# Patient Record
Sex: MALE | Race: WHITE | Employment: OTHER | URBAN - METROPOLITAN AREA
[De-identification: names, ages, dates, MRNs, and addresses within clinical notes are randomized per-mention and may not be internally consistent; named-entity substitution may affect disease eponyms.]

---

## 2017-02-22 RX ORDER — DILTIAZEM HYDROCHLORIDE 360 MG/1
360 CAPSULE, EXTENDED RELEASE ORAL DAILY
Qty: 90 CAPSULE | Refills: 1 | Status: SHIPPED | OUTPATIENT
Start: 2017-02-22 | End: 2017-05-25 | Stop reason: SDUPTHER

## 2017-05-25 ENCOUNTER — OFFICE VISIT (OUTPATIENT)
Dept: FAMILY MEDICINE CLINIC | Age: 62
End: 2017-05-25
Payer: COMMERCIAL

## 2017-05-25 VITALS
RESPIRATION RATE: 20 BRPM | SYSTOLIC BLOOD PRESSURE: 140 MMHG | BODY MASS INDEX: 38.06 KG/M2 | HEIGHT: 72 IN | WEIGHT: 281 LBS | HEART RATE: 75 BPM | DIASTOLIC BLOOD PRESSURE: 80 MMHG

## 2017-05-25 DIAGNOSIS — E11.9 TYPE 2 DIABETES MELLITUS WITHOUT COMPLICATION, WITHOUT LONG-TERM CURRENT USE OF INSULIN (HCC): Primary | ICD-10-CM

## 2017-05-25 DIAGNOSIS — I10 ESSENTIAL HYPERTENSION, BENIGN: ICD-10-CM

## 2017-05-25 DIAGNOSIS — M79.672 FOOT PAIN, BILATERAL: ICD-10-CM

## 2017-05-25 DIAGNOSIS — R39.14 FEELING OF INCOMPLETE BLADDER EMPTYING: ICD-10-CM

## 2017-05-25 DIAGNOSIS — M79.671 FOOT PAIN, BILATERAL: ICD-10-CM

## 2017-05-25 PROCEDURE — 99214 OFFICE O/P EST MOD 30 MIN: CPT | Performed by: FAMILY MEDICINE

## 2017-05-25 RX ORDER — DILTIAZEM HYDROCHLORIDE 360 MG/1
360 CAPSULE, EXTENDED RELEASE ORAL DAILY
Qty: 90 CAPSULE | Refills: 3 | Status: SHIPPED | OUTPATIENT
Start: 2017-05-25 | End: 2018-04-11 | Stop reason: SDUPTHER

## 2017-05-25 RX ORDER — TAMSULOSIN HYDROCHLORIDE 0.4 MG/1
0.8 CAPSULE ORAL DAILY
Qty: 180 CAPSULE | Refills: 3 | Status: SHIPPED | OUTPATIENT
Start: 2017-05-25 | End: 2018-04-11 | Stop reason: SDUPTHER

## 2017-05-30 ENCOUNTER — TELEPHONE (OUTPATIENT)
Dept: FAMILY MEDICINE CLINIC | Age: 62
End: 2017-05-30

## 2017-05-30 ENCOUNTER — HOSPITAL ENCOUNTER (OUTPATIENT)
Age: 62
Discharge: HOME | End: 2017-05-30
Payer: COMMERCIAL

## 2017-05-30 ENCOUNTER — NURSE ONLY (OUTPATIENT)
Dept: FAMILY MEDICINE CLINIC | Age: 62
End: 2017-05-30
Payer: COMMERCIAL

## 2017-05-30 VITALS — SYSTOLIC BLOOD PRESSURE: 136 MMHG | DIASTOLIC BLOOD PRESSURE: 88 MMHG

## 2017-05-30 DIAGNOSIS — E11.9 TYPE 2 DIABETES MELLITUS WITHOUT COMPLICATION, WITHOUT LONG-TERM CURRENT USE OF INSULIN (HCC): Primary | ICD-10-CM

## 2017-05-30 DIAGNOSIS — R39.14 FEELING OF INCOMPLETE BLADDER EMPTYING: ICD-10-CM

## 2017-05-30 DIAGNOSIS — E11.9 TYPE 2 DIABETES MELLITUS WITHOUT COMPLICATION, WITHOUT LONG-TERM CURRENT USE OF INSULIN (HCC): ICD-10-CM

## 2017-05-30 DIAGNOSIS — I10 ESSENTIAL HYPERTENSION, BENIGN: ICD-10-CM

## 2017-05-30 LAB
ALBUMIN SERPL-MCNC: 4.3 G/DL (ref 3.5–5.2)
ALBUMIN/GLOBULIN RATIO: ABNORMAL (ref 1–2.5)
ALP BLD-CCNC: 69 U/L (ref 40–129)
ALT SERPL-CCNC: 94 U/L (ref 5–41)
ANION GAP SERPL CALCULATED.3IONS-SCNC: 17 MMOL/L (ref 9–17)
AST SERPL-CCNC: 76 U/L
BILIRUB SERPL-MCNC: 0.62 MG/DL (ref 0.3–1.2)
BUN BLDV-MCNC: 16 MG/DL (ref 8–23)
BUN/CREAT BLD: 18 (ref 9–20)
CALCIUM SERPL-MCNC: 9.8 MG/DL (ref 8.6–10.4)
CHLORIDE BLD-SCNC: 101 MMOL/L (ref 98–107)
CHOLESTEROL/HDL RATIO: 3.1
CHOLESTEROL: 160 MG/DL
CO2: 22 MMOL/L (ref 20–31)
CREAT SERPL-MCNC: 0.88 MG/DL (ref 0.7–1.2)
CREATININE URINE POCT: NORMAL
ESTIMATED AVERAGE GLUCOSE: 154 MG/DL
GFR AFRICAN AMERICAN: >60 ML/MIN
GFR NON-AFRICAN AMERICAN: >60 ML/MIN
GFR SERPL CREATININE-BSD FRML MDRD: ABNORMAL ML/MIN/{1.73_M2}
GFR SERPL CREATININE-BSD FRML MDRD: ABNORMAL ML/MIN/{1.73_M2}
GLUCOSE BLD-MCNC: 129 MG/DL (ref 70–99)
HBA1C MFR BLD: 7 % (ref 4.8–5.9)
HDLC SERPL-MCNC: 52 MG/DL
LDL CHOLESTEROL: 89 MG/DL (ref 0–130)
MICROALBUMIN/CREAT 24H UR: NORMAL MG/G{CREAT}
MICROALBUMIN/CREAT UR-RTO: NORMAL
PATIENT FASTING?: YES
POTASSIUM SERPL-SCNC: 4.5 MMOL/L (ref 3.7–5.3)
PROSTATE SPECIFIC ANTIGEN: 0.39 UG/L
SODIUM BLD-SCNC: 140 MMOL/L (ref 135–144)
TOTAL PROTEIN: 7.6 G/DL (ref 6.4–8.3)
TRIGL SERPL-MCNC: 96 MG/DL
VLDLC SERPL CALC-MCNC: NORMAL MG/DL (ref 1–30)

## 2017-05-30 PROCEDURE — 83036 HEMOGLOBIN GLYCOSYLATED A1C: CPT

## 2017-05-30 PROCEDURE — 84153 ASSAY OF PSA TOTAL: CPT

## 2017-05-30 PROCEDURE — 36415 COLL VENOUS BLD VENIPUNCTURE: CPT

## 2017-05-30 PROCEDURE — 80053 COMPREHEN METABOLIC PANEL: CPT

## 2017-05-30 PROCEDURE — 82044 UR ALBUMIN SEMIQUANTITATIVE: CPT | Performed by: FAMILY MEDICINE

## 2017-05-30 PROCEDURE — 80061 LIPID PANEL: CPT

## 2017-06-11 ASSESSMENT — ENCOUNTER SYMPTOMS
COLOR CHANGE: 0
ORTHOPNEA: 0
CONSTIPATION: 0
VOMITING: 0
PHOTOPHOBIA: 0
BACK PAIN: 0
NAUSEA: 0
TROUBLE SWALLOWING: 0
FACIAL SWELLING: 0
SHORTNESS OF BREATH: 0
WHEEZING: 0
BLURRED VISION: 0
DIARRHEA: 0
ABDOMINAL PAIN: 0
ABDOMINAL DISTENTION: 0
COUGH: 0

## 2018-04-11 ENCOUNTER — OFFICE VISIT (OUTPATIENT)
Dept: FAMILY MEDICINE CLINIC | Age: 63
End: 2018-04-11
Payer: COMMERCIAL

## 2018-04-11 VITALS
HEART RATE: 93 BPM | OXYGEN SATURATION: 97 % | BODY MASS INDEX: 37.97 KG/M2 | DIASTOLIC BLOOD PRESSURE: 80 MMHG | SYSTOLIC BLOOD PRESSURE: 120 MMHG | WEIGHT: 280 LBS

## 2018-04-11 DIAGNOSIS — R19.4 CHANGE IN BOWEL HABITS: ICD-10-CM

## 2018-04-11 DIAGNOSIS — Z86.010 HISTORY OF COLON POLYPS: ICD-10-CM

## 2018-04-11 DIAGNOSIS — R39.9 LOWER URINARY TRACT SYMPTOMS (LUTS): ICD-10-CM

## 2018-04-11 DIAGNOSIS — I10 ESSENTIAL HYPERTENSION, BENIGN: ICD-10-CM

## 2018-04-11 DIAGNOSIS — E11.9 TYPE 2 DIABETES MELLITUS WITHOUT COMPLICATION, WITHOUT LONG-TERM CURRENT USE OF INSULIN (HCC): Primary | ICD-10-CM

## 2018-04-11 LAB — HBA1C MFR BLD: 9.1 %

## 2018-04-11 PROCEDURE — 1036F TOBACCO NON-USER: CPT | Performed by: FAMILY MEDICINE

## 2018-04-11 PROCEDURE — 3017F COLORECTAL CA SCREEN DOC REV: CPT | Performed by: FAMILY MEDICINE

## 2018-04-11 PROCEDURE — 99214 OFFICE O/P EST MOD 30 MIN: CPT | Performed by: FAMILY MEDICINE

## 2018-04-11 PROCEDURE — G8417 CALC BMI ABV UP PARAM F/U: HCPCS | Performed by: FAMILY MEDICINE

## 2018-04-11 PROCEDURE — 3046F HEMOGLOBIN A1C LEVEL >9.0%: CPT | Performed by: FAMILY MEDICINE

## 2018-04-11 PROCEDURE — G8427 DOCREV CUR MEDS BY ELIG CLIN: HCPCS | Performed by: FAMILY MEDICINE

## 2018-04-11 PROCEDURE — 2022F DILAT RTA XM EVC RTNOPTHY: CPT | Performed by: FAMILY MEDICINE

## 2018-04-11 PROCEDURE — 83036 HEMOGLOBIN GLYCOSYLATED A1C: CPT | Performed by: FAMILY MEDICINE

## 2018-04-11 RX ORDER — DILTIAZEM HYDROCHLORIDE 360 MG/1
360 CAPSULE, EXTENDED RELEASE ORAL DAILY
Qty: 90 CAPSULE | Refills: 3 | Status: SHIPPED | OUTPATIENT
Start: 2018-04-11 | End: 2018-07-27 | Stop reason: SDUPTHER

## 2018-04-11 RX ORDER — TAMSULOSIN HYDROCHLORIDE 0.4 MG/1
0.8 CAPSULE ORAL DAILY
Qty: 180 CAPSULE | Refills: 3 | Status: SHIPPED | OUTPATIENT
Start: 2018-04-11 | End: 2018-07-27 | Stop reason: SDUPTHER

## 2018-04-11 ASSESSMENT — PATIENT HEALTH QUESTIONNAIRE - PHQ9
SUM OF ALL RESPONSES TO PHQ QUESTIONS 1-9: 1
2. FEELING DOWN, DEPRESSED OR HOPELESS: 0
1. LITTLE INTEREST OR PLEASURE IN DOING THINGS: 1
SUM OF ALL RESPONSES TO PHQ9 QUESTIONS 1 & 2: 1

## 2018-04-18 ENCOUNTER — OFFICE VISIT (OUTPATIENT)
Dept: SURGERY | Age: 63
End: 2018-04-18
Payer: COMMERCIAL

## 2018-04-18 VITALS — HEIGHT: 72 IN | WEIGHT: 278 LBS | BODY MASS INDEX: 37.65 KG/M2

## 2018-04-18 DIAGNOSIS — R19.4 CHANGE IN BOWEL HABITS: Primary | ICD-10-CM

## 2018-04-18 DIAGNOSIS — Z86.010 HISTORY OF COLON POLYPS: ICD-10-CM

## 2018-04-18 PROCEDURE — G8417 CALC BMI ABV UP PARAM F/U: HCPCS | Performed by: SURGERY

## 2018-04-18 PROCEDURE — 3017F COLORECTAL CA SCREEN DOC REV: CPT | Performed by: SURGERY

## 2018-04-18 PROCEDURE — 1036F TOBACCO NON-USER: CPT | Performed by: SURGERY

## 2018-04-18 PROCEDURE — 99204 OFFICE O/P NEW MOD 45 MIN: CPT | Performed by: SURGERY

## 2018-04-18 PROCEDURE — G8427 DOCREV CUR MEDS BY ELIG CLIN: HCPCS | Performed by: SURGERY

## 2018-04-22 ASSESSMENT — ENCOUNTER SYMPTOMS
BLOOD IN STOOL: 0
ORTHOPNEA: 0
SHORTNESS OF BREATH: 0
TROUBLE SWALLOWING: 0
ABDOMINAL DISTENTION: 0
WHEEZING: 0
BACK PAIN: 0
FACIAL SWELLING: 0
NAUSEA: 0
COUGH: 0
SHORTNESS OF BREATH: 0
BLURRED VISION: 0
NAUSEA: 0
ABDOMINAL PAIN: 0
BACK PAIN: 0
COLOR CHANGE: 0
SORE THROAT: 0
TROUBLE SWALLOWING: 0
CHOKING: 0
COUGH: 0
VOMITING: 0
VOMITING: 0
ABDOMINAL PAIN: 0
CONSTIPATION: 1
PHOTOPHOBIA: 0

## 2018-04-23 ENCOUNTER — OFFICE VISIT (OUTPATIENT)
Dept: UROLOGY | Age: 63
End: 2018-04-23
Payer: COMMERCIAL

## 2018-04-23 ENCOUNTER — HOSPITAL ENCOUNTER (OUTPATIENT)
Age: 63
Setting detail: SPECIMEN
Discharge: HOME | End: 2018-04-23
Payer: COMMERCIAL

## 2018-04-23 VITALS
HEIGHT: 72 IN | SYSTOLIC BLOOD PRESSURE: 138 MMHG | WEIGHT: 278 LBS | BODY MASS INDEX: 37.65 KG/M2 | DIASTOLIC BLOOD PRESSURE: 84 MMHG

## 2018-04-23 DIAGNOSIS — R39.12 WEAK URINARY STREAM: ICD-10-CM

## 2018-04-23 DIAGNOSIS — N13.8 BPH WITH OBSTRUCTION/LOWER URINARY TRACT SYMPTOMS: ICD-10-CM

## 2018-04-23 DIAGNOSIS — N40.1 BPH WITH OBSTRUCTION/LOWER URINARY TRACT SYMPTOMS: ICD-10-CM

## 2018-04-23 DIAGNOSIS — R35.0 FREQUENCY OF URINATION: ICD-10-CM

## 2018-04-23 DIAGNOSIS — N13.8 BPH WITH OBSTRUCTION/LOWER URINARY TRACT SYMPTOMS: Primary | ICD-10-CM

## 2018-04-23 DIAGNOSIS — N40.1 BPH WITH OBSTRUCTION/LOWER URINARY TRACT SYMPTOMS: Primary | ICD-10-CM

## 2018-04-23 LAB
-: ABNORMAL
AMORPHOUS: ABNORMAL
BACTERIA: ABNORMAL
BILIRUBIN URINE: NEGATIVE
CASTS UA: ABNORMAL /LPF
COLOR: YELLOW
COMMENT UA: ABNORMAL
CRYSTALS, UA: ABNORMAL /HPF
EPITHELIAL CELLS UA: ABNORMAL /HPF (ref 0–5)
GLUCOSE URINE: NEGATIVE
KETONES, URINE: NEGATIVE
LEUKOCYTE ESTERASE, URINE: NEGATIVE
MUCUS: ABNORMAL
NITRITE, URINE: NEGATIVE
OTHER OBSERVATIONS UA: ABNORMAL
PH UA: 5 (ref 5–9)
PROTEIN UA: NEGATIVE
RBC UA: ABNORMAL /HPF (ref 0–2)
RENAL EPITHELIAL, UA: ABNORMAL /HPF
SPECIFIC GRAVITY UA: >1.03 (ref 1.01–1.02)
TRICHOMONAS: ABNORMAL
TURBIDITY: CLEAR
URINE HGB: ABNORMAL
UROBILINOGEN, URINE: NORMAL
WBC UA: ABNORMAL /HPF (ref 0–5)
YEAST: ABNORMAL

## 2018-04-23 PROCEDURE — 51798 US URINE CAPACITY MEASURE: CPT | Performed by: NURSE PRACTITIONER

## 2018-04-23 PROCEDURE — 81001 URINALYSIS AUTO W/SCOPE: CPT

## 2018-04-23 PROCEDURE — G8427 DOCREV CUR MEDS BY ELIG CLIN: HCPCS | Performed by: NURSE PRACTITIONER

## 2018-04-23 PROCEDURE — 99204 OFFICE O/P NEW MOD 45 MIN: CPT | Performed by: NURSE PRACTITIONER

## 2018-04-23 PROCEDURE — 1036F TOBACCO NON-USER: CPT | Performed by: NURSE PRACTITIONER

## 2018-04-23 PROCEDURE — 3017F COLORECTAL CA SCREEN DOC REV: CPT | Performed by: NURSE PRACTITIONER

## 2018-04-23 PROCEDURE — 87086 URINE CULTURE/COLONY COUNT: CPT

## 2018-04-23 PROCEDURE — G8417 CALC BMI ABV UP PARAM F/U: HCPCS | Performed by: NURSE PRACTITIONER

## 2018-04-23 ASSESSMENT — ENCOUNTER SYMPTOMS
BACK PAIN: 0
ABDOMINAL PAIN: 0
EYE PAIN: 0
CONSTIPATION: 0
WHEEZING: 0
NAUSEA: 0
COLOR CHANGE: 0
COUGH: 0
VOMITING: 0
SHORTNESS OF BREATH: 0
EYE REDNESS: 0

## 2018-04-24 LAB
CULTURE: NO GROWTH
CULTURE: NORMAL
Lab: NORMAL
Lab: NORMAL
SPECIMEN DESCRIPTION: NORMAL
SPECIMEN DESCRIPTION: NORMAL
STATUS: NORMAL

## 2018-05-14 ENCOUNTER — PROCEDURE VISIT (OUTPATIENT)
Dept: UROLOGY | Age: 63
End: 2018-05-14
Payer: COMMERCIAL

## 2018-05-14 VITALS — BODY MASS INDEX: 37.84 KG/M2 | SYSTOLIC BLOOD PRESSURE: 130 MMHG | DIASTOLIC BLOOD PRESSURE: 88 MMHG | WEIGHT: 279 LBS

## 2018-05-14 DIAGNOSIS — N40.0 BPH WITHOUT OBSTRUCTION/LOWER URINARY TRACT SYMPTOMS: Primary | ICD-10-CM

## 2018-05-14 PROCEDURE — G8417 CALC BMI ABV UP PARAM F/U: HCPCS | Performed by: UROLOGY

## 2018-05-14 PROCEDURE — 3017F COLORECTAL CA SCREEN DOC REV: CPT | Performed by: UROLOGY

## 2018-05-14 PROCEDURE — 52000 CYSTOURETHROSCOPY: CPT | Performed by: UROLOGY

## 2018-05-14 PROCEDURE — G8427 DOCREV CUR MEDS BY ELIG CLIN: HCPCS | Performed by: UROLOGY

## 2018-05-14 PROCEDURE — 1036F TOBACCO NON-USER: CPT | Performed by: UROLOGY

## 2018-05-14 PROCEDURE — 99214 OFFICE O/P EST MOD 30 MIN: CPT | Performed by: UROLOGY

## 2018-05-14 RX ORDER — FINASTERIDE 5 MG/1
5 TABLET, FILM COATED ORAL DAILY
Qty: 90 TABLET | Refills: 3 | Status: SHIPPED | OUTPATIENT
Start: 2018-05-14 | End: 2018-07-27 | Stop reason: SDUPTHER

## 2018-05-14 ASSESSMENT — ENCOUNTER SYMPTOMS
COLOR CHANGE: 0
WHEEZING: 0
EYE REDNESS: 0
SHORTNESS OF BREATH: 0
EYE PAIN: 0
VOMITING: 0
NAUSEA: 0
BACK PAIN: 0
COUGH: 0
ABDOMINAL PAIN: 0

## 2018-07-27 ENCOUNTER — TELEPHONE (OUTPATIENT)
Dept: UROLOGY | Age: 63
End: 2018-07-27

## 2018-07-27 DIAGNOSIS — N40.0 BPH WITHOUT OBSTRUCTION/LOWER URINARY TRACT SYMPTOMS: ICD-10-CM

## 2018-07-27 DIAGNOSIS — E11.9 TYPE 2 DIABETES MELLITUS WITHOUT COMPLICATION, WITHOUT LONG-TERM CURRENT USE OF INSULIN (HCC): ICD-10-CM

## 2018-07-27 RX ORDER — DILTIAZEM HYDROCHLORIDE 360 MG/1
360 CAPSULE, EXTENDED RELEASE ORAL DAILY
Qty: 90 CAPSULE | Refills: 1 | Status: SHIPPED | OUTPATIENT
Start: 2018-07-27 | End: 2019-01-23 | Stop reason: SDUPTHER

## 2018-07-27 RX ORDER — FINASTERIDE 5 MG/1
5 TABLET, FILM COATED ORAL DAILY
Qty: 90 TABLET | Refills: 1 | Status: SHIPPED | OUTPATIENT
Start: 2018-07-27 | End: 2019-01-23 | Stop reason: SDUPTHER

## 2018-07-27 RX ORDER — TAMSULOSIN HYDROCHLORIDE 0.4 MG/1
0.8 CAPSULE ORAL DAILY
Qty: 180 CAPSULE | Refills: 1 | Status: SHIPPED | OUTPATIENT
Start: 2018-07-27 | End: 2019-01-23 | Stop reason: SDUPTHER

## 2018-07-27 NOTE — TELEPHONE ENCOUNTER
Patient is requesting a refill to [facility] DR JEFFERS    Tamsulosin 0.4 mg  Metformin 1000 mg  Diltiazem 360 mg  Sitagliptin 100 mg    Patient is scheduled for 8/15/18 with Dr. Jaylin Negro Date Due    Diabetic foot exam  07/30/1963    DTaP/Tdap/Td vaccine (1 - Tdap) 07/30/1972    Pneumococcal med risk (1 of 1 - PPSV23) 07/30/1972    Shingles Vaccine (1 of 2 - 2 Dose Series) 07/30/2003    Diabetic retinal exam  06/06/2017    Diabetic microalbuminuria test  05/30/2018    Lipid screen  05/30/2018    Potassium monitoring  05/30/2018    Creatinine monitoring  05/30/2018    A1C test (Diabetic or Prediabetic)  07/11/2018    Flu vaccine (1) 09/01/2018    Colon cancer screen colonoscopy  02/12/2025    Hepatitis C screen  Addressed    HIV screen  Addressed             (applicable per patient's age: Cancer Screenings, Depression Screening, Fall Risk Screening, Immunizations)    Hemoglobin A1C (%)   Date Value   04/11/2018 9.1   05/30/2017 7.0 (H)   12/02/2016 7.5     Microalb/Crt.  Ratio (mcg/mg creat)   Date Value   05/26/2016 8     LDL Cholesterol (mg/dL)   Date Value   05/30/2017 89     AST (U/L)   Date Value   05/30/2017 76 (H)     ALT (U/L)   Date Value   05/30/2017 94 (H)     BUN (mg/dL)   Date Value   05/30/2017 16      (goal A1C is < 7)   (goal LDL is <100) need 30-50% reduction from baseline     BP Readings from Last 3 Encounters:   05/14/18 130/88   04/23/18 138/84   04/11/18 120/80    (goal /80)      All Future Testing planned in [facility]:  Lab Frequency Next Occurrence       Next Visit Date:  Future Appointments  Date Time Provider Lashay Rubio   8/13/2018 1:20 PM DO Ashanti Samuels [facility]   8/15/2018 9:40 AM DO Ashanti Samuels [facility]   11/14/2018 9:30 AM MD Mary Wright [facility]            Patient Active Problem List:     Essential hypertension, benign     Type 2 diabetes mellitus without complication, without long-term current use

## 2018-07-27 NOTE — TELEPHONE ENCOUNTER
Patient called and is requesting a new prescription for the Proscar, 5 mg., daily. He advised that he can no longer use the mail order and would like a new script sent to [facility] in [place].

## 2018-08-06 ENCOUNTER — HOSPITAL ENCOUNTER (OUTPATIENT)
Age: 63
Setting detail: SPECIMEN
Discharge: HOME | DRG: 694 | End: 2018-08-06
Payer: MEDICARE

## 2018-08-06 ENCOUNTER — OFFICE VISIT (OUTPATIENT)
Dept: UROLOGY | Age: 63
End: 2018-08-06
Payer: MEDICARE

## 2018-08-06 VITALS — WEIGHT: 272 LBS | DIASTOLIC BLOOD PRESSURE: 88 MMHG | BODY MASS INDEX: 36.89 KG/M2 | SYSTOLIC BLOOD PRESSURE: 148 MMHG

## 2018-08-06 DIAGNOSIS — R31.0 GROSS HEMATURIA: Primary | ICD-10-CM

## 2018-08-06 DIAGNOSIS — R31.0 GROSS HEMATURIA: ICD-10-CM

## 2018-08-06 LAB
-: ABNORMAL
AMORPHOUS: ABNORMAL
BACTERIA: ABNORMAL
BILIRUBIN URINE: ABNORMAL
CASTS UA: ABNORMAL /LPF
COLOR: ABNORMAL
COMMENT UA: ABNORMAL
CRYSTALS, UA: ABNORMAL /HPF
EPITHELIAL CELLS UA: ABNORMAL /HPF (ref 0–5)
GLUCOSE URINE: NEGATIVE
KETONES, URINE: ABNORMAL
LEUKOCYTE ESTERASE, URINE: NEGATIVE
MUCUS: ABNORMAL
NITRITE, URINE: ABNORMAL
OTHER OBSERVATIONS UA: ABNORMAL
PH UA: 6.5 (ref 5–9)
PROTEIN UA: ABNORMAL
RBC UA: ABNORMAL /HPF (ref 0–2)
RENAL EPITHELIAL, UA: ABNORMAL /HPF
SPECIFIC GRAVITY UA: >1.03 (ref 1.01–1.02)
TRICHOMONAS: ABNORMAL
TURBIDITY: ABNORMAL
URINE HGB: ABNORMAL
UROBILINOGEN, URINE: NORMAL
WBC UA: ABNORMAL /HPF (ref 0–5)
YEAST: ABNORMAL

## 2018-08-06 PROCEDURE — G8427 DOCREV CUR MEDS BY ELIG CLIN: HCPCS | Performed by: NURSE PRACTITIONER

## 2018-08-06 PROCEDURE — 99214 OFFICE O/P EST MOD 30 MIN: CPT | Performed by: NURSE PRACTITIONER

## 2018-08-06 PROCEDURE — 81001 URINALYSIS AUTO W/SCOPE: CPT

## 2018-08-06 PROCEDURE — 3017F COLORECTAL CA SCREEN DOC REV: CPT | Performed by: NURSE PRACTITIONER

## 2018-08-06 PROCEDURE — G8417 CALC BMI ABV UP PARAM F/U: HCPCS | Performed by: NURSE PRACTITIONER

## 2018-08-06 PROCEDURE — 1123F ACP DISCUSS/DSCN MKR DOCD: CPT | Performed by: NURSE PRACTITIONER

## 2018-08-06 PROCEDURE — 1101F PT FALLS ASSESS-DOCD LE1/YR: CPT | Performed by: NURSE PRACTITIONER

## 2018-08-06 PROCEDURE — 87086 URINE CULTURE/COLONY COUNT: CPT

## 2018-08-06 PROCEDURE — 4040F PNEUMOC VAC/ADMIN/RCVD: CPT | Performed by: NURSE PRACTITIONER

## 2018-08-06 PROCEDURE — 1036F TOBACCO NON-USER: CPT | Performed by: NURSE PRACTITIONER

## 2018-08-06 RX ORDER — CIPROFLOXACIN 500 MG/1
500 TABLET, FILM COATED ORAL 2 TIMES DAILY
Qty: 20 TABLET | Refills: 0 | Status: SHIPPED | OUTPATIENT
Start: 2018-08-06 | End: 2018-08-16

## 2018-08-06 ASSESSMENT — ENCOUNTER SYMPTOMS
SHORTNESS OF BREATH: 0
NAUSEA: 0
EYE REDNESS: 0
ABDOMINAL PAIN: 0
VOMITING: 0
COUGH: 0
EYE PAIN: 0
BACK PAIN: 0
CONSTIPATION: 0
COLOR CHANGE: 0
WHEEZING: 0

## 2018-08-06 NOTE — PROGRESS NOTES
Subjective:      Patient ID: Jamia Savage is a 70 y.o. male. HPI  Patient is a 70 y.o. male in no acute distress and alert and oriented to person, place and time. History  4/2018 Referral from Dr. Chip Maloney for LUTS. He complains of frequency, urgency, weak stream, intermittency. He has nocturia 1-2 times per night. He has been on Flomax for the last 5-6 years. He denies history of kidney stones. He denies history of frequent urinary tract infections. He was circumcised. He has never seen urology in the past.  He does have type 2 diabetes that is currently uncontrolled. Most recent hemoglobin A1c was 9.1. He does admit to drinking 3 cups of coffee per day. He denies constipation. 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 Started on proscar. Today  Here today with complaints of gross hematuria that started this morning. Patient denies flank or abdominal pain. He denies dysuria or worsening LUTS. He was a previous smoker, smoked 1 ppd for 20 years. He did quit 20 years ago. He is now retired, but worked on the [employer].    Past Medical History:   Diagnosis Date    Diabetes mellitus ([address]     Hypertension     Obstructive sleep apnea     on bipap at home     Past Surgical History:   Procedure Laterality Date    COLONOSCOPY  08/2012    [facility]    COLONOSCOPY  02/12/2015    Demar Lindsey MD; Colon polyps x2, sigmoid diverticulosis, internal and external hemorrhoids    HERNIA REPAIR       Family History   Problem Relation Age of Onset    Cancer Mother         Uterine    Heart Disease Father         CAD    Diabetes Brother      Current Outpatient Prescriptions on File Prior to Visit   Medication Sig Dispense Refill    finasteride (PROSCAR) 5 MG tablet Take 1 tablet by mouth daily 90 tablet 1    tamsulosin (FLOMAX) 0.4 MG capsule Take 2 capsules by mouth daily 180 capsule 1    metFORMIN (GLUCOPHAGE) 1000 MG tablet Take 1 tablet by mouth 2 times daily (with meals) 180 tablet 1    diltiazem (CARDIZEM CD) 360 MG extended release capsule Take 1 capsule by mouth daily 90 capsule 1    SITagliptin (JANUVIA) 100 MG tablet Take 1 tablet by mouth daily 90 tablet 1     No current facility-administered medications on file prior to visit. Outpatient Encounter Prescriptions as of 8/6/2018   Medication Sig Dispense Refill    finasteride (PROSCAR) 5 MG tablet Take 1 tablet by mouth daily 90 tablet 1    tamsulosin (FLOMAX) 0.4 MG capsule Take 2 capsules by mouth daily 180 capsule 1    metFORMIN (GLUCOPHAGE) 1000 MG tablet Take 1 tablet by mouth 2 times daily (with meals) 180 tablet 1    diltiazem (CARDIZEM CD) 360 MG extended release capsule Take 1 capsule by mouth daily 90 capsule 1    SITagliptin (JANUVIA) 100 MG tablet Take 1 tablet by mouth daily 90 tablet 1     No facility-administered encounter medications on file as of 8/6/2018. Patient has no known allergies. History   Smoking Status    Former Smoker    Years: 24.00    Quit date: 2/12/1996   Smokeless Tobacco    Never Used       History   Alcohol Use    4.8 oz/week    6 Cans of beer, 2 Shots of liquor per week     Comment: Beer weekly       Vitals:    08/06/18 1601   BP: (!) 148/88       Constitutional: Patient in no acute distress; Neuro: alert and oriented to person place and time. Psych: Mood and affect normal.  Skin: Normal  Lungs: Respiratory effort normal  Cardiovascular:  Normal peripheral pulses  Abdomen: Soft, non-tender, non-distended with no CVA, flank pain, hepatosplenomegaly or hernia. Kidneys normal.  Bladder non-tender and not distended.   Lymphatics: no palpable lymphadenopathy  Penis normal  Urethral meatus normal  Scrotal exam normal  Testicles normal bilaterally    Lab Results   Component Value Date    PSA 0.39 05/30/2017    PSA 0.53 05/26/2016    PSA 0.38 07/15/2014     Lab Results   Component Value Date    BUN 16 05/30/2017     Lab Results   Component Value Date    CREATININE 0.88

## 2018-08-07 ENCOUNTER — APPOINTMENT (OUTPATIENT)
Dept: CT IMAGING | Age: 63
End: 2018-08-07
Payer: MEDICARE

## 2018-08-07 ENCOUNTER — ANESTHESIA (OUTPATIENT)
Dept: OPERATING ROOM | Age: 63
DRG: 694 | End: 2018-08-07
Payer: MEDICARE

## 2018-08-07 ENCOUNTER — APPOINTMENT (OUTPATIENT)
Dept: GENERAL RADIOLOGY | Age: 63
DRG: 694 | End: 2018-08-07
Attending: FAMILY MEDICINE
Payer: MEDICARE

## 2018-08-07 ENCOUNTER — HOSPITAL ENCOUNTER (EMERGENCY)
Age: 63
Discharge: ANOTHER HOSPITAL | End: 2018-08-07
Attending: EMERGENCY MEDICINE
Payer: MEDICARE

## 2018-08-07 ENCOUNTER — HOSPITAL ENCOUNTER (INPATIENT)
Age: 63
LOS: 1 days | Discharge: HOME | DRG: 694 | End: 2018-08-08
Attending: FAMILY MEDICINE | Admitting: FAMILY MEDICINE
Payer: MEDICARE

## 2018-08-07 ENCOUNTER — ANESTHESIA EVENT (OUTPATIENT)
Dept: OPERATING ROOM | Age: 63
DRG: 694 | End: 2018-08-07
Payer: MEDICARE

## 2018-08-07 VITALS
OXYGEN SATURATION: 94 % | DIASTOLIC BLOOD PRESSURE: 67 MMHG | RESPIRATION RATE: 2 BRPM | SYSTOLIC BLOOD PRESSURE: 97 MMHG

## 2018-08-07 VITALS
DIASTOLIC BLOOD PRESSURE: 96 MMHG | TEMPERATURE: 97.8 F | HEART RATE: 86 BPM | SYSTOLIC BLOOD PRESSURE: 146 MMHG | WEIGHT: 269.9 LBS | BODY MASS INDEX: 36.56 KG/M2 | HEIGHT: 72 IN | RESPIRATION RATE: 18 BRPM | OXYGEN SATURATION: 96 %

## 2018-08-07 DIAGNOSIS — R10.9 LEFT FLANK PAIN: Primary | ICD-10-CM

## 2018-08-07 DIAGNOSIS — R31.29 OTHER MICROSCOPIC HEMATURIA: ICD-10-CM

## 2018-08-07 DIAGNOSIS — N20.1 URETEROLITHIASIS: ICD-10-CM

## 2018-08-07 PROBLEM — N13.2 HYDRONEPHROSIS WITH URINARY OBSTRUCTION DUE TO URETERAL CALCULUS: Status: ACTIVE | Noted: 2018-08-07

## 2018-08-07 PROBLEM — N20.0 KIDNEY STONE: Status: ACTIVE | Noted: 2018-08-07

## 2018-08-07 LAB
-: NORMAL
ABSOLUTE EOS #: 0.1 K/UL (ref 0–0.4)
ABSOLUTE IMMATURE GRANULOCYTE: ABNORMAL K/UL (ref 0–0.3)
ABSOLUTE LYMPH #: 1.3 K/UL (ref 1–4.8)
ABSOLUTE MONO #: 1 K/UL (ref 0–1)
AMORPHOUS: NORMAL
ANION GAP SERPL CALCULATED.3IONS-SCNC: 17 MMOL/L (ref 9–17)
BACTERIA: NORMAL
BASOPHILS # BLD: 0 % (ref 0–2)
BASOPHILS ABSOLUTE: 0.1 K/UL (ref 0–0.2)
BILIRUBIN URINE: NEGATIVE
BUN BLDV-MCNC: 19 MG/DL (ref 8–23)
BUN/CREAT BLD: 15 (ref 9–20)
CALCIUM SERPL-MCNC: 10.3 MG/DL (ref 8.6–10.4)
CASTS UA: NORMAL /LPF
CHLORIDE BLD-SCNC: 102 MMOL/L (ref 98–107)
CO2: 21 MMOL/L (ref 20–31)
COLOR: YELLOW
COMMENT UA: ABNORMAL
CREAT SERPL-MCNC: 1.31 MG/DL (ref 0.7–1.2)
CRYSTALS, UA: NORMAL /HPF
CULTURE: NO GROWTH
CULTURE: NO GROWTH
DIFFERENTIAL TYPE: YES
EKG ATRIAL RATE: 86 BPM
EKG P AXIS: 24 DEGREES
EKG P-R INTERVAL: 148 MS
EKG Q-T INTERVAL: 374 MS
EKG QRS DURATION: 98 MS
EKG QTC CALCULATION (BAZETT): 447 MS
EKG R AXIS: 19 DEGREES
EKG T AXIS: 20 DEGREES
EKG VENTRICULAR RATE: 86 BPM
EOSINOPHILS RELATIVE PERCENT: 1 % (ref 0–5)
EPITHELIAL CELLS UA: NORMAL /HPF
GFR AFRICAN AMERICAN: >60 ML/MIN
GFR NON-AFRICAN AMERICAN: 55 ML/MIN
GFR SERPL CREATININE-BSD FRML MDRD: ABNORMAL ML/MIN/{1.73_M2}
GFR SERPL CREATININE-BSD FRML MDRD: ABNORMAL ML/MIN/{1.73_M2}
GLUCOSE BLD-MCNC: 129 MG/DL (ref 74–100)
GLUCOSE BLD-MCNC: 165 MG/DL (ref 74–100)
GLUCOSE BLD-MCNC: 222 MG/DL (ref 74–100)
GLUCOSE BLD-MCNC: 318 MG/DL (ref 70–99)
GLUCOSE URINE: ABNORMAL
HCT VFR BLD CALC: 50.1 % (ref 41–53)
HEMOGLOBIN: 16.8 G/DL (ref 13.5–17.5)
IMMATURE GRANULOCYTES: ABNORMAL %
INR BLD: 1 (ref 0.9–1.2)
KETONES, URINE: ABNORMAL
LEUKOCYTE ESTERASE, URINE: NEGATIVE
LYMPHOCYTES # BLD: 10 % (ref 13–44)
Lab: NORMAL
Lab: NORMAL
MCH RBC QN AUTO: 31.8 PG (ref 26–34)
MCHC RBC AUTO-ENTMCNC: 33.5 G/DL (ref 31–37)
MCV RBC AUTO: 94.9 FL (ref 80–100)
MONOCYTES # BLD: 8 % (ref 5–9)
MUCUS: NORMAL
NITRITE, URINE: NEGATIVE
NRBC AUTOMATED: ABNORMAL PER 100 WBC
OTHER OBSERVATIONS UA: NORMAL
PDW BLD-RTO: 13.5 % (ref 12.1–15.2)
PH UA: 7 (ref 5–8)
PLATELET # BLD: 285 K/UL (ref 140–450)
PLATELET ESTIMATE: ABNORMAL
PMV BLD AUTO: ABNORMAL FL (ref 6–12)
POTASSIUM SERPL-SCNC: 4.9 MMOL/L (ref 3.7–5.3)
PROTEIN UA: ABNORMAL
PROTHROMBIN TIME: 10.7 SEC (ref 9.7–12.2)
RBC # BLD: 5.28 M/UL (ref 4.5–5.9)
RBC # BLD: ABNORMAL 10*6/UL
RBC UA: NORMAL /HPF (ref 0–2)
RENAL EPITHELIAL, UA: NORMAL /HPF
SEG NEUTROPHILS: 81 % (ref 39–75)
SEGMENTED NEUTROPHILS ABSOLUTE COUNT: 10.2 K/UL (ref 2.1–6.5)
SODIUM BLD-SCNC: 140 MMOL/L (ref 135–144)
SPECIFIC GRAVITY UA: 1.01 (ref 1–1.03)
SPECIMEN DESCRIPTION: NORMAL
SPECIMEN DESCRIPTION: NORMAL
STATUS: NORMAL
STATUS: NORMAL
TRICHOMONAS: NORMAL
TURBIDITY: CLEAR
URINE HGB: ABNORMAL
UROBILINOGEN, URINE: NORMAL
WBC # BLD: 12.7 K/UL (ref 3.5–11)
WBC # BLD: ABNORMAL 10*3/UL
WBC UA: NORMAL /HPF
YEAST: NORMAL

## 2018-08-07 PROCEDURE — 3600000014 HC SURGERY LEVEL 4 ADDTL 15MIN: Performed by: UROLOGY

## 2018-08-07 PROCEDURE — 81001 URINALYSIS AUTO W/SCOPE: CPT

## 2018-08-07 PROCEDURE — 3700000000 HC ANESTHESIA ATTENDED CARE: Performed by: UROLOGY

## 2018-08-07 PROCEDURE — 93005 ELECTROCARDIOGRAM TRACING: CPT

## 2018-08-07 PROCEDURE — 96376 TX/PRO/DX INJ SAME DRUG ADON: CPT

## 2018-08-07 PROCEDURE — 2580000003 HC RX 258: Performed by: EMERGENCY MEDICINE

## 2018-08-07 PROCEDURE — 82947 ASSAY GLUCOSE BLOOD QUANT: CPT

## 2018-08-07 PROCEDURE — 3600000004 HC SURGERY LEVEL 4 BASE: Performed by: UROLOGY

## 2018-08-07 PROCEDURE — 2580000003 HC RX 258: Performed by: UROLOGY

## 2018-08-07 PROCEDURE — 71045 X-RAY EXAM CHEST 1 VIEW: CPT

## 2018-08-07 PROCEDURE — 80048 BASIC METABOLIC PNL TOTAL CA: CPT

## 2018-08-07 PROCEDURE — 2500000003 HC RX 250 WO HCPCS: Performed by: NURSE ANESTHETIST, CERTIFIED REGISTERED

## 2018-08-07 PROCEDURE — 87086 URINE CULTURE/COLONY COUNT: CPT

## 2018-08-07 PROCEDURE — 85610 PROTHROMBIN TIME: CPT

## 2018-08-07 PROCEDURE — 3700000001 HC ADD 15 MINUTES (ANESTHESIA): Performed by: UROLOGY

## 2018-08-07 PROCEDURE — 6360000002 HC RX W HCPCS: Performed by: PHYSICIAN ASSISTANT

## 2018-08-07 PROCEDURE — 6360000002 HC RX W HCPCS: Performed by: EMERGENCY MEDICINE

## 2018-08-07 PROCEDURE — 2580000003 HC RX 258: Performed by: PHYSICIAN ASSISTANT

## 2018-08-07 PROCEDURE — 36415 COLL VENOUS BLD VENIPUNCTURE: CPT

## 2018-08-07 PROCEDURE — 74176 CT ABD & PELVIS W/O CONTRAST: CPT

## 2018-08-07 PROCEDURE — 1200000000 HC SEMI PRIVATE

## 2018-08-07 PROCEDURE — 96374 THER/PROPH/DIAG INJ IV PUSH: CPT

## 2018-08-07 PROCEDURE — 6360000002 HC RX W HCPCS: Performed by: FAMILY MEDICINE

## 2018-08-07 PROCEDURE — 85025 COMPLETE CBC W/AUTO DIFF WBC: CPT

## 2018-08-07 PROCEDURE — 7100000000 HC PACU RECOVERY - FIRST 15 MIN: Performed by: UROLOGY

## 2018-08-07 PROCEDURE — 6360000002 HC RX W HCPCS: Performed by: NURSE ANESTHETIST, CERTIFIED REGISTERED

## 2018-08-07 PROCEDURE — 2709999900 HC NON-CHARGEABLE SUPPLY: Performed by: UROLOGY

## 2018-08-07 PROCEDURE — 96375 TX/PRO/DX INJ NEW DRUG ADDON: CPT

## 2018-08-07 PROCEDURE — 6370000000 HC RX 637 (ALT 250 FOR IP): Performed by: UROLOGY

## 2018-08-07 PROCEDURE — C2617 STENT, NON-COR, TEM W/O DEL: HCPCS | Performed by: UROLOGY

## 2018-08-07 PROCEDURE — 0T778DZ DILATION OF LEFT URETER WITH INTRALUMINAL DEVICE, VIA NATURAL OR ARTIFICIAL OPENING ENDOSCOPIC: ICD-10-PCS | Performed by: UROLOGY

## 2018-08-07 PROCEDURE — 74018 RADEX ABDOMEN 1 VIEW: CPT

## 2018-08-07 PROCEDURE — 99285 EMERGENCY DEPT VISIT HI MDM: CPT

## 2018-08-07 DEVICE — URETERAL STENT SET
Type: IMPLANTABLE DEVICE | Site: URETER | Status: FUNCTIONAL
Brand: PERCUFLEX™ PLUS

## 2018-08-07 RX ORDER — SODIUM CHLORIDE 0.9 % (FLUSH) 0.9 %
10 SYRINGE (ML) INJECTION EVERY 12 HOURS SCHEDULED
Status: DISCONTINUED | OUTPATIENT
Start: 2018-08-07 | End: 2018-08-08 | Stop reason: HOSPADM

## 2018-08-07 RX ORDER — 0.9 % SODIUM CHLORIDE 0.9 %
500 INTRAVENOUS SOLUTION INTRAVENOUS ONCE
Status: COMPLETED | OUTPATIENT
Start: 2018-08-07 | End: 2018-08-07

## 2018-08-07 RX ORDER — PANTOPRAZOLE SODIUM 40 MG/10ML
40 INJECTION, POWDER, LYOPHILIZED, FOR SOLUTION INTRAVENOUS
Status: DISCONTINUED | OUTPATIENT
Start: 2018-08-07 | End: 2018-08-07

## 2018-08-07 RX ORDER — DEXTROSE MONOHYDRATE 25 G/50ML
12.5 INJECTION, SOLUTION INTRAVENOUS PRN
Status: DISCONTINUED | OUTPATIENT
Start: 2018-08-07 | End: 2018-08-08 | Stop reason: HOSPADM

## 2018-08-07 RX ORDER — ONDANSETRON 4 MG/1
4 TABLET, ORALLY DISINTEGRATING ORAL EVERY 8 HOURS PRN
Status: DISCONTINUED | OUTPATIENT
Start: 2018-08-07 | End: 2018-08-08 | Stop reason: HOSPADM

## 2018-08-07 RX ORDER — DEXTROSE MONOHYDRATE 50 MG/ML
100 INJECTION, SOLUTION INTRAVENOUS PRN
Status: DISCONTINUED | OUTPATIENT
Start: 2018-08-07 | End: 2018-08-08 | Stop reason: HOSPADM

## 2018-08-07 RX ORDER — NICOTINE POLACRILEX 4 MG
15 LOZENGE BUCCAL PRN
Status: DISCONTINUED | OUTPATIENT
Start: 2018-08-07 | End: 2018-08-07

## 2018-08-07 RX ORDER — ONDANSETRON 2 MG/ML
4 INJECTION INTRAMUSCULAR; INTRAVENOUS EVERY 30 MIN PRN
Status: DISCONTINUED | OUTPATIENT
Start: 2018-08-07 | End: 2018-08-07 | Stop reason: HOSPADM

## 2018-08-07 RX ORDER — PROPOFOL 10 MG/ML
INJECTION, EMULSION INTRAVENOUS PRN
Status: DISCONTINUED | OUTPATIENT
Start: 2018-08-07 | End: 2018-08-07 | Stop reason: SDUPTHER

## 2018-08-07 RX ORDER — SODIUM CHLORIDE 0.9 % (FLUSH) 0.9 %
10 SYRINGE (ML) INJECTION PRN
Status: DISCONTINUED | OUTPATIENT
Start: 2018-08-07 | End: 2018-08-07

## 2018-08-07 RX ORDER — LIDOCAINE HYDROCHLORIDE 10 MG/ML
INJECTION, SOLUTION INFILTRATION; PERINEURAL PRN
Status: DISCONTINUED | OUTPATIENT
Start: 2018-08-07 | End: 2018-08-07 | Stop reason: SDUPTHER

## 2018-08-07 RX ORDER — TAMSULOSIN HYDROCHLORIDE 0.4 MG/1
0.8 CAPSULE ORAL DAILY
Status: DISCONTINUED | OUTPATIENT
Start: 2018-08-07 | End: 2018-08-08 | Stop reason: HOSPADM

## 2018-08-07 RX ORDER — DILTIAZEM HYDROCHLORIDE 180 MG/1
360 CAPSULE, COATED, EXTENDED RELEASE ORAL DAILY
Status: DISCONTINUED | OUTPATIENT
Start: 2018-08-08 | End: 2018-08-08 | Stop reason: HOSPADM

## 2018-08-07 RX ORDER — ONDANSETRON 4 MG/1
4 TABLET, ORALLY DISINTEGRATING ORAL EVERY 8 HOURS PRN
Status: DISCONTINUED | OUTPATIENT
Start: 2018-08-07 | End: 2018-08-07

## 2018-08-07 RX ORDER — MORPHINE SULFATE 4 MG/ML
4 INJECTION, SOLUTION INTRAMUSCULAR; INTRAVENOUS ONCE
Status: COMPLETED | OUTPATIENT
Start: 2018-08-07 | End: 2018-08-07

## 2018-08-07 RX ORDER — DEXTROSE MONOHYDRATE 50 MG/ML
100 INJECTION, SOLUTION INTRAVENOUS PRN
Status: DISCONTINUED | OUTPATIENT
Start: 2018-08-07 | End: 2018-08-07

## 2018-08-07 RX ORDER — CIPROFLOXACIN 2 MG/ML
400 INJECTION, SOLUTION INTRAVENOUS EVERY 12 HOURS
Status: DISCONTINUED | OUTPATIENT
Start: 2018-08-07 | End: 2018-08-07

## 2018-08-07 RX ORDER — FINASTERIDE 5 MG/1
5 TABLET, FILM COATED ORAL DAILY
Status: DISCONTINUED | OUTPATIENT
Start: 2018-08-07 | End: 2018-08-08 | Stop reason: HOSPADM

## 2018-08-07 RX ORDER — SODIUM CHLORIDE 0.9 % (FLUSH) 0.9 %
10 SYRINGE (ML) INJECTION EVERY 12 HOURS SCHEDULED
Status: DISCONTINUED | OUTPATIENT
Start: 2018-08-07 | End: 2018-08-07

## 2018-08-07 RX ORDER — TAMSULOSIN HYDROCHLORIDE 0.4 MG/1
0.8 CAPSULE ORAL DAILY
Status: DISCONTINUED | OUTPATIENT
Start: 2018-08-07 | End: 2018-08-07

## 2018-08-07 RX ORDER — CIPROFLOXACIN 2 MG/ML
400 INJECTION, SOLUTION INTRAVENOUS EVERY 12 HOURS
Status: DISCONTINUED | OUTPATIENT
Start: 2018-08-08 | End: 2018-08-08 | Stop reason: HOSPADM

## 2018-08-07 RX ORDER — HYDROMORPHONE HCL 110MG/55ML
1 PATIENT CONTROLLED ANALGESIA SYRINGE INTRAVENOUS ONCE
Status: COMPLETED | OUTPATIENT
Start: 2018-08-07 | End: 2018-08-07

## 2018-08-07 RX ORDER — SODIUM CHLORIDE 9 MG/ML
INJECTION, SOLUTION INTRAVENOUS CONTINUOUS
Status: DISCONTINUED | OUTPATIENT
Start: 2018-08-07 | End: 2018-08-08 | Stop reason: HOSPADM

## 2018-08-07 RX ORDER — DEXAMETHASONE SODIUM PHOSPHATE 4 MG/ML
INJECTION, SOLUTION INTRA-ARTICULAR; INTRALESIONAL; INTRAMUSCULAR; INTRAVENOUS; SOFT TISSUE PRN
Status: DISCONTINUED | OUTPATIENT
Start: 2018-08-07 | End: 2018-08-07 | Stop reason: SDUPTHER

## 2018-08-07 RX ORDER — MORPHINE SULFATE 2 MG/ML
2 INJECTION, SOLUTION INTRAMUSCULAR; INTRAVENOUS
Status: DISCONTINUED | OUTPATIENT
Start: 2018-08-07 | End: 2018-08-08 | Stop reason: HOSPADM

## 2018-08-07 RX ORDER — PANTOPRAZOLE SODIUM 40 MG/10ML
40 INJECTION, POWDER, LYOPHILIZED, FOR SOLUTION INTRAVENOUS
Status: DISCONTINUED | OUTPATIENT
Start: 2018-08-08 | End: 2018-08-08 | Stop reason: HOSPADM

## 2018-08-07 RX ORDER — MORPHINE SULFATE 4 MG/ML
4 INJECTION, SOLUTION INTRAMUSCULAR; INTRAVENOUS
Status: DISCONTINUED | OUTPATIENT
Start: 2018-08-07 | End: 2018-08-07

## 2018-08-07 RX ORDER — SODIUM CHLORIDE 0.9 % (FLUSH) 0.9 %
10 SYRINGE (ML) INJECTION PRN
Status: DISCONTINUED | OUTPATIENT
Start: 2018-08-07 | End: 2018-08-08 | Stop reason: HOSPADM

## 2018-08-07 RX ORDER — NICOTINE POLACRILEX 4 MG
15 LOZENGE BUCCAL PRN
Status: DISCONTINUED | OUTPATIENT
Start: 2018-08-07 | End: 2018-08-08 | Stop reason: HOSPADM

## 2018-08-07 RX ORDER — DEXTROSE MONOHYDRATE 25 G/50ML
12.5 INJECTION, SOLUTION INTRAVENOUS PRN
Status: DISCONTINUED | OUTPATIENT
Start: 2018-08-07 | End: 2018-08-07

## 2018-08-07 RX ORDER — FINASTERIDE 5 MG/1
5 TABLET, FILM COATED ORAL DAILY
Status: DISCONTINUED | OUTPATIENT
Start: 2018-08-07 | End: 2018-08-07

## 2018-08-07 RX ORDER — DILTIAZEM HYDROCHLORIDE 180 MG/1
360 CAPSULE, COATED, EXTENDED RELEASE ORAL DAILY
Status: DISCONTINUED | OUTPATIENT
Start: 2018-08-07 | End: 2018-08-07

## 2018-08-07 RX ORDER — MORPHINE SULFATE 4 MG/ML
4 INJECTION, SOLUTION INTRAMUSCULAR; INTRAVENOUS ONCE
Status: DISCONTINUED | OUTPATIENT
Start: 2018-08-07 | End: 2018-08-07 | Stop reason: HOSPADM

## 2018-08-07 RX ORDER — MORPHINE SULFATE 2 MG/ML
1 INJECTION, SOLUTION INTRAMUSCULAR; INTRAVENOUS
Status: DISCONTINUED | OUTPATIENT
Start: 2018-08-07 | End: 2018-08-08 | Stop reason: HOSPADM

## 2018-08-07 RX ORDER — SODIUM CHLORIDE 9 MG/ML
INJECTION, SOLUTION INTRAVENOUS CONTINUOUS
Status: DISCONTINUED | OUTPATIENT
Start: 2018-08-07 | End: 2018-08-07

## 2018-08-07 RX ADMIN — INSULIN LISPRO 2 UNITS: 100 INJECTION, SOLUTION INTRAVENOUS; SUBCUTANEOUS at 20:28

## 2018-08-07 RX ADMIN — HYDROMORPHONE HYDROCHLORIDE 1 MG: 2 INJECTION, SOLUTION INTRAMUSCULAR; INTRAVENOUS; SUBCUTANEOUS at 03:48

## 2018-08-07 RX ADMIN — SODIUM CHLORIDE: 9 INJECTION, SOLUTION INTRAVENOUS at 16:33

## 2018-08-07 RX ADMIN — MORPHINE SULFATE 2 MG: 2 INJECTION, SOLUTION INTRAMUSCULAR; INTRAVENOUS at 12:26

## 2018-08-07 RX ADMIN — CIPROFLOXACIN 400 MG: 2 INJECTION, SOLUTION INTRAVENOUS at 10:46

## 2018-08-07 RX ADMIN — PROPOFOL 200 MG: 10 INJECTION, EMULSION INTRAVENOUS at 15:45

## 2018-08-07 RX ADMIN — CIPROFLOXACIN 400 MG: 2 INJECTION, SOLUTION INTRAVENOUS at 15:51

## 2018-08-07 RX ADMIN — SODIUM CHLORIDE: 9 INJECTION, SOLUTION INTRAVENOUS at 10:46

## 2018-08-07 RX ADMIN — FINASTERIDE 5 MG: 5 TABLET, FILM COATED ORAL at 17:47

## 2018-08-07 RX ADMIN — LIDOCAINE HYDROCHLORIDE 60 MG: 10 INJECTION, SOLUTION INFILTRATION; PERINEURAL at 15:45

## 2018-08-07 RX ADMIN — MORPHINE SULFATE 4 MG: 4 INJECTION INTRAVENOUS at 03:21

## 2018-08-07 RX ADMIN — TAMSULOSIN HYDROCHLORIDE 0.8 MG: 0.4 CAPSULE ORAL at 17:47

## 2018-08-07 RX ADMIN — DEXAMETHASONE SODIUM PHOSPHATE 4 MG: 4 INJECTION, SOLUTION INTRAMUSCULAR; INTRAVENOUS at 15:46

## 2018-08-07 RX ADMIN — SODIUM CHLORIDE: 9 INJECTION, SOLUTION INTRAVENOUS at 19:52

## 2018-08-07 RX ADMIN — SODIUM CHLORIDE 500 ML: 9 INJECTION, SOLUTION INTRAVENOUS at 03:20

## 2018-08-07 RX ADMIN — Medication 10 ML: at 10:56

## 2018-08-07 RX ADMIN — MORPHINE SULFATE 4 MG: 4 INJECTION INTRAVENOUS at 02:49

## 2018-08-07 RX ADMIN — ONDANSETRON 4 MG: 2 INJECTION INTRAMUSCULAR; INTRAVENOUS at 02:49

## 2018-08-07 ASSESSMENT — ENCOUNTER SYMPTOMS
SINUS PRESSURE: 0
ABDOMINAL PAIN: 0
TROUBLE SWALLOWING: 0
VOMITING: 1
SINUS PAIN: 0
SHORTNESS OF BREATH: 0
NAUSEA: 1
COUGH: 0
CHEST TIGHTNESS: 0
RHINORRHEA: 0
DIARRHEA: 0
VOICE CHANGE: 0
SORE THROAT: 0

## 2018-08-07 ASSESSMENT — PULMONARY FUNCTION TESTS
PIF_VALUE: 2
PIF_VALUE: 3
PIF_VALUE: 1
PIF_VALUE: 0
PIF_VALUE: 3
PIF_VALUE: 3
PIF_VALUE: 18
PIF_VALUE: 3
PIF_VALUE: 3
PIF_VALUE: 14
PIF_VALUE: 1
PIF_VALUE: 3
PIF_VALUE: 20
PIF_VALUE: 2
PIF_VALUE: 19
PIF_VALUE: 3
PIF_VALUE: 2

## 2018-08-07 ASSESSMENT — PAIN DESCRIPTION - PAIN TYPE
TYPE: ACUTE PAIN

## 2018-08-07 ASSESSMENT — PAIN SCALES - GENERAL
PAINLEVEL_OUTOF10: 0
PAINLEVEL_OUTOF10: 5
PAINLEVEL_OUTOF10: 7
PAINLEVEL_OUTOF10: 8
PAINLEVEL_OUTOF10: 4
PAINLEVEL_OUTOF10: 8
PAINLEVEL_OUTOF10: 8
PAINLEVEL_OUTOF10: 0
PAINLEVEL_OUTOF10: 4
PAINLEVEL_OUTOF10: 8
PAINLEVEL_OUTOF10: 5
PAINLEVEL_OUTOF10: 0

## 2018-08-07 ASSESSMENT — PAIN DESCRIPTION - LOCATION
LOCATION: FLANK
LOCATION: ABDOMEN
LOCATION: ABDOMEN

## 2018-08-07 ASSESSMENT — PAIN DESCRIPTION - PROGRESSION
CLINICAL_PROGRESSION: NOT CHANGED
CLINICAL_PROGRESSION: GRADUALLY IMPROVING

## 2018-08-07 ASSESSMENT — PAIN DESCRIPTION - ORIENTATION
ORIENTATION: LEFT

## 2018-08-07 ASSESSMENT — PAIN DESCRIPTION - FREQUENCY
FREQUENCY: INTERMITTENT
FREQUENCY: CONTINUOUS

## 2018-08-07 ASSESSMENT — PAIN DESCRIPTION - ONSET
ONSET: GRADUAL
ONSET: AWAKENED FROM SLEEP
ONSET: ON-GOING

## 2018-08-07 ASSESSMENT — PAIN DESCRIPTION - DESCRIPTORS
DESCRIPTORS: STABBING
DESCRIPTORS: STABBING
DESCRIPTORS: SHARP;SHOOTING

## 2018-08-07 NOTE — BRIEF OP NOTE
Brief Postoperative Note  ______________________________________________________________    Patient: Dawson Beltrán  YOB: 1955  MRN: [id number]  Date of Procedure: 8/7/2018    Pre-Op Diagnosis: KIDNEY STONES    Post-Op Diagnosis: Same       Procedure(s):  CYSTOSCOPY  STENT INSERTION-LEFT    Anesthesia: General    Surgeon(s):  Kenisha Mcgowan MD    Staff:  [employer]: Yaron Smalls     Estimated Blood Loss: * No values recorded between 8/7/2018  3:39 PM and 8/7/2018  3:55 PM * None    Complications: None    Specimens:   * No specimens in log *    Implants:    Implant Name Type Inv.  Item Serial No.  Lot No. LRB No. Used   STENT URET DBL PIGTL FLX [id number] Stent:Urological STENT URET DBL PIGTL FLX .[id number]   [employer]: INTERVENTIONAL CARDIO [id number] Left 1         Drains:      Findings: obstructed left ureter    Kenisha Mcgowan MD  Date: 8/7/2018  Time: 3:55 PM

## 2018-08-07 NOTE — ANESTHESIA POSTPROCEDURE EVALUATION
Department of Anesthesiology  Postprocedure Note    Patient: Giancarlo Caro  MRN: [id number]  [place]: 1955  Date of evaluation: 8/7/2018  Time:  4:05 PM     Procedure Summary     Date:  08/07/18 Room / Location:  Rhianna Mueller / Adan Baird    Anesthesia Start:  1539 Anesthesia Stop:  1603    Procedure:  CYSTOSCOPY  STENT INSERTION-LEFT (Left ) Diagnosis:  (KIDNEY STONES)    Surgeon:  Conrad Moore MD Responsible Provider:  MATTEO Meraz CRNA    Anesthesia Type:  general ASA Status:  3          Anesthesia Type: general    German Phase I: German Score: 9    German Phase II:      Last vitals: Reviewed and per EMR flowsheets.        Anesthesia Post Evaluation    Patient location during evaluation: PACU  Patient participation: complete - patient participated  Level of consciousness: awake and alert  Pain score: 0  Airway patency: patent  Nausea & Vomiting: no nausea and no vomiting  Complications: no  Cardiovascular status: hemodynamically stable  Respiratory status: acceptable and spontaneous ventilation  Hydration status: stable

## 2018-08-07 NOTE — H&P
is a 0.5 cm calculus in the distal left ureter with a more proximal approximately 0.4 cm calculus within the left ureter. There is mild upstream hydroureter and hydronephrosis with periureteral and perinephric fat stranding. The pancreas, adrenal glands, right kidney, and the appendix are unremarkable. There is colonic diverticulosis without evidence of acute inflammation. There is no mesenteric or retroperitoneal lymphadenopathy. Pelvis: The bladder and rectum are unremarkable. There are small bilateral fat-containing inguinal hernias. There is no iliac or inguinal lymphadenopathy. There is mild atherosclerotic disease. Bone windows show no aggressive osseous lesions. 1. 2 left ureteral calculi with the larger and more caudal calculus measuring up to 0.5 cm. There is mild upstream left hydroureter and hydronephrosis with periureteral and perinephric fat stranding. 2. Cholelithiasis without evidence of acute inflammation. 3. Hepatic steatosis. 4. Colonic diverticulosis without evidence of acute inflammation. Xr Chest Portable    Result Date: 8/7/2018  REPORT: Portable AP radiograph of the chest obtained at 1020  hours INDICATION: Preoperative evaluation, hypertension FINDINGS:  The lungs are well expanded and clear bilaterally. No focal consolidation, pleural effusion or pneumothorax seen. Normal cardiac and mediastinal silhouettes. No free intraperitoneal air. Final report electronically signed by Richard Wesley on 8/7/2018 10:39 AM    Normal chest        Assessment:    Principal Problem:    Hydronephrosis with urinary obstruction due to ureteral calculus  Active Problems:    Essential hypertension, benign    Type 2 diabetes mellitus without complication, without long-term current use of insulin (HCC)    BPH without obstruction/lower urinary tract symptoms    Left flank pain    Kidney stone  Resolved Problems:    * No resolved hospital problems.  *      Patient Active Problem List    Diagnosis Date Noted   

## 2018-08-07 NOTE — ANESTHESIA PRE PROCEDURE
Department of Anesthesiology  Preprocedure Note       Name:  Kindra Gonzalez   Age:  70 y.o.  :  1955                                          MRN:  [id number]         Date:  2018      Surgeon: Ijeoma Fuller):  Lavaun Barthel, MD    Procedure: Procedure(s):  CYSTOSCOPY  STENT INSERTION-POSSIBLE HLL    Medications prior to admission:   Prior to Admission medications    Medication Sig Start Date End Date Taking?  Authorizing Provider   ciprofloxacin (CIPRO) 500 MG tablet Take 1 tablet by mouth 2 times daily for 10 days 18 Yes MATTEO Pena CNP   finasteride (PROSCAR) 5 MG tablet Take 1 tablet by mouth daily 18  Yes MATTEO Pena CNP   tamsulosin [facility]) 0.4 MG capsule Take 2 capsules by mouth daily 18  Yes Radha Mason DO   metFORMIN (GLUCOPHAGE) 1000 MG tablet Take 1 tablet by mouth 2 times daily (with meals) 18  Yes Radha Mason DO   diltiazem (CARDIZEM CD) 360 MG extended release capsule Take 1 capsule by mouth daily 18  Yes Radha Mason DO   SITagliptin (JANUVIA) 100 MG tablet Take 1 tablet by mouth daily 18  Yes Radha Mason DO       Current medications:    Current Facility-Administered Medications   Medication Dose Route Frequency Provider Last Rate Last Dose    [MAR Hold] diltiazem (CARDIZEM CD) extended release capsule 360 mg  360 mg Oral Daily MD Bonnie Xiao Hold] finasteride (PROSCAR) tablet 5 mg  5 mg Oral Daily MD Bonnie Xiao Hold] linagliptin (TRADJENTA) tablet 5 mg  5 mg Oral Daily MD Bonnie Xiao Hold] tamsulosin (FLOMAX) capsule 0.8 mg  0.8 mg Oral Daily MD Bonnie Xiao Hold] ciprofloxacin (CIPRO) IVPB 400 mg  400 mg Intravenous Q12H Erin Bonner MD   Stopped at 18 1150    [MAR Hold] insulin lispro (HUMALOG) injection pen 0-12 Units  0-12 Units Subcutaneous TID WC MD Bonnie Xiao Hold] insulin lispro (HUMALOG) injection pen 0-6 Units  0-6 Units Subcutaneous Nightly Ronnie Lopes MD        [MAR Hold] glucose (GLUTOSE) 40 % oral gel 15 g  15 g Oral PRN Ronnie Lopes MD        [MAR Hold] dextrose 50 % solution 12.5 g  12.5 g Intravenous PRN Ronnie Lopes MD        [MAR Hold] glucagon (rDNA) injection 1 mg  1 mg Intramuscular PRN Ronnie Lopes MD        [MAR Hold] dextrose 5 % solution  100 mL/hr Intravenous PRN Ronnie Lopes MD        [facility] Hold] pneumococcal 13-valent conjugate (PREVNAR) injection 0.5 mL  0.5 mL Intramuscular Prior to discharge Ronnie Lpoes MD        [facility] Hold] morphine injection 1 mg  1 mg Intravenous Q3H PRN Rhea Sandhu PA-C        Or    [MAR Hold] morphine injection 2 mg  2 mg Intravenous Q3H PRN Rhea Sandhu PA-C   2 mg at 08/07/18 1226    [MAR Hold] 0.9 % sodium chloride infusion   Intravenous Continuous Rhea Sandhu PA-C 125 mL/hr at 08/07/18 1046      [MAR Hold] sodium chloride flush 0.9 % injection 10 mL  10 mL Intravenous 2 times per day Rhea Sandhu PA-C   10 mL at 08/07/18 1056    [MAR Hold] sodium chloride flush 0.9 % injection 10 mL  10 mL Intravenous PRN Rhea Sandhu PA-C        [MAR Hold] magnesium hydroxide (MILK OF MAGNESIA) 400 MG/5ML suspension 30 mL  30 mL Oral Daily PRN Rhea Sandhu PA-C        [facility] Hold] pantoprazole (PROTONIX) injection 40 mg  40 mg Intravenous QAM AC Tejinder Sandhu PA-C        [facility] Hold] ondansetron (ZOFRAN-ODT) disintegrating tablet 4 mg  4 mg Oral Q8H PRN Rhea Sandhu PA-C           Allergies:  No Known Allergies    Problem List:    Patient Active Problem List   Diagnosis Code    Essential hypertension, benign I10    Type 2 diabetes mellitus without complication, without long-term current use of insulin (HCC) E11.9    Varicose veins of legs I83.93    Tubular adenoma of colon D12.6    BPH without obstruction/lower urinary tract symptoms N40.0    Left flank pain R10.9

## 2018-08-07 NOTE — ED PROVIDER NOTES
[facility] ED  eMERGENCY dEPARTMENT eNCOUnter      Pt Name: Amy Child  MRN: [id number]  [place] 1955  Date of evaluation: 8/7/2018  Provider: Harvinder Glass, [facility]       Chief Complaint   Patient presents with    Flank Pain     left        HISTORY OF PRESENT ILLNESS    Amy Child is a 70 y.o. male who presents to the emergency department from home    Patient evaluated in the emergency department for left flank pain. He was seen by his urologist yesterday for gross hematuria. He was started on Cipro which he started taking. He has taken one dose so far. He was ordered an outpatient CT abdomen and pelvis to rule out kidney stone. He is on Flomax daily. No history of kidney stones. No fevers, chills. He does have nausea and vomiting that started when the pain started at 11 PM yesterday. No chest pain or shortness of breath. Triage notes and Nursing notes were reviewed by myself. Any discrepancies are addressed above.     PAST MEDICAL HISTORY     Past Medical History:   Diagnosis Date    Diabetes mellitus ([facility] [address]     Hypertension     Obstructive sleep apnea     on bipap at home       SURGICAL HISTORY       Past Surgical History:   Procedure Laterality Date    COLONOSCOPY  08/2012    Bethany Corrales MD    COLONOSCOPY  02/12/2015    Anibal Ferro MD; Colon polyps x2, sigmoid diverticulosis, internal and external hemorrhoids    HERNIA REPAIR         CURRENT MEDICATIONS       Previous Medications    CIPROFLOXACIN (CIPRO) 500 MG TABLET    Take 1 tablet by mouth 2 times daily for 10 days    DILTIAZEM (CARDIZEM CD) 360 MG EXTENDED RELEASE CAPSULE    Take 1 capsule by mouth daily    FINASTERIDE (PROSCAR) 5 MG TABLET    Take 1 tablet by mouth daily    METFORMIN (GLUCOPHAGE) 1000 MG TABLET    Take 1 tablet by mouth 2 times daily (with meals)    SITAGLIPTIN (JANUVIA) 100 MG TABLET    Take 1 tablet by mouth daily    TAMSULOSIN (FLOMAX) 0.4 MG CAPSULE    Take 2 capsules by mouth daily HENT:   Head: Normocephalic and atraumatic. Mouth/Throat: Oropharynx is clear and moist.   Neck: Normal range of motion. Neck supple. Cardiovascular: Normal rate and regular rhythm. Pulmonary/Chest: Effort normal and breath sounds normal.   Abdominal: Soft. Bowel sounds are normal. He exhibits no distension and no mass. There is no tenderness. There is CVA tenderness (left). There is no rebound and no guarding. Musculoskeletal: Normal range of motion. Neurological: He is alert and oriented to person, place, and time. Skin: Skin is warm and dry. No rash noted. He is not diaphoretic. No erythema. No pallor. Psychiatric: He has a normal mood and affect. Nursing note and vitals reviewed. DIAGNOSTIC RESULTS     EKG: (none if blank)  All EKG's are interpreted by the Emergency Department Physician who either signs or Co-signs this chart in the absence of a cardiologist.        RADIOLOGY: (none if blank)   Interpretation per the Radiologist below, if available at the time of this note:    CT ABDOMEN PELVIS WO CONTRAST Additional Contrast? None   Preliminary Result   Preliminary report only            1. 2 left ureteral calculi with the larger and more caudal calculus measuring    up to 0.5 cm. There is mild upstream left hydroureter and hydronephrosis with    periureteral and perinephric fat stranding. 2. Cholelithiasis without evidence of acute inflammation. 3. Hepatic steatosis. 4. Colonic diverticulosis without evidence of acute inflammation.           LABS:  Labs Reviewed   URINALYSIS - Abnormal; Notable for the following:        Result Value    Glucose, Ur 1000 mg/dL (*)     Ketones, Urine MODERATE (*)     Urine Hgb 3+ (*)     Protein, UA TRACE (*)     All other components within normal limits   BASIC METABOLIC PANEL - Abnormal; Notable for the following:     Glucose 318 (*)     CREATININE 1.31 (*)     GFR Non- 55 (*)     All other components within normal limits   CBC WITH AUTO DIFFERENTIAL - Abnormal; Notable for the following:     WBC 12.7 (*)     Seg Neutrophils 81 (*)     Lymphocytes 10 (*)     Segs Absolute 10.20 (*)     All other components within normal limits   URINE CULTURE   MICROSCOPIC URINALYSIS       All other labs were within normal range or not returned as of this dictation. EMERGENCY DEPARTMENT COURSE and Medical Decision Making:     MDM/   Evaluated for left flank pain. Lab work was obtained. Creatinine was slightly elevated 1.3. He had a slight leukocytosis as well. He is currently on Cipro for possible urinary tract infection. Cultures are pending. IV fluids and analgesia were provided. CT abdomen and pelvis shows 2 left ureteral calculi. The larger one measuring 5 mm is more caudal.  He does have left hydroureter and hydronephrosis with perinephric fat stranding. Spoke with his urologist.  He recommends keeping him on oral Cipro. He is to remain nothing by mouth. Physician is operating at [facility] tomorrow and request the patient be transferred there so he can taken to the OR if needed. Patient is comfortable this plan. Patient will be admitted for intractable flank pain secondary to obstructing ureterolithiasis. No further orders from urology. Pulse ox did drop after giving 1 mg Dilaudid. He was placed on 3 L of O2 by nasal cannula. O2 level back to normal.  We will Continue to monitor pulse ox and RR. He will be transferred via EMS squad to [facility] for further evaluation and treatment regarding the left flank pain and ureterolithiasis.   Strict return precautions and follow up instructions were discussed with the patient with which the patient agrees    ED Medications administered this visit:    Medications   0.9 % sodium chloride bolus (500 mLs Intravenous New Bag 8/7/18 0088)   ondansetron (ZOFRAN) injection 4 mg (4 mg Intravenous Given 8/7/18 1099)   morphine (PF) injection 4 mg (not administered)   morphine (PF) injection 4 mg (4 mg

## 2018-08-07 NOTE — PLAN OF CARE
Problem: Falls - Risk of:  Goal: Will remain free from falls  Will remain free from falls   Outcome: Ongoing  Remains free from falls. Fall precautions in place, slipper socks on. Pathway clear. Call light and belongings in reach. Instructed to use call light prior to getting up. Problem: Pain:  Goal: Pain level will decrease  Pain level will decrease   Outcome: Ongoing  Pain controlled at this time with PRN Morphine. Pt continues to take as needed with effectiveness. Will continue to assess pain every two hours. Problem: Fluid Volume:  Goal: Maintenance of adequate hydration will improve  Maintenance of adequate hydration will improve  Outcome: Ongoing  IV fluids infusing at 125ml/hr. Pt NPO at this time for surgery this afternoon.

## 2018-08-07 NOTE — CONSULTS
Meds:.[MAR Hold] glucose, [MAR Hold] dextrose, [MAR Hold] glucagon (rDNA), [MAR Hold] dextrose, [MAR Hold] morphine **OR** [MAR Hold] morphine, [MAR Hold] sodium chloride flush, [MAR Hold] magnesium hydroxide, [MAR Hold] ondansetron    Allergies:  Patient has no known allergies. Social History:    Social History     Social History    Marital status:      Spouse name: N/A    Number of children: N/A    Years of education: N/A     Occupational History    Not on file. Social History Main Topics    Smoking status: Former Smoker     Years: 24.00     Quit date: 2/12/1996    Smokeless tobacco: Never Used    Alcohol use 4.8 oz/week     6 Cans of beer, 2 Shots of liquor per week      Comment: Beer weekly    Drug use: No    Sexual activity: Not on file     Other Topics Concern    Not on file     Social History Narrative    No narrative on file       Family History:    Family History   Problem Relation Age of Onset    Cancer Mother         Uterine    Heart Disease Father         CAD    Diabetes Brother      Previous Urologic Family history: none    REVIEW OF SYSTEMS:  Constitutional: negative  Eyes: negative  Respiratory: negative  Cardiovascular: negative  Gastrointestinal: negative  Genitourinary: see HPI  Musculoskeletal: negative  Skin: negative   Neurological: negative  Hematological/Lymphatic: negative  Psychological: negative    Physical Exam:    This a 70 y.o. male   Patient Vitals for the past 24 hrs:   BP Temp Temp src Pulse Resp SpO2 Height Weight   08/07/18 1058 - - - 81 - - - -   08/07/18 1015 (!) 166/93 98.8 °F (37.1 °C) Temporal 88 18 91 % 6' (1.829 m) 268 lb 9 oz (121.8 kg)     Constitutional: Patient in no acute distress; Neuro: alert and oriented to person place and time.     Psych: Mood and affect normal.  Skin: Normal  Lungs: Respiratory effort normal  Cardiovascular:  Normal peripheral pulses  Abdomen: Soft, non-tender, non-distended with no CVA, flank pain, hepatosplenomegaly

## 2018-08-08 VITALS
OXYGEN SATURATION: 97 % | HEIGHT: 72 IN | BODY MASS INDEX: 36.65 KG/M2 | WEIGHT: 270.6 LBS | RESPIRATION RATE: 16 BRPM | DIASTOLIC BLOOD PRESSURE: 94 MMHG | TEMPERATURE: 97.2 F | HEART RATE: 64 BPM | SYSTOLIC BLOOD PRESSURE: 163 MMHG

## 2018-08-08 LAB
ALBUMIN SERPL-MCNC: 3.3 G/DL (ref 3.5–5.2)
ALBUMIN/GLOBULIN RATIO: 1 (ref 1–2.5)
ALP BLD-CCNC: 56 U/L (ref 40–129)
ALT SERPL-CCNC: 61 U/L (ref 5–41)
ANION GAP SERPL CALCULATED.3IONS-SCNC: 10 MMOL/L (ref 9–17)
AST SERPL-CCNC: 34 U/L
BILIRUB SERPL-MCNC: 0.42 MG/DL (ref 0.3–1.2)
BUN BLDV-MCNC: 12 MG/DL (ref 8–23)
BUN/CREAT BLD: 16 (ref 9–20)
CALCIUM SERPL-MCNC: 8.7 MG/DL (ref 8.6–10.4)
CHLORIDE BLD-SCNC: 103 MMOL/L (ref 98–107)
CO2: 26 MMOL/L (ref 20–31)
CREAT SERPL-MCNC: 0.77 MG/DL (ref 0.7–1.2)
GFR AFRICAN AMERICAN: >60 ML/MIN
GFR NON-AFRICAN AMERICAN: >60 ML/MIN
GFR SERPL CREATININE-BSD FRML MDRD: ABNORMAL ML/MIN/{1.73_M2}
GFR SERPL CREATININE-BSD FRML MDRD: ABNORMAL ML/MIN/{1.73_M2}
GLUCOSE BLD-MCNC: 187 MG/DL (ref 74–100)
GLUCOSE BLD-MCNC: 198 MG/DL (ref 70–99)
HCT VFR BLD CALC: 45.1 % (ref 40.7–50.3)
HEMOGLOBIN: 14.7 G/DL (ref 13–17)
MCH RBC QN AUTO: 31.7 PG (ref 25.2–33.5)
MCHC RBC AUTO-ENTMCNC: 32.6 G/DL (ref 28.4–34.8)
MCV RBC AUTO: 97.2 FL (ref 82.6–102.9)
NRBC AUTOMATED: 0 PER 100 WBC
PDW BLD-RTO: 13.2 % (ref 11.8–14.4)
PLATELET # BLD: 237 K/UL (ref 138–453)
PMV BLD AUTO: 9.8 FL (ref 8.1–13.5)
POTASSIUM SERPL-SCNC: 4 MMOL/L (ref 3.7–5.3)
RBC # BLD: 4.64 M/UL (ref 4.21–5.77)
SODIUM BLD-SCNC: 139 MMOL/L (ref 135–144)
TOTAL PROTEIN: 6.7 G/DL (ref 6.4–8.3)
WBC # BLD: 9.2 K/UL (ref 3.5–11.3)

## 2018-08-08 PROCEDURE — 36415 COLL VENOUS BLD VENIPUNCTURE: CPT

## 2018-08-08 PROCEDURE — G0009 ADMIN PNEUMOCOCCAL VACCINE: HCPCS | Performed by: UROLOGY

## 2018-08-08 PROCEDURE — C9113 INJ PANTOPRAZOLE SODIUM, VIA: HCPCS | Performed by: UROLOGY

## 2018-08-08 PROCEDURE — 6360000002 HC RX W HCPCS: Performed by: UROLOGY

## 2018-08-08 PROCEDURE — 85027 COMPLETE CBC AUTOMATED: CPT

## 2018-08-08 PROCEDURE — 90670 PCV13 VACCINE IM: CPT | Performed by: UROLOGY

## 2018-08-08 PROCEDURE — 6370000000 HC RX 637 (ALT 250 FOR IP): Performed by: FAMILY MEDICINE

## 2018-08-08 PROCEDURE — 2580000003 HC RX 258: Performed by: UROLOGY

## 2018-08-08 PROCEDURE — 6370000000 HC RX 637 (ALT 250 FOR IP): Performed by: UROLOGY

## 2018-08-08 PROCEDURE — 82947 ASSAY GLUCOSE BLOOD QUANT: CPT

## 2018-08-08 PROCEDURE — 80053 COMPREHEN METABOLIC PANEL: CPT

## 2018-08-08 RX ADMIN — CIPROFLOXACIN 400 MG: 2 INJECTION, SOLUTION INTRAVENOUS at 04:11

## 2018-08-08 RX ADMIN — PANTOPRAZOLE SODIUM 40 MG: 40 INJECTION, POWDER, FOR SOLUTION INTRAVENOUS at 07:29

## 2018-08-08 RX ADMIN — SODIUM CHLORIDE: 9 INJECTION, SOLUTION INTRAVENOUS at 04:09

## 2018-08-08 RX ADMIN — INSULIN LISPRO 2 UNITS: 100 INJECTION, SOLUTION INTRAVENOUS; SUBCUTANEOUS at 08:42

## 2018-08-08 RX ADMIN — LINAGLIPTIN 5 MG: 5 TABLET, FILM COATED ORAL at 08:41

## 2018-08-08 RX ADMIN — PNEUMOCOCCAL 13-VALENT CONJUGATE VACCINE 0.5 ML: 2.2; 2.2; 2.2; 2.2; 2.2; 4.4; 2.2; 2.2; 2.2; 2.2; 2.2; 2.2; 2.2 INJECTION, SUSPENSION INTRAMUSCULAR at 10:18

## 2018-08-08 RX ADMIN — DILTIAZEM HYDROCHLORIDE 360 MG: 180 CAPSULE, COATED, EXTENDED RELEASE ORAL at 08:41

## 2018-08-08 ASSESSMENT — PAIN SCALES - GENERAL
PAINLEVEL_OUTOF10: 0
PAINLEVEL_OUTOF10: 0

## 2018-08-15 ENCOUNTER — ANESTHESIA EVENT (OUTPATIENT)
Dept: OPERATING ROOM | Age: 63
End: 2018-08-15
Payer: MEDICARE

## 2018-08-15 ENCOUNTER — OFFICE VISIT (OUTPATIENT)
Dept: FAMILY MEDICINE CLINIC | Age: 63
End: 2018-08-15
Payer: MEDICARE

## 2018-08-15 ENCOUNTER — HOSPITAL ENCOUNTER (OUTPATIENT)
Age: 63
Discharge: HOME | End: 2018-08-15
Payer: MEDICARE

## 2018-08-15 VITALS
DIASTOLIC BLOOD PRESSURE: 80 MMHG | WEIGHT: 268 LBS | HEIGHT: 72 IN | BODY MASS INDEX: 36.3 KG/M2 | SYSTOLIC BLOOD PRESSURE: 130 MMHG

## 2018-08-15 DIAGNOSIS — R41.840 DIFFICULTY CONCENTRATING: ICD-10-CM

## 2018-08-15 DIAGNOSIS — E11.9 TYPE 2 DIABETES MELLITUS WITHOUT COMPLICATION, WITHOUT LONG-TERM CURRENT USE OF INSULIN (HCC): ICD-10-CM

## 2018-08-15 DIAGNOSIS — R31.0 GROSS HEMATURIA: ICD-10-CM

## 2018-08-15 DIAGNOSIS — E11.9 TYPE 2 DIABETES MELLITUS WITHOUT COMPLICATION, WITHOUT LONG-TERM CURRENT USE OF INSULIN (HCC): Primary | ICD-10-CM

## 2018-08-15 LAB
CHOLESTEROL/HDL RATIO: 2.7
CHOLESTEROL: 124 MG/DL
CREATININE URINE POCT: 300
HBA1C MFR BLD: 7.3 %
HCT VFR BLD CALC: 46.8 % (ref 41–53)
HDLC SERPL-MCNC: 46 MG/DL
HEMOGLOBIN: 15.7 G/DL (ref 13.5–17.5)
LDL CHOLESTEROL: 63 MG/DL (ref 0–130)
MICROALBUMIN/CREAT 24H UR: 150 MG/G{CREAT}
MICROALBUMIN/CREAT UR-RTO: NORMAL
PATIENT FASTING?: YES
TRIGL SERPL-MCNC: 76 MG/DL
TSH SERPL DL<=0.05 MIU/L-ACNC: 0.94 MIU/L (ref 0.3–5)
VITAMIN B-12: >2000 PG/ML (ref 232–1245)
VLDLC SERPL CALC-MCNC: NORMAL MG/DL (ref 1–30)

## 2018-08-15 PROCEDURE — 80061 LIPID PANEL: CPT

## 2018-08-15 PROCEDURE — 85014 HEMATOCRIT: CPT

## 2018-08-15 PROCEDURE — 3045F PR MOST RECENT HEMOGLOBIN A1C LEVEL 7.0-9.0%: CPT | Performed by: FAMILY MEDICINE

## 2018-08-15 PROCEDURE — 4040F PNEUMOC VAC/ADMIN/RCVD: CPT | Performed by: FAMILY MEDICINE

## 2018-08-15 PROCEDURE — 1123F ACP DISCUSS/DSCN MKR DOCD: CPT | Performed by: FAMILY MEDICINE

## 2018-08-15 PROCEDURE — G8417 CALC BMI ABV UP PARAM F/U: HCPCS | Performed by: FAMILY MEDICINE

## 2018-08-15 PROCEDURE — 1036F TOBACCO NON-USER: CPT | Performed by: FAMILY MEDICINE

## 2018-08-15 PROCEDURE — 3017F COLORECTAL CA SCREEN DOC REV: CPT | Performed by: FAMILY MEDICINE

## 2018-08-15 PROCEDURE — 2022F DILAT RTA XM EVC RTNOPTHY: CPT | Performed by: FAMILY MEDICINE

## 2018-08-15 PROCEDURE — 84443 ASSAY THYROID STIM HORMONE: CPT

## 2018-08-15 PROCEDURE — G8427 DOCREV CUR MEDS BY ELIG CLIN: HCPCS | Performed by: FAMILY MEDICINE

## 2018-08-15 PROCEDURE — 85018 HEMOGLOBIN: CPT

## 2018-08-15 PROCEDURE — 1101F PT FALLS ASSESS-DOCD LE1/YR: CPT | Performed by: FAMILY MEDICINE

## 2018-08-15 PROCEDURE — 99214 OFFICE O/P EST MOD 30 MIN: CPT | Performed by: FAMILY MEDICINE

## 2018-08-15 PROCEDURE — 36415 COLL VENOUS BLD VENIPUNCTURE: CPT

## 2018-08-15 PROCEDURE — 82044 UR ALBUMIN SEMIQUANTITATIVE: CPT | Performed by: FAMILY MEDICINE

## 2018-08-15 PROCEDURE — 1111F DSCHRG MED/CURRENT MED MERGE: CPT | Performed by: FAMILY MEDICINE

## 2018-08-15 PROCEDURE — 83036 HEMOGLOBIN GLYCOSYLATED A1C: CPT | Performed by: FAMILY MEDICINE

## 2018-08-15 PROCEDURE — 82607 VITAMIN B-12: CPT

## 2018-08-15 ASSESSMENT — PATIENT HEALTH QUESTIONNAIRE - PHQ9
2. FEELING DOWN, DEPRESSED OR HOPELESS: 0
SUM OF ALL RESPONSES TO PHQ QUESTIONS 1-9: 0
1. LITTLE INTEREST OR PLEASURE IN DOING THINGS: 0
SUM OF ALL RESPONSES TO PHQ QUESTIONS 1-9: 0
SUM OF ALL RESPONSES TO PHQ9 QUESTIONS 1 & 2: 0

## 2018-08-15 ASSESSMENT — ENCOUNTER SYMPTOMS: SHORTNESS OF BREATH: 1

## 2018-08-15 NOTE — PATIENT INSTRUCTIONS
SURVEY:    You may be receiving a survey from [employer] regarding your visit today. Please complete the survey to enable us to provide the highest quality of care to you and your family. If you cannot score us as very good on any question, please call the office to discuss how we could have made your experience exceptional.     Thank you.

## 2018-08-15 NOTE — PROGRESS NOTES
Name: Vicki Aden  : 1955         Chief Complaint:     Chief Complaint   Patient presents with    Diabetes    Hypertension    Benign Prostatic Hypertrophy       History of Present Illness:      Vicki Aden is a 70 y.o.  male who presents with Diabetes; Hypertension; and Benign Prostatic Hypertrophy      HPI     Recent L kidney stone. Still having gross hematuria. Some difficulty urinating. On cipro and has appt for stone procedure . F/u DM. Doesn't check sugar at home. Does take metformin BID and [place] and [place] daily. Diabetic 4-5 yrs and has always been on metformin. No certain diet and doesn't exercise. No pain or paresthesia in feet. Pt and wife noticing that he's had trouble focusing for the past few months. Has a lot of books he wants to read, mainly history ones, and has a harder time following what's happening. Constantly flipping channels on tv. Little bit of difficulty word-finding but he overall feels his memory is ok. Denies feeling depressed. Having some stress - they bought a house in [place] but still need to list [facility] home for sale. They feed some outdoor cats in [facility], so pt and wife are taking turns driving back & forth and spending a night or 2 at either house. Had been having trouble few mos ago with bowels but seems better now with use of fiber supplement. Last colonoscopy 2-3 yrs ago.      Past Medical History:     Past Medical History:   Diagnosis Date    Diabetes mellitus ([facility] [address]     Hypertension     Obstructive sleep apnea     on bipap at home        Past Surgical History:     Past Surgical History:   Procedure Laterality Date    COLONOSCOPY  2012    Liliana Little MD    COLONOSCOPY  2015    Jewel Quach MD; Colon polyps x2, sigmoid diverticulosis, internal and external hemorrhoids    CYSTOSCOPY Left 2018    with stent per Dr. Tristan Jo Left 2018    CYSTOSCOPY  STENT INSERTION-LEFT performed normal.   Nose: Nose normal.   Mouth/Throat: Oropharynx is clear and moist and mucous membranes are normal. Normal dentition. Eyes: Pupils are equal, round, and reactive to light. Conjunctivae and EOM are normal.   Neck: No thyroid mass and no thyromegaly present. Cardiovascular: Normal rate, regular rhythm, S1 normal and S2 normal.    No murmur heard. No peripheral edema. Pulmonary/Chest: Effort normal and breath sounds normal.   Abdominal: Soft. Bowel sounds are normal. There is no hepatosplenomegaly. There is no tenderness. Musculoskeletal:   Muscles of normal tone and bulk. Normal gait. Lymphadenopathy:     He has no cervical adenopathy. Neurological: He is alert and oriented to person, place, and time. He has normal strength. Skin: Skin is warm and dry. No rash noted. Psychiatric: He has a normal mood and affect. His behavior is normal.   Nursing note and vitals reviewed. DM foot exam: feet warm & well-perfused. DP and PT pulses 2+ bilaterally. No lesions, calluses, or cellulitis. Toenails in good repair. Sensation intact per light touch and monofilament testing.       Data:     Lab Results   Component Value Date     08/08/2018    K 4.0 08/08/2018     08/08/2018    CO2 26 08/08/2018    BUN 12 08/08/2018    CREATININE 0.77 08/08/2018    GLUCOSE 198 08/08/2018    PROT 6.7 08/08/2018    LABALBU 3.3 08/08/2018    BILITOT 0.42 08/08/2018    ALKPHOS 56 08/08/2018    AST 34 08/08/2018    ALT 61 08/08/2018     Lab Results   Component Value Date    WBC 9.2 08/08/2018    RBC 4.64 08/08/2018    HGB 15.7 08/15/2018    HCT 46.8 08/15/2018    MCV 97.2 08/08/2018    MCH 31.7 08/08/2018    MCHC 32.6 08/08/2018    RDW 13.2 08/08/2018     08/08/2018    MPV 9.8 08/08/2018     Lab Results   Component Value Date    TSH 0.94 08/15/2018     Lab Results   Component Value Date    CHOL 124 08/15/2018    HDL 46 08/15/2018    PSA 0.39 05/30/2017    LABA1C 7.3 08/15/2018         Assessment & Plan: Diagnosis Orders   1. Type 2 diabetes mellitus without complication, without long-term current use of insulin (HCC)  POCT microalbumin    POCT glycosylated hemoglobin (Hb A1C)    HM DIABETES FOOT EXAM    Lipid Panel    TSH with Reflex   2. Difficulty concentrating  TSH with Reflex    Vitamin B12   3. Gross hematuria  Hemoglobin And Hematocrit, Blood   DM much better and almost at goal. Cont current regimen. Updating labs. Difficulty concentrating for past few mos. Suspect pt may be starting to develop some early dementia symptoms unfortunately. Discussed. ddx also incl depression, stress, metabolic causes. Labs and consider aricept. Gross hematuria - recent nephrolithiasis and is following with urology, has appt 8/23 for procedure. Checking h&h for stability. Requested Prescriptions      No prescriptions requested or ordered in this encounter       Patient Instructions     SURVEY:    You may be receiving a survey from [employer] regarding your visit today. Please complete the survey to enable us to provide the highest quality of care to you and your family. If you cannot score us as very good on any question, please call the office to discuss how we could have made your experience exceptional.     Thank you. Oralia Blount received counseling on the following healthy behaviors: medication adherence  Reviewed prior labs and health maintenance. Continue current medications, diet and exercise. Discussed use, benefit, and side effects of prescribed medications. Barriers to medication compliance addressed. Patient given educational materials - see patient instructions. All patient questions answered. Patient voiced understanding.      Electronically signed by [place] DO on 8/15/2018 at 10:08 PM   [address]  Dept: [phone]

## 2018-08-16 ENCOUNTER — TELEPHONE (OUTPATIENT)
Dept: FAMILY MEDICINE CLINIC | Age: 63
End: 2018-08-16

## 2018-08-16 DIAGNOSIS — R74.8 ELEVATED VITAMIN B12 LEVEL: Primary | ICD-10-CM

## 2018-08-16 NOTE — TELEPHONE ENCOUNTER
----- Message from Yolanda Vigil DO sent at 8/16/2018  9:20 AM EDT -----  Labs ok except b12 is way higher than the normal range. If on any supplements needs to stop. This can cause some neurologic changes. Recheck 6 wks.

## 2018-08-20 ENCOUNTER — OFFICE VISIT (OUTPATIENT)
Dept: UROLOGY | Age: 63
End: 2018-08-20
Payer: MEDICARE

## 2018-08-20 VITALS — WEIGHT: 256 LBS | BODY MASS INDEX: 34.72 KG/M2 | SYSTOLIC BLOOD PRESSURE: 116 MMHG | DIASTOLIC BLOOD PRESSURE: 68 MMHG

## 2018-08-20 DIAGNOSIS — N13.2 HYDRONEPHROSIS WITH URINARY OBSTRUCTION DUE TO URETERAL CALCULUS: Primary | ICD-10-CM

## 2018-08-20 PROCEDURE — 3017F COLORECTAL CA SCREEN DOC REV: CPT | Performed by: UROLOGY

## 2018-08-20 PROCEDURE — 1123F ACP DISCUSS/DSCN MKR DOCD: CPT | Performed by: UROLOGY

## 2018-08-20 PROCEDURE — G8417 CALC BMI ABV UP PARAM F/U: HCPCS | Performed by: UROLOGY

## 2018-08-20 PROCEDURE — 99214 OFFICE O/P EST MOD 30 MIN: CPT | Performed by: UROLOGY

## 2018-08-20 PROCEDURE — 1101F PT FALLS ASSESS-DOCD LE1/YR: CPT | Performed by: UROLOGY

## 2018-08-20 PROCEDURE — 1036F TOBACCO NON-USER: CPT | Performed by: UROLOGY

## 2018-08-20 PROCEDURE — G8427 DOCREV CUR MEDS BY ELIG CLIN: HCPCS | Performed by: UROLOGY

## 2018-08-20 PROCEDURE — 1111F DSCHRG MED/CURRENT MED MERGE: CPT | Performed by: UROLOGY

## 2018-08-20 PROCEDURE — 4040F PNEUMOC VAC/ADMIN/RCVD: CPT | Performed by: UROLOGY

## 2018-08-20 ASSESSMENT — ENCOUNTER SYMPTOMS
COLOR CHANGE: 0
SHORTNESS OF BREATH: 0
EYE REDNESS: 0
WHEEZING: 0
BACK PAIN: 0
EYE PAIN: 0
VOMITING: 0
NAUSEA: 0
COUGH: 0
ABDOMINAL PAIN: 0

## 2018-08-23 ENCOUNTER — ANESTHESIA (OUTPATIENT)
Dept: OPERATING ROOM | Age: 63
End: 2018-08-23
Payer: MEDICARE

## 2018-08-23 ENCOUNTER — APPOINTMENT (OUTPATIENT)
Dept: GENERAL RADIOLOGY | Age: 63
End: 2018-08-23
Attending: UROLOGY
Payer: MEDICARE

## 2018-08-23 ENCOUNTER — HOSPITAL ENCOUNTER (OUTPATIENT)
Age: 63
Setting detail: OUTPATIENT SURGERY
Discharge: HOME | End: 2018-08-23
Attending: UROLOGY | Admitting: UROLOGY
Payer: MEDICARE

## 2018-08-23 VITALS
SYSTOLIC BLOOD PRESSURE: 107 MMHG | RESPIRATION RATE: 14 BRPM | OXYGEN SATURATION: 97 % | TEMPERATURE: 98.6 F | DIASTOLIC BLOOD PRESSURE: 66 MMHG

## 2018-08-23 VITALS
DIASTOLIC BLOOD PRESSURE: 79 MMHG | TEMPERATURE: 96.8 F | RESPIRATION RATE: 16 BRPM | SYSTOLIC BLOOD PRESSURE: 144 MMHG | HEIGHT: 72 IN | HEART RATE: 80 BPM | BODY MASS INDEX: 36.03 KG/M2 | OXYGEN SATURATION: 92 % | WEIGHT: 266 LBS

## 2018-08-23 LAB — GLUCOSE BLD-MCNC: 126 MG/DL (ref 65–99)

## 2018-08-23 PROCEDURE — 2709999900 HC NON-CHARGEABLE SUPPLY: Performed by: UROLOGY

## 2018-08-23 PROCEDURE — 7100000010 HC PHASE II RECOVERY - FIRST 15 MIN: Performed by: UROLOGY

## 2018-08-23 PROCEDURE — 3700000000 HC ANESTHESIA ATTENDED CARE: Performed by: UROLOGY

## 2018-08-23 PROCEDURE — 3600000015 HC SURGERY LEVEL 5 ADDTL 15MIN: Performed by: UROLOGY

## 2018-08-23 PROCEDURE — 76000 FLUOROSCOPY <1 HR PHYS/QHP: CPT

## 2018-08-23 PROCEDURE — 2580000003 HC RX 258: Performed by: UROLOGY

## 2018-08-23 PROCEDURE — C1758 CATHETER, URETERAL: HCPCS | Performed by: UROLOGY

## 2018-08-23 PROCEDURE — 7100000011 HC PHASE II RECOVERY - ADDTL 15 MIN: Performed by: UROLOGY

## 2018-08-23 PROCEDURE — 6360000002 HC RX W HCPCS: Performed by: UROLOGY

## 2018-08-23 PROCEDURE — 3600000005 HC SURGERY LEVEL 5 BASE: Performed by: UROLOGY

## 2018-08-23 PROCEDURE — C2617 STENT, NON-COR, TEM W/O DEL: HCPCS | Performed by: UROLOGY

## 2018-08-23 PROCEDURE — 3700000001 HC ADD 15 MINUTES (ANESTHESIA): Performed by: UROLOGY

## 2018-08-23 PROCEDURE — 6360000002 HC RX W HCPCS: Performed by: NURSE ANESTHETIST, CERTIFIED REGISTERED

## 2018-08-23 PROCEDURE — 2500000003 HC RX 250 WO HCPCS: Performed by: NURSE ANESTHETIST, CERTIFIED REGISTERED

## 2018-08-23 PROCEDURE — 6370000000 HC RX 637 (ALT 250 FOR IP): Performed by: UROLOGY

## 2018-08-23 PROCEDURE — C1769 GUIDE WIRE: HCPCS | Performed by: UROLOGY

## 2018-08-23 PROCEDURE — 82947 ASSAY GLUCOSE BLOOD QUANT: CPT

## 2018-08-23 DEVICE — URETERAL STENT
Type: IMPLANTABLE DEVICE | Site: URETER | Status: FUNCTIONAL
Brand: PERCUFLEX™ PLUS

## 2018-08-23 RX ORDER — ONDANSETRON 2 MG/ML
INJECTION INTRAMUSCULAR; INTRAVENOUS PRN
Status: DISCONTINUED | OUTPATIENT
Start: 2018-08-23 | End: 2018-08-23 | Stop reason: SDUPTHER

## 2018-08-23 RX ORDER — KETOROLAC TROMETHAMINE 30 MG/ML
INJECTION, SOLUTION INTRAMUSCULAR; INTRAVENOUS PRN
Status: DISCONTINUED | OUTPATIENT
Start: 2018-08-23 | End: 2018-08-23 | Stop reason: SDUPTHER

## 2018-08-23 RX ORDER — CIPROFLOXACIN 2 MG/ML
400 INJECTION, SOLUTION INTRAVENOUS
Status: COMPLETED | OUTPATIENT
Start: 2018-08-23 | End: 2018-08-23

## 2018-08-23 RX ORDER — MIDAZOLAM HYDROCHLORIDE 1 MG/ML
INJECTION INTRAMUSCULAR; INTRAVENOUS PRN
Status: DISCONTINUED | OUTPATIENT
Start: 2018-08-23 | End: 2018-08-23 | Stop reason: SDUPTHER

## 2018-08-23 RX ORDER — SODIUM CHLORIDE 0.9 % (FLUSH) 0.9 %
10 SYRINGE (ML) INJECTION EVERY 12 HOURS SCHEDULED
Status: DISCONTINUED | OUTPATIENT
Start: 2018-08-23 | End: 2018-08-23 | Stop reason: HOSPADM

## 2018-08-23 RX ORDER — SODIUM CHLORIDE, SODIUM LACTATE, POTASSIUM CHLORIDE, CALCIUM CHLORIDE 600; 310; 30; 20 MG/100ML; MG/100ML; MG/100ML; MG/100ML
INJECTION, SOLUTION INTRAVENOUS CONTINUOUS
Status: DISCONTINUED | OUTPATIENT
Start: 2018-08-23 | End: 2018-08-23 | Stop reason: HOSPADM

## 2018-08-23 RX ORDER — SODIUM CHLORIDE 0.9 % (FLUSH) 0.9 %
10 SYRINGE (ML) INJECTION PRN
Status: DISCONTINUED | OUTPATIENT
Start: 2018-08-23 | End: 2018-08-23 | Stop reason: HOSPADM

## 2018-08-23 RX ORDER — FENTANYL CITRATE 50 UG/ML
INJECTION, SOLUTION INTRAMUSCULAR; INTRAVENOUS PRN
Status: DISCONTINUED | OUTPATIENT
Start: 2018-08-23 | End: 2018-08-23 | Stop reason: SDUPTHER

## 2018-08-23 RX ORDER — DEXAMETHASONE SODIUM PHOSPHATE 10 MG/ML
INJECTION INTRAMUSCULAR; INTRAVENOUS PRN
Status: DISCONTINUED | OUTPATIENT
Start: 2018-08-23 | End: 2018-08-23 | Stop reason: SDUPTHER

## 2018-08-23 RX ORDER — KETAMINE HYDROCHLORIDE 100 MG/ML
INJECTION, SOLUTION INTRAMUSCULAR; INTRAVENOUS PRN
Status: DISCONTINUED | OUTPATIENT
Start: 2018-08-23 | End: 2018-08-23 | Stop reason: SDUPTHER

## 2018-08-23 RX ORDER — ACETAMINOPHEN 10 MG/ML
INJECTION, SOLUTION INTRAVENOUS PRN
Status: DISCONTINUED | OUTPATIENT
Start: 2018-08-23 | End: 2018-08-23 | Stop reason: SDUPTHER

## 2018-08-23 RX ORDER — LIDOCAINE HYDROCHLORIDE 10 MG/ML
INJECTION, SOLUTION EPIDURAL; INFILTRATION; INTRACAUDAL; PERINEURAL PRN
Status: DISCONTINUED | OUTPATIENT
Start: 2018-08-23 | End: 2018-08-23 | Stop reason: SDUPTHER

## 2018-08-23 RX ADMIN — MIDAZOLAM HYDROCHLORIDE 1 MG: 2 INJECTION, SOLUTION INTRAMUSCULAR; INTRAVENOUS at 08:45

## 2018-08-23 RX ADMIN — FENTANYL CITRATE 25 MCG: 50 INJECTION, SOLUTION INTRAMUSCULAR; INTRAVENOUS at 09:21

## 2018-08-23 RX ADMIN — MIDAZOLAM HYDROCHLORIDE 1 MG: 2 INJECTION, SOLUTION INTRAMUSCULAR; INTRAVENOUS at 08:53

## 2018-08-23 RX ADMIN — DEXAMETHASONE SODIUM PHOSPHATE 10 MG: 10 INJECTION INTRAMUSCULAR; INTRAVENOUS at 08:53

## 2018-08-23 RX ADMIN — KETAMINE HYDROCHLORIDE 200 MG: 100 INJECTION, SOLUTION, CONCENTRATE INTRAMUSCULAR; INTRAVENOUS at 08:53

## 2018-08-23 RX ADMIN — FENTANYL CITRATE 25 MCG: 50 INJECTION, SOLUTION INTRAMUSCULAR; INTRAVENOUS at 09:18

## 2018-08-23 RX ADMIN — ONDANSETRON 4 MG: 2 INJECTION, SOLUTION INTRAMUSCULAR; INTRAVENOUS at 08:53

## 2018-08-23 RX ADMIN — FENTANYL CITRATE 50 MCG: 50 INJECTION, SOLUTION INTRAMUSCULAR; INTRAVENOUS at 08:53

## 2018-08-23 RX ADMIN — KETOROLAC TROMETHAMINE 30 MG: 30 INJECTION, SOLUTION INTRAMUSCULAR at 08:53

## 2018-08-23 RX ADMIN — FENTANYL CITRATE 50 MCG: 50 INJECTION, SOLUTION INTRAMUSCULAR; INTRAVENOUS at 08:45

## 2018-08-23 RX ADMIN — CIPROFLOXACIN 400 MG: 2 INJECTION, SOLUTION INTRAVENOUS at 08:30

## 2018-08-23 RX ADMIN — LIDOCAINE HYDROCHLORIDE 5 ML: 10 INJECTION, SOLUTION EPIDURAL; INFILTRATION; INTRACAUDAL; PERINEURAL at 08:53

## 2018-08-23 RX ADMIN — ACETAMINOPHEN 1000 MG: 10 INJECTION, SOLUTION INTRAVENOUS at 08:52

## 2018-08-23 RX ADMIN — SODIUM CHLORIDE, POTASSIUM CHLORIDE, SODIUM LACTATE AND CALCIUM CHLORIDE: 600; 310; 30; 20 INJECTION, SOLUTION INTRAVENOUS at 08:25

## 2018-08-23 ASSESSMENT — PAIN SCALES - GENERAL: PAINLEVEL_OUTOF10: 0

## 2018-08-23 ASSESSMENT — PAIN - FUNCTIONAL ASSESSMENT: PAIN_FUNCTIONAL_ASSESSMENT: 0-10

## 2018-08-23 NOTE — PROGRESS NOTES
Up to bathroom with steady gait. Stent in place and taped to leg. Voids blood-tinged urine. Denies pain or nausea. Dr. Naeem De Los Santos speaks with pt and family. Discharge Criteria  Outpatients must meet criteria 1 through 7. Up to restroom, void sufficient amount. Yes    1. Minimum 30 minutes after last dose of sedative medication, minimum 120 minutes after last dose of reversal agent. Yes    2. Systolic BP stable within 20 mmHg for 30 minutes & systolic BP between 90 & 691 or within 10 mmHg of baseline. Yes    3. Pulse between 60 and 100 or within 10 bpm of baseline. Yes    4. Spontaneous respiratory rate >/= 10 per minute. Yes    5. SaO2 >/= 95 or  >/= baseline. Yes    6. Able to cough and swallow or return to baseline function. Yes    7. Alert and oriented or return to baseline mental status. Yes    8. Demonstrates controlled, coordinated movements, ambulates with steady gait, or return to baseline activity function. Yes    9. Minimal or no pain or nausea, or at a level tolerable and acceptable to patient. Yes    10. Takes and retains oral fluids as allowed. Yes    11. Procedural / perioperative site stable. Minimal or no bleeding. Yes        12. If GI endoscopy procedure, minimal or no abdominal distention or passing flatus. Yes    13. Written discharge instructions and emergency telephone number provided. Yes    14. Accompanied by a responsible adult. Yes    Adult patient discharged from facility without responsible person meets above criteria plus the following:   a) remains awake without stimulus for 30 minutes     b) oriented appropriate for age      c) all vital signs stable   d) no significant risk of losing protective reflexes      e) able to maintain pre-procedure mobility without assistance   f) no nausea or dizziness      g) transportation arrangements that do not require patient to operate motor   Vehicle.   Yes

## 2018-08-23 NOTE — OP NOTE
placed additional Glidewire up  and then passed a flexible ureteroscope up, we identified the stones in the  distal ureter. We used 200 micron laser fiber and ablated the stones using  a dusting setting. Once we were able to do this, we did go up into the  kidney and a formal pyeloscopy was performed, no stones were evident in the  kidney. We were able to come back down the ureter and we chased several of  these fragments out into the bladder.  _____ the left was less than 2 mm. We were then able to remove the ureteroscope. We  placed the cystoscope  over the Glidewire and placed a 6-Ukrainian 28 cm double-J ureteral stent over  the Glidewire up into the kidney. Glidewire was removed. Proximal curl  was confirmed by fluoroscopy. Distal curl was confirmed by visualization. At this point in time, the stent string was attached to the thigh with  Steris and benzoin. We did use 2% lidocaine and Uro-jet for local  anesthetic. He was then awoken from general anesthesia, transferred to  [facility], and taken to the PACU in satisfactory condition by nursing and  Anesthesia team.    PLAN:  The patient will be discharged home per PACU criteria and follow up  with us in 2 days or next Monday for stent removal via string.         Chriss Paredes    D: 08/23/2018 9:48:06       T: 08/23/2018 11:14:45     [email or web address]  Job#: [id number]     Doc#: [id number]    CC:

## 2018-08-23 NOTE — ANESTHESIA PRE PROCEDURE
Department of Anesthesiology  Preprocedure Note       Name:  Jose Miguel Underwood   Age:  70 y.o.  :  1955                                          MRN:  [id number]         Date:  2018      Surgeon: Selena Zuniga):  Ravinder Rodriguez MD    Procedure: Procedure(s):  CYSTOSCOPY URETEROSCOPY- LEFT HLL, STENT EXCHANGE    Medications prior to admission:   Prior to Admission medications    Medication Sig Start Date End Date Taking?  Authorizing Provider   finasteride (PROSCAR) 5 MG tablet Take 1 tablet by mouth daily 18   Vincent Landau, APRN - CNP   tamsulosin [facility]) 0.4 MG capsule Take 2 capsules by mouth daily 18   Paola Jenkins, DO   metFORMIN (GLUCOPHAGE) 1000 MG tablet Take 1 tablet by mouth 2 times daily (with meals) 18   Paola Jenkins,    diltiazem (CARDIZEM CD) 360 MG extended release capsule Take 1 capsule by mouth daily 18   Paola Jenkins, DO   SITagliptin (JANUVIA) 100 MG tablet Take 1 tablet by mouth daily 18   Paola Jenkins, DO       Current medications:    Current Facility-Administered Medications   Medication Dose Route Frequency Provider Last Rate Last Dose    lactated ringers infusion   Intravenous Continuous Ravinder Rodriguez  mL/hr at 18 0825      sodium chloride flush 0.9 % injection 10 mL  10 mL Intravenous 2 times per day Ravinder Rodriguez MD        sodium chloride flush 0.9 % injection 10 mL  10 mL Intravenous PRN Ravinder Rodriguez MD        ciprofloxacin (CIPRO) IVPB 400 mg  400 mg Intravenous On Call to Arnaldo Valadez  mL/hr at 18 0830 400 mg at 18 0830       Allergies:  No Known Allergies    Problem List:    Patient Active Problem List   Diagnosis Code    Essential hypertension, benign I10    Type 2 diabetes mellitus without complication, without long-term current use of insulin (HCC) E11.9    Varicose veins of legs I83.93    Tubular adenoma of colon D12.6    BPH without obstruction/lower urinary tract symptoms N40.0    Left flank pain R10.9    Kidney stone N20.0    Hydronephrosis with urinary obstruction due to ureteral calculus N13.2       Past Medical History:        Diagnosis Date    Diabetes mellitus ([address]     Hypertension     Obstructive sleep apnea     on bipap at home       Past Surgical History:        Procedure Laterality Date    [facility]  08/2012    [facility]    COLONOSCOPY  02/12/2015    Geoffrey Ibarra MD; Colon polyps x2, sigmoid diverticulosis, internal and external hemorrhoids    CYSTOSCOPY Left 08/07/2018    with stent per Dr. Melonie Lloyd Left 8/7/2018    CYSTOSCOPY  STENT INSERTION-LEFT performed by Do Mancini MD at [address] [place] [address] History:    Social History   Substance Use Topics    Smoking status: Former Smoker     Packs/day: 15.00     Years: 24.00     Quit date: 2/12/1996    Smokeless tobacco: Never Used    Alcohol use 4.8 oz/week     6 Cans of beer, 2 Shots of liquor per week      Comment: Beer weekly                                Counseling given: Not Answered      Vital Signs (Current):   Vitals:    08/23/18 0822   BP: (!) 158/101   Pulse: 80   Resp: 18   Temp: 36.6 °C (97.8 °F)   TempSrc: Temporal   SpO2: 96%   Weight: 266 lb (120.7 kg)   Height: 6' (1.829 m)                                              BP Readings from Last 3 Encounters:   08/23/18 (!) 158/101   08/20/18 116/68   08/15/18 130/80       NPO Status: Time of last liquid consumption: 2045                        Time of last solid consumption: 2030                        Date of last liquid consumption: 08/22/18                        Date of last solid food consumption: 08/22/18    BMI:   Wt Readings from Last 3 Encounters:   08/23/18 266 lb (120.7 kg)   08/20/18 256 lb (116.1 kg)   08/15/18 268 lb (121.6 kg)     Body mass index is 36.08 kg/m².     CBC:   Lab Results   Component Value Date    WBC 9.2 08/08/2018    RBC 4.64 08/08/2018    HGB 15.7

## 2018-08-23 NOTE — H&P (VIEW-ONLY)
[facility]  History and Physical        Patient:  Avani Rangel  MRN: [id number]    Chief Complaint:  Jacqui Slimmer    History Obtained From:  patient, electronic medical record    PCP: Stefanie Beach DO    History of Present Illness: The patient is a 70 y.o. male  With h/o type 2 dm,bph,htn who presents with suddenonset of pain from lt loin to groin. He initially had painless hematuria and later had pain,went to er and was found to have ureteric stone with obstruction and advised to be admitted for urology intervention. Past Medical History:        Diagnosis Date    Diabetes mellitus ([address]     Hypertension     Obstructive sleep apnea     on bipap at home       Past Surgical History:        Procedure Laterality Date    [facility]  08/2012    [facility]    COLONOSCOPY  02/12/2015    Geoffrey Ibarra MD; Colon polyps x2, sigmoid diverticulosis, internal and external hemorrhoids    HERNIA REPAIR         Family History:   Family History   Problem Relation Age of Onset    Cancer Mother         Uterine    Heart Disease Father         CAD    Diabetes Brother        Social History:   TOBACCO:   reports that he quit smoking about 22 years ago. He quit after 24.00 years of use. He has never used smokeless tobacco.  ETOH:   reports that he drinks about 4.8 oz of alcohol per week . OCCUPATION: retired    Allergies:  Patient has no known allergies. Medications Prior to Admission:    Prior to Admission medications    Medication Sig Start Date End Date Taking?  Authorizing Provider   ciprofloxacin (CIPRO) 500 MG tablet Take 1 tablet by mouth 2 times daily for 10 days 8/6/18 8/16/18 Yes MATTEO Dawn CNP   finasteride (PROSCAR) 5 MG tablet Take 1 tablet by mouth daily 7/27/18  Yes MATTEO Dawn CNP   tamsulosin [facility]) 0.4 MG capsule Take 2 capsules by mouth daily 7/27/18  Yes Stefanie Beach DO   metFORMIN (GLUCOPHAGE) 1000 MG tablet Take 1 tablet by mouth 2 times daily (with meals) 7/27/18  Yes Karle Loop, DO   diltiazem (CARDIZEM CD) 360 MG extended release capsule Take 1 capsule by mouth daily 7/27/18  Yes Karle Loop, DO   SITagliptin (JANUVIA) 100 MG tablet Take 1 tablet by mouth daily 7/27/18  Yes Karle Loop, DO       Review of Systems:  Constitutional:negative  for fevers, and negative for chills. Eyes: negative for visual disturbance   ENT: negative for sore throat, negative nasal congestion, and negative for earache  Respiratory: negative for shortness of breath, negative for cough, and negative for wheezing  Cardiovascular: negative for chest pain, negative for palpitations, and negative for syncope  Gastrointestinal: positive for abdominal pain, positive for nausea,positive for vomiting, negative for diarrhea, negative for constipation, and negative for hematochezia or melena  Genitourinary: negative for dysuria, negative for urinary urgency, negative for urinary frequency, and positive for hematuria  Skin: negative for skin rash, and negative for skin lesions  Neurological: negative for unilateral weakness, numbness or tingling. Physical Exam:    Vitals:   Vitals:    08/07/18 1058   BP:    Pulse: 81   Resp:    Temp:    SpO2:      Weight: 268 lb 9 oz (121.8 kg)   Height: 6' (182.9 cm)     GEN:  alert and oriented to person, place and time, well-developed and well-nourished, in no acute distress  EYES: No gross abnormalities.  and PERRL  NECK: normal, supple, no lymphadenopathy,  no carotid bruits  PULM: clear to auscultation bilaterally- no wheezes, rales or rhonchi, normal air movement, no respiratory distress  COR: regular rate & rhythm, no murmurs and no gallops  ABD:  tenderness present- lt flank,  without rebound and guarding   - tenderness lt cva and lt flank  EXT:   no cyanosis, clubbing or edema present    NEURO: follows commands, AKBAR, no deficits  SKIN:  no rashes or significant lesions  -----------------------------------------------------------------  Diagnostic Data:   Lab Results   Component Value Date    WBC 12.7 (H) 08/07/2018    HGB 16.8 08/07/2018     08/07/2018       Lab Results   Component Value Date    BUN 19 08/07/2018    CREATININE 1.31 (H) 08/07/2018     08/07/2018    K 4.9 08/07/2018    CALCIUM 10.3 08/07/2018     08/07/2018    CO2 21 08/07/2018    LABGLOM 55 (L) 08/07/2018       Lab Results   Component Value Date    WBCUA NOT REPORTED 08/07/2018    RBCUA 5 TO 10 08/07/2018    EPITHUA NOT REPORTED 08/07/2018    LEUKOCYTESUR NEGATIVE 08/07/2018    SPECGRAV 1.015 08/07/2018    GLUCOSEU 1000 mg/dL (A) 08/07/2018    KETUA MODERATE (A) 08/07/2018    PROTEINU TRACE (A) 08/07/2018    HGBUR 3+ (A) 08/07/2018    CASTUA NOT REPORTED 08/07/2018    CRYSTUA NOT REPORTED 08/07/2018    BACTERIA NOT REPORTED 08/07/2018    YEAST NOT REPORTED 08/07/2018       No results found for: MYOGLOBIN, TROPONINT, CKTOTAL, CKMB, PROBNP    Ct Abdomen Pelvis Wo Contrast Additional Contrast? None    Result Date: 8/7/2018  EXAM: CT ABDOMEN PELVIS WO CONTRAST CLINICAL STATEMENT: Left flank pain with nausea and vomiting. COMPARISON: None available. TECHNIQUE: CT examination of the abdomen and pelvis without IV contrast.  Coronal and sagittal reformations were performed. Dose reduction techniques were achieved by using automated exposure control and/or adjustment of mA and/or kV according to patient size and/or use of iterative reconstruction technique. FINDINGS: The visualized portions of the lung bases are clear. Coronary artery disease is seen. Abdomen: Please note that the sensitivity for detection of focal lesions or vascular disease is markedly reduced without intravenous contrast. There is hepatic steatosis. Otherwise, the liver and spleen are unremarkable. There is no intra or extrahepatic biliary duct dilatation. There is cholelithiasis without evidence of acute inflammation.  There

## 2018-08-27 ENCOUNTER — TELEPHONE (OUTPATIENT)
Dept: UROLOGY | Age: 63
End: 2018-08-27

## 2018-08-27 DIAGNOSIS — N20.1 URETERAL CALCULUS, LEFT: Primary | ICD-10-CM

## 2018-08-27 NOTE — TELEPHONE ENCOUNTER
Called patient to schedule him follow up post HLL done on 8/23. Patient stated he did pull stent today without difficulty.   Patient scheduled for follow up in 6 weeks - needs KUB prior

## 2018-10-09 ENCOUNTER — HOSPITAL ENCOUNTER (OUTPATIENT)
Age: 63
Discharge: HOME | End: 2018-10-11
Payer: MEDICARE

## 2018-10-09 ENCOUNTER — HOSPITAL ENCOUNTER (OUTPATIENT)
Dept: GENERAL RADIOLOGY | Age: 63
Discharge: HOME | End: 2018-10-11
Payer: MEDICARE

## 2018-10-09 DIAGNOSIS — N20.1 URETERAL CALCULUS, LEFT: ICD-10-CM

## 2018-10-09 PROCEDURE — 74018 RADEX ABDOMEN 1 VIEW: CPT

## 2018-10-11 ENCOUNTER — OFFICE VISIT (OUTPATIENT)
Dept: UROLOGY | Age: 63
End: 2018-10-11
Payer: MEDICARE

## 2018-10-11 VITALS
HEIGHT: 72 IN | BODY MASS INDEX: 37.11 KG/M2 | DIASTOLIC BLOOD PRESSURE: 80 MMHG | WEIGHT: 274 LBS | SYSTOLIC BLOOD PRESSURE: 138 MMHG

## 2018-10-11 DIAGNOSIS — N40.1 BPH WITH OBSTRUCTION/LOWER URINARY TRACT SYMPTOMS: ICD-10-CM

## 2018-10-11 DIAGNOSIS — N13.8 BPH WITH OBSTRUCTION/LOWER URINARY TRACT SYMPTOMS: ICD-10-CM

## 2018-10-11 DIAGNOSIS — R35.0 FREQUENCY OF URINATION: ICD-10-CM

## 2018-10-11 DIAGNOSIS — N20.0 KIDNEY STONE: Primary | ICD-10-CM

## 2018-10-11 DIAGNOSIS — R39.12 WEAK URINARY STREAM: ICD-10-CM

## 2018-10-11 PROCEDURE — G8427 DOCREV CUR MEDS BY ELIG CLIN: HCPCS | Performed by: NURSE PRACTITIONER

## 2018-10-11 PROCEDURE — 4040F PNEUMOC VAC/ADMIN/RCVD: CPT | Performed by: NURSE PRACTITIONER

## 2018-10-11 PROCEDURE — G8484 FLU IMMUNIZE NO ADMIN: HCPCS | Performed by: NURSE PRACTITIONER

## 2018-10-11 PROCEDURE — 1101F PT FALLS ASSESS-DOCD LE1/YR: CPT | Performed by: NURSE PRACTITIONER

## 2018-10-11 PROCEDURE — 99213 OFFICE O/P EST LOW 20 MIN: CPT | Performed by: NURSE PRACTITIONER

## 2018-10-11 PROCEDURE — 1123F ACP DISCUSS/DSCN MKR DOCD: CPT | Performed by: NURSE PRACTITIONER

## 2018-10-11 PROCEDURE — 1036F TOBACCO NON-USER: CPT | Performed by: NURSE PRACTITIONER

## 2018-10-11 PROCEDURE — G8417 CALC BMI ABV UP PARAM F/U: HCPCS | Performed by: NURSE PRACTITIONER

## 2018-10-11 PROCEDURE — 3017F COLORECTAL CA SCREEN DOC REV: CPT | Performed by: NURSE PRACTITIONER

## 2018-10-11 ASSESSMENT — ENCOUNTER SYMPTOMS
ABDOMINAL PAIN: 0
COLOR CHANGE: 0
BACK PAIN: 0
WHEEZING: 0
EYE PAIN: 0
VOMITING: 0
COUGH: 0
EYE REDNESS: 0
CONSTIPATION: 0
SHORTNESS OF BREATH: 0
NAUSEA: 0

## 2018-10-11 NOTE — PROGRESS NOTES
Subjective:      Patient ID: Therese Hernandez is a 70 y.o. male. HPI  Patient is a 70 y.o. male in no acute distress and alert and oriented to person, place and time. History  4/2018 Referral from Dr. Aron Hull for LUTS. He complains of frequency, urgency, weak stream, intermittency.  He has nocturia 1-2 times per night.  He has been on Flomax for the last 5-6 years. Glo Dwyer denies history of kidney stones.  He denies history of frequent urinary tract infections.  He was circumcised. Glo Dwyer has never seen urology in the past. Glo Dwyer does have type 2 diabetes that is currently uncontrolled.  Most recent hemoglobin A1c was 9.1.  He does admit to drinking 3 cups of coffee per day. Glo Dwyer denies constipation.      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 Started on proscar. 8/2018 left HLL for 5mm distal left ureteral stone    Today  Here today for a 6 week follow-up s/p left HLL. He did have a KUB completed prior to today's visit. This film was independently reviewed and is negative for  calcifications. He denies dysuria, gross hematuria, flank or abdominal pain. He is having nocturia 2 times per night and is urinating every 2-3 hours during the day. He denies incontinence. He does continue to drink 3 cups of coffee per day. He denies constipation.   Past Medical History:   Diagnosis Date    Diabetes mellitus ([address]     Hypertension     Obstructive sleep apnea     on bipap at home     Past Surgical History:   Procedure Laterality Date    [facility]    COLONOSCOPY  02/12/2015    Merced Lucero MD; Colon polyps x2, sigmoid diverticulosis, internal and external hemorrhoids    CYSTOSCOPY Left 08/07/2018    with stent per Dr. Taylor Garcia Left 8/7/2018    CYSTOSCOPY  STENT INSERTION-LEFT performed by Jl Garcia MD at [address] [facility] OFFICE/OUTPT [facility] Left 8/23/2018    CYSTOSCOPY URETEROSCOPY-

## 2019-01-09 ENCOUNTER — APPOINTMENT (OUTPATIENT)
Dept: CT IMAGING | Age: 64
DRG: 392 | End: 2019-01-09
Payer: MEDICARE

## 2019-01-09 ENCOUNTER — HOSPITAL ENCOUNTER (INPATIENT)
Age: 64
LOS: 3 days | Discharge: HOME | DRG: 392 | End: 2019-01-12
Attending: EMERGENCY MEDICINE | Admitting: INTERNAL MEDICINE
Payer: MEDICARE

## 2019-01-09 DIAGNOSIS — K57.92 ACUTE DIVERTICULITIS: Primary | ICD-10-CM

## 2019-01-09 LAB
-: NORMAL
AMORPHOUS: NORMAL
ANION GAP SERPL CALCULATED.3IONS-SCNC: 12 MMOL/L (ref 9–17)
BACTERIA: NORMAL
BILIRUBIN URINE: NEGATIVE
BUN BLDV-MCNC: 11 MG/DL (ref 8–23)
BUN/CREAT BLD: 13 (ref 9–20)
CALCIUM SERPL-MCNC: 9.3 MG/DL (ref 8.6–10.4)
CASTS UA: NORMAL /LPF
CHLORIDE BLD-SCNC: 100 MMOL/L (ref 98–107)
CO2: 24 MMOL/L (ref 20–31)
COLOR: YELLOW
COMMENT UA: ABNORMAL
CREAT SERPL-MCNC: 0.87 MG/DL (ref 0.7–1.2)
CRYSTALS, UA: NORMAL /HPF
EPITHELIAL CELLS UA: NORMAL /HPF (ref 0–5)
GFR AFRICAN AMERICAN: >60 ML/MIN
GFR NON-AFRICAN AMERICAN: >60 ML/MIN
GFR SERPL CREATININE-BSD FRML MDRD: ABNORMAL ML/MIN/{1.73_M2}
GFR SERPL CREATININE-BSD FRML MDRD: ABNORMAL ML/MIN/{1.73_M2}
GLUCOSE BLD-MCNC: 139 MG/DL (ref 70–99)
GLUCOSE URINE: NEGATIVE
HCT VFR BLD CALC: 47.4 % (ref 40.7–50.3)
HEMOGLOBIN: 15.8 G/DL (ref 13–17)
KETONES, URINE: NEGATIVE
LEUKOCYTE ESTERASE, URINE: NEGATIVE
MCH RBC QN AUTO: 32.1 PG (ref 25.2–33.5)
MCHC RBC AUTO-ENTMCNC: 33.3 G/DL (ref 28.4–34.8)
MCV RBC AUTO: 96.3 FL (ref 82.6–102.9)
MUCUS: NORMAL
NITRITE, URINE: NEGATIVE
NRBC AUTOMATED: 0 PER 100 WBC
OTHER OBSERVATIONS UA: NORMAL
PDW BLD-RTO: 13 % (ref 11.8–14.4)
PH UA: 5.5 (ref 5–9)
PLATELET # BLD: 252 K/UL (ref 138–453)
PMV BLD AUTO: 9.7 FL (ref 8.1–13.5)
POTASSIUM SERPL-SCNC: 4.3 MMOL/L (ref 3.7–5.3)
PROTEIN UA: NEGATIVE
RBC # BLD: 4.92 M/UL (ref 4.21–5.77)
RBC UA: NORMAL /HPF (ref 0–2)
RENAL EPITHELIAL, UA: NORMAL /HPF
SODIUM BLD-SCNC: 136 MMOL/L (ref 135–144)
SPECIFIC GRAVITY UA: >1.03 (ref 1.01–1.02)
TRICHOMONAS: NORMAL
TURBIDITY: CLEAR
URINE HGB: NEGATIVE
UROBILINOGEN, URINE: NORMAL
WBC # BLD: 13.7 K/UL (ref 3.5–11.3)
WBC UA: NORMAL /HPF (ref 0–5)
YEAST: NORMAL

## 2019-01-09 PROCEDURE — 2580000003 HC RX 258: Performed by: PHYSICIAN ASSISTANT

## 2019-01-09 PROCEDURE — 99285 EMERGENCY DEPT VISIT HI MDM: CPT

## 2019-01-09 PROCEDURE — 80048 BASIC METABOLIC PNL TOTAL CA: CPT

## 2019-01-09 PROCEDURE — 87040 BLOOD CULTURE FOR BACTERIA: CPT

## 2019-01-09 PROCEDURE — 96375 TX/PRO/DX INJ NEW DRUG ADDON: CPT

## 2019-01-09 PROCEDURE — 1200000000 HC SEMI PRIVATE

## 2019-01-09 PROCEDURE — 96374 THER/PROPH/DIAG INJ IV PUSH: CPT

## 2019-01-09 PROCEDURE — 81001 URINALYSIS AUTO W/SCOPE: CPT

## 2019-01-09 PROCEDURE — 74176 CT ABD & PELVIS W/O CONTRAST: CPT

## 2019-01-09 PROCEDURE — 85027 COMPLETE CBC AUTOMATED: CPT

## 2019-01-09 PROCEDURE — 36415 COLL VENOUS BLD VENIPUNCTURE: CPT

## 2019-01-09 PROCEDURE — 6360000002 HC RX W HCPCS: Performed by: PHYSICIAN ASSISTANT

## 2019-01-09 PROCEDURE — 6370000000 HC RX 637 (ALT 250 FOR IP): Performed by: EMERGENCY MEDICINE

## 2019-01-09 RX ORDER — 0.9 % SODIUM CHLORIDE 0.9 %
1000 INTRAVENOUS SOLUTION INTRAVENOUS ONCE
Status: COMPLETED | OUTPATIENT
Start: 2019-01-09 | End: 2019-01-10

## 2019-01-09 RX ORDER — ONDANSETRON 2 MG/ML
4 INJECTION INTRAMUSCULAR; INTRAVENOUS ONCE
Status: COMPLETED | OUTPATIENT
Start: 2019-01-09 | End: 2019-01-09

## 2019-01-09 RX ORDER — CIPROFLOXACIN 2 MG/ML
400 INJECTION, SOLUTION INTRAVENOUS ONCE
Status: COMPLETED | OUTPATIENT
Start: 2019-01-09 | End: 2019-01-10

## 2019-01-09 RX ORDER — ACETAMINOPHEN 500 MG
1000 TABLET ORAL ONCE
Status: COMPLETED | OUTPATIENT
Start: 2019-01-09 | End: 2019-01-09

## 2019-01-09 RX ORDER — MORPHINE SULFATE 2 MG/ML
4 INJECTION, SOLUTION INTRAMUSCULAR; INTRAVENOUS ONCE
Status: COMPLETED | OUTPATIENT
Start: 2019-01-09 | End: 2019-01-09

## 2019-01-09 RX ADMIN — MORPHINE SULFATE 4 MG: 2 INJECTION, SOLUTION INTRAMUSCULAR; INTRAVENOUS at 22:23

## 2019-01-09 RX ADMIN — ONDANSETRON 4 MG: 2 INJECTION INTRAMUSCULAR; INTRAVENOUS at 22:24

## 2019-01-09 RX ADMIN — SODIUM CHLORIDE 1000 ML: 9 INJECTION, SOLUTION INTRAVENOUS at 22:23

## 2019-01-09 RX ADMIN — ACETAMINOPHEN 1000 MG: 500 TABLET, FILM COATED ORAL at 22:24

## 2019-01-09 ASSESSMENT — ENCOUNTER SYMPTOMS
EYE REDNESS: 0
NAUSEA: 1
SORE THROAT: 0
DIARRHEA: 0
ABDOMINAL PAIN: 1
WHEEZING: 0
VOMITING: 0
BACK PAIN: 0
COUGH: 0
BLOOD IN STOOL: 0
RHINORRHEA: 0
SHORTNESS OF BREATH: 0
CHEST TIGHTNESS: 0
EYE DISCHARGE: 0
CONSTIPATION: 0

## 2019-01-09 ASSESSMENT — PAIN DESCRIPTION - LOCATION: LOCATION: FLANK

## 2019-01-09 ASSESSMENT — PAIN DESCRIPTION - ORIENTATION: ORIENTATION: RIGHT

## 2019-01-09 ASSESSMENT — PAIN SCALES - GENERAL: PAINLEVEL_OUTOF10: 8

## 2019-01-09 ASSESSMENT — PAIN DESCRIPTION - PAIN TYPE: TYPE: ACUTE PAIN

## 2019-01-10 LAB
ABSOLUTE EOS #: 0.03 K/UL (ref 0–0.44)
ABSOLUTE IMMATURE GRANULOCYTE: 0.04 K/UL (ref 0–0.3)
ABSOLUTE LYMPH #: 0.98 K/UL (ref 1.1–3.7)
ABSOLUTE MONO #: 1.44 K/UL (ref 0.1–1.2)
BASOPHILS # BLD: 0 % (ref 0–2)
BASOPHILS ABSOLUTE: 0.04 K/UL (ref 0–0.2)
DIFFERENTIAL TYPE: ABNORMAL
EKG ATRIAL RATE: 96 BPM
EKG P AXIS: 38 DEGREES
EKG P-R INTERVAL: 148 MS
EKG Q-T INTERVAL: 332 MS
EKG QRS DURATION: 96 MS
EKG QTC CALCULATION (BAZETT): 419 MS
EKG R AXIS: 51 DEGREES
EKG T AXIS: 49 DEGREES
EKG VENTRICULAR RATE: 96 BPM
EOSINOPHILS RELATIVE PERCENT: 0 % (ref 1–4)
ESTIMATED AVERAGE GLUCOSE: 148 MG/DL
GLUCOSE BLD-MCNC: 117 MG/DL (ref 74–100)
GLUCOSE BLD-MCNC: 141 MG/DL (ref 74–100)
GLUCOSE BLD-MCNC: 148 MG/DL (ref 74–100)
GLUCOSE BLD-MCNC: 150 MG/DL (ref 74–100)
GLUCOSE BLD-MCNC: 155 MG/DL (ref 74–100)
GLUCOSE BLD-MCNC: 161 MG/DL (ref 74–100)
HBA1C MFR BLD: 6.8 % (ref 4.8–5.9)
HCT VFR BLD CALC: 46.6 % (ref 40.7–50.3)
HEMOGLOBIN: 15.2 G/DL (ref 13–17)
IMMATURE GRANULOCYTES: 0 %
LYMPHOCYTES # BLD: 8 % (ref 24–43)
MCH RBC QN AUTO: 31.8 PG (ref 25.2–33.5)
MCHC RBC AUTO-ENTMCNC: 32.6 G/DL (ref 28.4–34.8)
MCV RBC AUTO: 97.5 FL (ref 82.6–102.9)
MONOCYTES # BLD: 12 % (ref 3–12)
NRBC AUTOMATED: 0 PER 100 WBC
PDW BLD-RTO: 13.1 % (ref 11.8–14.4)
PLATELET # BLD: 230 K/UL (ref 138–453)
PLATELET ESTIMATE: ABNORMAL
PMV BLD AUTO: 9.7 FL (ref 8.1–13.5)
RBC # BLD: 4.78 M/UL (ref 4.21–5.77)
RBC # BLD: ABNORMAL 10*6/UL
SEG NEUTROPHILS: 80 % (ref 36–65)
SEGMENTED NEUTROPHILS ABSOLUTE COUNT: 9.59 K/UL (ref 1.5–8.1)
WBC # BLD: 12.1 K/UL (ref 3.5–11.3)
WBC # BLD: ABNORMAL 10*3/UL

## 2019-01-10 PROCEDURE — 2580000003 HC RX 258: Performed by: INTERNAL MEDICINE

## 2019-01-10 PROCEDURE — 36415 COLL VENOUS BLD VENIPUNCTURE: CPT

## 2019-01-10 PROCEDURE — 83036 HEMOGLOBIN GLYCOSYLATED A1C: CPT

## 2019-01-10 PROCEDURE — 82947 ASSAY GLUCOSE BLOOD QUANT: CPT

## 2019-01-10 PROCEDURE — 2500000003 HC RX 250 WO HCPCS: Performed by: INTERNAL MEDICINE

## 2019-01-10 PROCEDURE — 6370000000 HC RX 637 (ALT 250 FOR IP): Performed by: INTERNAL MEDICINE

## 2019-01-10 PROCEDURE — 6360000002 HC RX W HCPCS: Performed by: PHYSICIAN ASSISTANT

## 2019-01-10 PROCEDURE — 2500000003 HC RX 250 WO HCPCS: Performed by: PHYSICIAN ASSISTANT

## 2019-01-10 PROCEDURE — 99221 1ST HOSP IP/OBS SF/LOW 40: CPT | Performed by: SURGERY

## 2019-01-10 PROCEDURE — 6360000002 HC RX W HCPCS: Performed by: INTERNAL MEDICINE

## 2019-01-10 PROCEDURE — 1200000000 HC SEMI PRIVATE

## 2019-01-10 PROCEDURE — 93005 ELECTROCARDIOGRAM TRACING: CPT

## 2019-01-10 PROCEDURE — 85025 COMPLETE CBC W/AUTO DIFF WBC: CPT

## 2019-01-10 RX ORDER — MORPHINE SULFATE 2 MG/ML
2 INJECTION, SOLUTION INTRAMUSCULAR; INTRAVENOUS EVERY 4 HOURS PRN
Status: DISCONTINUED | OUTPATIENT
Start: 2019-01-10 | End: 2019-01-12 | Stop reason: HOSPADM

## 2019-01-10 RX ORDER — SODIUM CHLORIDE 9 MG/ML
INJECTION, SOLUTION INTRAVENOUS CONTINUOUS
Status: DISCONTINUED | OUTPATIENT
Start: 2019-01-10 | End: 2019-01-12 | Stop reason: HOSPADM

## 2019-01-10 RX ORDER — NICOTINE POLACRILEX 4 MG
15 LOZENGE BUCCAL PRN
Status: DISCONTINUED | OUTPATIENT
Start: 2019-01-10 | End: 2019-01-12 | Stop reason: HOSPADM

## 2019-01-10 RX ORDER — DEXTROSE MONOHYDRATE 25 G/50ML
12.5 INJECTION, SOLUTION INTRAVENOUS PRN
Status: DISCONTINUED | OUTPATIENT
Start: 2019-01-10 | End: 2019-01-12 | Stop reason: HOSPADM

## 2019-01-10 RX ORDER — DILTIAZEM HYDROCHLORIDE 180 MG/1
360 CAPSULE, COATED, EXTENDED RELEASE ORAL DAILY
Status: DISCONTINUED | OUTPATIENT
Start: 2019-01-10 | End: 2019-01-12 | Stop reason: HOSPADM

## 2019-01-10 RX ORDER — DEXTROSE MONOHYDRATE 50 MG/ML
100 INJECTION, SOLUTION INTRAVENOUS PRN
Status: DISCONTINUED | OUTPATIENT
Start: 2019-01-10 | End: 2019-01-12 | Stop reason: HOSPADM

## 2019-01-10 RX ORDER — SODIUM CHLORIDE 0.9 % (FLUSH) 0.9 %
10 SYRINGE (ML) INJECTION EVERY 12 HOURS SCHEDULED
Status: DISCONTINUED | OUTPATIENT
Start: 2019-01-10 | End: 2019-01-12 | Stop reason: HOSPADM

## 2019-01-10 RX ORDER — FAMOTIDINE 20 MG/1
20 TABLET, FILM COATED ORAL 2 TIMES DAILY
Status: DISCONTINUED | OUTPATIENT
Start: 2019-01-10 | End: 2019-01-12 | Stop reason: HOSPADM

## 2019-01-10 RX ORDER — FINASTERIDE 5 MG/1
5 TABLET, FILM COATED ORAL DAILY
Status: DISCONTINUED | OUTPATIENT
Start: 2019-01-10 | End: 2019-01-12 | Stop reason: HOSPADM

## 2019-01-10 RX ORDER — SODIUM CHLORIDE 0.9 % (FLUSH) 0.9 %
10 SYRINGE (ML) INJECTION PRN
Status: DISCONTINUED | OUTPATIENT
Start: 2019-01-10 | End: 2019-01-12 | Stop reason: HOSPADM

## 2019-01-10 RX ORDER — ACETAMINOPHEN 650 MG/1
650 SUPPOSITORY RECTAL EVERY 4 HOURS PRN
Status: DISCONTINUED | OUTPATIENT
Start: 2019-01-10 | End: 2019-01-11 | Stop reason: ALTCHOICE

## 2019-01-10 RX ORDER — ONDANSETRON 2 MG/ML
4 INJECTION INTRAMUSCULAR; INTRAVENOUS EVERY 6 HOURS PRN
Status: DISCONTINUED | OUTPATIENT
Start: 2019-01-10 | End: 2019-01-12 | Stop reason: HOSPADM

## 2019-01-10 RX ORDER — TAMSULOSIN HYDROCHLORIDE 0.4 MG/1
0.8 CAPSULE ORAL DAILY
Status: DISCONTINUED | OUTPATIENT
Start: 2019-01-10 | End: 2019-01-12 | Stop reason: HOSPADM

## 2019-01-10 RX ORDER — CIPROFLOXACIN 2 MG/ML
400 INJECTION, SOLUTION INTRAVENOUS EVERY 12 HOURS
Status: DISCONTINUED | OUTPATIENT
Start: 2019-01-10 | End: 2019-01-12 | Stop reason: HOSPADM

## 2019-01-10 RX ORDER — MORPHINE SULFATE 4 MG/ML
4 INJECTION, SOLUTION INTRAMUSCULAR; INTRAVENOUS EVERY 4 HOURS PRN
Status: DISCONTINUED | OUTPATIENT
Start: 2019-01-10 | End: 2019-01-12 | Stop reason: HOSPADM

## 2019-01-10 RX ADMIN — CIPROFLOXACIN 400 MG: 2 INJECTION, SOLUTION INTRAVENOUS at 01:22

## 2019-01-10 RX ADMIN — METRONIDAZOLE 500 MG: 500 INJECTION, SOLUTION INTRAVENOUS at 00:18

## 2019-01-10 RX ADMIN — CIPROFLOXACIN 400 MG: 2 INJECTION, SOLUTION INTRAVENOUS at 12:20

## 2019-01-10 RX ADMIN — TAMSULOSIN HYDROCHLORIDE 0.8 MG: 0.4 CAPSULE ORAL at 09:14

## 2019-01-10 RX ADMIN — ENOXAPARIN SODIUM 40 MG: 40 INJECTION SUBCUTANEOUS at 09:14

## 2019-01-10 RX ADMIN — FAMOTIDINE 20 MG: 20 TABLET, FILM COATED ORAL at 01:03

## 2019-01-10 RX ADMIN — INSULIN LISPRO 2 UNITS: 100 INJECTION, SOLUTION INTRAVENOUS; SUBCUTANEOUS at 16:07

## 2019-01-10 RX ADMIN — INSULIN LISPRO 2 UNITS: 100 INJECTION, SOLUTION INTRAVENOUS; SUBCUTANEOUS at 12:20

## 2019-01-10 RX ADMIN — INSULIN LISPRO 2 UNITS: 100 INJECTION, SOLUTION INTRAVENOUS; SUBCUTANEOUS at 04:37

## 2019-01-10 RX ADMIN — INSULIN LISPRO 2 UNITS: 100 INJECTION, SOLUTION INTRAVENOUS; SUBCUTANEOUS at 23:50

## 2019-01-10 RX ADMIN — DILTIAZEM HYDROCHLORIDE 360 MG: 180 CAPSULE, COATED, EXTENDED RELEASE ORAL at 09:14

## 2019-01-10 RX ADMIN — SODIUM CHLORIDE: 9 INJECTION, SOLUTION INTRAVENOUS at 00:17

## 2019-01-10 RX ADMIN — METRONIDAZOLE 500 MG: 500 INJECTION, SOLUTION INTRAVENOUS at 23:48

## 2019-01-10 RX ADMIN — METRONIDAZOLE 500 MG: 500 INJECTION, SOLUTION INTRAVENOUS at 16:08

## 2019-01-10 RX ADMIN — FAMOTIDINE 20 MG: 20 TABLET, FILM COATED ORAL at 20:32

## 2019-01-10 RX ADMIN — SODIUM CHLORIDE: 9 INJECTION, SOLUTION INTRAVENOUS at 23:35

## 2019-01-10 RX ADMIN — SODIUM CHLORIDE: 9 INJECTION, SOLUTION INTRAVENOUS at 14:41

## 2019-01-10 RX ADMIN — METRONIDAZOLE 500 MG: 500 INJECTION, SOLUTION INTRAVENOUS at 07:46

## 2019-01-10 RX ADMIN — INSULIN LISPRO 2 UNITS: 100 INJECTION, SOLUTION INTRAVENOUS; SUBCUTANEOUS at 07:52

## 2019-01-10 RX ADMIN — MORPHINE SULFATE 4 MG: 4 INJECTION, SOLUTION INTRAMUSCULAR; INTRAVENOUS at 07:03

## 2019-01-10 RX ADMIN — FAMOTIDINE 20 MG: 20 TABLET, FILM COATED ORAL at 09:14

## 2019-01-10 ASSESSMENT — PAIN DESCRIPTION - PAIN TYPE
TYPE: ACUTE PAIN

## 2019-01-10 ASSESSMENT — PAIN DESCRIPTION - ORIENTATION
ORIENTATION: RIGHT

## 2019-01-10 ASSESSMENT — PAIN - FUNCTIONAL ASSESSMENT: PAIN_FUNCTIONAL_ASSESSMENT: ACTIVITIES ARE NOT PREVENTED

## 2019-01-10 ASSESSMENT — PAIN DESCRIPTION - LOCATION
LOCATION: FLANK

## 2019-01-10 ASSESSMENT — PAIN DESCRIPTION - ONSET: ONSET: ON-GOING

## 2019-01-10 ASSESSMENT — PAIN DESCRIPTION - DESCRIPTORS
DESCRIPTORS: ACHING;CRAMPING
DESCRIPTORS: ACHING
DESCRIPTORS: ACHING
DESCRIPTORS: ACHING;CRAMPING

## 2019-01-10 ASSESSMENT — PAIN DESCRIPTION - PROGRESSION: CLINICAL_PROGRESSION: NOT CHANGED

## 2019-01-10 ASSESSMENT — PAIN SCALES - GENERAL
PAINLEVEL_OUTOF10: 2
PAINLEVEL_OUTOF10: 8
PAINLEVEL_OUTOF10: 6
PAINLEVEL_OUTOF10: 4
PAINLEVEL_OUTOF10: 6

## 2019-01-10 ASSESSMENT — PAIN DESCRIPTION - FREQUENCY
FREQUENCY: INTERMITTENT

## 2019-01-11 LAB
ABSOLUTE EOS #: <0.03 K/UL (ref 0–0.44)
ABSOLUTE IMMATURE GRANULOCYTE: 0.04 K/UL (ref 0–0.3)
ABSOLUTE LYMPH #: 1.2 K/UL (ref 1.1–3.7)
ABSOLUTE MONO #: 1.3 K/UL (ref 0.1–1.2)
ALBUMIN SERPL-MCNC: 3.5 G/DL (ref 3.5–5.2)
ALBUMIN/GLOBULIN RATIO: 1.2 (ref 1–2.5)
ALP BLD-CCNC: 50 U/L (ref 40–129)
ALT SERPL-CCNC: 28 U/L (ref 5–41)
ANION GAP SERPL CALCULATED.3IONS-SCNC: 12 MMOL/L (ref 9–17)
AST SERPL-CCNC: 13 U/L
BASOPHILS # BLD: 0 % (ref 0–2)
BASOPHILS ABSOLUTE: 0.03 K/UL (ref 0–0.2)
BILIRUB SERPL-MCNC: 0.8 MG/DL (ref 0.3–1.2)
BUN BLDV-MCNC: 7 MG/DL (ref 8–23)
BUN/CREAT BLD: 10 (ref 9–20)
CALCIUM SERPL-MCNC: 8.5 MG/DL (ref 8.6–10.4)
CHLORIDE BLD-SCNC: 102 MMOL/L (ref 98–107)
CO2: 24 MMOL/L (ref 20–31)
CREAT SERPL-MCNC: 0.67 MG/DL (ref 0.7–1.2)
DIFFERENTIAL TYPE: ABNORMAL
EOSINOPHILS RELATIVE PERCENT: 0 % (ref 1–4)
GFR AFRICAN AMERICAN: >60 ML/MIN
GFR NON-AFRICAN AMERICAN: >60 ML/MIN
GFR SERPL CREATININE-BSD FRML MDRD: ABNORMAL ML/MIN/{1.73_M2}
GFR SERPL CREATININE-BSD FRML MDRD: ABNORMAL ML/MIN/{1.73_M2}
GLUCOSE BLD-MCNC: 101 MG/DL (ref 74–100)
GLUCOSE BLD-MCNC: 121 MG/DL (ref 74–100)
GLUCOSE BLD-MCNC: 122 MG/DL (ref 74–100)
GLUCOSE BLD-MCNC: 131 MG/DL (ref 74–100)
GLUCOSE BLD-MCNC: 135 MG/DL (ref 74–100)
GLUCOSE BLD-MCNC: 160 MG/DL (ref 70–99)
GLUCOSE BLD-MCNC: 163 MG/DL (ref 74–100)
HCT VFR BLD CALC: 43.1 % (ref 40.7–50.3)
HEMOGLOBIN: 14.3 G/DL (ref 13–17)
IMMATURE GRANULOCYTES: 0 %
LYMPHOCYTES # BLD: 12 % (ref 24–43)
MCH RBC QN AUTO: 32 PG (ref 25.2–33.5)
MCHC RBC AUTO-ENTMCNC: 33.2 G/DL (ref 28.4–34.8)
MCV RBC AUTO: 96.4 FL (ref 82.6–102.9)
MONOCYTES # BLD: 13 % (ref 3–12)
NRBC AUTOMATED: 0 PER 100 WBC
PDW BLD-RTO: 13.2 % (ref 11.8–14.4)
PLATELET # BLD: 223 K/UL (ref 138–453)
PLATELET ESTIMATE: ABNORMAL
PMV BLD AUTO: 10.1 FL (ref 8.1–13.5)
POTASSIUM SERPL-SCNC: 3.8 MMOL/L (ref 3.7–5.3)
RBC # BLD: 4.47 M/UL (ref 4.21–5.77)
RBC # BLD: ABNORMAL 10*6/UL
SEG NEUTROPHILS: 75 % (ref 36–65)
SEGMENTED NEUTROPHILS ABSOLUTE COUNT: 7.53 K/UL (ref 1.5–8.1)
SODIUM BLD-SCNC: 138 MMOL/L (ref 135–144)
TOTAL PROTEIN: 6.5 G/DL (ref 6.4–8.3)
WBC # BLD: 10.1 K/UL (ref 3.5–11.3)
WBC # BLD: ABNORMAL 10*3/UL

## 2019-01-11 PROCEDURE — 6360000002 HC RX W HCPCS: Performed by: PHYSICIAN ASSISTANT

## 2019-01-11 PROCEDURE — G0008 ADMIN INFLUENZA VIRUS VAC: HCPCS | Performed by: INTERNAL MEDICINE

## 2019-01-11 PROCEDURE — 6370000000 HC RX 637 (ALT 250 FOR IP): Performed by: PHYSICIAN ASSISTANT

## 2019-01-11 PROCEDURE — 2580000003 HC RX 258: Performed by: INTERNAL MEDICINE

## 2019-01-11 PROCEDURE — 6360000002 HC RX W HCPCS: Performed by: INTERNAL MEDICINE

## 2019-01-11 PROCEDURE — 6370000000 HC RX 637 (ALT 250 FOR IP): Performed by: INTERNAL MEDICINE

## 2019-01-11 PROCEDURE — 36415 COLL VENOUS BLD VENIPUNCTURE: CPT

## 2019-01-11 PROCEDURE — 90686 IIV4 VACC NO PRSV 0.5 ML IM: CPT | Performed by: INTERNAL MEDICINE

## 2019-01-11 PROCEDURE — 82947 ASSAY GLUCOSE BLOOD QUANT: CPT

## 2019-01-11 PROCEDURE — 1200000000 HC SEMI PRIVATE

## 2019-01-11 PROCEDURE — 99231 SBSQ HOSP IP/OBS SF/LOW 25: CPT | Performed by: SURGERY

## 2019-01-11 PROCEDURE — 85025 COMPLETE CBC W/AUTO DIFF WBC: CPT

## 2019-01-11 PROCEDURE — 2500000003 HC RX 250 WO HCPCS: Performed by: INTERNAL MEDICINE

## 2019-01-11 PROCEDURE — 80053 COMPREHEN METABOLIC PANEL: CPT

## 2019-01-11 RX ORDER — METRONIDAZOLE 500 MG/1
500 TABLET ORAL 3 TIMES DAILY
Qty: 30 TABLET | Refills: 0 | Status: SHIPPED | OUTPATIENT
Start: 2019-01-11 | End: 2019-01-12

## 2019-01-11 RX ORDER — CIPROFLOXACIN 500 MG/1
500 TABLET, FILM COATED ORAL 2 TIMES DAILY
Qty: 20 TABLET | Refills: 0 | Status: SHIPPED | OUTPATIENT
Start: 2019-01-11 | End: 2019-01-12

## 2019-01-11 RX ORDER — ACETAMINOPHEN 325 MG/1
650 TABLET ORAL EVERY 4 HOURS PRN
Status: DISCONTINUED | OUTPATIENT
Start: 2019-01-11 | End: 2019-01-12 | Stop reason: HOSPADM

## 2019-01-11 RX ADMIN — INFLUENZA A VIRUS A/MICHIGAN/45/2015 X-275 (H1N1) ANTIGEN (FORMALDEHYDE INACTIVATED), INFLUENZA A VIRUS A/SINGAPORE/INFIMH-16-0019/2016 IVR-186 (H3N2) ANTIGEN (FORMALDEHYDE INACTIVATED), INFLUENZA B VIRUS B/PHUKET/3073/2013 ANTIGEN (FORMALDEHYDE INACTIVATED), AND INFLUENZA B VIRUS B/MARYLAND/15/2016 BX-69A ANTIGEN (FORMALDEHYDE INACTIVATED) 0.5 ML: 15; 15; 15; 15 INJECTION, SUSPENSION INTRAMUSCULAR at 12:58

## 2019-01-11 RX ADMIN — INSULIN LISPRO 2 UNITS: 100 INJECTION, SOLUTION INTRAVENOUS; SUBCUTANEOUS at 03:58

## 2019-01-11 RX ADMIN — TAMSULOSIN HYDROCHLORIDE 0.8 MG: 0.4 CAPSULE ORAL at 08:26

## 2019-01-11 RX ADMIN — METRONIDAZOLE 500 MG: 500 INJECTION, SOLUTION INTRAVENOUS at 08:25

## 2019-01-11 RX ADMIN — ENOXAPARIN SODIUM 30 MG: 30 INJECTION SUBCUTANEOUS at 20:41

## 2019-01-11 RX ADMIN — ENOXAPARIN SODIUM 40 MG: 40 INJECTION SUBCUTANEOUS at 08:29

## 2019-01-11 RX ADMIN — METRONIDAZOLE 500 MG: 500 INJECTION, SOLUTION INTRAVENOUS at 16:03

## 2019-01-11 RX ADMIN — SODIUM CHLORIDE: 9 INJECTION, SOLUTION INTRAVENOUS at 22:19

## 2019-01-11 RX ADMIN — CIPROFLOXACIN 400 MG: 2 INJECTION, SOLUTION INTRAVENOUS at 00:50

## 2019-01-11 RX ADMIN — ACETAMINOPHEN 650 MG: 325 TABLET, FILM COATED ORAL at 15:00

## 2019-01-11 RX ADMIN — ACETAMINOPHEN 650 MG: 325 TABLET, FILM COATED ORAL at 09:21

## 2019-01-11 RX ADMIN — CIPROFLOXACIN 400 MG: 2 INJECTION, SOLUTION INTRAVENOUS at 12:54

## 2019-01-11 RX ADMIN — FAMOTIDINE 20 MG: 20 TABLET, FILM COATED ORAL at 20:41

## 2019-01-11 RX ADMIN — DILTIAZEM HYDROCHLORIDE 360 MG: 180 CAPSULE, COATED, EXTENDED RELEASE ORAL at 08:26

## 2019-01-11 RX ADMIN — FAMOTIDINE 20 MG: 20 TABLET, FILM COATED ORAL at 08:26

## 2019-01-11 ASSESSMENT — PAIN DESCRIPTION - PAIN TYPE
TYPE: ACUTE PAIN

## 2019-01-11 ASSESSMENT — PAIN DESCRIPTION - ORIENTATION
ORIENTATION: ANTERIOR
ORIENTATION: ANTERIOR
ORIENTATION: RIGHT;LOWER
ORIENTATION: RIGHT;LOWER

## 2019-01-11 ASSESSMENT — PAIN SCALES - GENERAL
PAINLEVEL_OUTOF10: 5
PAINLEVEL_OUTOF10: 0
PAINLEVEL_OUTOF10: 5
PAINLEVEL_OUTOF10: 2
PAINLEVEL_OUTOF10: 5
PAINLEVEL_OUTOF10: 5

## 2019-01-11 ASSESSMENT — PAIN DESCRIPTION - DESCRIPTORS
DESCRIPTORS: SHARP
DESCRIPTORS: HEADACHE
DESCRIPTORS: HEADACHE
DESCRIPTORS: SHARP

## 2019-01-11 ASSESSMENT — PAIN DESCRIPTION - FREQUENCY
FREQUENCY: INTERMITTENT
FREQUENCY: CONTINUOUS
FREQUENCY: CONTINUOUS
FREQUENCY: INTERMITTENT

## 2019-01-11 ASSESSMENT — PAIN DESCRIPTION - LOCATION
LOCATION: HEAD
LOCATION: ABDOMEN
LOCATION: HEAD
LOCATION: ABDOMEN

## 2019-01-12 VITALS
BODY MASS INDEX: 36.64 KG/M2 | OXYGEN SATURATION: 94 % | DIASTOLIC BLOOD PRESSURE: 92 MMHG | HEART RATE: 84 BPM | TEMPERATURE: 97.7 F | HEIGHT: 72 IN | SYSTOLIC BLOOD PRESSURE: 151 MMHG | WEIGHT: 270.5 LBS | RESPIRATION RATE: 16 BRPM

## 2019-01-12 LAB
ABSOLUTE EOS #: 0.17 K/UL (ref 0–0.44)
ABSOLUTE IMMATURE GRANULOCYTE: <0.03 K/UL (ref 0–0.3)
ABSOLUTE LYMPH #: 1.43 K/UL (ref 1.1–3.7)
ABSOLUTE MONO #: 1.15 K/UL (ref 0.1–1.2)
BASOPHILS # BLD: 1 % (ref 0–2)
BASOPHILS ABSOLUTE: 0.04 K/UL (ref 0–0.2)
DIFFERENTIAL TYPE: ABNORMAL
EOSINOPHILS RELATIVE PERCENT: 2 % (ref 1–4)
GLUCOSE BLD-MCNC: 107 MG/DL (ref 74–100)
GLUCOSE BLD-MCNC: 116 MG/DL (ref 74–100)
GLUCOSE BLD-MCNC: 122 MG/DL (ref 74–100)
GLUCOSE BLD-MCNC: 140 MG/DL (ref 74–100)
HCT VFR BLD CALC: 42.4 % (ref 40.7–50.3)
HEMOGLOBIN: 14 G/DL (ref 13–17)
IMMATURE GRANULOCYTES: 0 %
LYMPHOCYTES # BLD: 18 % (ref 24–43)
MCH RBC QN AUTO: 32.1 PG (ref 25.2–33.5)
MCHC RBC AUTO-ENTMCNC: 33 G/DL (ref 28.4–34.8)
MCV RBC AUTO: 97.2 FL (ref 82.6–102.9)
MONOCYTES # BLD: 15 % (ref 3–12)
NRBC AUTOMATED: 0 PER 100 WBC
PDW BLD-RTO: 12.8 % (ref 11.8–14.4)
PLATELET # BLD: 218 K/UL (ref 138–453)
PLATELET ESTIMATE: ABNORMAL
PMV BLD AUTO: 9.9 FL (ref 8.1–13.5)
RBC # BLD: 4.36 M/UL (ref 4.21–5.77)
RBC # BLD: ABNORMAL 10*6/UL
SEG NEUTROPHILS: 64 % (ref 36–65)
SEGMENTED NEUTROPHILS ABSOLUTE COUNT: 5.1 K/UL (ref 1.5–8.1)
WBC # BLD: 7.9 K/UL (ref 3.5–11.3)
WBC # BLD: ABNORMAL 10*3/UL

## 2019-01-12 PROCEDURE — 85025 COMPLETE CBC W/AUTO DIFF WBC: CPT

## 2019-01-12 PROCEDURE — 6370000000 HC RX 637 (ALT 250 FOR IP): Performed by: INTERNAL MEDICINE

## 2019-01-12 PROCEDURE — 82947 ASSAY GLUCOSE BLOOD QUANT: CPT

## 2019-01-12 PROCEDURE — 36415 COLL VENOUS BLD VENIPUNCTURE: CPT

## 2019-01-12 PROCEDURE — 2500000003 HC RX 250 WO HCPCS: Performed by: INTERNAL MEDICINE

## 2019-01-12 PROCEDURE — 6360000002 HC RX W HCPCS: Performed by: PHYSICIAN ASSISTANT

## 2019-01-12 PROCEDURE — 6360000002 HC RX W HCPCS: Performed by: INTERNAL MEDICINE

## 2019-01-12 RX ORDER — METRONIDAZOLE 500 MG/1
500 TABLET ORAL 3 TIMES DAILY
Qty: 30 TABLET | Refills: 0 | Status: SHIPPED | OUTPATIENT
Start: 2019-01-12 | End: 2019-01-22

## 2019-01-12 RX ORDER — CIPROFLOXACIN 500 MG/1
500 TABLET, FILM COATED ORAL 2 TIMES DAILY
Qty: 20 TABLET | Refills: 0 | Status: SHIPPED | OUTPATIENT
Start: 2019-01-12 | End: 2019-01-22

## 2019-01-12 RX ADMIN — CIPROFLOXACIN 400 MG: 2 INJECTION, SOLUTION INTRAVENOUS at 11:38

## 2019-01-12 RX ADMIN — ENOXAPARIN SODIUM 30 MG: 30 INJECTION SUBCUTANEOUS at 08:56

## 2019-01-12 RX ADMIN — CIPROFLOXACIN 400 MG: 2 INJECTION, SOLUTION INTRAVENOUS at 00:40

## 2019-01-12 RX ADMIN — METRONIDAZOLE 500 MG: 500 INJECTION, SOLUTION INTRAVENOUS at 08:56

## 2019-01-12 RX ADMIN — METRONIDAZOLE 500 MG: 500 INJECTION, SOLUTION INTRAVENOUS at 00:40

## 2019-01-12 RX ADMIN — FAMOTIDINE 20 MG: 20 TABLET, FILM COATED ORAL at 08:56

## 2019-01-12 RX ADMIN — TAMSULOSIN HYDROCHLORIDE 0.8 MG: 0.4 CAPSULE ORAL at 08:56

## 2019-01-12 RX ADMIN — DILTIAZEM HYDROCHLORIDE 360 MG: 180 CAPSULE, COATED, EXTENDED RELEASE ORAL at 08:56

## 2019-01-12 ASSESSMENT — PAIN DESCRIPTION - DESCRIPTORS: DESCRIPTORS: SHARP

## 2019-01-12 ASSESSMENT — PAIN DESCRIPTION - ORIENTATION: ORIENTATION: RIGHT;LOWER

## 2019-01-12 ASSESSMENT — PAIN DESCRIPTION - PAIN TYPE: TYPE: ACUTE PAIN

## 2019-01-12 ASSESSMENT — PAIN SCALES - GENERAL: PAINLEVEL_OUTOF10: 5

## 2019-01-12 ASSESSMENT — PAIN DESCRIPTION - LOCATION: LOCATION: ABDOMEN

## 2019-01-12 ASSESSMENT — PAIN DESCRIPTION - FREQUENCY: FREQUENCY: INTERMITTENT

## 2019-01-14 ENCOUNTER — TELEPHONE (OUTPATIENT)
Dept: FAMILY MEDICINE CLINIC | Age: 64
End: 2019-01-14

## 2019-01-14 LAB
CULTURE: NORMAL
CULTURE: NORMAL
Lab: NORMAL
Lab: NORMAL
SPECIMEN DESCRIPTION: NORMAL
SPECIMEN DESCRIPTION: NORMAL
STATUS: NORMAL
STATUS: NORMAL

## 2019-01-23 ENCOUNTER — OFFICE VISIT (OUTPATIENT)
Dept: FAMILY MEDICINE CLINIC | Age: 64
End: 2019-01-23
Payer: MEDICARE

## 2019-01-23 ENCOUNTER — OFFICE VISIT (OUTPATIENT)
Dept: SURGERY | Age: 64
End: 2019-01-23
Payer: MEDICARE

## 2019-01-23 VITALS
SYSTOLIC BLOOD PRESSURE: 124 MMHG | WEIGHT: 262 LBS | HEART RATE: 83 BPM | HEIGHT: 72 IN | DIASTOLIC BLOOD PRESSURE: 74 MMHG | BODY MASS INDEX: 35.49 KG/M2

## 2019-01-23 VITALS
HEART RATE: 92 BPM | HEIGHT: 72 IN | WEIGHT: 263.6 LBS | SYSTOLIC BLOOD PRESSURE: 156 MMHG | TEMPERATURE: 98.3 F | RESPIRATION RATE: 24 BRPM | DIASTOLIC BLOOD PRESSURE: 95 MMHG | BODY MASS INDEX: 35.7 KG/M2

## 2019-01-23 DIAGNOSIS — I51.7 LVH (LEFT VENTRICULAR HYPERTROPHY): ICD-10-CM

## 2019-01-23 DIAGNOSIS — K57.92 DIVERTICULITIS: Primary | ICD-10-CM

## 2019-01-23 DIAGNOSIS — Z09 HOSPITAL DISCHARGE FOLLOW-UP: ICD-10-CM

## 2019-01-23 DIAGNOSIS — K52.9 COLITIS: Primary | ICD-10-CM

## 2019-01-23 PROCEDURE — 1101F PT FALLS ASSESS-DOCD LE1/YR: CPT | Performed by: SURGERY

## 2019-01-23 PROCEDURE — G8482 FLU IMMUNIZE ORDER/ADMIN: HCPCS | Performed by: SURGERY

## 2019-01-23 PROCEDURE — G8417 CALC BMI ABV UP PARAM F/U: HCPCS | Performed by: SURGERY

## 2019-01-23 PROCEDURE — 1123F ACP DISCUSS/DSCN MKR DOCD: CPT | Performed by: SURGERY

## 2019-01-23 PROCEDURE — 1111F DSCHRG MED/CURRENT MED MERGE: CPT | Performed by: SURGERY

## 2019-01-23 PROCEDURE — 99213 OFFICE O/P EST LOW 20 MIN: CPT | Performed by: SURGERY

## 2019-01-23 PROCEDURE — 3017F COLORECTAL CA SCREEN DOC REV: CPT | Performed by: SURGERY

## 2019-01-23 PROCEDURE — 4040F PNEUMOC VAC/ADMIN/RCVD: CPT | Performed by: SURGERY

## 2019-01-23 PROCEDURE — 1036F TOBACCO NON-USER: CPT | Performed by: SURGERY

## 2019-01-23 PROCEDURE — G8427 DOCREV CUR MEDS BY ELIG CLIN: HCPCS | Performed by: SURGERY

## 2019-01-23 PROCEDURE — 99495 TRANSJ CARE MGMT MOD F2F 14D: CPT | Performed by: FAMILY MEDICINE

## 2019-01-23 RX ORDER — DILTIAZEM HYDROCHLORIDE 360 MG/1
360 CAPSULE, EXTENDED RELEASE ORAL DAILY
Qty: 90 CAPSULE | Refills: 1 | Status: SHIPPED | OUTPATIENT
Start: 2019-01-23 | End: 2019-09-19 | Stop reason: SDUPTHER

## 2019-01-23 RX ORDER — FINASTERIDE 5 MG/1
5 TABLET, FILM COATED ORAL DAILY
Qty: 90 TABLET | Refills: 1 | Status: SHIPPED | OUTPATIENT
Start: 2019-01-23 | End: 2019-05-16

## 2019-01-23 RX ORDER — TAMSULOSIN HYDROCHLORIDE 0.4 MG/1
0.4 CAPSULE ORAL 2 TIMES DAILY
Qty: 180 CAPSULE | Refills: 1 | Status: SHIPPED | OUTPATIENT
Start: 2019-01-23 | End: 2019-08-24 | Stop reason: SDUPTHER

## 2019-01-23 ASSESSMENT — PATIENT HEALTH QUESTIONNAIRE - PHQ9
2. FEELING DOWN, DEPRESSED OR HOPELESS: 0
SUM OF ALL RESPONSES TO PHQ9 QUESTIONS 1 & 2: 0
1. LITTLE INTEREST OR PLEASURE IN DOING THINGS: 0
SUM OF ALL RESPONSES TO PHQ QUESTIONS 1-9: 0
SUM OF ALL RESPONSES TO PHQ QUESTIONS 1-9: 0

## 2019-01-23 ASSESSMENT — ENCOUNTER SYMPTOMS: RESPIRATORY NEGATIVE: 1

## 2019-01-30 ENCOUNTER — HOSPITAL ENCOUNTER (OUTPATIENT)
Dept: NON INVASIVE DIAGNOSTICS | Age: 64
Discharge: HOME | End: 2019-01-30
Payer: MEDICARE

## 2019-01-30 DIAGNOSIS — I51.7 LVH (LEFT VENTRICULAR HYPERTROPHY): ICD-10-CM

## 2019-01-30 LAB
LV EF: 50 %
LVEF MODALITY: NORMAL

## 2019-01-30 PROCEDURE — 93306 TTE W/DOPPLER COMPLETE: CPT

## 2019-02-28 ENCOUNTER — ANESTHESIA EVENT (OUTPATIENT)
Dept: OPERATING ROOM | Age: 64
End: 2019-02-28
Payer: MEDICARE

## 2019-03-01 ENCOUNTER — ANESTHESIA (OUTPATIENT)
Dept: OPERATING ROOM | Age: 64
End: 2019-03-01
Payer: MEDICARE

## 2019-03-01 ENCOUNTER — HOSPITAL ENCOUNTER (OUTPATIENT)
Age: 64
Setting detail: OUTPATIENT SURGERY
Discharge: HOME | End: 2019-03-01
Attending: SURGERY | Admitting: SURGERY
Payer: MEDICARE

## 2019-03-01 VITALS
HEIGHT: 72 IN | SYSTOLIC BLOOD PRESSURE: 140 MMHG | TEMPERATURE: 97.2 F | BODY MASS INDEX: 36.57 KG/M2 | DIASTOLIC BLOOD PRESSURE: 97 MMHG | OXYGEN SATURATION: 93 % | HEART RATE: 74 BPM | WEIGHT: 270 LBS | RESPIRATION RATE: 16 BRPM

## 2019-03-01 VITALS
OXYGEN SATURATION: 95 % | RESPIRATION RATE: 14 BRPM | DIASTOLIC BLOOD PRESSURE: 94 MMHG | SYSTOLIC BLOOD PRESSURE: 129 MMHG

## 2019-03-01 PROBLEM — K57.30 DIVERTICULOSIS OF LARGE INTESTINE WITHOUT HEMORRHAGE: Status: ACTIVE | Noted: 2019-03-01

## 2019-03-01 LAB — GLUCOSE BLD-MCNC: 99 MG/DL (ref 74–100)

## 2019-03-01 PROCEDURE — 3700000000 HC ANESTHESIA ATTENDED CARE: Performed by: SURGERY

## 2019-03-01 PROCEDURE — 3700000001 HC ADD 15 MINUTES (ANESTHESIA): Performed by: SURGERY

## 2019-03-01 PROCEDURE — 7100000011 HC PHASE II RECOVERY - ADDTL 15 MIN: Performed by: SURGERY

## 2019-03-01 PROCEDURE — G0121 COLON CA SCRN NOT HI RSK IND: HCPCS | Performed by: SURGERY

## 2019-03-01 PROCEDURE — 82947 ASSAY GLUCOSE BLOOD QUANT: CPT

## 2019-03-01 PROCEDURE — 7100000010 HC PHASE II RECOVERY - FIRST 15 MIN: Performed by: SURGERY

## 2019-03-01 PROCEDURE — 6360000002 HC RX W HCPCS: Performed by: NURSE ANESTHETIST, CERTIFIED REGISTERED

## 2019-03-01 PROCEDURE — 2709999900 HC NON-CHARGEABLE SUPPLY: Performed by: SURGERY

## 2019-03-01 PROCEDURE — 3609027000 HC COLONOSCOPY: Performed by: SURGERY

## 2019-03-01 PROCEDURE — 2580000003 HC RX 258: Performed by: SURGERY

## 2019-03-01 PROCEDURE — 2500000003 HC RX 250 WO HCPCS: Performed by: NURSE ANESTHETIST, CERTIFIED REGISTERED

## 2019-03-01 RX ORDER — SODIUM CHLORIDE, SODIUM LACTATE, POTASSIUM CHLORIDE, CALCIUM CHLORIDE 600; 310; 30; 20 MG/100ML; MG/100ML; MG/100ML; MG/100ML
INJECTION, SOLUTION INTRAVENOUS CONTINUOUS
Status: DISCONTINUED | OUTPATIENT
Start: 2019-03-01 | End: 2019-03-01 | Stop reason: HOSPADM

## 2019-03-01 RX ORDER — LIDOCAINE HYDROCHLORIDE 20 MG/ML
INJECTION, SOLUTION INFILTRATION; PERINEURAL PRN
Status: DISCONTINUED | OUTPATIENT
Start: 2019-03-01 | End: 2019-03-01 | Stop reason: SDUPTHER

## 2019-03-01 RX ORDER — PROPOFOL 10 MG/ML
INJECTION, EMULSION INTRAVENOUS CONTINUOUS PRN
Status: DISCONTINUED | OUTPATIENT
Start: 2019-03-01 | End: 2019-03-01 | Stop reason: SDUPTHER

## 2019-03-01 RX ADMIN — PROPOFOL 140 MCG/KG/MIN: 10 INJECTION, EMULSION INTRAVENOUS at 11:11

## 2019-03-01 RX ADMIN — LIDOCAINE HYDROCHLORIDE 60 MG: 20 INJECTION, SOLUTION INFILTRATION; PERINEURAL at 11:11

## 2019-03-01 RX ADMIN — SODIUM CHLORIDE, POTASSIUM CHLORIDE, SODIUM LACTATE AND CALCIUM CHLORIDE: 600; 310; 30; 20 INJECTION, SOLUTION INTRAVENOUS at 10:24

## 2019-03-01 ASSESSMENT — PAIN SCALES - GENERAL
PAINLEVEL_OUTOF10: 0
PAINLEVEL_OUTOF10: 0

## 2019-05-08 ENCOUNTER — TELEPHONE (OUTPATIENT)
Dept: FAMILY MEDICINE CLINIC | Age: 64
End: 2019-05-08

## 2019-05-08 NOTE — TELEPHONE ENCOUNTER
Would like a call back regarding his [place] and [place]. Kelly Vernon left a message on the machine. Health Maintenance   Topic Date Due    DTaP/Tdap/Td vaccine (1 - Tdap) 07/30/1972    Shingles Vaccine (1 of 2) 07/30/2003    Diabetic retinal exam  06/06/2017    Pneumococcal 65+ years Vaccine (2 of 2 - PPSV23) 08/08/2019    Diabetic foot exam  08/15/2019    Diabetic microalbuminuria test  08/15/2019    Lipid screen  08/15/2019    A1C test (Diabetic or Prediabetic)  01/10/2020    Potassium monitoring  01/11/2020    Creatinine monitoring  01/11/2020    Colon cancer screen colonoscopy  03/01/2029    Flu vaccine  Completed    AAA screen  Completed    Hepatitis C screen  Addressed    HIV screen  Addressed             (applicable per patient's age: Cancer Screenings, Depression Screening, Fall Risk Screening, Immunizations)    Hemoglobin A1C (%)   Date Value   01/10/2019 6.8 (H)   08/15/2018 7.3   04/11/2018 9.1     Microalb/Crt.  Ratio (mcg/mg creat)   Date Value   05/26/2016 8     LDL Cholesterol (mg/dL)   Date Value   08/15/2018 63     AST (U/L)   Date Value   01/11/2019 13     ALT (U/L)   Date Value   01/11/2019 28     BUN (mg/dL)   Date Value   01/11/2019 7 (L)      (goal A1C is < 7)   (goal LDL is <100) need 30-50% reduction from baseline     BP Readings from Last 3 Encounters:   03/01/19 (!) 129/94   03/01/19 (!) 140/97   01/23/19 (!) 156/95    (goal /80)      All Future Testing planned in [facility]:  Lab Frequency Next Occurrence   Basic Metabolic Panel Once 65/11/9967   Basic Metabolic Panel Once 71/42/6913   CT ABDOMEN PELVIS W WO CONTRAST Additional Contrast? None Once 08/06/2018   Vitamin B12 Once 08/15/2019   XR ABDOMEN (KUB) (SINGLE AP VIEW) Once 10/11/2019       Next Visit Date:  Future Appointments   Date Time Provider Lashay Rubio   5/16/2019  1:30 PM Leslie Thomas MD Will Urol [facility]            Patient Active Problem List:     Essential hypertension, benign     Type 2 diabetes mellitus without complication, without long-term current use of insulin (HCC)     Varicose veins of legs     Tubular adenoma of colon     BPH without obstruction/lower urinary tract symptoms     Left flank pain     Kidney stone     Hydronephrosis with urinary obstruction due to ureteral calculus     BPH with obstruction/lower urinary tract symptoms     Frequency of urination     Weak urinary stream     Diverticulosis of large intestine without hemorrhage

## 2019-05-16 ENCOUNTER — OFFICE VISIT (OUTPATIENT)
Dept: UROLOGY | Age: 64
End: 2019-05-16
Payer: MEDICARE

## 2019-05-16 VITALS
WEIGHT: 265 LBS | HEIGHT: 72 IN | BODY MASS INDEX: 35.89 KG/M2 | DIASTOLIC BLOOD PRESSURE: 84 MMHG | SYSTOLIC BLOOD PRESSURE: 140 MMHG

## 2019-05-16 DIAGNOSIS — N40.1 BPH WITH OBSTRUCTION/LOWER URINARY TRACT SYMPTOMS: Primary | ICD-10-CM

## 2019-05-16 DIAGNOSIS — N13.8 BPH WITH OBSTRUCTION/LOWER URINARY TRACT SYMPTOMS: Primary | ICD-10-CM

## 2019-05-16 DIAGNOSIS — R35.0 FREQUENCY OF URINATION: ICD-10-CM

## 2019-05-16 DIAGNOSIS — R39.12 WEAK URINARY STREAM: ICD-10-CM

## 2019-05-16 PROCEDURE — 3017F COLORECTAL CA SCREEN DOC REV: CPT | Performed by: UROLOGY

## 2019-05-16 PROCEDURE — G8427 DOCREV CUR MEDS BY ELIG CLIN: HCPCS | Performed by: UROLOGY

## 2019-05-16 PROCEDURE — 4040F PNEUMOC VAC/ADMIN/RCVD: CPT | Performed by: UROLOGY

## 2019-05-16 PROCEDURE — 51798 US URINE CAPACITY MEASURE: CPT | Performed by: UROLOGY

## 2019-05-16 PROCEDURE — 1036F TOBACCO NON-USER: CPT | Performed by: UROLOGY

## 2019-05-16 PROCEDURE — G8417 CALC BMI ABV UP PARAM F/U: HCPCS | Performed by: UROLOGY

## 2019-05-16 PROCEDURE — 1123F ACP DISCUSS/DSCN MKR DOCD: CPT | Performed by: UROLOGY

## 2019-05-16 PROCEDURE — 99214 OFFICE O/P EST MOD 30 MIN: CPT | Performed by: UROLOGY

## 2019-05-16 NOTE — PROGRESS NOTES
HPI:    Patient is a 70 y.o. male in no acute distress. He is alert and oriented to person, place, and time. History  4/2018 Referral from Dr. Yazmin Vee for LUTS. He complains of frequency, urgency, weak stream, intermittency.  He has nocturia 1-2 times per night.  He has been on Flomax for the last 5-6 years. Ariel Faye denies history of kidney stones.  He denies history of frequent urinary tract infections.  He was circumcised. Ariel Faye has never seen urology in the past. Ariel Faye does have type 2 diabetes that is currently uncontrolled.  Most recent hemoglobin A1c was 9.1.  He does admit to drinking 3 cups of coffee per day. Ariel Faye denies constipation.      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 Started on proscar.     8/2018 left HLL for 5mm distal left ureteral stone     Currently  Patient is here for a 6 month follow up due to BPH. Patient continues to take the flomax but has stopped the proscar about a week ago. He is uncertain if he wants to continue to take it. He does have a weak urinary stream and nocutira 2-3 times a night. No abdominal/flank pain or hematuria. Patient did stop taking Proscar week ago. He would like to remain off of this medicine. He is concerned about giving blood while on the medication. IPSS Questionnaire (AUA-7): Over the past month    1)  How often have you had a sensation of not emptying your bladder completely after you finish urinating? 1 - Less than 1 time in 5   2)  How often have you had to urinate again less than two hours after you finished urinating? 3 - About half the time   3)  How often have you found you stopped and started again several times when you urinated? 1 - Less than 1 time in 5   4) How difficult have you found it to postpone urination? 1 - Less than 1 time in 5   5) How often have you had a weak urinary stream?  2 - Less than half the time   6) How often have you had to push or strain to begin urination?   1 - Less than 1 time in Lab Results   Component Value Date    CREATININE 0.67 (L) 01/11/2019     Lab Results   Component Value Date    PSA 0.39 05/30/2017    PSA 0.53 05/26/2016    PSA 0.38 07/15/2014       ASSESSMENT:  This is a 70 y.o. male with the following diagnoses:   Diagnosis Orders   1. BPH with obstruction/lower urinary tract symptoms  SC MEASUREMENT,POST-VOID RESIDUAL VOLUME BY US,NON-IMAGING   2. Frequency of urination  SC MEASUREMENT,POST-VOID RESIDUAL VOLUME BY US,NON-IMAGING   3. Weak urinary stream  SC MEASUREMENT,POST-VOID RESIDUAL VOLUME BY US,NON-IMAGING         PLAN:  Patient. Flomax now. We will see him back in 6 months with a KUB for stone follow-up. We will also reassess his lower urinary tract symptoms that point in time as well.

## 2019-08-26 RX ORDER — TAMSULOSIN HYDROCHLORIDE 0.4 MG/1
CAPSULE ORAL
Qty: 180 CAPSULE | Refills: 1 | Status: SHIPPED | OUTPATIENT
Start: 2019-08-26 | End: 2020-01-27 | Stop reason: SDUPTHER

## 2019-09-19 RX ORDER — DILTIAZEM HYDROCHLORIDE 360 MG/1
CAPSULE, EXTENDED RELEASE ORAL
Qty: 90 CAPSULE | Refills: 1 | Status: SHIPPED | OUTPATIENT
Start: 2019-09-19 | End: 2020-01-27 | Stop reason: SDUPTHER

## 2019-11-14 ENCOUNTER — OFFICE VISIT (OUTPATIENT)
Dept: UROLOGY | Age: 64
End: 2019-11-14
Payer: MEDICARE

## 2019-11-14 VITALS
BODY MASS INDEX: 34.32 KG/M2 | DIASTOLIC BLOOD PRESSURE: 88 MMHG | SYSTOLIC BLOOD PRESSURE: 148 MMHG | WEIGHT: 276 LBS | HEIGHT: 75 IN

## 2019-11-14 DIAGNOSIS — R35.0 FREQUENCY OF URINATION: ICD-10-CM

## 2019-11-14 DIAGNOSIS — N40.1 BPH WITH OBSTRUCTION/LOWER URINARY TRACT SYMPTOMS: ICD-10-CM

## 2019-11-14 DIAGNOSIS — N20.0 KIDNEY STONE: Primary | ICD-10-CM

## 2019-11-14 DIAGNOSIS — R39.12 WEAK URINARY STREAM: ICD-10-CM

## 2019-11-14 DIAGNOSIS — N13.8 BPH WITH OBSTRUCTION/LOWER URINARY TRACT SYMPTOMS: ICD-10-CM

## 2019-11-14 DIAGNOSIS — Z12.5 PROSTATE CANCER SCREENING: ICD-10-CM

## 2019-11-14 PROCEDURE — 3017F COLORECTAL CA SCREEN DOC REV: CPT | Performed by: UROLOGY

## 2019-11-14 PROCEDURE — 99213 OFFICE O/P EST LOW 20 MIN: CPT | Performed by: UROLOGY

## 2019-11-14 PROCEDURE — G8484 FLU IMMUNIZE NO ADMIN: HCPCS | Performed by: UROLOGY

## 2019-11-14 PROCEDURE — 51798 US URINE CAPACITY MEASURE: CPT | Performed by: UROLOGY

## 2019-11-14 PROCEDURE — 1123F ACP DISCUSS/DSCN MKR DOCD: CPT | Performed by: UROLOGY

## 2019-11-14 PROCEDURE — 1036F TOBACCO NON-USER: CPT | Performed by: UROLOGY

## 2019-11-14 PROCEDURE — 4040F PNEUMOC VAC/ADMIN/RCVD: CPT | Performed by: UROLOGY

## 2019-11-14 PROCEDURE — G8427 DOCREV CUR MEDS BY ELIG CLIN: HCPCS | Performed by: UROLOGY

## 2019-11-14 PROCEDURE — G8417 CALC BMI ABV UP PARAM F/U: HCPCS | Performed by: UROLOGY

## 2019-11-14 ASSESSMENT — ENCOUNTER SYMPTOMS
VOMITING: 0
COUGH: 0
ABDOMINAL PAIN: 0
BACK PAIN: 0
EYE REDNESS: 0
EYE PAIN: 0
WHEEZING: 0
NAUSEA: 0
COLOR CHANGE: 0
SHORTNESS OF BREATH: 0

## 2020-01-02 RX ORDER — SITAGLIPTIN 100 MG/1
TABLET, FILM COATED ORAL
Qty: 90 TABLET | Refills: 1 | Status: SHIPPED | OUTPATIENT
Start: 2020-01-02 | End: 2020-06-16 | Stop reason: SDUPTHER

## 2020-01-02 NOTE — TELEPHONE ENCOUNTER
Last visit:  1/23/2019  Next Visit Date:    Future Appointments   Date Time Provider Lashay Rubio   5/21/2020  1:45 PM Emerson Hughes MD [facility] [[email or web address]]     Last Med refill:    Medication List:  Prior to Admission medications    Medication Sig Start Date End Date Taking? Authorizing Provider   diltiazem (CARDIZEM CD) 360 MG extended release capsule TAKE 1 CAPSULE BY MOUTH EVERY DAY 9/19/19   Dirk Winter, DO   tamsulosin (FLOMAX) 0.4 MG capsule TAKE 1 CAPSULE BY MOUTH TWICE A DAY 8/26/19   Dirk Winter, DO   metFORMIN (GLUCOPHAGE) 1000 MG tablet TAKE 1 TABLET BY MOUTH TWICE A DAY WITH MEALS 7/29/19   Dirk Winter, DO   SITagliptin (JANUVIA) 100 MG tablet Take 1 tablet by mouth daily 1/23/19   Dirk Winter,        Allergies:  Patient has no known allergies. Hemoglobin A1C (%)   Date Value   01/10/2019 6.8 (H)   08/15/2018 7.3   04/11/2018 9.1             ( goal A1C is < 7)   Microalb/Crt.  Ratio (mcg/mg creat)   Date Value   05/26/2016 8     LDL Cholesterol (mg/dL)   Date Value   08/15/2018 63   05/30/2017 89       (goal LDL is <100)   AST (U/L)   Date Value   01/11/2019 13     ALT (U/L)   Date Value   01/11/2019 28     BUN (mg/dL)   Date Value   01/11/2019 7 (L)     BP Readings from Last 3 Encounters:   11/14/19 (!) 148/88   05/16/19 (!) 140/84   03/01/19 (!) 129/94          (goal 120/80)

## 2020-01-02 NOTE — TELEPHONE ENCOUNTER
januvia 100 mg    [facility] Maintenance   Topic Date Due    DTaP/Tdap/Td vaccine (1 - Tdap) 07/30/1964    Shingles Vaccine (1 of 2) 07/30/2003    Diabetic retinal exam  06/06/2017    Annual Wellness Visit (AWV)  05/29/2019    Pneumococcal 65+ years Vaccine (2 of 2 - PPSV23) 08/08/2019    Diabetic foot exam  08/15/2019    Diabetic microalbuminuria test  08/15/2019    Lipid screen  08/15/2019    Flu vaccine (1) 09/01/2019    A1C test (Diabetic or Prediabetic)  01/10/2020    Potassium monitoring  01/11/2020    Creatinine monitoring  01/11/2020    Colon cancer screen colonoscopy  03/01/2029    AAA screen  Completed    Hepatitis C screen  Addressed             (applicable per patient's age: Cancer Screenings, Depression Screening, Fall Risk Screening, Immunizations)    Hemoglobin A1C (%)   Date Value   01/10/2019 6.8 (H)   08/15/2018 7.3   04/11/2018 9.1     Microalb/Crt.  Ratio (mcg/mg creat)   Date Value   05/26/2016 8     LDL Cholesterol (mg/dL)   Date Value   08/15/2018 63     AST (U/L)   Date Value   01/11/2019 13     ALT (U/L)   Date Value   01/11/2019 28     BUN (mg/dL)   Date Value   01/11/2019 7 (L)      (goal A1C is < 7)   (goal LDL is <100) need 30-50% reduction from baseline     BP Readings from Last 3 Encounters:   11/14/19 (!) 148/88   05/16/19 (!) 140/84   03/01/19 (!) 129/94    (goal /80)      All Future Testing planned in [facility]:  Lab Frequency Next Occurrence   XR ABDOMEN (KUB) (SINGLE AP VIEW) Once 04/08/2020   XR ABDOMEN (KUB) (SINGLE AP VIEW) Once 05/14/2020   Psa screening Once 05/14/2020       Next Visit Date:  Future Appointments   Date Time Provider Lashay Rubio   5/21/2020  1:45 PM Jennett Skiff, MD [facility]            Patient Active Problem List:     Essential hypertension, benign     Type 2 diabetes mellitus without complication, without long-term current use of insulin ([address]     Varicose veins of legs     Tubular adenoma of colon     BPH

## 2020-01-02 NOTE — TELEPHONE ENCOUNTER
Last visit:  1/23/2019  Next Visit Date:    Future Appointments   Date Time Provider Lashay Rubio   1/27/2020  8:00 AM Alexia Trujillo DO [facility]   5/21/2020  1:45 PM Jennett Skiff, MD [facility]     Last Med refill:    Medication List:  Prior to Admission medications    Medication Sig Start Date End Date Taking? Authorizing Provider   diltiazem (CARDIZEM CD) 360 MG extended release capsule TAKE 1 CAPSULE BY MOUTH EVERY DAY 9/19/19   Alexia Trujillo, DO   tamsulosin (FLOMAX) 0.4 MG capsule TAKE 1 CAPSULE BY MOUTH TWICE A DAY 8/26/19   Alexia Trujillo, DO   metFORMIN (GLUCOPHAGE) 1000 MG tablet TAKE 1 TABLET BY MOUTH TWICE A DAY WITH MEALS 7/29/19   Alexia Trujillo, DO   SITagliptin (JANUVIA) 100 MG tablet Take 1 tablet by mouth daily 1/23/19   Alexia Trujillo, DO       Allergies:  Patient has no known allergies. Hemoglobin A1C (%)   Date Value   01/10/2019 6.8 (H)   08/15/2018 7.3   04/11/2018 9.1             ( goal A1C is < 7)   Microalb/Crt.  Ratio (mcg/mg creat)   Date Value   05/26/2016 8     LDL Cholesterol (mg/dL)   Date Value   08/15/2018 63   05/30/2017 89       (goal LDL is <100)   AST (U/L)   Date Value   01/11/2019 13     ALT (U/L)   Date Value   01/11/2019 28     BUN (mg/dL)   Date Value   01/11/2019 7 (L)     BP Readings from Last 3 Encounters:   11/14/19 (!) 148/88   05/16/19 (!) 140/84   03/01/19 (!) 129/94          (goal 120/80)

## 2020-01-27 ENCOUNTER — OFFICE VISIT (OUTPATIENT)
Dept: FAMILY MEDICINE CLINIC | Age: 65
End: 2020-01-27
Payer: MEDICARE

## 2020-01-27 ENCOUNTER — HOSPITAL ENCOUNTER (OUTPATIENT)
Age: 65
Discharge: HOME | End: 2020-01-27
Payer: MEDICARE

## 2020-01-27 VITALS
DIASTOLIC BLOOD PRESSURE: 82 MMHG | HEART RATE: 72 BPM | OXYGEN SATURATION: 96 % | BODY MASS INDEX: 34.07 KG/M2 | WEIGHT: 274 LBS | SYSTOLIC BLOOD PRESSURE: 130 MMHG | HEIGHT: 75 IN

## 2020-01-27 LAB
ALBUMIN SERPL-MCNC: 4.7 G/DL (ref 3.5–5.2)
ALBUMIN/GLOBULIN RATIO: ABNORMAL (ref 1–2.5)
ALP BLD-CCNC: 61 U/L (ref 40–129)
ALT SERPL-CCNC: 79 U/L (ref 5–41)
ANION GAP SERPL CALCULATED.3IONS-SCNC: 13 MMOL/L (ref 9–17)
AST SERPL-CCNC: 44 U/L
BILIRUB SERPL-MCNC: 0.47 MG/DL (ref 0.3–1.2)
BUN BLDV-MCNC: 14 MG/DL (ref 8–23)
BUN/CREAT BLD: 16 (ref 9–20)
CALCIUM SERPL-MCNC: 10.1 MG/DL (ref 8.6–10.4)
CHLORIDE BLD-SCNC: 103 MMOL/L (ref 98–107)
CHOLESTEROL/HDL RATIO: 3.1
CHOLESTEROL: 160 MG/DL
CO2: 26 MMOL/L (ref 20–31)
CREAT SERPL-MCNC: 0.85 MG/DL (ref 0.7–1.2)
CREATININE URINE: 155.1 MG/DL (ref 39–259)
ESTIMATED AVERAGE GLUCOSE: 163 MG/DL
GFR AFRICAN AMERICAN: >60 ML/MIN
GFR NON-AFRICAN AMERICAN: >60 ML/MIN
GFR SERPL CREATININE-BSD FRML MDRD: ABNORMAL ML/MIN/{1.73_M2}
GFR SERPL CREATININE-BSD FRML MDRD: ABNORMAL ML/MIN/{1.73_M2}
GLUCOSE BLD-MCNC: 160 MG/DL (ref 70–99)
HBA1C MFR BLD: 7.3 % (ref 4.8–5.9)
HDLC SERPL-MCNC: 52 MG/DL
HEPATITIS C ANTIBODY: NONREACTIVE
LDL CHOLESTEROL: 89 MG/DL (ref 0–130)
MICROALBUMIN/CREAT 24H UR: 16 MG/L
MICROALBUMIN/CREAT UR-RTO: 10 MCG/MG CREAT
PATIENT FASTING?: YES
POTASSIUM SERPL-SCNC: 4.5 MMOL/L (ref 3.7–5.3)
SODIUM BLD-SCNC: 142 MMOL/L (ref 135–144)
TOTAL PROTEIN: 8.1 G/DL (ref 6.4–8.3)
TRIGL SERPL-MCNC: 93 MG/DL
VLDLC SERPL CALC-MCNC: NORMAL MG/DL (ref 1–30)

## 2020-01-27 PROCEDURE — 3017F COLORECTAL CA SCREEN DOC REV: CPT | Performed by: FAMILY MEDICINE

## 2020-01-27 PROCEDURE — 83036 HEMOGLOBIN GLYCOSYLATED A1C: CPT

## 2020-01-27 PROCEDURE — 80053 COMPREHEN METABOLIC PANEL: CPT

## 2020-01-27 PROCEDURE — 86803 HEPATITIS C AB TEST: CPT

## 2020-01-27 PROCEDURE — 80061 LIPID PANEL: CPT

## 2020-01-27 PROCEDURE — 1123F ACP DISCUSS/DSCN MKR DOCD: CPT | Performed by: FAMILY MEDICINE

## 2020-01-27 PROCEDURE — 82570 ASSAY OF URINE CREATININE: CPT

## 2020-01-27 PROCEDURE — G8482 FLU IMMUNIZE ORDER/ADMIN: HCPCS | Performed by: FAMILY MEDICINE

## 2020-01-27 PROCEDURE — 99214 OFFICE O/P EST MOD 30 MIN: CPT | Performed by: FAMILY MEDICINE

## 2020-01-27 PROCEDURE — 90732 PPSV23 VACC 2 YRS+ SUBQ/IM: CPT | Performed by: FAMILY MEDICINE

## 2020-01-27 PROCEDURE — 36415 COLL VENOUS BLD VENIPUNCTURE: CPT

## 2020-01-27 PROCEDURE — 82043 UR ALBUMIN QUANTITATIVE: CPT

## 2020-01-27 PROCEDURE — G8427 DOCREV CUR MEDS BY ELIG CLIN: HCPCS | Performed by: FAMILY MEDICINE

## 2020-01-27 PROCEDURE — 1036F TOBACCO NON-USER: CPT | Performed by: FAMILY MEDICINE

## 2020-01-27 PROCEDURE — 4040F PNEUMOC VAC/ADMIN/RCVD: CPT | Performed by: FAMILY MEDICINE

## 2020-01-27 PROCEDURE — G8417 CALC BMI ABV UP PARAM F/U: HCPCS | Performed by: FAMILY MEDICINE

## 2020-01-27 PROCEDURE — 2022F DILAT RTA XM EVC RTNOPTHY: CPT | Performed by: FAMILY MEDICINE

## 2020-01-27 PROCEDURE — G0009 ADMIN PNEUMOCOCCAL VACCINE: HCPCS | Performed by: FAMILY MEDICINE

## 2020-01-27 RX ORDER — DILTIAZEM HYDROCHLORIDE 360 MG/1
CAPSULE, EXTENDED RELEASE ORAL
Qty: 90 CAPSULE | Refills: 1 | Status: SHIPPED | OUTPATIENT
Start: 2020-01-27 | End: 2020-08-31

## 2020-01-27 RX ORDER — TAMSULOSIN HYDROCHLORIDE 0.4 MG/1
CAPSULE ORAL
Qty: 180 CAPSULE | Refills: 1 | Status: SHIPPED | OUTPATIENT
Start: 2020-01-27 | End: 2020-05-21 | Stop reason: SDUPTHER

## 2020-01-27 SDOH — ECONOMIC STABILITY: FOOD INSECURITY: WITHIN THE PAST 12 MONTHS, YOU WORRIED THAT YOUR FOOD WOULD RUN OUT BEFORE YOU GOT MONEY TO BUY MORE.: NEVER TRUE

## 2020-01-27 SDOH — ECONOMIC STABILITY: FOOD INSECURITY: WITHIN THE PAST 12 MONTHS, THE FOOD YOU BOUGHT JUST DIDN'T LAST AND YOU DIDN'T HAVE MONEY TO GET MORE.: NEVER TRUE

## 2020-01-27 SDOH — ECONOMIC STABILITY: INCOME INSECURITY: HOW HARD IS IT FOR YOU TO PAY FOR THE VERY BASICS LIKE FOOD, HOUSING, MEDICAL CARE, AND HEATING?: NOT HARD AT ALL

## 2020-01-27 ASSESSMENT — ENCOUNTER SYMPTOMS
GASTROINTESTINAL NEGATIVE: 1
RESPIRATORY NEGATIVE: 1

## 2020-01-27 ASSESSMENT — PATIENT HEALTH QUESTIONNAIRE - PHQ9
SUM OF ALL RESPONSES TO PHQ9 QUESTIONS 1 & 2: 0
SUM OF ALL RESPONSES TO PHQ QUESTIONS 1-9: 0
1. LITTLE INTEREST OR PLEASURE IN DOING THINGS: 0
SUM OF ALL RESPONSES TO PHQ QUESTIONS 1-9: 0
2. FEELING DOWN, DEPRESSED OR HOPELESS: 0

## 2020-01-27 NOTE — PROGRESS NOTES
COLONOSCOPY N/A 3/1/2019    COLORECTAL CANCER SCREENING performed by Elena Diaz DO at [address] Left 08/07/2018    with stent per Dr. José Migeul Aguila      umbilical hernia    DE Elisha Left 8/7/2018    CYSTOSCOPY  STENT INSERTION-LEFT performed by Brody Griggs MD at [address] [facility] OFFICE/OUTPT [facility] Left 8/23/2018    CYSTOSCOPY URETEROSCOPY- LEFT HLL, STENT EXCHANGE performed by Brody Griggs MD at [facility] OR        Medications:       Prior to Admission medications    Medication Sig Start Date End Date Taking? Authorizing Provider   metFORMIN (GLUCOPHAGE) 1000 MG tablet TAKE 1 TABLET BY MOUTH TWICE A DAY WITH MEALS 1/27/20  Yes Burke Reyes DO   tamsulosin (FLOMAX) 0.4 MG capsule TAKE 1 CAPSULE BY MOUTH TWICE A DAY 1/27/20  Yes Burke Reyes DO   diltiazem (CARDIZEM CD) 360 MG extended release capsule TAKE 1 CAPSULE BY MOUTH EVERY DAY 1/27/20  Yes Burke Reyes DO   JANUVIA 100 MG tablet TAKE 1 TABLET BY MOUTH EVERY DAY 1/2/20  Yes Burke Reyes DO        Allergies:       Patient has no known allergies. Social History:     Tobacco:    reports that he quit smoking about 23 years ago. He has a 360.00 pack-year smoking history. He has never used smokeless tobacco.  Alcohol:      reports current alcohol use of about 8.0 standard drinks of alcohol per week. Drug Use:  reports no history of drug use. Family History:     Family History   Problem Relation Age of Onset    Cancer Mother         Uterine    Heart Disease Father         CAD    Diabetes Brother        Review of Systems:     Positive and Negative as described in HPI    Review of Systems   Constitutional: Negative. Respiratory: Negative. Cardiovascular: Negative. Gastrointestinal: Negative.         Physical Exam:     Vitals:  /82   Pulse 72   Ht 6' 3\" (1.905 m)   Wt 274 lb (124.3 kg)   SpO2 96%   BMI 34.25 kg/m²   Physical Exam  Vitals signs and nursing note reviewed. Constitutional:       General: He is not in acute distress. Appearance: He is well-developed. HENT:      Right Ear: Tympanic membrane and ear canal normal.      Left Ear: Tympanic membrane and ear canal normal.      Nose: Nose normal.      Mouth/Throat:      Mouth: Mucous membranes are moist.      Comments: Posterior half of palate markedly erythematous and raw-appearing without particular lesion. esperanza tender submandibular gland swelling. Eyes:      Conjunctiva/sclera: Conjunctivae normal.   Neck:      Musculoskeletal: Neck supple. Cardiovascular:      Rate and Rhythm: Normal rate and regular rhythm. Heart sounds: Normal heart sounds. Pulmonary:      Effort: Pulmonary effort is normal.      Breath sounds: Normal breath sounds. Lymphadenopathy:      Cervical: No cervical adenopathy. Skin:     General: Skin is warm and dry. Neurological:      Mental Status: He is alert and oriented to person, place, and time. Psychiatric:         Judgment: Judgment normal.       DM foot exam: feet warm & well-perfused. DP and PT pulses 2+ bilaterally. No lesions, calluses, or cellulitis. Toenails in good repair. Sensation intact per light touch and monofilament testing.       Data:     Lab Results   Component Value Date     01/27/2020    K 4.5 01/27/2020     01/27/2020    CO2 26 01/27/2020    BUN 14 01/27/2020    CREATININE 0.85 01/27/2020    GLUCOSE 160 01/27/2020    PROT 8.1 01/27/2020    LABALBU 4.7 01/27/2020    BILITOT 0.47 01/27/2020    ALKPHOS 61 01/27/2020    AST 44 01/27/2020    ALT 79 01/27/2020     Lab Results   Component Value Date    WBC 7.9 01/12/2019    RBC 4.36 01/12/2019    HGB 14.0 01/12/2019    HCT 42.4 01/12/2019    MCV 97.2 01/12/2019    MCH 32.1 01/12/2019    MCHC 33.0 01/12/2019    RDW 12.8 01/12/2019     01/12/2019    MPV 9.9 01/12/2019     Lab Results   Component Value Date    TSH 0.94 08/15/2018     Lab Results   Component Value Date CHOL 160 01/27/2020    HDL 52 01/27/2020    PSA 0.39 05/30/2017    LABA1C 7.3 01/27/2020         Assessment & Plan:        Diagnosis Orders   1. Type 2 diabetes mellitus without complication, without long-term current use of insulin (HCC)  Hemoglobin A1C    Comprehensive Metabolic Panel    Lipid Panel    Microalbumin, Ur    HM DIABETES FOOT EXAM   2. AN (obstructive sleep apnea)     3. Submandibular lymphadenopathy  CBC Auto Differential   4. Elevated vitamin B12 level  Vitamin B12   5. Need for 23-polyvalent pneumococcal polysaccharide vaccine  PNEUMOVAX 23 subcutaneous/IM (Pneumococcal polysaccharide vaccine 23-valent >= 3yo)   6. Encounter for hepatitis C screening test for low risk patient  Hepatitis C Antibody   7. Prostate cancer screening  Psa screening   Diabetes which have been in decent control. Updating labs. Likely continue same regimen of metformin and Januvia. Normal foot exam.  AN, helped by use of BiPAP. Having some air leak. Advised that he contact DME company about possibly getting different mask and at least refilling supplies so that he can replace cushion, etc.  Bilateral submandibular lymphadenopathy, also signs of oral irritation, uncertain etiology. Differential diagnosis would include infectious, including viral, or irritant. Advised to avoid irritants, buy toothpaste not containing sodium lauryl sulfate, follow-up if not improving. Does need to make dentist appointment. Longtime former smoker. Rechecking labs incl b12 d/t previous elevation. F/u 6 mos.         Requested Prescriptions     Signed Prescriptions Disp Refills    metFORMIN (GLUCOPHAGE) 1000 MG tablet 180 tablet 1     Sig: TAKE 1 TABLET BY MOUTH TWICE A DAY WITH MEALS    tamsulosin (FLOMAX) 0.4 MG capsule 180 capsule 1     Sig: TAKE 1 CAPSULE BY MOUTH TWICE A DAY    diltiazem (CARDIZEM CD) 360 MG extended release capsule 90 capsule 1     Sig: TAKE 1 CAPSULE BY MOUTH EVERY DAY       Patient Instructions

## 2020-01-28 ENCOUNTER — TELEPHONE (OUTPATIENT)
Dept: FAMILY MEDICINE CLINIC | Age: 65
End: 2020-01-28

## 2020-05-11 ENCOUNTER — HOSPITAL ENCOUNTER (OUTPATIENT)
Dept: GENERAL RADIOLOGY | Age: 65
Discharge: HOME | End: 2020-05-13
Payer: MEDICARE

## 2020-05-11 ENCOUNTER — HOSPITAL ENCOUNTER (OUTPATIENT)
Age: 65
Discharge: HOME | End: 2020-05-13
Payer: MEDICARE

## 2020-05-11 ENCOUNTER — HOSPITAL ENCOUNTER (OUTPATIENT)
Age: 65
Discharge: HOME | End: 2020-05-11
Payer: MEDICARE

## 2020-05-11 LAB — PROSTATE SPECIFIC ANTIGEN: 0.35 UG/L

## 2020-05-11 PROCEDURE — G0103 PSA SCREENING: HCPCS

## 2020-05-11 PROCEDURE — 74018 RADEX ABDOMEN 1 VIEW: CPT

## 2020-05-11 PROCEDURE — 36415 COLL VENOUS BLD VENIPUNCTURE: CPT

## 2020-05-21 ENCOUNTER — OFFICE VISIT (OUTPATIENT)
Dept: UROLOGY | Age: 65
End: 2020-05-21
Payer: MEDICARE

## 2020-05-21 ENCOUNTER — HOSPITAL ENCOUNTER (OUTPATIENT)
Age: 65
Setting detail: SPECIMEN
Discharge: HOME | End: 2020-05-21
Payer: MEDICARE

## 2020-05-21 VITALS
DIASTOLIC BLOOD PRESSURE: 86 MMHG | SYSTOLIC BLOOD PRESSURE: 136 MMHG | HEART RATE: 86 BPM | BODY MASS INDEX: 34.37 KG/M2 | WEIGHT: 275 LBS

## 2020-05-21 LAB
-: ABNORMAL
AMORPHOUS: ABNORMAL
BACTERIA: ABNORMAL
BILIRUBIN URINE: NEGATIVE
CASTS UA: ABNORMAL /LPF
COLOR: YELLOW
COMMENT UA: ABNORMAL
CRYSTALS, UA: ABNORMAL /HPF
EPITHELIAL CELLS UA: ABNORMAL /HPF (ref 0–5)
GLUCOSE URINE: NEGATIVE
KETONES, URINE: NEGATIVE
LEUKOCYTE ESTERASE, URINE: NEGATIVE
MUCUS: ABNORMAL
NITRITE, URINE: NEGATIVE
OTHER OBSERVATIONS UA: ABNORMAL
PH UA: 5.5 (ref 5–9)
PROTEIN UA: NEGATIVE
RBC UA: ABNORMAL /HPF (ref 0–2)
RENAL EPITHELIAL, UA: ABNORMAL /HPF
SPECIFIC GRAVITY UA: >1.03 (ref 1.01–1.02)
TRICHOMONAS: ABNORMAL
TURBIDITY: CLEAR
URINE HGB: NEGATIVE
UROBILINOGEN, URINE: NORMAL
WBC UA: ABNORMAL /HPF (ref 0–5)
YEAST: ABNORMAL

## 2020-05-21 PROCEDURE — 3017F COLORECTAL CA SCREEN DOC REV: CPT | Performed by: NURSE PRACTITIONER

## 2020-05-21 PROCEDURE — 51798 US URINE CAPACITY MEASURE: CPT | Performed by: NURSE PRACTITIONER

## 2020-05-21 PROCEDURE — 1036F TOBACCO NON-USER: CPT | Performed by: NURSE PRACTITIONER

## 2020-05-21 PROCEDURE — G8417 CALC BMI ABV UP PARAM F/U: HCPCS | Performed by: NURSE PRACTITIONER

## 2020-05-21 PROCEDURE — G8427 DOCREV CUR MEDS BY ELIG CLIN: HCPCS | Performed by: NURSE PRACTITIONER

## 2020-05-21 PROCEDURE — 81001 URINALYSIS AUTO W/SCOPE: CPT

## 2020-05-21 PROCEDURE — 99213 OFFICE O/P EST LOW 20 MIN: CPT | Performed by: NURSE PRACTITIONER

## 2020-05-21 PROCEDURE — 4040F PNEUMOC VAC/ADMIN/RCVD: CPT | Performed by: NURSE PRACTITIONER

## 2020-05-21 PROCEDURE — 87086 URINE CULTURE/COLONY COUNT: CPT

## 2020-05-21 PROCEDURE — 1123F ACP DISCUSS/DSCN MKR DOCD: CPT | Performed by: NURSE PRACTITIONER

## 2020-05-21 RX ORDER — TAMSULOSIN HYDROCHLORIDE 0.4 MG/1
CAPSULE ORAL
Qty: 180 CAPSULE | Refills: 3 | Status: SHIPPED | OUTPATIENT
Start: 2020-05-21 | End: 2021-06-01

## 2020-05-21 ASSESSMENT — ENCOUNTER SYMPTOMS
NAUSEA: 0
COLOR CHANGE: 0
EYE PAIN: 0
EYE REDNESS: 0
COUGH: 0
BACK PAIN: 0
ABDOMINAL PAIN: 0
SHORTNESS OF BREATH: 0
WHEEZING: 0
VOMITING: 0
CONSTIPATION: 0

## 2020-05-21 NOTE — PROGRESS NOTES
HPI:    Patient is a 75 y.o. male in no acute distress. He is alert and oriented to person, place, and time. History  4/2018 Referral from Dr. Yanique Wilkinson for LUTS. He complains of frequency, urgency, weak stream, intermittency.  He has nocturia 1-2 times per night.  He has been on Flomax for the last 5-6 years. Yolanda Chen denies history of kidney stones.  He denies history of frequent urinary tract infections.  He was circumcised. Yolanda Chen has never seen urology in the past. Yolanda Chen does have type 2 diabetes that is currently uncontrolled.  Most recent hemoglobin A1c was 9.1.  He does admit to drinking 3 cups of coffee per day. Yolanda Chen denies constipation.      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 Started on proscar.     8/2018 left HLL for 5mm distal left ureteral stone    Today  Here today for a one-year follow-up for history of stones, BPH, and prostate cancer screening. Patient is urinating every 2-3 hours during the day. He does have nocturia 3 times per night. He denies any dysuria, gross hematuria, flank or abdominal pain. He denies any episodes of spontaneous stone passage. Did have a KUB completed prior to today's visit. Some was independently reviewed and shows a 4 mm nonobstructing left renal stone. He does continue to take Flomax daily. He did stop Proscar approximately 1 year ago because he would like to continue to donate blood and on this medication he is not allowed. Most recent PSA is 0.35. Past Medical History:   Diagnosis Date    Diabetes mellitus ([address]     Hypertension     Obesity (BMI 30-39. 9)     Obstructive sleep apnea     on bipap at home    Prostate disease      Past Surgical History:   Procedure Laterality Date    [facility]ON[facility]  08/2012    [facility]    COLONOSCOPY  02/12/2015    Emerson Dean MD; Colon polyps x2, sigmoid diverticulosis, internal and external hemorrhoids    COLONOSCOPY  03/01/2019    Dr. Eugene Casarez -- screening; no bx/polyps    COLONOSCOPY N/A

## 2020-05-22 LAB
CULTURE: NO GROWTH
Lab: NORMAL
SPECIMEN DESCRIPTION: NORMAL

## 2020-05-26 ENCOUNTER — TELEPHONE (OUTPATIENT)
Dept: UROLOGY | Age: 65
End: 2020-05-26

## 2020-06-16 RX ORDER — SITAGLIPTIN 100 MG/1
TABLET, FILM COATED ORAL
Qty: 90 TABLET | Refills: 1 | Status: SHIPPED | OUTPATIENT
Start: 2020-06-16 | End: 2020-07-27 | Stop reason: SDUPTHER

## 2020-06-16 NOTE — TELEPHONE ENCOUNTER
meganuvia 100 mg    [place]. Health Maintenance   Topic Date Due    DTaP/Tdap/Td vaccine (1 - Tdap) 07/30/1972    Shingles Vaccine (1 of 2) 07/30/2003    Diabetic retinal exam  06/06/2017    Annual Wellness Visit (AWV)  05/29/2019    Diabetic foot exam  01/27/2021    A1C test (Diabetic or Prediabetic)  01/27/2021    Diabetic microalbuminuria test  01/27/2021    Lipid screen  01/27/2021    Potassium monitoring  01/27/2021    Creatinine monitoring  01/27/2021    Colon cancer screen colonoscopy  03/01/2029    Flu vaccine  Completed    Pneumococcal 65+ years Vaccine  Completed    AAA screen  Completed    Hepatitis C screen  Addressed    Hepatitis A vaccine  Aged Out    Hib vaccine  Aged Out    Meningococcal (ACWY) vaccine  Aged Out             (applicable per patient's age: Cancer Screenings, Depression Screening, Fall Risk Screening, Immunizations)    Hemoglobin A1C (%)   Date Value   01/27/2020 7.3 (H)   01/10/2019 6.8 (H)   08/15/2018 7.3     Microalb/Crt.  Ratio (mcg/mg creat)   Date Value   01/27/2020 10     LDL Cholesterol (mg/dL)   Date Value   01/27/2020 89     AST (U/L)   Date Value   01/27/2020 44 (H)     ALT (U/L)   Date Value   01/27/2020 79 (H)     BUN (mg/dL)   Date Value   01/27/2020 14      (goal A1C is < 7)   (goal LDL is <100) need 30-50% reduction from baseline     BP Readings from Last 3 Encounters:   05/21/20 136/86   01/27/20 130/82   11/14/19 (!) 148/88    (goal /80)      All Future Testing planned in [facility]:  Lab Frequency Next Occurrence   Vitamin B12 Once 01/26/2021   CBC Auto Differential Once 01/26/2021   Psa screening Once 05/21/2021   XR ABDOMEN (KUB) (SINGLE AP VIEW) Once 05/21/2021       Next Visit Date:  Future Appointments   Date Time Provider Lashay Rubio   7/27/2020  8:00 AM DO Monse [facility]   5/20/2021  1:00 PM MD Olimpia [facility]            Patient Active Problem List:     Essential hypertension, benign     Type 2 diabetes mellitus without complication, without long-term current use of insulin (HCC)     Varicose veins of legs     Tubular adenoma of colon     BPH without obstruction/lower urinary tract symptoms     Left flank pain     Kidney stone     Hydronephrosis with urinary obstruction due to ureteral calculus     BPH with obstruction/lower urinary tract symptoms     Frequency of urination     Weak urinary stream     Diverticulosis of large intestine without hemorrhage

## 2020-07-27 ENCOUNTER — OFFICE VISIT (OUTPATIENT)
Dept: FAMILY MEDICINE CLINIC | Age: 65
End: 2020-07-27
Payer: MEDICARE

## 2020-07-27 VITALS
OXYGEN SATURATION: 98 % | DIASTOLIC BLOOD PRESSURE: 80 MMHG | SYSTOLIC BLOOD PRESSURE: 122 MMHG | HEART RATE: 76 BPM | WEIGHT: 266 LBS | BODY MASS INDEX: 36.03 KG/M2 | HEIGHT: 72 IN

## 2020-07-27 LAB — HBA1C MFR BLD: 7 %

## 2020-07-27 PROCEDURE — 83036 HEMOGLOBIN GLYCOSYLATED A1C: CPT | Performed by: FAMILY MEDICINE

## 2020-07-27 PROCEDURE — 99214 OFFICE O/P EST MOD 30 MIN: CPT | Performed by: FAMILY MEDICINE

## 2020-07-27 PROCEDURE — 2022F DILAT RTA XM EVC RTNOPTHY: CPT | Performed by: FAMILY MEDICINE

## 2020-07-27 PROCEDURE — 4040F PNEUMOC VAC/ADMIN/RCVD: CPT | Performed by: FAMILY MEDICINE

## 2020-07-27 PROCEDURE — G8417 CALC BMI ABV UP PARAM F/U: HCPCS | Performed by: FAMILY MEDICINE

## 2020-07-27 PROCEDURE — G0438 PPPS, INITIAL VISIT: HCPCS | Performed by: FAMILY MEDICINE

## 2020-07-27 PROCEDURE — G8427 DOCREV CUR MEDS BY ELIG CLIN: HCPCS | Performed by: FAMILY MEDICINE

## 2020-07-27 PROCEDURE — 1123F ACP DISCUSS/DSCN MKR DOCD: CPT | Performed by: FAMILY MEDICINE

## 2020-07-27 PROCEDURE — 3017F COLORECTAL CA SCREEN DOC REV: CPT | Performed by: FAMILY MEDICINE

## 2020-07-27 PROCEDURE — 3051F HG A1C>EQUAL 7.0%<8.0%: CPT | Performed by: FAMILY MEDICINE

## 2020-07-27 PROCEDURE — 1036F TOBACCO NON-USER: CPT | Performed by: FAMILY MEDICINE

## 2020-07-27 RX ORDER — ROSUVASTATIN CALCIUM 5 MG/1
5 TABLET, COATED ORAL NIGHTLY
Qty: 90 TABLET | Refills: 1 | Status: SHIPPED | OUTPATIENT
Start: 2020-07-27 | End: 2020-11-30

## 2020-07-27 ASSESSMENT — PATIENT HEALTH QUESTIONNAIRE - PHQ9
SUM OF ALL RESPONSES TO PHQ QUESTIONS 1-9: 0
SUM OF ALL RESPONSES TO PHQ QUESTIONS 1-9: 0

## 2020-07-27 ASSESSMENT — LIFESTYLE VARIABLES
HOW MANY STANDARD DRINKS CONTAINING ALCOHOL DO YOU HAVE ON A TYPICAL DAY: 0
HOW OFTEN DURING THE LAST YEAR HAVE YOU BEEN UNABLE TO REMEMBER WHAT HAPPENED THE NIGHT BEFORE BECAUSE YOU HAD BEEN DRINKING: 0
HOW OFTEN DURING THE LAST YEAR HAVE YOU HAD A FEELING OF GUILT OR REMORSE AFTER DRINKING: 0
HOW OFTEN DURING THE LAST YEAR HAVE YOU NEEDED AN ALCOHOLIC DRINK FIRST THING IN THE MORNING TO GET YOURSELF GOING AFTER A NIGHT OF HEAVY DRINKING: 0
HOW OFTEN DO YOU HAVE A DRINK CONTAINING ALCOHOL: 2
HAVE YOU OR SOMEONE ELSE BEEN INJURED AS A RESULT OF YOUR DRINKING: 0
AUDIT TOTAL SCORE: 2
HOW OFTEN DO YOU HAVE SIX OR MORE DRINKS ON ONE OCCASION: 0
HOW OFTEN DURING THE LAST YEAR HAVE YOU FOUND THAT YOU WERE NOT ABLE TO STOP DRINKING ONCE YOU HAD STARTED: 0
HOW OFTEN DURING THE LAST YEAR HAVE YOU FAILED TO DO WHAT WAS NORMALLY EXPECTED FROM YOU BECAUSE OF DRINKING: 0
HAS A RELATIVE, FRIEND, DOCTOR, OR ANOTHER HEALTH PROFESSIONAL EXPRESSED CONCERN ABOUT YOUR DRINKING OR SUGGESTED YOU CUT DOWN: 0
AUDIT-C TOTAL SCORE: 2

## 2020-07-27 ASSESSMENT — ENCOUNTER SYMPTOMS
EYES NEGATIVE: 1
RESPIRATORY NEGATIVE: 1
GASTROINTESTINAL NEGATIVE: 1

## 2020-07-27 NOTE — PATIENT INSTRUCTIONS
SURVEY:    You may be receiving a survey from [employer] regarding your visit today. Please complete the survey to enable us to provide the highest quality of care to you and your family. If you cannot score us a very good on any question, please call the office to discuss how we could of made your experience a very good one. Thank you. Personalized Preventive Plan for Lia Ritchie - 7/27/2020  Medicare offers a range of preventive health benefits. Some of the tests and screenings are paid in full while other may be subject to a deductible, co-insurance, and/or copay. Some of these benefits include a comprehensive review of your medical history including lifestyle, illnesses that may run in your family, and various assessments and screenings as appropriate. After reviewing your medical record and screening and assessments performed today your provider may have ordered immunizations, labs, imaging, and/or referrals for you. A list of these orders (if applicable) as well as your Preventive Care list are included within your After Visit Summary for your review. Other Preventive Recommendations:    · A preventive eye exam performed by an eye specialist is recommended every 1-2 years to screen for glaucoma; cataracts, macular degeneration, and other eye disorders. · A preventive dental visit is recommended every 6 months. · Try to get at least 150 minutes of exercise per week or 10,000 steps per day on a pedometer . · Order or download the FREE \"Exercise & Physical Activity: Your Everyday Guide\" from [employer]. Call 6-376.696.2831 or search [employer] online. · You need 5215-8463 mg of calcium and 7443-1739 IU of vitamin D per day.  It is possible to meet your calcium requirement with diet alone, but a vitamin D supplement is usually necessary to meet this goal.  · When exposed to the sun, use a sunscreen that protects against both UVA and UVB radiation with an SPF of 30 or greater. Reapply every 2 to 3 hours or after sweating, drying off with a towel, or swimming. · Always wear a seat belt when traveling in a car. Always wear a helmet when riding a bicycle or motorcycle. Personalized Preventive Plan for [employer] - 7/27/2020  Medicare offers a range of preventive health benefits. Some of the tests and screenings are paid in full while other may be subject to a deductible, co-insurance, and/or copay. Some of these benefits include a comprehensive review of your medical history including lifestyle, illnesses that may run in your family, and various assessments and screenings as appropriate. After reviewing your medical record and screening and assessments performed today your provider may have ordered immunizations, labs, imaging, and/or referrals for you. A list of these orders (if applicable) as well as your Preventive Care list are included within your After Visit Summary for your review. Other Preventive Recommendations:    A preventive eye exam performed by an eye specialist is recommended every 1-2 years to screen for glaucoma; cataracts, macular degeneration, and other eye disorders. A preventive dental visit is recommended every 6 months. Try to get at least 150 minutes of exercise per week or 10,000 steps per day on a pedometer . Order or download the FREE \"Exercise & Physical Activity: Your Everyday Guide\" from [employer]. Call 0-531.574.7561 or search [employer] online. You need 5457-4529 mg of calcium and 1127-2511 IU of vitamin D per day. It is possible to meet your calcium requirement with diet alone, but a vitamin D supplement is usually necessary to meet this goal.  When exposed to the sun, use a sunscreen that protects against both UVA and UVB radiation with an SPF of 30 or greater. Reapply every 2 to 3 hours or after sweating, drying off with a towel, or swimming.   Always wear a seat belt when traveling in a car. Always wear a helmet when riding a bicycle or motorcycle.

## 2020-07-27 NOTE — PROGRESS NOTES
Name: Oneyda Owen  : 1955         Chief Complaint:     Chief Complaint   Patient presents with    Medicare AWV       History of Present Illness:      Oneyda Owen is a 75 y.o.  male who presents with Medicare AWV      HPI     Once in a while still gets some blurred vision, thinks maybe just in L eye, feels fuzzy, lasts 20-30s and then goes away. Mild dizziness at the time but doesn't feel he's come close to passing out. No headache assoc with the vision change. No eye exam for a while. F/u DM. Doesn't check sugars but feels he's doing ok. Has been walking more often for exercise. Taking meds as directed. januvia costs about $250 per 90 days. No adverse effects to medications. He does not believe he's ever been on cholesterol medication. BPH stable on Flomax twice a day, tolerates well. Still urinates frequently. Sees urology and has been offered laser procedure but declined and is still not desiring that at this point. Past Medical History:     Past Medical History:   Diagnosis Date    Diabetes mellitus ([address]     Hypertension     Obesity (BMI 30-39. 9)     Obstructive sleep apnea     on bipap at home    Prostate disease         Past Surgical History:     Past Surgical History:   Procedure Laterality Date    [facility]  2012    [facility]    COLONOSCOPY  2015    Lyndal Mortimer MD; Colon polyps x2, sigmoid diverticulosis, internal and external hemorrhoids    COLONOSCOPY  2019    Dr. Renata Monsalve -- screening; no bx/polyps    COLONOSCOPY N/A 3/1/2019    COLORECTAL CANCER SCREENING performed by Emily Heaton DO at [address] Left 2018    with stent per Dr. Kye Parks      umbilical hernia    [facility] Elisha Left 2018    CYSTOSCOPY  STENT INSERTION-LEFT performed by Maycol Gamez MD at [address] OFFICE/OUTPT [address] [facility] Left 2018    CYSTOSCOPY URETEROSCOPY- LEFT HLL, STENT EXCHANGE performed by Edu Forde MD at [facility] OR        Medications:       Prior to Admission medications    Medication Sig Start Date End Date Taking? Authorizing Provider   rosuvastatin (CRESTOR) 5 MG tablet Take 1 tablet by mouth nightly 7/27/20  Yes Merceda Blue, DO   metFORMIN (GLUCOPHAGE) 1000 MG tablet TAKE 1 TABLET BY MOUTH TWICE A DAY WITH MEALS 7/19/20  Yes Merceda Blue, DO   SITagliptin (JANUVIA) 100 MG tablet TAKE 1 TABLET BY MOUTH EVERY DAY 6/16/20  Yes Merceda Blue, DO   tamsulosin (FLOMAX) 0.4 MG capsule TAKE 1 CAPSULE BY MOUTH TWICE A DAY 5/21/20  Yes MATTEO Haley CNP   diltiazem (CARDIZEM CD) 360 MG extended release capsule TAKE 1 CAPSULE BY MOUTH EVERY DAY 1/27/20  Yes Melquiades Childress, DO        Allergies:       Patient has no known allergies. Social History:     Tobacco:    reports that he quit smoking about 24 years ago. He has a 360.00 pack-year smoking history. He has never used smokeless tobacco.  Alcohol:      reports current alcohol use of about 8.0 standard drinks of alcohol per week. Drug Use:  reports no history of drug use. Family History:     Family History   Problem Relation Age of Onset    Cancer Mother         Uterine    Heart Disease Father         CAD    Diabetes Brother        Review of Systems:     Positive and Negative as described in HPI    Review of Systems   Constitutional: Negative. HENT: Negative. Eyes: Negative. Respiratory: Negative. Cardiovascular: Negative. Gastrointestinal: Negative. Genitourinary: Negative. Musculoskeletal: Negative. Skin: Negative. Neurological: Negative. Hematological: Negative. Psychiatric/Behavioral: Negative. Physical Exam:     Vitals:  /80 (Site: Left Upper Arm, Position: Sitting, Cuff Size: Large Adult)   Pulse 76   Ht 6' (1.829 m)   Wt 266 lb (120.7 kg)   SpO2 98%   BMI 36.08 kg/m²   Physical Exam  Vitals signs and nursing note reviewed.    Constitutional:       Appearance: Normal appearance. He is well-developed. He is obese. He is not ill-appearing. HENT:      Right Ear: Hearing and tympanic membrane normal.      Left Ear: Hearing and tympanic membrane normal.      Nose: Nose normal.      Mouth/Throat:      Dentition: Normal dentition. Eyes:      Conjunctiva/sclera: Conjunctivae normal.      Pupils: Pupils are equal, round, and reactive to light. Neck:      Thyroid: No thyroid mass or thyromegaly. Vascular: No carotid bruit (murmur transmitted from heart). Cardiovascular:      Rate and Rhythm: Normal rate and regular rhythm. Heart sounds: S1 normal and S2 normal. Murmur (2/6 systolic) present. Comments: No peripheral edema. Pulmonary:      Effort: Pulmonary effort is normal.      Breath sounds: Normal breath sounds. Abdominal:      General: Bowel sounds are normal.      Palpations: Abdomen is soft. Tenderness: There is no abdominal tenderness. Musculoskeletal:      Comments: Muscles of normal tone and bulk. Normal gait. Lymphadenopathy:      Cervical: No cervical adenopathy. Skin:     General: Skin is warm and dry. Findings: No rash. Neurological:      Mental Status: He is alert and oriented to person, place, and time. Psychiatric:         Behavior: Behavior normal. Behavior is cooperative.          Data:     Lab Results   Component Value Date     01/27/2020    K 4.5 01/27/2020     01/27/2020    CO2 26 01/27/2020    BUN 14 01/27/2020    CREATININE 0.85 01/27/2020    GLUCOSE 160 01/27/2020    PROT 8.1 01/27/2020    LABALBU 4.7 01/27/2020    BILITOT 0.47 01/27/2020    ALKPHOS 61 01/27/2020    AST 44 01/27/2020    ALT 79 01/27/2020     Lab Results   Component Value Date    WBC 7.9 01/12/2019    RBC 4.36 01/12/2019    HGB 14.0 01/12/2019    HCT 42.4 01/12/2019    MCV 97.2 01/12/2019    MCH 32.1 01/12/2019    MCHC 33.0 01/12/2019    RDW 12.8 01/12/2019     01/12/2019    MPV 9.9 01/12/2019     Lab Results   Component Value Date    TSH 0.94 08/15/2018     Lab Results   Component Value Date    CHOL 160 01/27/2020    HDL 52 01/27/2020    PSA 0.35 05/11/2020    LABA1C 7.0 07/27/2020         Assessment & Plan:        Diagnosis Orders   1. Routine general medical examination at a health care facility     2. Type 2 diabetes mellitus without complication, without long-term current use of insulin (HCC)  POCT glycosylated hemoglobin (Hb A1C)    Hemoglobin A1C    Lipid Panel    Microalbumin, Ur    Comprehensive Metabolic Panel   3. Transient vision disturbance  US CAROTID ARTERY BILATERAL   4. Unspecified visual field defects   US CAROTID ARTERY BILATERAL   5. BPH with obstruction/lower urinary tract symptoms     Diabetes at top range of goal with a1c 7%. Continue same Rx and follow-up 6 months. Ongoing intermittent vision disturbance in the left eye only. Multiple risk factors for stroke. Starting Crestor, low-dose as patient's LDL was only 89, and checking carotid ultrasound. Make eye appt. Consider brain MRI. Check lipids prior to next appt. BPH stable, cont flomax BID. Requested Prescriptions     Signed Prescriptions Disp Refills    rosuvastatin (CRESTOR) 5 MG tablet 90 tablet 1     Sig: Take 1 tablet by mouth nightly       Patient Instructions     SURVEY:    You may be receiving a survey from [employer] regarding your visit today. Please complete the survey to enable us to provide the highest quality of care to you and your family. If you cannot score us a very good on any question, please call the office to discuss how we could of made your experience a very good one. Thank you. Personalized Preventive Plan for Cara Dopp - 7/27/2020  Medicare offers a range of preventive health benefits. Some of the tests and screenings are paid in full while other may be subject to a deductible, co-insurance, and/or copay.     Some of these benefits include a comprehensive review of your medical history including lifestyle, medical record and screening and assessments performed today your provider may have ordered immunizations, labs, imaging, and/or referrals for you. A list of these orders (if applicable) as well as your Preventive Care list are included within your After Visit Summary for your review. Other Preventive Recommendations:    A preventive eye exam performed by an eye specialist is recommended every 1-2 years to screen for glaucoma; cataracts, macular degeneration, and other eye disorders. A preventive dental visit is recommended every 6 months. Try to get at least 150 minutes of exercise per week or 10,000 steps per day on a pedometer . Order or download the FREE \"Exercise & Physical Activity: Your Everyday Guide\" from [employer]. Call 3-517.815.2547 or search [employer] online. You need 6776-9056 mg of calcium and 7477-4318 IU of vitamin D per day. It is possible to meet your calcium requirement with diet alone, but a vitamin D supplement is usually necessary to meet this goal.  When exposed to the sun, use a sunscreen that protects against both UVA and UVB radiation with an SPF of 30 or greater. Reapply every 2 to 3 hours or after sweating, drying off with a towel, or swimming. Always wear a seat belt when traveling in a car. Always wear a helmet when riding a bicycle or motorcycle. Marquis Lu received counseling on the following healthy behaviors: medication adherence  Reviewed prior labs and health maintenance. Continue current medications, diet and exercise. Discussed use, benefit, and side effects of prescribed medications. Barriers to medication compliance addressed. Patient given educational materials - see patient instructions. All patient questions answered. Patient voiced understanding.      Electronically signed by Arabella Lott DO on 7/27/2020 at 10:33 PM   [address] by Naomi Sullivan MD at [address] [facility] [address] OFFICE/OUTPT [address] Left 8/23/2018    CYSTOSCOPY URETEROSCOPY- LEFT HLL, STENT EXCHANGE performed by Naomi Sullivan MD at [facility] OR         Family History   Problem Relation Age of Onset    Cancer Mother         Uterine    Heart Disease Father         CAD    Diabetes Brother        CareTeam (Including outside providers/suppliers regularly involved in providing care):   Patient Care Team:  Varinder Youngblood DO as PCP - General (Family Medicine)  Varinder Youngblood DO as PCP - [address] [place] [address] [facility]  Naomi Sullivan MD as Consulting Physician (Urology)    Wt Readings from Last 3 Encounters:   07/27/20 266 lb (120.7 kg)   05/21/20 275 lb (124.7 kg)   01/27/20 274 lb (124.3 kg)     Vitals:    07/27/20 0803   BP: 122/80   Site: Left Upper Arm   Position: Sitting   Cuff Size: Large Adult   Pulse: 76   SpO2: 98%   Weight: 266 lb (120.7 kg)   Height: 6' (1.829 m)     Body mass index is 36.08 kg/m². Based upon direct observation of the patient, evaluation of cognition reveals recent and remote memory intact. Patient's complete Health Risk Assessment and screening values have been reviewed and are found in Flowsheets. The following problems were reviewed today and where indicated follow up appointments were made and/or referrals ordered. Positive Risk Factor Screenings with Interventions:     General Health:  General  In general, how would you say your health is?: Good  In the past 7 days, have you experienced any of the following?  New or Increased Pain, New or Increased Fatigue, Loneliness, Social Isolation, Stress or Anger?: None of These  Do you get the social and emotional support that you need?: Yes  Do you have a Living Will?: (!) No  General Health Risk Interventions:  · No Living Will: additional information provided      Health Habits/Nutrition:  Health Habits/Nutrition  Do you exercise for at least 20 minutes 2-3 times per week?: (!) No  Have you lost any weight without trying in the past 3 months?: No  Do you eat fewer than 2 meals per day?: No  Have you seen a dentist within the past year?: (!) No  Body mass index is 36.08 kg/m².   Health Habits/Nutrition Interventions:  · Inadequate physical activity:  start walking more often    Hearing/Vision:  No exam data present  Hearing/Vision  Do you or your family notice any trouble with your hearing?: No  Do you have difficulty driving, watching TV, or doing any of your daily activities because of your eyesight?: No  Have you had an eye exam within the past year?: (!) No  Hearing/Vision Interventions:  · Vision concerns:  patient encouraged to make appointment with his/her eye specialist    Safety:  Safety  Do you have working smoke detectors?: Yes  Have all throw rugs been removed or fastened?: (!) No  Do you have non-slip mats or surfaces in all bathtubs/showers?: Yes  Do all of your stairways have a railing or banister?: Yes  Are your doorways, halls and stairs free of clutter?: Yes  Do you always fasten your seatbelt when you are in a car?: Yes  Safety Interventions:  · Home safety tips provided    Personalized Preventive Plan   Current Health Maintenance Status  Immunization History   Administered Date(s) Administered    Influenza Virus Vaccine 11/24/2015    Influenza, High Dose (Fluzone 65 yrs and older) 12/03/2019    Influenza, Amedeo Crest, 6 mo and older, IM, PF (Flulaval, Fluarix) 01/11/2019    Pneumococcal Conjugate 13-valent (Zachary Salle) 08/08/2018    Pneumococcal Polysaccharide ([id number]) 01/27/2020        Health Maintenance   Topic Date Due    DTaP/Tdap/Td vaccine (1 - Tdap) 07/30/1972    Shingles Vaccine (1 of 2) 07/30/2003    Diabetic retinal exam  06/06/2017    Annual Wellness Visit (AWV)  05/29/2019    Flu vaccine (1) 09/01/2020    Diabetic foot exam  01/27/2021    A1C test (Diabetic or Prediabetic)  01/27/2021    Diabetic microalbuminuria test  01/27/2021    Lipid screen  01/27/2021  Potassium monitoring  01/27/2021    Creatinine monitoring  01/27/2021    Colon cancer screen colonoscopy  03/01/2029    Pneumococcal 65+ years Vaccine  Completed    AAA screen  Completed    Hepatitis C screen  Addressed    Hepatitis A vaccine  Aged Out    Hib vaccine  Aged Out    Meningococcal (ACWY) vaccine  Aged Out     Recommendations for [employer] Due: see orders and patient instructions/AVS.  . Recommended screening schedule for the next 5-10 years is provided to the patient in written form: see Patient Instructions/AVS.    Shandahernandezomari Polk was seen today for medicare awv. Diagnoses and all orders for this visit:    Routine general medical examination at a health care facility    Type 2 diabetes mellitus without complication, without long-term current use of insulin (HCC)  -     POCT glycosylated hemoglobin (Hb A1C)  -     Hemoglobin A1C; Future  -     Lipid Panel; Future  -     Microalbumin, Ur; Future  -     Comprehensive Metabolic Panel; Future    Transient vision disturbance  -     US CAROTID ARTERY BILATERAL; Future    Unspecified visual field defects   -     US CAROTID ARTERY BILATERAL; Future    BPH with obstruction/lower urinary tract symptoms    Other orders  -     rosuvastatin (CRESTOR) 5 MG tablet;  Take 1 tablet by mouth nightly

## 2020-08-12 ENCOUNTER — HOSPITAL ENCOUNTER (OUTPATIENT)
Dept: VASCULAR LAB | Age: 65
Discharge: HOME | End: 2020-08-14
Payer: MEDICARE

## 2020-08-12 PROCEDURE — 93880 EXTRACRANIAL BILAT STUDY: CPT

## 2020-08-31 RX ORDER — DILTIAZEM HYDROCHLORIDE 360 MG/1
CAPSULE, EXTENDED RELEASE ORAL
Qty: 90 CAPSULE | Refills: 1 | Status: SHIPPED | OUTPATIENT
Start: 2020-08-31 | End: 2021-01-27 | Stop reason: SDUPTHER

## 2020-11-30 RX ORDER — ROSUVASTATIN CALCIUM 5 MG/1
TABLET, COATED ORAL
Qty: 90 TABLET | Refills: 1 | Status: SHIPPED | OUTPATIENT
Start: 2020-11-30 | End: 2021-04-08

## 2021-01-18 NOTE — TELEPHONE ENCOUNTER
Last visit:  7/27/2020  Next Visit Date:    Future Appointments   Date Time Provider Lashay Moorei   1/27/2021  8:20 AM DO Chuck Eduardo [facility]   5/20/2021  1:00 PM MD Tereza Khan [facility]     Last Med refill:    Medication List:  Prior to Admission medications    Medication Sig Start Date End Date Taking? Authorizing Provider   rosuvastatin (CRESTOR) 5 MG tablet TAKE 1 TABLET BY MOUTH EVERY DAY AT NIGHT 11/30/20   Lexx Clark, DO   SITagliptin (JANUVIA) 100 MG tablet TAKE 1 TABLET BY MOUTH EVERY DAY 11/30/20   Lexx Clark, DO   dilTIAZem (CARDIZEM CD) 360 MG extended release capsule TAKE 1 CAPSULE BY MOUTH EVERY DAY 8/31/20   Lexx Clark, DO   metFORMIN (GLUCOPHAGE) 1000 MG tablet TAKE 1 TABLET BY MOUTH TWICE A DAY WITH MEALS 7/19/20   Lexx Clark, DO   tamsulosin (FLOMAX) 0.4 MG capsule TAKE 1 CAPSULE BY MOUTH TWICE A DAY 5/21/20   Kaitlin Heath, APRN - CNP       Allergies:  Patient has no known allergies. Hemoglobin A1C (%)   Date Value   07/27/2020 7.0   01/27/2020 7.3 (H)   01/10/2019 6.8 (H)             ( goal A1C is < 7)   Microalb/Crt.  Ratio (mcg/mg creat)   Date Value   01/27/2020 10     LDL Cholesterol (mg/dL)   Date Value   01/27/2020 89   08/15/2018 63       (goal LDL is <100)   AST (U/L)   Date Value   01/27/2020 44 (H)     ALT (U/L)   Date Value   01/27/2020 79 (H)     BUN (mg/dL)   Date Value   01/27/2020 14     BP Readings from Last 3 Encounters:   07/27/20 122/80   05/21/20 136/86   01/27/20 130/82          (goal 120/80)

## 2021-01-27 ENCOUNTER — OFFICE VISIT (OUTPATIENT)
Dept: FAMILY MEDICINE CLINIC | Age: 66
End: 2021-01-27
Payer: MEDICARE

## 2021-01-27 ENCOUNTER — HOSPITAL ENCOUNTER (OUTPATIENT)
Age: 66
Discharge: HOME | End: 2021-01-27
Payer: MEDICARE

## 2021-01-27 VITALS
SYSTOLIC BLOOD PRESSURE: 142 MMHG | BODY MASS INDEX: 36.7 KG/M2 | HEIGHT: 72 IN | HEART RATE: 80 BPM | WEIGHT: 271 LBS | OXYGEN SATURATION: 99 % | DIASTOLIC BLOOD PRESSURE: 82 MMHG

## 2021-01-27 DIAGNOSIS — E11.9 TYPE 2 DIABETES MELLITUS WITHOUT COMPLICATION, WITHOUT LONG-TERM CURRENT USE OF INSULIN (HCC): Primary | ICD-10-CM

## 2021-01-27 DIAGNOSIS — I10 ESSENTIAL HYPERTENSION, BENIGN: ICD-10-CM

## 2021-01-27 DIAGNOSIS — I83.813 VARICOSE VEINS OF BOTH LOWER EXTREMITIES WITH PAIN: ICD-10-CM

## 2021-01-27 DIAGNOSIS — R29.898 HEAVY SENSATION OF LOWER EXTREMITY: ICD-10-CM

## 2021-01-27 DIAGNOSIS — E11.9 TYPE 2 DIABETES MELLITUS WITHOUT COMPLICATION, WITHOUT LONG-TERM CURRENT USE OF INSULIN (HCC): ICD-10-CM

## 2021-01-27 LAB
ALBUMIN SERPL-MCNC: 4.3 G/DL (ref 3.5–5.2)
ALBUMIN/GLOBULIN RATIO: ABNORMAL (ref 1–2.5)
ALP BLD-CCNC: 66 U/L (ref 40–129)
ALT SERPL-CCNC: 45 U/L (ref 5–41)
ANION GAP SERPL CALCULATED.3IONS-SCNC: 11 MMOL/L (ref 9–17)
AST SERPL-CCNC: 34 U/L
BILIRUB SERPL-MCNC: 0.53 MG/DL (ref 0.3–1.2)
BUN BLDV-MCNC: 14 MG/DL (ref 8–23)
BUN/CREAT BLD: 17 (ref 9–20)
CALCIUM SERPL-MCNC: 10.2 MG/DL (ref 8.6–10.4)
CHLORIDE BLD-SCNC: 103 MMOL/L (ref 98–107)
CHOLESTEROL/HDL RATIO: 2.4
CHOLESTEROL: 120 MG/DL
CO2: 23 MMOL/L (ref 20–31)
CREAT SERPL-MCNC: 0.82 MG/DL (ref 0.7–1.2)
CREATININE URINE: 167.2 MG/DL (ref 39–259)
ESTIMATED AVERAGE GLUCOSE: 166 MG/DL
GFR AFRICAN AMERICAN: >60 ML/MIN
GFR NON-AFRICAN AMERICAN: >60 ML/MIN
GFR SERPL CREATININE-BSD FRML MDRD: ABNORMAL ML/MIN/{1.73_M2}
GFR SERPL CREATININE-BSD FRML MDRD: ABNORMAL ML/MIN/{1.73_M2}
GLUCOSE BLD-MCNC: 151 MG/DL (ref 70–99)
HBA1C MFR BLD: 7.4 % (ref 4–6)
HDLC SERPL-MCNC: 50 MG/DL
LDL CHOLESTEROL: 50 MG/DL (ref 0–130)
MICROALBUMIN/CREAT 24H UR: 14 MG/L
MICROALBUMIN/CREAT UR-RTO: 8 MCG/MG CREAT
PATIENT FASTING?: YES
POTASSIUM SERPL-SCNC: 4.5 MMOL/L (ref 3.7–5.3)
SODIUM BLD-SCNC: 137 MMOL/L (ref 135–144)
TOTAL PROTEIN: 7.6 G/DL (ref 6.4–8.3)
TRIGL SERPL-MCNC: 99 MG/DL
VLDLC SERPL CALC-MCNC: NORMAL MG/DL (ref 1–30)

## 2021-01-27 PROCEDURE — G8427 DOCREV CUR MEDS BY ELIG CLIN: HCPCS | Performed by: FAMILY MEDICINE

## 2021-01-27 PROCEDURE — 82570 ASSAY OF URINE CREATININE: CPT

## 2021-01-27 PROCEDURE — 99214 OFFICE O/P EST MOD 30 MIN: CPT | Performed by: FAMILY MEDICINE

## 2021-01-27 PROCEDURE — 3051F HG A1C>EQUAL 7.0%<8.0%: CPT | Performed by: FAMILY MEDICINE

## 2021-01-27 PROCEDURE — 82043 UR ALBUMIN QUANTITATIVE: CPT

## 2021-01-27 PROCEDURE — 4040F PNEUMOC VAC/ADMIN/RCVD: CPT | Performed by: FAMILY MEDICINE

## 2021-01-27 PROCEDURE — 3017F COLORECTAL CA SCREEN DOC REV: CPT | Performed by: FAMILY MEDICINE

## 2021-01-27 PROCEDURE — G8417 CALC BMI ABV UP PARAM F/U: HCPCS | Performed by: FAMILY MEDICINE

## 2021-01-27 PROCEDURE — 83036 HEMOGLOBIN GLYCOSYLATED A1C: CPT

## 2021-01-27 PROCEDURE — 1123F ACP DISCUSS/DSCN MKR DOCD: CPT | Performed by: FAMILY MEDICINE

## 2021-01-27 PROCEDURE — 1036F TOBACCO NON-USER: CPT | Performed by: FAMILY MEDICINE

## 2021-01-27 PROCEDURE — 80053 COMPREHEN METABOLIC PANEL: CPT

## 2021-01-27 PROCEDURE — 80061 LIPID PANEL: CPT

## 2021-01-27 PROCEDURE — G8482 FLU IMMUNIZE ORDER/ADMIN: HCPCS | Performed by: FAMILY MEDICINE

## 2021-01-27 PROCEDURE — 36415 COLL VENOUS BLD VENIPUNCTURE: CPT

## 2021-01-27 PROCEDURE — 2022F DILAT RTA XM EVC RTNOPTHY: CPT | Performed by: FAMILY MEDICINE

## 2021-01-27 RX ORDER — DILTIAZEM HYDROCHLORIDE 360 MG/1
CAPSULE, EXTENDED RELEASE ORAL
Qty: 90 CAPSULE | Refills: 1 | Status: SHIPPED | OUTPATIENT
Start: 2021-01-27 | End: 2021-06-15 | Stop reason: SDUPTHER

## 2021-01-27 ASSESSMENT — PATIENT HEALTH QUESTIONNAIRE - PHQ9
SUM OF ALL RESPONSES TO PHQ9 QUESTIONS 1 & 2: 0
1. LITTLE INTEREST OR PLEASURE IN DOING THINGS: 0
2. FEELING DOWN, DEPRESSED OR HOPELESS: 0

## 2021-01-27 NOTE — PROGRESS NOTES
STENT INSERTION-LEFT performed by Tony Garcia MD at [address] [facility] [address] OFF[address]/OUTPT [address] Left 8/23/2018    CYSTOSCOPY URETEROSCOPY- LEFT HLL, STENT EXCHANGE performed by Tony Garcia MD at [address]        Medications:       Prior to Admission medications    Medication Sig Start Date End Date Taking? Authorizing Provider   dilTIAZem (CARDIZEM CD) 360 MG extended release capsule 1 by mouth daily 1/27/21  Yes Murphy Guaman, DO   metFORMIN (GLUCOPHAGE) 1000 MG tablet TAKE 1 TABLET BY MOUTH TWICE A DAY WITH MEALS 1/18/21   Murphy Guaman, DO   rosuvastatin (CRESTOR) 5 MG tablet TAKE 1 TABLET BY MOUTH EVERY DAY AT NIGHT 11/30/20   Murphy Guaman, DO   SITagliptin (JANUVIA) 100 MG tablet TAKE 1 TABLET BY MOUTH EVERY DAY 11/30/20   Murphy Guaman, DO   tamsulosin (FLOMAX) 0.4 MG capsule TAKE 1 CAPSULE BY MOUTH TWICE A DAY 5/21/20   MATTEO Mary - CNP        Allergies:       Patient has no known allergies. Social History:     Tobacco:    reports that he quit smoking about 24 years ago. He has a 360.00 pack-year smoking history. He has never used smokeless tobacco.  Alcohol:      reports current alcohol use of about 8.0 standard drinks of alcohol per week. Drug Use:  reports no history of drug use. Family History:     Family History   Problem Relation Age of Onset    Cancer Mother         Uterine    Heart Disease Father         CAD    Diabetes Brother        Review of Systems:     Positive and Negative as described in HPI    Review of Systems    Physical Exam:     Vitals:  BP (!) 142/82   Pulse 80   Ht 6' (1.829 m)   Wt 271 lb (122.9 kg)   SpO2 99%   BMI 36.75 kg/m²   Physical Exam  Constitutional:       Appearance: Normal appearance. He is obese. He is not ill-appearing. Cardiovascular:      Rate and Rhythm: Normal rate and regular rhythm. Heart sounds: No murmur.       Comments: Large varicose veins visible left medial proximal leg and left distal thigh but much low back pain but still may have lumbar stenosis. Not overly bothersome. Advised to try to gradually increase walking. Follow-up if not improving. Requested Prescriptions     Signed Prescriptions Disp Refills    dilTIAZem (CARDIZEM CD) 360 MG extended release capsule 90 capsule 1     Si by mouth daily       Patient Instructions   SURVEY:    You may be receiving a survey from [employer] regarding your visit today. You may get this in the mail, through your MyChart or in your email. Please complete the survey to enable us to provide the highest quality of care to you and your family. If you cannot score us as very good ( 5 Stars) on any question, please feel free to call the office to discuss how we could have made your experience exceptional.     Thank you. Clinical Care Team:  DO Yoni Garcia, [address] [place] [address] Team:  Joanna Thurston received counseling on the following healthy behaviors: medication adherence  Reviewed prior labs and health maintenance. Continue current medications, diet and exercise. Discussed use, benefit, and side effects of prescribed medications. Barriers to medication compliance addressed. Patient given educational materials - see patient instructions. All patient questions answered. Patient voiced understanding.      Electronically signed by Nilesh Walden DO on 2021 at 8:39 PM   [address]  Dept: [phone]

## 2021-01-27 NOTE — PATIENT INSTRUCTIONS
SURVEY:    You may be receiving a survey from [employer] regarding your visit today. You may get this in the mail, through your MyChart or in your email. Please complete the survey to enable us to provide the highest quality of care to you and your family. If you cannot score us as very good ( 5 Stars) on any question, please feel free to call the office to discuss how we could have made your experience exceptional.     Thank you.     Clinical Care Team:  Dr. Cal Bhandari, DO Dougie Guevara, [address] [place] [address] Team:  [address] [place] [address]

## 2021-01-31 PROBLEM — N20.0 KIDNEY STONE: Status: RESOLVED | Noted: 2018-08-07 | Resolved: 2021-01-31

## 2021-01-31 PROBLEM — R10.9 LEFT FLANK PAIN: Status: RESOLVED | Noted: 2018-08-07 | Resolved: 2021-01-31

## 2021-01-31 PROBLEM — N40.0 BPH WITHOUT OBSTRUCTION/LOWER URINARY TRACT SYMPTOMS: Status: RESOLVED | Noted: 2018-05-14 | Resolved: 2021-01-31

## 2021-01-31 PROBLEM — R35.0 FREQUENCY OF URINATION: Status: RESOLVED | Noted: 2018-10-11 | Resolved: 2021-01-31

## 2021-01-31 PROBLEM — R39.12 WEAK URINARY STREAM: Status: RESOLVED | Noted: 2018-10-11 | Resolved: 2021-01-31

## 2021-01-31 PROBLEM — N13.2 HYDRONEPHROSIS WITH URINARY OBSTRUCTION DUE TO URETERAL CALCULUS: Status: RESOLVED | Noted: 2018-08-07 | Resolved: 2021-01-31

## 2021-04-08 RX ORDER — ROSUVASTATIN CALCIUM 5 MG/1
TABLET, COATED ORAL
Qty: 90 TABLET | Refills: 1 | Status: SHIPPED | OUTPATIENT
Start: 2021-04-08 | End: 2021-09-13 | Stop reason: DRUGHIGH

## 2021-04-08 NOTE — TELEPHONE ENCOUNTER
Last OV: 1/27/2021 DM/ HTN   Last RX  Next scheduled apt: 7/27/2021    Sure scripts request     RX pending

## 2021-05-24 ENCOUNTER — TELEPHONE (OUTPATIENT)
Dept: UROLOGY | Age: 66
End: 2021-05-24

## 2021-05-24 DIAGNOSIS — N20.0 RENAL STONE: ICD-10-CM

## 2021-05-24 DIAGNOSIS — Z12.5 PROSTATE CANCER SCREENING: Primary | ICD-10-CM

## 2021-05-27 ENCOUNTER — HOSPITAL ENCOUNTER (OUTPATIENT)
Dept: GENERAL RADIOLOGY | Age: 66
Discharge: HOME | End: 2021-05-29
Payer: MEDICARE

## 2021-05-27 ENCOUNTER — HOSPITAL ENCOUNTER (OUTPATIENT)
Age: 66
Discharge: HOME | End: 2021-05-29
Payer: MEDICARE

## 2021-05-27 ENCOUNTER — HOSPITAL ENCOUNTER (OUTPATIENT)
Age: 66
Discharge: HOME | End: 2021-05-27
Payer: MEDICARE

## 2021-05-27 DIAGNOSIS — N20.0 RENAL STONE: ICD-10-CM

## 2021-05-27 LAB — PROSTATE SPECIFIC ANTIGEN: 0.34 UG/L

## 2021-05-27 PROCEDURE — 74018 RADEX ABDOMEN 1 VIEW: CPT

## 2021-05-27 PROCEDURE — 36415 COLL VENOUS BLD VENIPUNCTURE: CPT

## 2021-05-27 PROCEDURE — G0103 PSA SCREENING: HCPCS

## 2021-06-10 ENCOUNTER — HOSPITAL ENCOUNTER (OUTPATIENT)
Age: 66
End: 2021-06-10
Payer: MEDICARE

## 2021-06-10 ENCOUNTER — HOSPITAL ENCOUNTER (OUTPATIENT)
Age: 66
Discharge: HOME | End: 2021-06-10
Payer: MEDICARE

## 2021-06-10 ENCOUNTER — OFFICE VISIT (OUTPATIENT)
Dept: UROLOGY | Age: 66
End: 2021-06-10
Payer: MEDICARE

## 2021-06-10 VITALS
WEIGHT: 274 LBS | BODY MASS INDEX: 37.11 KG/M2 | SYSTOLIC BLOOD PRESSURE: 138 MMHG | HEIGHT: 72 IN | DIASTOLIC BLOOD PRESSURE: 88 MMHG

## 2021-06-10 DIAGNOSIS — Z12.5 PROSTATE CANCER SCREENING: ICD-10-CM

## 2021-06-10 DIAGNOSIS — N13.8 BPH WITH OBSTRUCTION/LOWER URINARY TRACT SYMPTOMS: ICD-10-CM

## 2021-06-10 DIAGNOSIS — Z01.818 PREOP TESTING: ICD-10-CM

## 2021-06-10 DIAGNOSIS — R35.0 FREQUENCY OF URINATION: ICD-10-CM

## 2021-06-10 DIAGNOSIS — N20.0 RENAL STONE: Primary | ICD-10-CM

## 2021-06-10 DIAGNOSIS — N40.1 BPH WITH OBSTRUCTION/LOWER URINARY TRACT SYMPTOMS: ICD-10-CM

## 2021-06-10 LAB
ABSOLUTE EOS #: 0.3 K/UL (ref 0–0.4)
ABSOLUTE IMMATURE GRANULOCYTE: ABNORMAL K/UL (ref 0–0.3)
ABSOLUTE LYMPH #: 1.7 K/UL (ref 1–4.8)
ABSOLUTE MONO #: 0.7 K/UL (ref 0–1)
ANION GAP SERPL CALCULATED.3IONS-SCNC: 13 MMOL/L (ref 9–17)
BASOPHILS # BLD: 1 % (ref 0–2)
BASOPHILS ABSOLUTE: 0 K/UL (ref 0–0.2)
BUN BLDV-MCNC: 10 MG/DL (ref 8–23)
BUN/CREAT BLD: 13 (ref 9–20)
CALCIUM SERPL-MCNC: 9.3 MG/DL (ref 8.6–10.4)
CHLORIDE BLD-SCNC: 102 MMOL/L (ref 98–107)
CO2: 22 MMOL/L (ref 20–31)
CREAT SERPL-MCNC: 0.76 MG/DL (ref 0.7–1.2)
DIFFERENTIAL TYPE: YES
EOSINOPHILS RELATIVE PERCENT: 5 % (ref 0–5)
GFR AFRICAN AMERICAN: >60 ML/MIN
GFR NON-AFRICAN AMERICAN: >60 ML/MIN
GFR SERPL CREATININE-BSD FRML MDRD: ABNORMAL ML/MIN/{1.73_M2}
GFR SERPL CREATININE-BSD FRML MDRD: ABNORMAL ML/MIN/{1.73_M2}
GLUCOSE BLD-MCNC: 142 MG/DL (ref 70–99)
HCT VFR BLD CALC: 45.1 % (ref 41–53)
HEMOGLOBIN: 15.3 G/DL (ref 13.5–17.5)
IMMATURE GRANULOCYTES: ABNORMAL %
LYMPHOCYTES # BLD: 28 % (ref 13–44)
MCH RBC QN AUTO: 31.3 PG (ref 26–34)
MCHC RBC AUTO-ENTMCNC: 34 G/DL (ref 31–37)
MCV RBC AUTO: 92.2 FL (ref 80–100)
MONOCYTES # BLD: 12 % (ref 5–9)
NRBC AUTOMATED: ABNORMAL PER 100 WBC
PDW BLD-RTO: 14.3 % (ref 12.1–15.2)
PLATELET # BLD: 285 K/UL (ref 140–450)
PLATELET ESTIMATE: ABNORMAL
PMV BLD AUTO: ABNORMAL FL (ref 6–12)
POTASSIUM SERPL-SCNC: 4.4 MMOL/L (ref 3.7–5.3)
RBC # BLD: 4.89 M/UL (ref 4.5–5.9)
RBC # BLD: ABNORMAL 10*6/UL
SEG NEUTROPHILS: 54 % (ref 39–75)
SEGMENTED NEUTROPHILS ABSOLUTE COUNT: 3.5 K/UL (ref 2.1–6.5)
SODIUM BLD-SCNC: 137 MMOL/L (ref 135–144)
WBC # BLD: 6.3 K/UL (ref 3.5–11)
WBC # BLD: ABNORMAL 10*3/UL

## 2021-06-10 PROCEDURE — 80048 BASIC METABOLIC PNL TOTAL CA: CPT

## 2021-06-10 PROCEDURE — 4040F PNEUMOC VAC/ADMIN/RCVD: CPT | Performed by: PHYSICIAN ASSISTANT

## 2021-06-10 PROCEDURE — 1036F TOBACCO NON-USER: CPT | Performed by: PHYSICIAN ASSISTANT

## 2021-06-10 PROCEDURE — 85025 COMPLETE CBC W/AUTO DIFF WBC: CPT

## 2021-06-10 PROCEDURE — 1123F ACP DISCUSS/DSCN MKR DOCD: CPT | Performed by: PHYSICIAN ASSISTANT

## 2021-06-10 PROCEDURE — 36415 COLL VENOUS BLD VENIPUNCTURE: CPT

## 2021-06-10 PROCEDURE — 99215 OFFICE O/P EST HI 40 MIN: CPT | Performed by: PHYSICIAN ASSISTANT

## 2021-06-10 PROCEDURE — 51798 US URINE CAPACITY MEASURE: CPT | Performed by: PHYSICIAN ASSISTANT

## 2021-06-10 PROCEDURE — 3017F COLORECTAL CA SCREEN DOC REV: CPT | Performed by: PHYSICIAN ASSISTANT

## 2021-06-10 PROCEDURE — G8427 DOCREV CUR MEDS BY ELIG CLIN: HCPCS | Performed by: PHYSICIAN ASSISTANT

## 2021-06-10 PROCEDURE — G8417 CALC BMI ABV UP PARAM F/U: HCPCS | Performed by: PHYSICIAN ASSISTANT

## 2021-06-10 PROCEDURE — 93005 ELECTROCARDIOGRAM TRACING: CPT | Performed by: PHYSICIAN ASSISTANT

## 2021-06-10 RX ORDER — TRIAMCINOLONE ACETONIDE 5 MG/G
CREAM TOPICAL
COMMUNITY
Start: 2021-05-30

## 2021-06-10 RX ORDER — OXYBUTYNIN CHLORIDE 5 MG/1
5 TABLET, EXTENDED RELEASE ORAL EVERY EVENING
Qty: 30 TABLET | Refills: 3 | Status: SHIPPED | OUTPATIENT
Start: 2021-06-10 | End: 2021-10-18

## 2021-06-10 ASSESSMENT — ENCOUNTER SYMPTOMS
WHEEZING: 0
COLOR CHANGE: 0
SHORTNESS OF BREATH: 0
NAUSEA: 0
COUGH: 0
VOMITING: 0
ABDOMINAL PAIN: 0
BACK PAIN: 0
CONSTIPATION: 0
EYE REDNESS: 0

## 2021-06-10 NOTE — PROGRESS NOTES
1 CAPSULE BY MOUTH TWICE A  capsule 0    SITagliptin (JANUVIA) 100 MG tablet TAKE 1 TABLET BY MOUTH EVERY DAY 90 tablet 1    rosuvastatin (CRESTOR) 5 MG tablet TAKE 1 TABLET BY MOUTH EVERY DAY AT NIGHT 90 tablet 1    dilTIAZem (CARDIZEM CD) 360 MG extended release capsule 1 by mouth daily 90 capsule 1    metFORMIN (GLUCOPHAGE) 1000 MG tablet TAKE 1 TABLET BY MOUTH TWICE A DAY WITH MEALS 180 tablet 1     No current facility-administered medications on file prior to visit. Patient has no known allergies. Family History   Problem Relation Age of Onset    Cancer Mother         Uterine    Heart Disease Father         CAD    Diabetes Brother      Social History     Tobacco Use   Smoking Status Former Smoker    Packs/day: 15.00    Years: 24.00    Pack years: 360.00    Quit date: 1996    Years since quittin.3   Smokeless Tobacco Never Used       Social History     Substance and Sexual Activity   Alcohol Use Yes    Alcohol/week: 8.0 standard drinks    Types: 6 Cans of beer, 2 Shots of liquor per week    Comment: occasional       Review of Systems   Constitutional: Negative for appetite change, chills and fever. Eyes: Negative for redness and visual disturbance. Respiratory: Negative for cough, shortness of breath and wheezing. Cardiovascular: Negative for chest pain and leg swelling. Gastrointestinal: Negative for abdominal pain, constipation, nausea and vomiting. Genitourinary: Negative for decreased urine volume, difficulty urinating, discharge, dysuria, enuresis, flank pain, frequency, hematuria, penile pain, scrotal swelling, testicular pain and urgency. Positive for nocturia x3-4   Musculoskeletal: Negative for back pain, joint swelling and myalgias. Skin: Negative for color change, rash and wound. Neurological: Negative for dizziness, tremors and numbness. Hematological: Negative for adenopathy. Does not bruise/bleed easily.        /88 (Site: Left Upper Arm, Position: Sitting, Cuff Size: Large Adult)   Ht 6' (1.829 m)   Wt 274 lb (124.3 kg)   BMI 37.16 kg/m²       PHYSICAL EXAM:  Constitutional: Patient in no acute distress; Neuro: alert and oriented to person place and time. Psych: Mood and affect normal.  Skin: Normal  Lungs: Respiratory effort normal  Cardiovascular:  Normal peripheral pulses  Abdomen: Soft, non-tender, non-distended  Rectal: Deferred    Lab Results   Component Value Date    BUN 10 06/10/2021     Lab Results   Component Value Date    CREATININE 0.76 06/10/2021     Lab Results   Component Value Date    PSA 0.34 05/27/2021    PSA 0.35 05/11/2020    PSA 0.39 05/30/2017       ASSESSMENT:   Diagnosis Orders   1. Renal stone  XR ABDOMEN (KUB) (SINGLE AP VIEW)   2. Prostate cancer screening  PSA screening   3. Frequency of urination     4. BPH with obstruction/lower urinary tract symptoms  [place] MEASUREMENT,POST-VOID RESIDUAL VOLUME BY US,NON-IMAGING   5. Preop testing  EKG 12 Lead    CBC With Auto Differential    Basic Metabolic Panel         PLAN:  BLADDER IRRITANTS     There are several changes you can make to your diet to help improve your urinary symptoms. The following have been shown to cause irritation to the bladder and should be AVOIDED if possible:  ~ Coffee (including decaffeinated)   ~ ALL Tea (including green teas and decaffeinated)  ~ Soda/Pop/carbonated beverages/Energy drinks (especially dark dyed yesenia, root beers, mountain dew, etc)  ~These are MUCH worse if they have caffeine, but can also be irritative to the bladder even without caffeine  ~ Alcoholic beverages  ~ Spicy foods (peppers contain capsaicin, which is very irritating to the bladder)  ~ Acidic foods (for example: tomato based foods, orange juice, etc)    We encourage increased water intake, unless you have been placed on a fluid restriction by another provider.      oxybutinin XL 5mg -we did discuss that this medication may cause dry eye, dry mouth, dizziness, constipation. He voiced understanding. Next f/u will need PVR    Continue flomax BID    Increase water intake    Stone was visible on KUB. We discussed risks and benefits of ESWL procedure and stent placement (including bleeding, infection, injury/perforation of  tract, renal hematoma, and possibility of the need for multiple procedures such as stent placement, subsequent HLL or repeat ESWL), details of procedure, and what to expect post-operatively. Patient does understand that this procedure will fragment the stone and these fragments will need to pass. Fragment passage will be associated with gross hematuria and flank pain. Patient voiced understanding and is amenable to schedule on left ESWL. Follow-up after left ESWL    Spent 40 mins, >50% time face-to-face, discussing diagnoses, any applicable test results, treatment plan/options, and prognosis.

## 2021-06-10 NOTE — PATIENT INSTRUCTIONS
BLADDER IRRITANTS     There are several changes you can make to your diet to help improve your urinary symptoms. The following have been shown to cause irritation to the bladder and should be AVOIDED if possible:  ~ Coffee (including decaffeinated)   ~ ALL Tea (including green teas and decaffeinated)  ~ Soda/Pop/carbonated beverages/Energy drinks (especially dark dyed yesenia, root beers, mountain dew, etc)  ~These are MUCH worse if they have caffeine, but can also be irritative to the bladder even without caffeine  ~ Alcoholic beverages  ~ Spicy foods (peppers contain capsaicin, which is very irritating to the bladder)  ~ Acidic foods (for example: tomato based foods, orange juice, etc)    We encourage increased water intake, unless you have been placed on a fluid restriction by another provider. STONE PREVENTION    To prevent future stone formation:    1) DO drink ~65-80 oz (2-2.5L) of water per day to stay adequately hydrated     2) AVOID/LIMIT intake of \"bad fluids\": soda/pop, coffee, tea, alcohol, energy drinks, any beverage with caffeine can act to dehydrate you       \"BAD FLUIDS\" DO NOT COUNT TOWARD THE 65-80oz of water    3) REDUCE consumption of sodium/salt - DO NOT salt your food, read labels (lunch meats, canned soups, prepared meals are high in salt), choose low salt options    4) DO NOT EAT animal protein/meat more than 2 meals a day - this includes red meat, pork, poultry and fish      SURVEY:    You may be receiving a survey from [employer] regarding your visit today. Please complete the survey to enable us to provide the highest quality of care to you and your family. If you cannot score us a very good on any question, please call the office to discuss how we could have made your experience a very good one. Thank you.   Daysi

## 2021-06-11 LAB
EKG ATRIAL RATE: 75 BPM
EKG P AXIS: 39 DEGREES
EKG P-R INTERVAL: 152 MS
EKG Q-T INTERVAL: 364 MS
EKG QRS DURATION: 100 MS
EKG QTC CALCULATION (BAZETT): 406 MS
EKG R AXIS: 38 DEGREES
EKG T AXIS: 30 DEGREES
EKG VENTRICULAR RATE: 75 BPM

## 2021-06-11 PROCEDURE — 93010 ELECTROCARDIOGRAM REPORT: CPT | Performed by: INTERNAL MEDICINE

## 2021-06-15 ENCOUNTER — HOSPITAL ENCOUNTER (OUTPATIENT)
Dept: GENERAL RADIOLOGY | Age: 66
Discharge: HOME | End: 2021-06-17
Payer: MEDICARE

## 2021-06-15 ENCOUNTER — HOSPITAL ENCOUNTER (OUTPATIENT)
Age: 66
Discharge: HOME | End: 2021-06-17
Payer: MEDICARE

## 2021-06-15 ENCOUNTER — OFFICE VISIT (OUTPATIENT)
Dept: FAMILY MEDICINE CLINIC | Age: 66
End: 2021-06-15
Payer: MEDICARE

## 2021-06-15 VITALS
HEART RATE: 86 BPM | HEIGHT: 72 IN | WEIGHT: 274 LBS | OXYGEN SATURATION: 96 % | BODY MASS INDEX: 37.11 KG/M2 | DIASTOLIC BLOOD PRESSURE: 60 MMHG | SYSTOLIC BLOOD PRESSURE: 100 MMHG

## 2021-06-15 DIAGNOSIS — N20.0 LEFT NEPHROLITHIASIS: ICD-10-CM

## 2021-06-15 DIAGNOSIS — G47.33 OSA TREATED WITH BIPAP: ICD-10-CM

## 2021-06-15 DIAGNOSIS — R06.02 SOB (SHORTNESS OF BREATH): ICD-10-CM

## 2021-06-15 DIAGNOSIS — R06.09 DYSPNEA ON EXERTION: ICD-10-CM

## 2021-06-15 DIAGNOSIS — R06.02 SOB (SHORTNESS OF BREATH): Primary | ICD-10-CM

## 2021-06-15 DIAGNOSIS — R94.31 ABNORMAL EKG: ICD-10-CM

## 2021-06-15 PROCEDURE — 3017F COLORECTAL CA SCREEN DOC REV: CPT | Performed by: FAMILY MEDICINE

## 2021-06-15 PROCEDURE — 1036F TOBACCO NON-USER: CPT | Performed by: FAMILY MEDICINE

## 2021-06-15 PROCEDURE — 71046 X-RAY EXAM CHEST 2 VIEWS: CPT

## 2021-06-15 PROCEDURE — 4040F PNEUMOC VAC/ADMIN/RCVD: CPT | Performed by: FAMILY MEDICINE

## 2021-06-15 PROCEDURE — G8427 DOCREV CUR MEDS BY ELIG CLIN: HCPCS | Performed by: FAMILY MEDICINE

## 2021-06-15 PROCEDURE — G8417 CALC BMI ABV UP PARAM F/U: HCPCS | Performed by: FAMILY MEDICINE

## 2021-06-15 PROCEDURE — 1123F ACP DISCUSS/DSCN MKR DOCD: CPT | Performed by: FAMILY MEDICINE

## 2021-06-15 PROCEDURE — 99214 OFFICE O/P EST MOD 30 MIN: CPT | Performed by: FAMILY MEDICINE

## 2021-06-15 RX ORDER — DILTIAZEM HYDROCHLORIDE 360 MG/1
CAPSULE, EXTENDED RELEASE ORAL
Qty: 90 CAPSULE | Refills: 1 | Status: SHIPPED | OUTPATIENT
Start: 2021-06-15 | End: 2021-12-16

## 2021-06-15 SDOH — ECONOMIC STABILITY: FOOD INSECURITY: WITHIN THE PAST 12 MONTHS, THE FOOD YOU BOUGHT JUST DIDN'T LAST AND YOU DIDN'T HAVE MONEY TO GET MORE.: NEVER TRUE

## 2021-06-15 SDOH — ECONOMIC STABILITY: FOOD INSECURITY: WITHIN THE PAST 12 MONTHS, YOU WORRIED THAT YOUR FOOD WOULD RUN OUT BEFORE YOU GOT MONEY TO BUY MORE.: NEVER TRUE

## 2021-06-15 ASSESSMENT — SOCIAL DETERMINANTS OF HEALTH (SDOH): HOW HARD IS IT FOR YOU TO PAY FOR THE VERY BASICS LIKE FOOD, HOUSING, MEDICAL CARE, AND HEATING?: NOT HARD AT ALL

## 2021-06-15 NOTE — PROGRESS NOTES
Name: Abhi Burgos  : 1955         Chief Complaint:     Chief Complaint   Patient presents with    Cardiac Clearance     renal stone    Diabetes       History of Present Illness:      Abhi Burgos is a 77 y.o.  male who presents with Cardiac Clearance (renal stone) and Diabetes      HPI    Pt presents for surgical clearance for L ESWL for large renal stone. However, preop testing revealed abnl EKG. Pt does notice he's out of breath more than he used to be, for maybe 6 mos. Notices with walking more than couple blocks, going up stairs. He has to take a break every approx 8 min while mowing grass, which he didn't have to do last summer. No chest pain. Notes he does have AN which he treats faithfully with BiPAP but still wonders if that could affect things. Sees Dr Mark Guillen for sleep medicine. Got most recent supplies from [address] [place] [address]. Has 2 machines, one at each home. Unsure what pressure setting is. Medical History:     Patient Active Problem List   Diagnosis    Essential hypertension, benign    Type 2 diabetes mellitus without complication, without long-term current use of insulin (HCC)    Varicose veins of legs    Tubular adenoma of colon    BPH with obstruction/lower urinary tract symptoms    Diverticulosis of large intestine without hemorrhage       Medications:       Prior to Admission medications    Medication Sig Start Date End Date Taking?  Authorizing Provider   metFORMIN (GLUCOPHAGE) 1000 MG tablet 1 by mouth twice a day with meals 6/15/21  Yes Dick Rock, DO   dilTIAZem (CARDIZEM CD) 360 MG extended release capsule 1 by mouth daily 6/15/21  Yes Dick Rock, DO   triamcinolone (ARISTOCORT) 0.5 % cream APPLY THIN COAT TO AFFECTED AREA TWICE A DAY 21  Yes Historical Provider, MD   oxybutynin (DITROPAN XL) 5 MG extended release tablet Take 1 tablet by mouth every evening 6/10/21  Yes Tejinder Sandhu PA-C   tamsulosin (FLOMAX) 0.4 MG capsule TAKE 1 CAPSULE BY MOUTH TWICE A DAY 6/1/21  Yes Cayla George, APRN - CNP   SITagliptin (JANUVIA) 100 MG tablet TAKE 1 TABLET BY MOUTH EVERY DAY 4/8/21  Yes Juan A Odonnell DO   rosuvastatin (CRESTOR) 5 MG tablet TAKE 1 TABLET BY MOUTH EVERY DAY AT NIGHT 4/8/21  Yes Juan A Odonnell DO        Allergies:       Patient has no known allergies. Physical Exam:     Vitals:  /60   Pulse 86   Ht 6' (1.829 m)   Wt 274 lb (124.3 kg)   SpO2 96%   BMI 37.16 kg/m²   Physical Exam  Vitals and nursing note reviewed. Constitutional:       General: He is not in acute distress. Appearance: He is well-developed. He is not ill-appearing. HENT:      Right Ear: Tympanic membrane and ear canal normal.      Left Ear: Tympanic membrane and ear canal normal.      Nose: Nose normal.   Eyes:      Conjunctiva/sclera: Conjunctivae normal.   Cardiovascular:      Rate and Rhythm: Normal rate and regular rhythm. Heart sounds: Normal heart sounds. Pulmonary:      Effort: Pulmonary effort is normal.      Breath sounds: Normal breath sounds. Musculoskeletal:      Cervical back: Neck supple. Lymphadenopathy:      Cervical: No cervical adenopathy. Skin:     General: Skin is warm and dry. Neurological:      Mental Status: He is alert and oriented to person, place, and time.    Psychiatric:         Judgment: Judgment normal.         Data:     Lab Results   Component Value Date     06/10/2021    K 4.4 06/10/2021     06/10/2021    CO2 22 06/10/2021    BUN 10 06/10/2021    CREATININE 0.76 06/10/2021    GLUCOSE 142 06/10/2021    PROT 7.6 01/27/2021    LABALBU 4.3 01/27/2021    BILITOT 0.53 01/27/2021    ALKPHOS 66 01/27/2021    AST 34 01/27/2021    ALT 45 01/27/2021     Lab Results   Component Value Date    WBC 6.3 06/10/2021    RBC 4.89 06/10/2021    HGB 15.3 06/10/2021    HCT 45.1 06/10/2021    MCV 92.2 06/10/2021    MCH 31.3 06/10/2021    MCHC 34.0 06/10/2021    RDW 14.3 06/10/2021     06/10/2021    MPV NOT REPORTED 06/10/2021     Lab Results   Component Value Date    TSH 0.94 08/15/2018     Lab Results   Component Value Date    CHOL 120 01/27/2021    HDL 50 01/27/2021    PSA 0.34 05/27/2021    LABA1C 7.4 01/27/2021     6/10/2021 EKG: Normal sinus, rate 75, poor R wave progression    1/30/2019 echo  Summary  Left ventricle is mildly enlarged. Global left ventricular systolic function is low normal with an estimated  ejection fraction of 50 % . Left atrium is moderately dilated. Right atrium is mildly dilated . Mildly dilated right ventricular cavity. Aortic valve leaflets are mildly thickened. No aortic stenosis. Thickened mitral valve leaflets. Mild to moderate mitral regurgitation.     In summary, he has borderline normal LV function with EF 50%  Mild to moderate MR  Right sided chambers mildly enlarged  No significant change from previous echo    Assessment & Plan:        Diagnosis Orders   1. SOB (shortness of breath)  XR CHEST STANDARD (2 VW)   2. Dyspnea on exertion  Cardiac Stress Test - w/Pharm   3. Abnormal EKG  Cardiac Stress Test - w/Pharm   4. Left nephrolithiasis     5. AN treated with BiPAP     New shortness of breath, dyspnea on exertion, change over the past few months. Patient has also had changes in his EKG, poor R wave progression and T wave abnormality. Significant risk factors with AN, hypertension, diabetes. Because for shortness of breath and also musculoskeletal pains, he is unable to walk on a treadmill for stress test.  Lexiscan ordered. Not cleared for surgery currently. Left nephrolithiasis, thankfully not causing pain but is over 1 cm in size and the removal has been recommended. Can look at rescheduling this after we get the cardiac issue investigated. Obstructive sleep apnea on BiPAP, patient compliant with treatment faithfully every night, greater than 4 hours a night, and has relief of his symptoms with treatment. Continue same.       Requested Prescriptions     Signed Prescriptions Disp Refills    metFORMIN (GLUCOPHAGE) 1000 MG tablet 180 tablet 1     Si by mouth twice a day with meals    dilTIAZem (CARDIZEM CD) 360 MG extended release capsule 90 capsule 1     Si by mouth daily         Patient Instructions   SURVEY:    You may be receiving a survey from [employer] regarding your visit today. You may get this in the mail, through your MyChart or in your email. Please complete the survey to enable us to provide the highest quality of care to you and your family. If you cannot score us as very good ( 5 Stars) on any question, please feel free to call the office to discuss how we could have made your experience exceptional.     Thank you. Clinical Care Team:  Dr. Michell Pandya DO                                           [facility], [address] [place] [address] Team:  Jewell Campbell received counseling on the following healthy behaviors: medication adherence  Reviewed prior labs and health maintenance. Continue current medications, diet and exercise. Discussed use, benefit, and side effects of prescribed medications. Barriers to medication compliance addressed. Patient given educational materials - see patient instructions. All patient questions answered.   Patient voiced understanding.     signed by Michell Pandya DO on 2021 at 12:39 PM  [address]  [address], [address]  Dept: [phone]

## 2021-06-22 ENCOUNTER — HOSPITAL ENCOUNTER (OUTPATIENT)
Dept: NUCLEAR MEDICINE | Age: 66
Discharge: HOME | End: 2021-06-24
Payer: MEDICARE

## 2021-06-22 ENCOUNTER — HOSPITAL ENCOUNTER (OUTPATIENT)
Dept: NON INVASIVE DIAGNOSTICS | Age: 66
Discharge: HOME | End: 2021-06-22
Payer: MEDICARE

## 2021-06-22 VITALS — SYSTOLIC BLOOD PRESSURE: 132 MMHG | DIASTOLIC BLOOD PRESSURE: 86 MMHG | HEART RATE: 81 BPM

## 2021-06-22 DIAGNOSIS — R94.31 ABNORMAL EKG: ICD-10-CM

## 2021-06-22 DIAGNOSIS — R06.09 DYSPNEA ON EXERTION: ICD-10-CM

## 2021-06-22 PROCEDURE — A9500 TC99M SESTAMIBI: HCPCS | Performed by: FAMILY MEDICINE

## 2021-06-22 PROCEDURE — 6360000002 HC RX W HCPCS: Performed by: INTERNAL MEDICINE

## 2021-06-22 PROCEDURE — 78452 HT MUSCLE IMAGE SPECT MULT: CPT

## 2021-06-22 PROCEDURE — 3430000000 HC RX DIAGNOSTIC RADIOPHARMACEUTICAL: Performed by: FAMILY MEDICINE

## 2021-06-22 PROCEDURE — 2580000003 HC RX 258: Performed by: INTERNAL MEDICINE

## 2021-06-22 PROCEDURE — 93017 CV STRESS TEST TRACING ONLY: CPT

## 2021-06-22 RX ORDER — SODIUM CHLORIDE 0.9 % (FLUSH) 0.9 %
10 SYRINGE (ML) INJECTION PRN
Status: DISCONTINUED | OUTPATIENT
Start: 2021-06-22 | End: 2021-06-22 | Stop reason: HOSPADM

## 2021-06-22 RX ORDER — AMINOPHYLLINE DIHYDRATE 25 MG/ML
50 INJECTION, SOLUTION INTRAVENOUS PRN
Status: DISCONTINUED | OUTPATIENT
Start: 2021-06-22 | End: 2021-06-22 | Stop reason: HOSPADM

## 2021-06-22 RX ADMIN — TETRAKIS(2-METHOXYISOBUTYLISOCYANIDE)COPPER(I) TETRAFLUOROBORATE 30 MILLICURIE: 1 INJECTION, POWDER, LYOPHILIZED, FOR SOLUTION INTRAVENOUS at 08:17

## 2021-06-22 RX ADMIN — SODIUM CHLORIDE, PRESERVATIVE FREE 10 ML: 5 INJECTION INTRAVENOUS at 08:14

## 2021-06-22 RX ADMIN — REGADENOSON 0.4 MG: 0.08 INJECTION, SOLUTION INTRAVENOUS at 08:13

## 2021-06-22 RX ADMIN — TETRAKIS(2-METHOXYISOBUTYLISOCYANIDE)COPPER(I) TETRAFLUOROBORATE 10 MILLICURIE: 1 INJECTION, POWDER, LYOPHILIZED, FOR SOLUTION INTRAVENOUS at 07:13

## 2021-06-22 NOTE — PROGRESS NOTES
Home medications and allergies reviewed/updated with patient. Patient verbalized understanding of test/procedures.

## 2021-06-23 ENCOUNTER — TELEPHONE (OUTPATIENT)
Dept: FAMILY MEDICINE CLINIC | Age: 66
End: 2021-06-23

## 2021-06-23 DIAGNOSIS — R94.39 ABNORMAL STRESS TEST: Primary | ICD-10-CM

## 2021-06-23 RX ORDER — METOPROLOL SUCCINATE 25 MG/1
25 TABLET, EXTENDED RELEASE ORAL DAILY
Qty: 90 TABLET | Refills: 1 | Status: SHIPPED | OUTPATIENT
Start: 2021-06-23 | End: 2021-12-10

## 2021-06-23 NOTE — PROCEDURES
[place] [address]                              CARDIAC STRESS TEST    PATIENT NAME: Aviva Coates                   :        1955  MED REC NO:   [id number]                              ROOM:  ACCOUNT NO:   [de-identified]                           ADMIT DATE: 2021  PROVIDER:     Jodi Martinez      DATE OF STUDY:  2021    LEXISCAN CARDIOLITE STRESS TEST:    INDICATION:  Chest pain. IMPRESSION:  1. We gave 0.4 mg of Lexiscan intravenously. 2.  This was followed in 20 seconds by Cardiolite infusion. 3.  There was no chest pain. 4.  There was no ST depression. 5.  It was an overall negative Lexiscan stress test.  6.  Cardiolite to follow.         Ayla Nickerson    D: 2021 7:07:30       T: 2021 8:19:39     [email or web address]  Job#: [id number]     Doc#: [id number]    CC:  Jessi Kaur DO

## 2021-06-23 NOTE — TELEPHONE ENCOUNTER
----- Message from Cam Watts DO sent at 6/23/2021 11:57 AM EDT -----  Stress test abnormal, looks like he may have had a heart attack in the past and part of the heart muscle is not moving well. Needs to see cardiology. Please make sure he is on a baby aspirin as we had discussed at his last visit, and also add metoprolol succinate 25 mg daily.

## 2021-06-23 NOTE — PROCEDURES
results are most consistent with an intermediate risk for  significant coronary artery disease. Depending on the patient symptoms and level of clinical suspicion,  aggressive medical management vs. additional testing by coronary  angiography may be indicated. The results of this test were discussed with Dr. David Byrd on 06/22/2021  at [phone]. PATRICA LING    D: 06/23/2021 9:20:48       T: 06/23/2021 9:22:05     [email or web address]  Job#: [id number]     Doc#: Unknown    CC:  Price Sanchez DO

## 2021-06-24 ENCOUNTER — TELEPHONE (OUTPATIENT)
Dept: UROLOGY | Age: 66
End: 2021-06-24

## 2021-06-24 DIAGNOSIS — R94.39 ABNORMAL STRESS TEST: ICD-10-CM

## 2021-06-24 DIAGNOSIS — E55.9 VITAMIN D DEFICIENCY: ICD-10-CM

## 2021-06-24 DIAGNOSIS — I10 ESSENTIAL HYPERTENSION: ICD-10-CM

## 2021-06-24 DIAGNOSIS — E78.5 HYPERLIPIDEMIA, UNSPECIFIED HYPERLIPIDEMIA TYPE: ICD-10-CM

## 2021-06-24 DIAGNOSIS — I10 ESSENTIAL HYPERTENSION, BENIGN: Primary | ICD-10-CM

## 2021-06-24 NOTE — TELEPHONE ENCOUNTER
Fax received from Dr. Felton Sam stating that patient not cleared for surgery scheduled for 6/29/21. Patient to have ESWL. Called patient and informed him that surgery cancelled. He stated he is scheduled to see Elena Hester on 6/29/21. Advised patient to call office once cleared for surgery. Patient voiced understanding.

## 2021-06-28 ENCOUNTER — HOSPITAL ENCOUNTER (OUTPATIENT)
Age: 66
Discharge: HOME | End: 2021-06-28
Payer: MEDICARE

## 2021-06-28 DIAGNOSIS — I10 ESSENTIAL HYPERTENSION, BENIGN: ICD-10-CM

## 2021-06-28 DIAGNOSIS — E78.5 HYPERLIPIDEMIA, UNSPECIFIED HYPERLIPIDEMIA TYPE: ICD-10-CM

## 2021-06-28 DIAGNOSIS — R94.39 ABNORMAL STRESS TEST: ICD-10-CM

## 2021-06-28 DIAGNOSIS — E55.9 VITAMIN D DEFICIENCY: ICD-10-CM

## 2021-06-28 DIAGNOSIS — I10 ESSENTIAL HYPERTENSION: ICD-10-CM

## 2021-06-28 LAB
ALBUMIN SERPL-MCNC: 4.2 G/DL (ref 3.5–5.2)
ALBUMIN/GLOBULIN RATIO: ABNORMAL (ref 1–2.5)
ALP BLD-CCNC: 60 U/L (ref 40–129)
ALT SERPL-CCNC: 57 U/L (ref 5–41)
ANION GAP SERPL CALCULATED.3IONS-SCNC: 13 MMOL/L (ref 9–17)
AST SERPL-CCNC: 51 U/L
BILIRUB SERPL-MCNC: 0.54 MG/DL (ref 0.3–1.2)
BUN BLDV-MCNC: 13 MG/DL (ref 8–23)
BUN/CREAT BLD: 17 (ref 9–20)
CALCIUM SERPL-MCNC: 9.2 MG/DL (ref 8.6–10.4)
CHLORIDE BLD-SCNC: 103 MMOL/L (ref 98–107)
CO2: 23 MMOL/L (ref 20–31)
CREAT SERPL-MCNC: 0.78 MG/DL (ref 0.7–1.2)
GFR AFRICAN AMERICAN: >60 ML/MIN
GFR NON-AFRICAN AMERICAN: >60 ML/MIN
GFR SERPL CREATININE-BSD FRML MDRD: ABNORMAL ML/MIN/{1.73_M2}
GFR SERPL CREATININE-BSD FRML MDRD: ABNORMAL ML/MIN/{1.73_M2}
GLUCOSE BLD-MCNC: 168 MG/DL (ref 70–99)
MAGNESIUM: 2 MG/DL (ref 1.6–2.6)
PATIENT FASTING?: YES
POTASSIUM SERPL-SCNC: 4.3 MMOL/L (ref 3.7–5.3)
SODIUM BLD-SCNC: 139 MMOL/L (ref 135–144)
TOTAL PROTEIN: 7.6 G/DL (ref 6.4–8.3)
TSH SERPL DL<=0.05 MIU/L-ACNC: 1.56 MIU/L (ref 0.3–5)

## 2021-06-28 PROCEDURE — 84443 ASSAY THYROID STIM HORMONE: CPT

## 2021-06-28 PROCEDURE — 83735 ASSAY OF MAGNESIUM: CPT

## 2021-06-28 PROCEDURE — 80053 COMPREHEN METABOLIC PANEL: CPT

## 2021-06-28 PROCEDURE — 82306 VITAMIN D 25 HYDROXY: CPT

## 2021-06-28 PROCEDURE — 36415 COLL VENOUS BLD VENIPUNCTURE: CPT

## 2021-06-28 PROCEDURE — 80061 LIPID PANEL: CPT

## 2021-06-29 ENCOUNTER — OFFICE VISIT (OUTPATIENT)
Dept: CARDIOLOGY CLINIC | Age: 66
End: 2021-06-29
Payer: MEDICARE

## 2021-06-29 DIAGNOSIS — I10 ESSENTIAL HYPERTENSION: ICD-10-CM

## 2021-06-29 DIAGNOSIS — R94.39 ABNORMAL STRESS ECG: ICD-10-CM

## 2021-06-29 DIAGNOSIS — R06.02 SOB (SHORTNESS OF BREATH): Primary | ICD-10-CM

## 2021-06-29 LAB
CHOLESTEROL/HDL RATIO: 2.8
CHOLESTEROL: 119 MG/DL
HDLC SERPL-MCNC: 43 MG/DL
LDL CHOLESTEROL: 55 MG/DL (ref 0–130)
TRIGL SERPL-MCNC: 107 MG/DL
VITAMIN D 25-HYDROXY: 25.5 NG/ML (ref 30–100)
VLDLC SERPL CALC-MCNC: NORMAL MG/DL (ref 1–30)

## 2021-06-29 PROCEDURE — 1036F TOBACCO NON-USER: CPT | Performed by: INTERNAL MEDICINE

## 2021-06-29 PROCEDURE — 99204 OFFICE O/P NEW MOD 45 MIN: CPT | Performed by: INTERNAL MEDICINE

## 2021-06-29 PROCEDURE — G8428 CUR MEDS NOT DOCUMENT: HCPCS | Performed by: INTERNAL MEDICINE

## 2021-06-29 PROCEDURE — 1123F ACP DISCUSS/DSCN MKR DOCD: CPT | Performed by: INTERNAL MEDICINE

## 2021-06-29 PROCEDURE — 3017F COLORECTAL CA SCREEN DOC REV: CPT | Performed by: INTERNAL MEDICINE

## 2021-06-29 PROCEDURE — 4040F PNEUMOC VAC/ADMIN/RCVD: CPT | Performed by: INTERNAL MEDICINE

## 2021-06-29 PROCEDURE — G8417 CALC BMI ABV UP PARAM F/U: HCPCS | Performed by: INTERNAL MEDICINE

## 2021-06-29 RX ORDER — ASPIRIN 81 MG/1
81 TABLET ORAL DAILY
COMMUNITY

## 2021-06-29 NOTE — LETTER
Yara Gambino M.D. [address] [place] [address]  [phone]    2021    Mechelle Melo DO  [address] [facility] [address]      RE:   Liz Miller  :  1955      Dear Dr. Radhika Estrada:    CHIEF COMPLAINT:  1. Shortness of breath. 2.  Abnormal EKG. 3.  Abnormal Lexiscan Cardiolite stress test.    HISTORY OF PRESENT ILLNESS:  I had the pleasure of seeing Mr. Mariella Maguire in our office on 2021. He is a very pleasant 68-year-old gentleman who has never had a cardiac problem in the past.  He does have multiple risk factors of coronary artery disease including strong family history as well as hypertension, hyperlipidemia, and diabetes. He was found to have a kidney stone of 1 cm nonobstructing in his left kidney. He is pending to have a lithotripsy. He saw Dr. Radhika Estrada who noted that he was more short of breath. She ordered a Lexiscan Cardiolite stress test which was abnormal and therefore, I am seeing him in consult. He has never had any chest pain or chest discomfort. However, he has noted more shortness of breath in the last 6 months. He notes it particularly when mowing. Last year, he had to stop much less frequently than he does this year. He has to stop every 5 to 10 minutes because of shortness of breath. (He also notes he was using mainly a riding lawnmower last year. ..). His energy level has also been down from as compared to last year. He does have chest pain, but it is very atypical and can occur at any time of the day or night. He has had no syncope or near syncope. He does have fatigue and loss of energy as compared to last year. He gets short of breath when walking upstairs and has to go slower. His wife has also noted his shortness of breath. CARDIAC RISK FACTORS:  Hypertension:  Positive. Hyperlipidemia:  Positive. Diabetes:  Positive.   Other Family Members: systolic blowing type murmur. No diastolic murmur. PMI was normal.  No lift, thrust, or pericardial friction rub. LUNGS:  Quite clear to auscultation and percussion. ABDOMEN:  Soft and nontender. Good bowel sounds. EXTREMITIES:  Good femoral pulses. Good pedal pulses. No pedal edema. Skin was warm and dry. No calf tenderness. Nail beds pink. Good cap refill. PULSES:  Bilateral symmetrical radial, brachial and carotid pulses. No carotid bruits. Good femoral and pedal pulses. NEUROLOGIC EXAM:  Within normal limits. PSYCHIATRIC EXAM:  Within normal limits. LABORATORY DATA:  His sodium was 139, potassium 4.3, BUN 13, creatinine 0.78, GFR greater than 60. Magnesium was 2.0, glucose 168. His calcium was 9.2. ALT was 57, AST was 51. TSH was 1.56. His white count was 6.3, hemoglobin 15.3 with a platelet count 833,905. EKG showed sinus rhythm with an old anterior myocardial infarction. Rayo Gambler stress test was abnormal with a moderate perfusion defect in the inferolateral, inferior region consistent with MI with mirna-infarct ischemia. He is overall intermediate risk for coronary artery disease. IMPRESSION:  1. Shortness of breath and loss of energy with some atypical chest pain, all consistent with angina starting approximately 6 months ago. 2.  Strong family history of coronary artery disease with two brothers and a father who had myocardial infarctions or cardiac intervention. 3.  Hypertension. 4.  Hyperlipidemia. 5.  Severe sleep apnea. 6.  Abnormal stress test with inferolateral wall infarct with mirna-infarct ischemia. 7.  Pending lithotripsy. PLAN:  1. We will set up for an echocardiogram.  2.  We will do a cardiac catheterization in preparation for lithotripsy. DISCUSSION:  Mr. Edgar Parker has soft signs with shortness of breath that is changed from last year.   He noticed it when he is mowing the grass where he has to stop frequently because of shortness of breath. His chest pain is quite atypical.  However, he does have diabetes and would not be unusual to have no chest pain or very atypical chest discomfort. His stress test was abnormal with an intermediate risk of coronary artery disease. He is pending having lithotripsy. There is no good way to follow up to treatment for symptoms. His shortness of breath, of course, could be from deconditioning or underlying pulmonary disease, etc.  Therefore, trying medical therapy and treating conservatively would be difficult with his situation. Also, he is pending having surgery and therefore, I think it is important to establish his surgical risk. I have discussed cardiac catheterization. He is willing to proceed in [place]. His lithotripsy will be placed on hold until this is performed. If I do find significant CAD and have to do stenting, his lithotripsy would be delayed at least 4 to 6 months before he could go off his dual antiplatelet therapy. Thank you very much for allowing me the privilege of seeing Mr. Luigi Mathias. If you have any questions on my thoughts, please do not hesitate to contact me.     Sincerely,        Trena Silver    D: 06/29/2021 12:21:31     T: 06/29/2021 12:26:34     [email or web address]  Job#: [id number]   Doc#: [id number]

## 2021-06-29 NOTE — PROGRESS NOTES
Ov Dr. Adrián Peters for new pt   Referred by Dr. Lamont Orona   For surgical clearance   Lithotripsy   Had abn ekg   C/o sob  When mowing now will have  To stop multiple times now   Tired/sob   No previous heart issue/cardiologist   Sob going up stairs  Having to go slower   Wife thinks has slowed down some   (+) sleep apnea  Bedside echo done      No children   Retired in 2015 from [employer] is \"alright\"  Brother passed at 76 with MI  Dad and grandfather had heart issues   Brother is pt here Cori Mullins couple beers a week       Will set up for Echo     Will get up for heart cath for July 23

## 2021-07-05 NOTE — PROGRESS NOTES
Sulema Zheng M.D. [address] [place] [address]  [phone]    2021    Amber Funes DO  [address] [facility] [address]      RE:   Renzo Giraldo  :  1955      Dear Dr. Sumanth Perry:    CHIEF COMPLAINT:  1. Shortness of breath. 2.  Abnormal EKG. 3.  Abnormal Lexiscan Cardiolite stress test.    HISTORY OF PRESENT ILLNESS:  I had the pleasure of seeing Mr. Елена Alas in our office on 2021. He is a very pleasant 24-year-old gentleman who has never had a cardiac problem in the past.  He does have multiple risk factors of coronary artery disease including strong family history as well as hypertension, hyperlipidemia, and diabetes. He was found to have a kidney stone of 1 cm nonobstructing in his left kidney. He is pending to have a lithotripsy. He saw Dr. Sumanth Perry who noted that he was more short of breath. She ordered a Lexiscan Cardiolite stress test which was abnormal and therefore, I am seeing him in consult. He has never had any chest pain or chest discomfort. However, he has noted more shortness of breath in the last 6 months. He notes it particularly when mowing. Last year, he had to stop much less frequently than he does this year. He has to stop every 5 to 10 minutes because of shortness of breath. (He also notes he was using mainly a riding lawnmower last year. ..). His energy level has also been down from as compared to last year. He does have chest pain, but it is very atypical and can occur at any time of the day or night. He has had no syncope or near syncope. He does have fatigue and loss of energy as compared to last year. He gets short of breath when walking upstairs and has to go slower. His wife has also noted his shortness of breath. CARDIAC RISK FACTORS:  Hypertension:  Positive. Hyperlipidemia:  Positive. Diabetes:  Positive.   Other Family Members: Positive. Smoking:  Negative. Peripheral Vascular Disease:  Negative. MEDICATIONS:  At home, he is on aspirin 81 mg daily, diltiazem 360 mg daily, metformin 1000 mg b.i.d., Toprol-XL 25 mg daily, Ditropan XL 5 mg daily, Crestor 5 mg daily, Januvia 100 mg daily, Flomax 0.4 mg b.i.d. PAST MEDICAL AND SURGICAL HISTORY:  1. He has had hypertension and hyperlipidemia for many years. 2.  He has had severe sleep apnea, is on a BiPAP mask at home. 3.  He has prostate disease. 4.  He has renal stones and is pending to have a lithotripsy. 5.  He has had umbilical hernia repair and multiple colonoscopies. FAMILY HISTORY:  His brother passed away at 76 of myocardial infarction. Father passed away at 67. Younger brother had stenting. SOCIAL HISTORY:  He is 77years old. . He has no children. He retired from [employer] in 2015. He quit smoking in 1996. Drinks several beers per week. He does not exercise, but tries to stay somewhat active. REVIEW OF SYSTEMS:  Cardiac as above. Other systems reviewed including constitutional, eyes, ears, nose and throat, cardiovascular, respiratory, GI, , musculoskeletal, integumentary, neurologic, endocrine, hematologic and allergic/immunologic are negative except for described above. No weight loss or weight gain. No change in bowel habits. No blood in stools. No fevers, sweats or chills. PHYSICAL EXAMINATION:  VITAL SIGNS:  His blood pressure was 130/70 with a heart rate of 70 and regular. Respiratory rate 18. O2 saturation was 97%. GENERAL:  He is a very pleasant 66-year-old gentleman accompanied by his wife. He was oriented to person, place and time. Answered questions appropriately. SKIN:  No unusual skin changes. HEENT:  The pupils are equally round and intact. Mucous membranes were dry. NECK:  No JVD. Good carotid pulses. No carotid bruits. No lymphadenopathy or thyromegaly. CARDIOVASCULAR EXAM:  S1 and S2 were normal.  No S3 or S4.   Soft systolic blowing type murmur. No diastolic murmur. PMI was normal.  No lift, thrust, or pericardial friction rub. LUNGS:  Quite clear to auscultation and percussion. ABDOMEN:  Soft and nontender. Good bowel sounds. EXTREMITIES:  Good femoral pulses. Good pedal pulses. No pedal edema. Skin was warm and dry. No calf tenderness. Nail beds pink. Good cap refill. PULSES:  Bilateral symmetrical radial, brachial and carotid pulses. No carotid bruits. Good femoral and pedal pulses. NEUROLOGIC EXAM:  Within normal limits. PSYCHIATRIC EXAM:  Within normal limits. LABORATORY DATA:  His sodium was 139, potassium 4.3, BUN 13, creatinine 0.78, GFR greater than 60. Magnesium was 2.0, glucose 168. His calcium was 9.2. ALT was 57, AST was 51. TSH was 1.56. His white count was 6.3, hemoglobin 15.3 with a platelet count 458,022. EKG showed sinus rhythm with an old anterior myocardial infarction. [employer] stress test was abnormal with a moderate perfusion defect in the inferolateral, inferior region consistent with MI with mirna-infarct ischemia. He is overall intermediate risk for coronary artery disease. IMPRESSION:  1. Shortness of breath and loss of energy with some atypical chest pain, all consistent with angina starting approximately 6 months ago. 2.  Strong family history of coronary artery disease with two brothers and a father who had myocardial infarctions or cardiac intervention. 3.  Hypertension. 4.  Hyperlipidemia. 5.  Severe sleep apnea. 6.  Abnormal stress test with inferolateral wall infarct with mirna-infarct ischemia. 7.  Pending lithotripsy. PLAN:  1. We will set up for an echocardiogram.  2.  We will do a cardiac catheterization in preparation for lithotripsy. DISCUSSION:  Mr. Nelida Serrano has soft signs with shortness of breath that is changed from last year.   He noticed it when he is mowing the grass where he has to stop frequently because of shortness of breath. His chest pain is quite atypical.  However, he does have diabetes and would not be unusual to have no chest pain or very atypical chest discomfort. His stress test was abnormal with an intermediate risk of coronary artery disease. He is pending having lithotripsy. There is no good way to follow up to treatment for symptoms. His shortness of breath, of course, could be from deconditioning or underlying pulmonary disease, etc.  Therefore, trying medical therapy and treating conservatively would be difficult with his situation. Also, he is pending having surgery and therefore, I think it is important to establish his surgical risk. I have discussed cardiac catheterization. He is willing to proceed in [place]. His lithotripsy will be placed on hold until this is performed. If I do find significant CAD and have to do stenting, his lithotripsy would be delayed at least 4 to 6 months before he could go off his dual antiplatelet therapy. Thank you very much for allowing me the privilege of seeing Mr. Jamilah Ambriz. If you have any questions on my thoughts, please do not hesitate to contact me.     Sincerely,        Ceasar Sawyer    D: 06/29/2021 12:21:31     T: 06/29/2021 12:26:34     [email or web address]  Job#: [id number]   Doc#: [id number]

## 2021-07-06 ENCOUNTER — HOSPITAL ENCOUNTER (OUTPATIENT)
Dept: NON INVASIVE DIAGNOSTICS | Age: 66
Discharge: HOME | End: 2021-07-06
Payer: MEDICARE

## 2021-07-06 ENCOUNTER — HOSPITAL ENCOUNTER (OUTPATIENT)
Age: 66
Discharge: HOME | End: 2021-07-06
Payer: MEDICARE

## 2021-07-06 DIAGNOSIS — R06.02 SOB (SHORTNESS OF BREATH): ICD-10-CM

## 2021-07-06 DIAGNOSIS — I10 ESSENTIAL HYPERTENSION: ICD-10-CM

## 2021-07-06 DIAGNOSIS — R94.39 ABNORMAL STRESS ECG: ICD-10-CM

## 2021-07-06 LAB
LV EF: 55 %
LVEF MODALITY: NORMAL

## 2021-07-06 PROCEDURE — 93306 TTE W/DOPPLER COMPLETE: CPT

## 2021-07-08 ENCOUNTER — TELEPHONE (OUTPATIENT)
Dept: CARDIOLOGY CLINIC | Age: 66
End: 2021-07-08

## 2021-07-08 NOTE — TELEPHONE ENCOUNTER
Pt notified of echo- Good LV function- right sided chamber mildly enlarged- may have sleep apnea-  Pt stated he does have sleep apnea and wears a cpap   PER Dr. Mallory Luu

## 2021-07-14 ENCOUNTER — HOSPITAL ENCOUNTER (OUTPATIENT)
Age: 66
Discharge: HOME | End: 2021-07-14
Payer: MEDICARE

## 2021-07-14 DIAGNOSIS — I10 ESSENTIAL HYPERTENSION: ICD-10-CM

## 2021-07-14 DIAGNOSIS — R94.39 ABNORMAL STRESS ECG: ICD-10-CM

## 2021-07-14 DIAGNOSIS — R06.02 SOB (SHORTNESS OF BREATH): ICD-10-CM

## 2021-07-14 LAB
ABSOLUTE EOS #: 0.4 K/UL (ref 0–0.4)
ABSOLUTE IMMATURE GRANULOCYTE: ABNORMAL K/UL (ref 0–0.3)
ABSOLUTE LYMPH #: 1.5 K/UL (ref 1–4.8)
ABSOLUTE MONO #: 0.8 K/UL (ref 0–1)
ALBUMIN SERPL-MCNC: 4 G/DL (ref 3.5–5.2)
ALBUMIN/GLOBULIN RATIO: ABNORMAL (ref 1–2.5)
ALP BLD-CCNC: 61 U/L (ref 40–129)
ALT SERPL-CCNC: 54 U/L (ref 5–41)
ANION GAP SERPL CALCULATED.3IONS-SCNC: 8 MMOL/L (ref 9–17)
AST SERPL-CCNC: 44 U/L
BASOPHILS # BLD: 0 % (ref 0–2)
BASOPHILS ABSOLUTE: 0 K/UL (ref 0–0.2)
BILIRUB SERPL-MCNC: 0.49 MG/DL (ref 0.3–1.2)
BUN BLDV-MCNC: 13 MG/DL (ref 8–23)
BUN/CREAT BLD: 16 (ref 9–20)
CALCIUM SERPL-MCNC: 9 MG/DL (ref 8.6–10.4)
CHLORIDE BLD-SCNC: 101 MMOL/L (ref 98–107)
CHOLESTEROL/HDL RATIO: 2.8
CHOLESTEROL: 120 MG/DL
CO2: 26 MMOL/L (ref 20–31)
CREAT SERPL-MCNC: 0.81 MG/DL (ref 0.7–1.2)
DIFFERENTIAL TYPE: YES
EOSINOPHILS RELATIVE PERCENT: 6 % (ref 0–5)
GFR AFRICAN AMERICAN: >60 ML/MIN
GFR NON-AFRICAN AMERICAN: >60 ML/MIN
GFR SERPL CREATININE-BSD FRML MDRD: ABNORMAL ML/MIN/{1.73_M2}
GFR SERPL CREATININE-BSD FRML MDRD: ABNORMAL ML/MIN/{1.73_M2}
GLUCOSE BLD-MCNC: 181 MG/DL (ref 70–99)
HCT VFR BLD CALC: 44.8 % (ref 41–53)
HDLC SERPL-MCNC: 43 MG/DL
HEMOGLOBIN: 15.2 G/DL (ref 13.5–17.5)
IMMATURE GRANULOCYTES: ABNORMAL %
LDL CHOLESTEROL: 56 MG/DL (ref 0–130)
LYMPHOCYTES # BLD: 23 % (ref 13–44)
MCH RBC QN AUTO: 31.3 PG (ref 26–34)
MCHC RBC AUTO-ENTMCNC: 33.9 G/DL (ref 31–37)
MCV RBC AUTO: 92.5 FL (ref 80–100)
MONOCYTES # BLD: 12 % (ref 5–9)
NRBC AUTOMATED: ABNORMAL PER 100 WBC
PATIENT FASTING?: YES
PDW BLD-RTO: 14.1 % (ref 12.1–15.2)
PLATELET # BLD: 269 K/UL (ref 140–450)
PLATELET ESTIMATE: ABNORMAL
PMV BLD AUTO: ABNORMAL FL (ref 6–12)
POTASSIUM SERPL-SCNC: 4.4 MMOL/L (ref 3.7–5.3)
RBC # BLD: 4.84 M/UL (ref 4.5–5.9)
RBC # BLD: ABNORMAL 10*6/UL
SEG NEUTROPHILS: 59 % (ref 39–75)
SEGMENTED NEUTROPHILS ABSOLUTE COUNT: 3.9 K/UL (ref 2.1–6.5)
SODIUM BLD-SCNC: 135 MMOL/L (ref 135–144)
TOTAL PROTEIN: 7.3 G/DL (ref 6.4–8.3)
TRIGL SERPL-MCNC: 103 MG/DL
VLDLC SERPL CALC-MCNC: NORMAL MG/DL (ref 1–30)
WBC # BLD: 6.6 K/UL (ref 3.5–11)
WBC # BLD: ABNORMAL 10*3/UL

## 2021-07-14 PROCEDURE — 36415 COLL VENOUS BLD VENIPUNCTURE: CPT

## 2021-07-14 PROCEDURE — 85025 COMPLETE CBC W/AUTO DIFF WBC: CPT

## 2021-07-14 PROCEDURE — 93005 ELECTROCARDIOGRAM TRACING: CPT

## 2021-07-14 PROCEDURE — 80053 COMPREHEN METABOLIC PANEL: CPT

## 2021-07-14 PROCEDURE — 80061 LIPID PANEL: CPT

## 2021-07-14 RX ORDER — TAMSULOSIN HYDROCHLORIDE 0.4 MG/1
CAPSULE ORAL
Qty: 180 CAPSULE | Refills: 3 | Status: SHIPPED | OUTPATIENT
Start: 2021-07-14 | End: 2022-08-08 | Stop reason: SDUPTHER

## 2021-07-14 NOTE — TELEPHONE ENCOUNTER
Patient's chart was reviewed. Patient tolerates Flomax twice a day well. We will renew this medication. Patient was seen within the past year.

## 2021-07-15 LAB
EKG ATRIAL RATE: 64 BPM
EKG P AXIS: 27 DEGREES
EKG P-R INTERVAL: 122 MS
EKG Q-T INTERVAL: 392 MS
EKG QRS DURATION: 106 MS
EKG QTC CALCULATION (BAZETT): 404 MS
EKG R AXIS: 36 DEGREES
EKG T AXIS: 45 DEGREES
EKG VENTRICULAR RATE: 64 BPM

## 2021-07-15 PROCEDURE — 93010 ELECTROCARDIOGRAM REPORT: CPT | Performed by: INTERNAL MEDICINE

## 2021-07-26 ENCOUNTER — HOSPITAL ENCOUNTER (OUTPATIENT)
Age: 66
Discharge: HOME | End: 2021-07-26
Payer: MEDICARE

## 2021-07-26 ENCOUNTER — TELEPHONE (OUTPATIENT)
Dept: CARDIOLOGY CLINIC | Age: 66
End: 2021-07-26

## 2021-07-26 DIAGNOSIS — R06.02 SOB (SHORTNESS OF BREATH): ICD-10-CM

## 2021-07-26 DIAGNOSIS — R94.39 ABNORMAL STRESS ECG: ICD-10-CM

## 2021-07-26 DIAGNOSIS — I10 ESSENTIAL HYPERTENSION: ICD-10-CM

## 2021-07-26 LAB
BUN BLDV-MCNC: 11 MG/DL (ref 8–23)
CREAT SERPL-MCNC: 0.79 MG/DL (ref 0.7–1.2)
GFR AFRICAN AMERICAN: >60 ML/MIN
GFR NON-AFRICAN AMERICAN: >60 ML/MIN
GFR SERPL CREATININE-BSD FRML MDRD: NORMAL ML/MIN/{1.73_M2}
GFR SERPL CREATININE-BSD FRML MDRD: NORMAL ML/MIN/{1.73_M2}

## 2021-07-26 PROCEDURE — 36415 COLL VENOUS BLD VENIPUNCTURE: CPT

## 2021-07-26 PROCEDURE — 82565 ASSAY OF CREATININE: CPT

## 2021-07-26 PROCEDURE — 84520 ASSAY OF UREA NITROGEN: CPT

## 2021-07-26 NOTE — TELEPHONE ENCOUNTER
Pt called following cath   [address] to restart metformin   Has appt tomorrow with   Surgeon for bypass.

## 2021-07-30 ENCOUNTER — OFFICE VISIT (OUTPATIENT)
Dept: FAMILY MEDICINE CLINIC | Age: 66
End: 2021-07-30
Payer: MEDICARE

## 2021-07-30 VITALS
WEIGHT: 269 LBS | HEIGHT: 72 IN | DIASTOLIC BLOOD PRESSURE: 70 MMHG | OXYGEN SATURATION: 98 % | SYSTOLIC BLOOD PRESSURE: 128 MMHG | BODY MASS INDEX: 36.44 KG/M2 | HEART RATE: 80 BPM

## 2021-07-30 DIAGNOSIS — Z00.00 ROUTINE GENERAL MEDICAL EXAMINATION AT A HEALTH CARE FACILITY: Primary | ICD-10-CM

## 2021-07-30 DIAGNOSIS — E66.01 SEVERE OBESITY (BMI 35.0-35.9 WITH COMORBIDITY) (HCC): ICD-10-CM

## 2021-07-30 PROCEDURE — 3017F COLORECTAL CA SCREEN DOC REV: CPT | Performed by: FAMILY MEDICINE

## 2021-07-30 PROCEDURE — 4040F PNEUMOC VAC/ADMIN/RCVD: CPT | Performed by: FAMILY MEDICINE

## 2021-07-30 PROCEDURE — G0439 PPPS, SUBSEQ VISIT: HCPCS | Performed by: FAMILY MEDICINE

## 2021-07-30 PROCEDURE — 1123F ACP DISCUSS/DSCN MKR DOCD: CPT | Performed by: FAMILY MEDICINE

## 2021-07-30 RX ORDER — ROSUVASTATIN CALCIUM 5 MG/1
TABLET, COATED ORAL
Qty: 90 TABLET | Refills: 1 | Status: CANCELLED | OUTPATIENT
Start: 2021-07-30

## 2021-07-30 RX ORDER — DILTIAZEM HYDROCHLORIDE 360 MG/1
360 CAPSULE, EXTENDED RELEASE ORAL DAILY
COMMUNITY
End: 2021-07-30

## 2021-07-30 ASSESSMENT — LIFESTYLE VARIABLES: HOW OFTEN DO YOU HAVE A DRINK CONTAINING ALCOHOL: 0

## 2021-07-30 ASSESSMENT — PATIENT HEALTH QUESTIONNAIRE - PHQ9
1. LITTLE INTEREST OR PLEASURE IN DOING THINGS: 0
2. FEELING DOWN, DEPRESSED OR HOPELESS: 0
SUM OF ALL RESPONSES TO PHQ QUESTIONS 1-9: 0
SUM OF ALL RESPONSES TO PHQ QUESTIONS 1-9: 0
SUM OF ALL RESPONSES TO PHQ9 QUESTIONS 1 & 2: 0
SUM OF ALL RESPONSES TO PHQ QUESTIONS 1-9: 0

## 2021-07-30 NOTE — PROGRESS NOTES
Medicare Annual Wellness Visit  Name: Susana Cortes Date: 2021   MRN: [id number] Sex: Male   Age: 74 y.o. Ethnicity: Non- / Non    : 1955 Race: White (non-)      Shima Alcazar is here for Medicare AWV and Diabetes    Screenings for behavioral, psychosocial and functional/safety risks, and cognitive dysfunction are all negative except as indicated below. These results, as well as other patient data from the [address] form, are documented in Flowsheets linked to this Encounter. No Known Allergies    Prior to Visit Medications    Medication Sig Taking? Authorizing Provider   tamsulosin (FLOMAX) 0.4 MG capsule TAKE 1 CAPSULE BY MOUTH TWICE A DAY Yes Tejinder Sandhu PA-C   aspirin 81 MG EC tablet Take 81 mg by mouth daily Yes Historical Provider, MD   metoprolol succinate (TOPROL XL) 25 MG extended release tablet Take 1 tablet by mouth daily Yes Flaco Flores DO   metFORMIN (GLUCOPHAGE) 1000 MG tablet 1 by mouth twice a day with meals Yes Flaco Flores DO   dilTIAZem (CARDIZEM CD) 360 MG extended release capsule 1 by mouth daily Yes Flaco Flores DO   triamcinolone (ARISTOCORT) 0.5 % cream APPLY THIN COAT TO AFFECTED AREA TWICE A DAY Yes Historical Provider, MD   oxybutynin (DITROPAN XL) 5 MG extended release tablet Take 1 tablet by mouth every evening Yes Tejinder Sandhu PA-C   SITagliptin (JANUVIA) 100 MG tablet TAKE 1 TABLET BY MOUTH EVERY DAY Yes Mary Mata DO   rosuvastatin (CRESTOR) 5 MG tablet TAKE 1 TABLET BY MOUTH EVERY DAY AT NIGHT Yes Flaco Flores DO       Past Medical History:   Diagnosis Date    Diabetes mellitus ([address]     Hypertension     Obesity (BMI 30-39. 9)     Obstructive sleep apnea     on bipap at home    Prostate disease        Past Surgical History:   Procedure Laterality Date    CARDIAC CATHETERIZATION Left 2021    Dr. Janey Toney @ [facility]--Refer to Dr. Oliva Tarango  2012 Jimmy Orlando MD    COLONOSCOPY  02/12/2015    Lindsey Levy MD; Colon polyps x2, sigmoid diverticulosis, internal and external hemorrhoids    COLONOSCOPY  03/01/2019    Dr. Desiree Christensen -- screening; no bx/polyps    COLONOSCOPY N/A 03/01/2019    COLORECTAL CANCER SCREENING performed by Prashanth Trinh DO at [address] Left 08/07/2018    with stent per Dr. Israel Ballard      umbilical hernia    [place] CYSTOSCOPY,INSERT URETERAL STENT Left 08/07/2018    CYSTOSCOPY  STENT INSERTION-LEFT performed by Panda Haile MD at [address] OFFICE/OUTPT [address] Left 08/23/2018    CYSTOSCOPY URETEROSCOPY- LEFT HLL, STENT EXCHANGE performed by Panda Haile MD at [facility] OR       Family History   Problem Relation Age of Onset    Cancer Mother         Uterine    Heart Disease Father         CAD    Diabetes Brother        CareTeam (Including outside providers/suppliers regularly involved in providing care):   Patient Care Team:  Cara Kwok DO as PCP - General (Family Medicine)  Cara Kwok DO as PCP - [facility]  Panda Haile MD as Consulting Physician (Urology)    Wt Readings from Last 3 Encounters:   07/30/21 269 lb (122 kg)   06/15/21 274 lb (124.3 kg)   06/10/21 274 lb (124.3 kg)     Vitals:    07/30/21 0903   BP: 128/70   Pulse: 80   SpO2: 98%   Weight: 269 lb (122 kg)   Height: 6' (1.829 m)     Body mass index is 36.48 kg/m². Based upon direct observation of the patient, evaluation of cognition reveals recent and remote memory intact.     General Appearance: alert and oriented to person, place and time, well developed and well- nourished, in no acute distress  Skin: warm and dry, no rash or erythema  Head: normocephalic and atraumatic  Eyes: pupils equal, round, and reactive to light, extraocular eye movements intact, conjunctivae normal  ENT: tympanic membrane, external ear and ear canal normal bilaterally, nose without deformity, nasal mucosa and turbinates his/her dentist       Personalized Preventive Plan   Current Health Maintenance Status  Immunization History   Administered Date(s) Administered    COVID-19, Pfizer, PF, 30mcg/0.3mL 03/12/2021, 04/02/2021    Influenza Virus Vaccine 11/24/2015    Influenza, High Dose (Fluzone 65 yrs and older) 12/03/2019, 10/15/2020    Influenza, Evelin Herb, 6 mo and older, IM, PF (Flulaval, Fluarix) 01/11/2019    Influenza, Quadv, adjuvanted, 65 yrs +, IM, PF (Fluad) 10/15/2020    Pneumococcal Conjugate 13-valent ([id number]) 08/08/2018    Pneumococcal Polysaccharide ([id number]) 01/27/2020        Health Maintenance   Topic Date Due    DTaP/Tdap/Td vaccine (1 - Tdap) Never done    Shingles Vaccine (1 of 2) Never done   [employer] Visit (AWV)  Never done    Flu vaccine (1) 09/01/2021    Diabetic retinal exam  12/07/2021    A1C test (Diabetic or Prediabetic)  01/27/2022    Diabetic microalbuminuria test  01/27/2022    Diabetic foot exam  01/31/2022    Lipid screen  07/14/2022    Potassium monitoring  07/14/2022    Creatinine monitoring  07/26/2022    Colon cancer screen colonoscopy  03/01/2029    Pneumococcal 65+ years Vaccine  Completed    COVID-19 Vaccine  Completed    AAA screen  Completed    Hepatitis C screen  Completed    Hepatitis A vaccine  Aged Out    Hib vaccine  Aged Out    Meningococcal (ACWY) vaccine  Aged Out     Recommendations for [employer] Due: see orders and patient instructions/AVS.  . Recommended screening schedule for the next 5-10 years is provided to the patient in written form: see Patient Instructions/AVS.    Danny Brennan was seen today for medicare awv and diabetes. Diagnoses and all orders for this visit:    Severe obesity (BMI 35.0-35.9 with comorbidity) ([address]             After last visit patient underwent stress test and ultimately cath showing multivessel disease. Having CABG 8/4. Reviewed outside labs showing A1c uncontrolled, 7.7.   Advised working on diet and

## 2021-07-30 NOTE — PATIENT INSTRUCTIONS
SURVEY:    You may be receiving a survey from [employer] regarding your visit today. You may get this in the mail, through your MyChart or in your email. Please complete the survey to enable us to provide the highest quality of care to you and your family. If you cannot score us as very good ( 5 Stars) on any question, please feel free to call the office to discuss how we could have made your experience exceptional.     Thank you. Clinical Care Team:  DO Annita Silver [facility], [address] [place] [address] Team:  [address] [place] [address]    Personalized Preventive Plan for Ashley Bourgeois - 7/30/2021  Medicare offers a range of preventive health benefits. Some of the tests and screenings are paid in full while other may be subject to a deductible, co-insurance, and/or copay. Some of these benefits include a comprehensive review of your medical history including lifestyle, illnesses that may run in your family, and various assessments and screenings as appropriate. After reviewing your medical record and screening and assessments performed today your provider may have ordered immunizations, labs, imaging, and/or referrals for you. A list of these orders (if applicable) as well as your Preventive Care list are included within your After Visit Summary for your review. Other Preventive Recommendations:    · A preventive eye exam performed by an eye specialist is recommended every 1-2 years to screen for glaucoma; cataracts, macular degeneration, and other eye disorders. · A preventive dental visit is recommended every 6 months. · Try to get at least 150 minutes of exercise per week or 10,000 steps per day on a pedometer . · Order or download the FREE \"Exercise & Physical Activity: Your Everyday Guide\" from The [employer].  Call 6-457.426.8434 or search The [facility] on Aging online. · You need 9593-2329 mg of calcium and 8451-9293 IU of vitamin D per day. It is possible to meet your calcium requirement with diet alone, but a vitamin D supplement is usually necessary to meet this goal.  · When exposed to the sun, use a sunscreen that protects against both UVA and UVB radiation with an SPF of 30 or greater. Reapply every 2 to 3 hours or after sweating, drying off with a towel, or swimming. · Always wear a seat belt when traveling in a car. Always wear a helmet when riding a bicycle or motorcycle.

## 2021-09-13 ENCOUNTER — OFFICE VISIT (OUTPATIENT)
Dept: CARDIOLOGY CLINIC | Age: 66
End: 2021-09-13
Payer: MEDICARE

## 2021-09-13 ENCOUNTER — HOSPITAL ENCOUNTER (OUTPATIENT)
Age: 66
Setting detail: SPECIMEN
Discharge: HOME | End: 2021-09-13
Payer: MEDICARE

## 2021-09-13 ENCOUNTER — OFFICE VISIT (OUTPATIENT)
Dept: FAMILY MEDICINE CLINIC | Age: 66
End: 2021-09-13
Payer: MEDICARE

## 2021-09-13 VITALS
WEIGHT: 252 LBS | SYSTOLIC BLOOD PRESSURE: 100 MMHG | OXYGEN SATURATION: 97 % | HEART RATE: 80 BPM | DIASTOLIC BLOOD PRESSURE: 62 MMHG | BODY MASS INDEX: 34.18 KG/M2 | TEMPERATURE: 98 F

## 2021-09-13 VITALS
BODY MASS INDEX: 34.04 KG/M2 | HEART RATE: 84 BPM | OXYGEN SATURATION: 95 % | WEIGHT: 251 LBS | DIASTOLIC BLOOD PRESSURE: 64 MMHG | SYSTOLIC BLOOD PRESSURE: 120 MMHG

## 2021-09-13 DIAGNOSIS — Z87.442 HISTORY OF KIDNEY STONES: ICD-10-CM

## 2021-09-13 DIAGNOSIS — E55.9 VITAMIN D DEFICIENCY: ICD-10-CM

## 2021-09-13 DIAGNOSIS — R31.0 GROSS HEMATURIA: ICD-10-CM

## 2021-09-13 DIAGNOSIS — Z95.1 S/P CABG (CORONARY ARTERY BYPASS GRAFT): Primary | ICD-10-CM

## 2021-09-13 DIAGNOSIS — I10 ESSENTIAL HYPERTENSION: ICD-10-CM

## 2021-09-13 DIAGNOSIS — R31.0 GROSS HEMATURIA: Primary | ICD-10-CM

## 2021-09-13 DIAGNOSIS — E11.9 TYPE 2 DIABETES MELLITUS WITHOUT COMPLICATION, WITHOUT LONG-TERM CURRENT USE OF INSULIN (HCC): ICD-10-CM

## 2021-09-13 DIAGNOSIS — E78.5 HYPERLIPIDEMIA, UNSPECIFIED HYPERLIPIDEMIA TYPE: ICD-10-CM

## 2021-09-13 LAB
BILIRUBIN, POC: ABNORMAL
BLOOD URINE, POC: ABNORMAL
CLARITY, POC: CLEAR
COLOR, POC: YELLOW
GLUCOSE URINE, POC: ABNORMAL
KETONES, POC: ABNORMAL
LEUKOCYTE EST, POC: ABNORMAL
NITRITE, POC: ABNORMAL
PH, POC: 5
PROTEIN, POC: 30
SPECIFIC GRAVITY, POC: 1.03
UROBILINOGEN, POC: 0.2

## 2021-09-13 PROCEDURE — 4040F PNEUMOC VAC/ADMIN/RCVD: CPT | Performed by: NURSE PRACTITIONER

## 2021-09-13 PROCEDURE — 3017F COLORECTAL CA SCREEN DOC REV: CPT | Performed by: INTERNAL MEDICINE

## 2021-09-13 PROCEDURE — G8417 CALC BMI ABV UP PARAM F/U: HCPCS | Performed by: NURSE PRACTITIONER

## 2021-09-13 PROCEDURE — G8427 DOCREV CUR MEDS BY ELIG CLIN: HCPCS | Performed by: NURSE PRACTITIONER

## 2021-09-13 PROCEDURE — 99214 OFFICE O/P EST MOD 30 MIN: CPT | Performed by: INTERNAL MEDICINE

## 2021-09-13 PROCEDURE — 4040F PNEUMOC VAC/ADMIN/RCVD: CPT | Performed by: INTERNAL MEDICINE

## 2021-09-13 PROCEDURE — 87077 CULTURE AEROBIC IDENTIFY: CPT

## 2021-09-13 PROCEDURE — G8427 DOCREV CUR MEDS BY ELIG CLIN: HCPCS | Performed by: INTERNAL MEDICINE

## 2021-09-13 PROCEDURE — 1111F DSCHRG MED/CURRENT MED MERGE: CPT | Performed by: INTERNAL MEDICINE

## 2021-09-13 PROCEDURE — 1036F TOBACCO NON-USER: CPT | Performed by: NURSE PRACTITIONER

## 2021-09-13 PROCEDURE — 87086 URINE CULTURE/COLONY COUNT: CPT

## 2021-09-13 PROCEDURE — 87186 SC STD MICRODIL/AGAR DIL: CPT

## 2021-09-13 PROCEDURE — 81002 URINALYSIS NONAUTO W/O SCOPE: CPT | Performed by: NURSE PRACTITIONER

## 2021-09-13 PROCEDURE — 1123F ACP DISCUSS/DSCN MKR DOCD: CPT | Performed by: NURSE PRACTITIONER

## 2021-09-13 PROCEDURE — 1036F TOBACCO NON-USER: CPT | Performed by: INTERNAL MEDICINE

## 2021-09-13 PROCEDURE — 1123F ACP DISCUSS/DSCN MKR DOCD: CPT | Performed by: INTERNAL MEDICINE

## 2021-09-13 PROCEDURE — 99213 OFFICE O/P EST LOW 20 MIN: CPT | Performed by: NURSE PRACTITIONER

## 2021-09-13 PROCEDURE — G8417 CALC BMI ABV UP PARAM F/U: HCPCS | Performed by: INTERNAL MEDICINE

## 2021-09-13 PROCEDURE — 3051F HG A1C>EQUAL 7.0%<8.0%: CPT | Performed by: INTERNAL MEDICINE

## 2021-09-13 PROCEDURE — 3017F COLORECTAL CA SCREEN DOC REV: CPT | Performed by: NURSE PRACTITIONER

## 2021-09-13 PROCEDURE — 2022F DILAT RTA XM EVC RTNOPTHY: CPT | Performed by: INTERNAL MEDICINE

## 2021-09-13 RX ORDER — GLIMEPIRIDE 1 MG/1
0.5 TABLET ORAL
COMMUNITY
Start: 2021-08-11 | End: 2022-03-24 | Stop reason: SDUPTHER

## 2021-09-13 RX ORDER — ROSUVASTATIN CALCIUM 40 MG/1
40 TABLET, COATED ORAL EVERY EVENING
COMMUNITY
End: 2021-09-13 | Stop reason: SDUPTHER

## 2021-09-13 RX ORDER — LANCETS 30 GAUGE
EACH MISCELLANEOUS
COMMUNITY
Start: 2021-08-10

## 2021-09-13 RX ORDER — OXYCODONE HYDROCHLORIDE AND ACETAMINOPHEN 5; 325 MG/1; MG/1
TABLET ORAL
COMMUNITY
Start: 2021-08-10 | End: 2022-01-31 | Stop reason: ALTCHOICE

## 2021-09-13 RX ORDER — BLOOD-GLUCOSE METER
EACH MISCELLANEOUS
COMMUNITY
Start: 2021-08-11

## 2021-09-13 RX ORDER — ROSUVASTATIN CALCIUM 40 MG/1
40 TABLET, COATED ORAL EVERY EVENING
Qty: 90 TABLET | Refills: 3 | Status: SHIPPED | OUTPATIENT
Start: 2021-09-13 | End: 2022-08-08 | Stop reason: SDUPTHER

## 2021-09-13 RX ORDER — BISACODYL 10 MG
10 SUPPOSITORY, RECTAL RECTAL DAILY
COMMUNITY
Start: 2021-08-10

## 2021-09-13 RX ORDER — LOSARTAN POTASSIUM 25 MG/1
25 TABLET ORAL
COMMUNITY
Start: 2021-08-10 | End: 2021-11-24 | Stop reason: SDUPTHER

## 2021-09-13 RX ORDER — ACETAMINOPHEN 325 MG/1
650 TABLET ORAL EVERY 4 HOURS PRN
COMMUNITY
Start: 2021-08-10

## 2021-09-13 ASSESSMENT — ENCOUNTER SYMPTOMS
SHORTNESS OF BREATH: 0
VOMITING: 0
DIARRHEA: 0
COUGH: 0
NAUSEA: 0

## 2021-09-13 NOTE — PROGRESS NOTES
Ov Dr. Onita Meckel for hospital follow up   S/p open heart Aug 4   occ right side chest pain   Left leg slowly healing   No edema    Will set up for cardiac rehab    Will INCREASE Crestor (rosuvastatin) to 40 mg   One tablet nightly     Will follow up in 3 months with routine testing

## 2021-09-13 NOTE — LETTER
Peter Swift M.D. [address] [place] [address]  [phone]    2021    Solislydia Blair, DO  [address] [facility] [address]      RE:   Denver Shock  :  1955      Dear Dr. Jarrod Dan:    CHIEF COMPLAINT:  1. Severe coronary artery disease. 2.  Status post cardiac catheterization on 2021, which showed 60% disease in the left main trunk, 80% proximal LAD, 80% large OM, 90% in the posterior ventricular branch of the right coronary artery, EF of 55%. 3.  Status post open heart surgery by Dr. Hari Mcgovern on 2021, with a LIMA to the LAD and a vein graft to the OM branch of the circumflex, with the posterior ventricular branch of the right coronary artery too small to bypass. HISTORY OF PRESENT ILLNESS:  I had the pleasure of seeing Mr. Aide Chavira in our office on 2021. I trust you received my full H and P from 2021. He was having shortness of breath and had an abnormal Lexiscan Cardiolite stress test.  I saw him in consult on 2021. We had started him on medical therapy, but he continued to be short of breath, and therefore, I did a cardiac catheterization on 2021, at [facility] in [place]. This showed 60% left main trunk, 80% proximal LAD, 80% OM, with 90% disease in the distal portion of the right coronary artery. His EF was 55%. We consulted Dr. Hari Mcgovern, who did bypass surgery on 2021, with a LIMA to the LAD and a vein graft to the OM branch of the circumflex. He overall has done well since that time. He has been walking at home. His wife is trying to adhere to a cardiac diet. His incisions have healed nicely and he has very little pain in his precordium. His right leg, where the vein was harvested, has some erythema but does not look infected.     He saw Parish Fischer, who is the PA for Dr. Hari Mcgovern, on , who cleared him and released him from their care. He has had no syncope or near syncope, lightheadedness or dizziness. Energy level is improving. His appetite is good. MEDICATIONS:  He is on aspirin 81 mg daily, Cardizem  mg daily, Amaryl 1 mg half a tablet with breakfast, Cozaar 25 mg daily, Glucophage 1000 mg b.i.d., Toprol-XL 25 mg daily, Ditropan XL 5 mg daily, Crestor 5 mg daily, Januvia 100 mg daily, Flomax 0.4 mg b.i.d. PHYSICAL EXAMINATION:  VITAL SIGNS:  His blood pressure was 120/64 with a heart rate of 84 and regular. Respiratory rate 18. O2 sat 95%. Weight 251 pounds. GENERAL:  He is a pleasant 75-year-old gentleman. Denied pain. He was oriented to person, place and time. Answered questions appropriately. SKIN:  No unusual skin changes. HEENT:  The pupils are equally round and intact. Mucous membranes were dry. NECK:  No JVD. Good carotid pulses. No carotid bruits. No lymphadenopathy or thyromegaly. CARDIOVASCULAR EXAM:  S1 and S2 were normal.  No S3 or S4. Soft systolic blowing type murmur. No diastolic murmur. PMI was normal.  No lift, thrust, or pericardial friction rub. LUNGS:  Quite clear to auscultation and percussion. ABDOMEN:  Soft and nontender. Good bowel sounds. EXTREMITIES:  Good femoral pulses. Good pedal pulses. He had trace pedal edema on his right leg. Skin was warm and dry. There was slight erythema where his vein graft was harvested. NEUROLOGIC EXAM:  Unremarkable. PSYCHIATRIC EXAM:  Unremarkable. LABORATORY DATA:  From [facility], his sodium was 139, potassium 4.1, glucose 88, BUN was 18, creatinine 1.11, calcium was 9.5. His white count was 11.57 with a hemoglobin of 10.2 and a platelet count of 458,425. IMPRESSION:  1. Severe coronary artery disease.   2.  Status post cardiac catheterization on 07/23/2021, at [facility] in [place], showed 60% left main trunk, 80% long lesion in the proximal LAD, with 80% disease in the large OM, 90% disease in the distal right coronary artery, EF of 55%. 3.  Open heart surgery by Dr. Hari Mcgovern on 08/04/2021, with a LIMA to the LAD, vein graft to the OM branch of the circumflex, with his distal right coronary artery too small to bypass. 4.  Non-insulin-dependent diabetes. 5.  Hypertension. 6.  Hyperlipidemia. PLAN:  1. Increase Crestor to 40 mg daily. 2.  Refer to cardiac rehab.  3.  I will see him in three months with a full set of blood work, and if he is doing well at that time, we will see him yearly. 4.  Aggressive risk management. DISCUSSION:  Mr. Aide Chavira and his wife have done a very good job following his bypass surgery. He has made a good recovery thus far. He is now almost 6 weeks out from his surgery and it is reasonable to have him see cardiac rehab and start rehab within the next one to two weeks. He is very motivated for his risk modification. I have asked him to see Dr. Jarrod Dan for his ongoing diabetes management. Again, I will see him in three months and we will do a full set of blood work at that time. Thank you very much for allowing me the privilege of seeing Mr. Aide Chavira. If you have any questions on my thoughts, please do not hesitate to contact me.     Sincerely,        Kervin Smoker    D: 09/13/2021 12:33:54     T: 09/13/2021 12:37:42     [email or web address]  Job#: [id number]   Doc#: [id number]

## 2021-09-13 NOTE — PATIENT INSTRUCTIONS
SURVEY:    You may be receiving a survey from [employer] regarding your visit today. Please complete the survey to enable us to provide the highest quality of care to you and your family. If you cannot score us a very good (5 Stars) on any question, please call the office to discuss how we could have made your experience a very good one. Thank you.     Clinical Care Team: MATTEO Paiz-REILLY Sal LPN    Clerical Team: Jazz Caldera

## 2021-09-13 NOTE — PATIENT INSTRUCTIONS
Will set up for cardiac rehab    Will INCREASE Crestor (rosuvastatin) to 40 mg   One tablet nightly     Will follow up in 3 months with routine testing

## 2021-09-13 NOTE — PROGRESS NOTES
HPI Notes    Name: Yudelka Pierre  : 1955         Chief Complaint:     Chief Complaint   Patient presents with    Hematuria     Patient here today with blood in urine, noticed 2 days ago. Difficulty urinating but getting better. He did have a catheter in when he had CABG on 21       History of Present Illness:        HPI  Pt is 75 yo male who presents for blood in the urine. First noticed 2 days ago. Sees blood in the stream and in the toilet water. Pt has had blood in his urine before while having a kidney stone. Pt denies any pain in his flanks and does not think this is a kidney stone. Pt had a CABG on 21 and did have a catheter placed. Denies any burning or pain with urination. Does describe urine stream as intermittent and espinal have some trouble starting his stream.    Past Medical History:     Past Medical History:   Diagnosis Date    Diabetes mellitus ([facility] [address]     Hypertension     Obesity (BMI 30-39. 9)     Obstructive sleep apnea     on bipap at home    Prostate disease       Reviewed all health maintenance requirements and ordered appropriate tests  Health Maintenance Due   Topic Date Due    DTaP/Tdap/Td vaccine (1 - Tdap) Never done    Shingles Vaccine (1 of 2) Never done    Flu vaccine (1) 2021       Past Surgical History:     Past Surgical History:   Procedure Laterality Date    CARDIAC CATHETERIZATION Left 2021    Dr. Carol Waller @ [facility]--Refer to Dr. Geoff Mckenna  2012    Sandy Cantrell MD    COLONOSCOPY  2015    Mathilda Duverney MD; Colon polyps x2, sigmoid diverticulosis, internal and external hemorrhoids    COLONOSCOPY  2019    Dr. Sean Ramirez -- screening; no bx/polyps    COLONOSCOPY N/A 2019    COLORECTAL CANCER SCREENING performed by Silvia Handy DO at [facility] Left 2018    with stent per Dr. Raquel Brasher      umbilical hernia    NV Elisha Left 2018    CYSTOSCOPY  STENT INSERTION-LEFT [facility] OFF[facility]/OUTPT [address] Left 08/23/2018    CYSTOSCOPY URETEROSCOPY- LEFT HLL, STENT EXCHANGE performed by Patricia Rich MD at [facility] OR        Medications:       Prior to Admission medications    Medication Sig Start Date End Date Taking? Authorizing Provider   bisacodyl (DULCOLAX) 10 MG suppository Place 10 mg rectally daily 8/10/21  Yes Historical Provider, MD   acetaminophen (TYLENOL) 325 MG tablet Take 650 mg by mouth every 4 hours as needed 8/10/21  Yes Historical Provider, MD   losartan (COZAAR) 25 MG tablet Take 25 mg by mouth Daily with lunch 8/10/21 12/8/21 Yes Historical Provider, MD   glimepiride (AMARYL) 1 MG tablet Take 0.5 mg by mouth daily (with breakfast) 8/11/21  Yes Historical Provider, MD   OXYGEN Inhale into the lungs   Yes Historical Provider, MD   rosuvastatin (CRESTOR) 40 MG tablet Take 1 tablet by mouth every evening 9/13/21  Yes Leeann Gee MD   Blood Glucose Monitoring Suppl (CONTOUR NEXT MONITOR) w/Device KIT TEST TWICE A DAY BEFORE BREAKFAST AND DINNER 8/11/21  Yes Historical Provider, MD   Lancets MISC Use to check BG before breakfast and dinner   Dx E11.65  For Contour next meter .  8/10/21  Yes Historical Provider, MD   tamsulosin (FLOMAX) 0.4 MG capsule TAKE 1 CAPSULE BY MOUTH TWICE A DAY 7/14/21  Yes Tejinder Sandhu PA-C   aspirin 81 MG EC tablet Take 81 mg by mouth daily   Yes Historical Provider, MD   metoprolol succinate (TOPROL XL) 25 MG extended release tablet Take 1 tablet by mouth daily 6/23/21  Yes Rozina Henry,    metFORMIN (GLUCOPHAGE) 1000 MG tablet 1 by mouth twice a day with meals 6/15/21  Yes Rozina Henry, DO   dilTIAZem (CARDIZEM CD) 360 MG extended release capsule 1 by mouth daily 6/15/21  Yes Rozina Henry, DO   triamcinolone (ARISTOCORT) 0.5 % cream APPLY THIN COAT TO AFFECTED AREA TWICE A DAY 5/30/21  Yes Historical Provider, MD   oxybutynin (DITROPAN XL) 5 MG extended release tablet Take 1 tablet by mouth every evening 6/10/21  Yes Tejinder Sandhu PA-C   SITagliptin (JANUVIA) 100 MG tablet TAKE 1 TABLET BY MOUTH EVERY DAY 4/8/21  Yes Shay Marquez DO   oxyCODONE-acetaminophen (PERCOCET) 5-325 MG per tablet TAKE 1 TABLET BY MOUTH EVERY 6 HOURS AS NEEDED . Patient not taking: Reported on 9/13/2021 8/10/21   Historical Provider, MD        Allergies:       Patient has no known allergies. Social History:     Tobacco:    reports that he quit smoking about 25 years ago. He has a 360.00 pack-year smoking history. He has never used smokeless tobacco.  Alcohol:      reports current alcohol use of about 8.0 standard drinks of alcohol per week. Drug Use:  reports no history of drug use. Family History:        Family History   Problem Relation Age of Onset   Mónica Lew Cancer Mother         Uterine    Heart Disease Father         CAD    Diabetes Brother        Review of Systems:         Review of Systems   Constitutional: Negative for chills and fever. Respiratory: Negative for cough and shortness of breath. Cardiovascular: Negative for chest pain and palpitations. Gastrointestinal: Negative for diarrhea, nausea and vomiting. Genitourinary: Positive for hematuria. Negative for flank pain, frequency, testicular pain and urgency. Neurological: Negative for dizziness, seizures and headaches. Physical Exam:     Vitals:  /62   Pulse 80   Temp 98 °F (36.7 °C) (Oral)   Wt 252 lb (114.3 kg)   SpO2 97%   BMI 34.18 kg/m²       Physical Exam  Vitals and nursing note reviewed. Constitutional:       Appearance: He is well-developed. Cardiovascular:      Rate and Rhythm: Normal rate and regular rhythm. Heart sounds: S1 normal and S2 normal.   Pulmonary:      Effort: Pulmonary effort is normal. No respiratory distress. Breath sounds: Normal breath sounds. Abdominal:      General: Bowel sounds are normal.      Palpations: Abdomen is soft.       Tenderness: There is no abdominal tenderness. There is no right CVA tenderness or left CVA tenderness. Skin:     General: Skin is warm and dry. Psychiatric:         Behavior: Behavior is cooperative. Data:     Lab Results   Component Value Date     07/14/2021    K 4.4 07/14/2021     07/14/2021    CO2 26 07/14/2021    BUN 11 07/26/2021    CREATININE 0.79 07/26/2021    GLUCOSE 181 07/14/2021    PROT 7.3 07/14/2021    LABALBU 4.0 07/14/2021    BILITOT 0.49 07/14/2021    ALKPHOS 61 07/14/2021    AST 44 07/14/2021    ALT 54 07/14/2021     Lab Results   Component Value Date    WBC 6.6 07/14/2021    RBC 4.84 07/14/2021    HGB 15.2 07/14/2021    HCT 44.8 07/14/2021    MCV 92.5 07/14/2021    MCH 31.3 07/14/2021    MCHC 33.9 07/14/2021    RDW 14.1 07/14/2021     07/14/2021    MPV NOT REPORTED 07/14/2021     Lab Results   Component Value Date    TSH 1.56 06/28/2021     Lab Results   Component Value Date    CHOL 120 07/14/2021    HDL 43 07/14/2021    PSA 0.34 05/27/2021    LABA1C 7.4 01/27/2021          Assessment & Plan        Diagnosis Orders   1. Gross hematuria  POCT Urinalysis no Micro    Culture, Urine   2. History of kidney stones  POCT Urinalysis no Micro    Culture, Urine     Point-of-care UA was positive for blood, protein, small leukocytes. There is no flank pain or abdominal tenderness. Will send urine for culture. If positive, will treat for UTI. If negative, will send to urology. Patient verbalizes understanding and agreement with plan. All questions answered. If symptoms do not resolve or worsen, return to office. Completed Refills   Requested Prescriptions      No prescriptions requested or ordered in this encounter     No follow-ups on file. No orders of the defined types were placed in this encounter.     Orders Placed This Encounter   Procedures    Culture, Urine     Standing Status:   Future     Standing Expiration Date:   9/13/2022     Order Specific Question: Specify (ex-cath, midstream, cysto, etc)? Answer:   clean catch    POCT Urinalysis no Micro         Patient Instructions   SURVEY:    You may be receiving a survey from [email or web address] regarding your visit today. Please complete the survey to enable us to provide the highest quality of care to you and your family. If you cannot score us a very good (5 Stars) on any question, please call the office to discuss how we could have made your experience a very good one. Thank you. Clinical Care Team: KWAME Nuno LPN    Clerical Team: Misty Castro        Electronically signed by MATTEO Nuno CNP on 9/13/2021 at 2:24 PM           Completed Refills      Requested Prescriptions      No prescriptions requested or ordered in this encounter         [facility] received counseling on the following healthy behaviors: medication adherence  Reviewed prior labs and health maintenance. Continue current medications, diet and exercise. Discussed use, benefit, and side effects of prescribed medications. Barriers to medication compliance addressed. Patient given educational materials - see patient instructions. All patient questions answered. Patient voiced understanding.

## 2021-09-15 ENCOUNTER — TELEPHONE (OUTPATIENT)
Dept: FAMILY MEDICINE CLINIC | Age: 66
End: 2021-09-15

## 2021-09-15 LAB
CULTURE: ABNORMAL
Lab: ABNORMAL
SPECIMEN DESCRIPTION: ABNORMAL

## 2021-09-15 RX ORDER — CIPROFLOXACIN 500 MG/1
500 TABLET, FILM COATED ORAL 2 TIMES DAILY
Qty: 14 TABLET | Refills: 0 | Status: SHIPPED | OUTPATIENT
Start: 2021-09-15 | End: 2021-09-22

## 2021-09-15 NOTE — PROGRESS NOTES
Queen Ilya M.D. [address] [place] [address]  [phone]    2021    Yoni Martines DO  [address] [address]      RE:   Yudelka Pierre  :  1955      Dear Dr. Jonathan Vaughan:    CHIEF COMPLAINT:  1. Severe coronary artery disease. 2.  Status post cardiac catheterization on 2021, which showed 60% disease in the left main trunk, 80% proximal LAD, 80% large OM, 90% in the posterior ventricular branch of the right coronary artery, EF of 55%. 3.  Status post open heart surgery by Dr. Matthew Anglin on 2021, with a LIMA to the LAD and a vein graft to the OM branch of the circumflex, with the posterior ventricular branch of the right coronary artery too small to bypass. HISTORY OF PRESENT ILLNESS:  I had the pleasure of seeing Mr. Rober Kelly in our office on 2021. I trust you received my full H and P from 2021. He was having shortness of breath and had an abnormal Lexiscan Cardiolite stress test.  I saw him in consult on 2021. We had started him on medical therapy, but he continued to be short of breath, and therefore, I did a cardiac catheterization on 2021, at [facility] in [place]. This showed 60% left main trunk, 80% proximal LAD, 80% OM, with 90% disease in the distal portion of the right coronary artery. His EF was 55%. We consulted Dr. Matthew Anglin, who did bypass surgery on 2021, with a LIMA to the LAD and a vein graft to the OM branch of the circumflex. He overall has done well since that time. He has been walking at home. His wife is trying to adhere to a cardiac diet. His incisions have healed nicely and he has very little pain in his precordium. His right leg, where the vein was harvested, has some erythema but does not look infected.     He saw Ernesto Kohler, who is the PA for Dr. Matthew Anglin, on , who cleared him and released him from their care. He has had no syncope or near syncope, lightheadedness or dizziness. Energy level is improving. His appetite is good. MEDICATIONS:  He is on aspirin 81 mg daily, Cardizem  mg daily, Amaryl 1 mg half a tablet with breakfast, Cozaar 25 mg daily, Glucophage 1000 mg b.i.d., Toprol-XL 25 mg daily, Ditropan XL 5 mg daily, Crestor 5 mg daily, Januvia 100 mg daily, Flomax 0.4 mg b.i.d. PHYSICAL EXAMINATION:  VITAL SIGNS:  His blood pressure was 120/64 with a heart rate of 84 and regular. Respiratory rate 18. O2 sat 95%. Weight 251 pounds. GENERAL:  He is a pleasant 64-year-old gentleman. Denied pain. He was oriented to person, place and time. Answered questions appropriately. SKIN:  No unusual skin changes. HEENT:  The pupils are equally round and intact. Mucous membranes were dry. NECK:  No JVD. Good carotid pulses. No carotid bruits. No lymphadenopathy or thyromegaly. CARDIOVASCULAR EXAM:  S1 and S2 were normal.  No S3 or S4. Soft systolic blowing type murmur. No diastolic murmur. PMI was normal.  No lift, thrust, or pericardial friction rub. LUNGS:  Quite clear to auscultation and percussion. ABDOMEN:  Soft and nontender. Good bowel sounds. EXTREMITIES:  Good femoral pulses. Good pedal pulses. He had trace pedal edema on his right leg. Skin was warm and dry. There was slight erythema where his vein graft was harvested. NEUROLOGIC EXAM:  Unremarkable. PSYCHIATRIC EXAM:  Unremarkable. LABORATORY DATA:  From [facility] on [facility], his sodium was 139, potassium 4.1, glucose 88, BUN was 18, creatinine 1.11, calcium was 9.5. His white count was 11.57 with a hemoglobin of 10.2 and a platelet count of 499,935. IMPRESSION:  1. Severe coronary artery disease.   2.  Status post cardiac catheterization on 07/23/2021, at [facility], showed 60% left main trunk, 80% long lesion in the proximal LAD, with 80% disease in the large OM, 90% disease in the distal right coronary artery, EF of 55%. 3.  Open heart surgery by Dr. Gaetano Carranza on 08/04/2021, with a LIMA to the LAD, vein graft to the OM branch of the circumflex, with his distal right coronary artery too small to bypass. 4.  Non-insulin-dependent diabetes. 5.  Hypertension. 6.  Hyperlipidemia. PLAN:  1. Increase Crestor to 40 mg daily. 2.  Refer to cardiac rehab.  3.  I will see him in three months with a full set of blood work, and if he is doing well at that time, we will see him yearly. 4.  Aggressive risk management. DISCUSSION:  Mr. Terence Flores and his wife have done a very good job following his bypass surgery. He has made a good recovery thus far. He is now almost 6 weeks out from his surgery and it is reasonable to have him see cardiac rehab and start rehab within the next one to two weeks. He is very motivated for his risk modification. I have asked him to see Dr. Nicho Sharp for his ongoing diabetes management. Again, I will see him in three months and we will do a full set of blood work at that time. Thank you very much for allowing me the privilege of seeing Mr. Terence Flores. If you have any questions on my thoughts, please do not hesitate to contact me.     Sincerely,        Ruperto Hodge    D: 09/13/2021 12:33:54     T: 09/13/2021 12:37:42     [email or web address]  Job#: [id number]   Doc#: [id number]

## 2021-09-15 NOTE — TELEPHONE ENCOUNTER
----- Message from MATTEO Lopez CNP sent at 9/15/2021 10:53 AM EDT -----  Please the patient know that his urine did grow out lactose fermenting gram-negative rods. This is most consistent with E. coli. I would like to put the patient on Cipro 500 mg p.o. twice daily x7 days.   Thank you

## 2021-09-29 ENCOUNTER — HOSPITAL ENCOUNTER (OUTPATIENT)
Dept: CARDIAC REHAB | Age: 66
Setting detail: THERAPIES SERIES
Discharge: HOME | End: 2021-09-29
Payer: MEDICARE

## 2021-09-29 NOTE — PROGRESS NOTES
Cardiac Rehabilitation   Physician Order Form    Willie Zamorano  1955  [id number]  9/29/2021    [x] Phase 2 ECG Monitored Cardiac Rehabilitation    [] MI   [] PTCA with/without stents  [x] CABG  [] Heart Valve Repair/Replaced   [] Stable Angina [] Other:     Onset Date: 8/4/21                    Cardiac Education Goals: (see individualized treatment plan for specific goals, progression & compliance)    [x] Hypertension [x] Physical Inactivity  [x] A & P  [] Smoking  [x] Coping/Depression  [x] Medications  [x] Diabetes  [x] Obesity   [x] Angina  [x] Hyperlipidemia [x] Stress   [x] Home Exercise                     Prescribed Exercise Plan:    Target Hr: 84 - 96     Duration: 31 - 60 Minutes  Frequency: 3 Days per week  Initial Met Level: 2.3 - 2.5 METS  Limitations: STERNAL PRECAUTIONS    Modalities:  [x]Treadmill   [x] UBE  [x] Seated Stepper  [x] Rowing Machine  [x] Weights/therabands  [] Elliptical    · Aerobic exercise to total 31-60 minutes. Progressing by 1-2 minutes per week and/or 1-2 levels per week per patient tolerance using various modalities; according to Elier Scale 12-16 and THR  · Introduce 8-12 bilateral UE and LE progressive resistance exercises at 1-3 sets per lift, on 2-3 non-consecutive days using weights/ GREEN  therabands AND WTS TO 8-24 # for 8-15 reps to progressive overload by increasing resistance once reps progressed to at least 15 reps on at least 2 occasions                 Per patient symptoms use:  · Appropriate ACLS Algorhythm for Cardiac Events. · Nitroglycerine 0.4mg SLq 5mins X 3 for angina pain. · 12 lead EKG for c/o chest pain or change in rhythm. · Nasal O2 for SaO2 <90% or symptoms warranted. · Blood sugar monitoring for Hyper/Hypoglycemia symptoms.

## 2021-09-29 NOTE — PROGRESS NOTES
Cardiac Rehab Initial History and Assessment    Daphnie Mcmahon   1955  [id number]  9/29/2021    Primary Diagnosis: coronary artery bypass grafting  Living Will: [x] Yes   [] No  On File: [x] Yes   [] No   [] N/A  Durable Power of : [x] Yes   [] No    Medical History  Past Medical History:   Diagnosis Date    Diabetes mellitus ([facility] [address]     Hernia cerebri ([facility] [address]     Hyperlipidemia     Hypertension     Obesity (BMI 30-39. 9)     Obstructive sleep apnea     on bipap at home    Prostate disease      Past Surgical History:   Procedure Laterality Date    CARDIAC CATHETERIZATION Left 07/23/2021    Dr. Jl Aleman @ [facility]--Refer to Dr. Nany Saucedo  08/2012    Logan Zafar MD    COLONOSCOPY  02/12/2015    Ravi Torres MD; Colon polyps x2, sigmoid diverticulosis, internal and external hemorrhoids    COLONOSCOPY  03/01/2019    Dr. Stephania Chicas -- screening; no bx/polyps    COLONOSCOPY N/A 03/01/2019    COLORECTAL CANCER SCREENING performed by Nannette Ward DO at [address] Left 08/07/2018    with stent per Dr. Jaron Harrington      umbilical hernia    CA Elisha Left 08/07/2018    CYSTOSCOPY  STENT INSERTION-LEFT performed by Arash Lombardo MD at [address]PT [address] [facility] [address] Left 08/23/2018    CYSTOSCOPY URETEROSCOPY- LEFT HLL, STENT EXCHANGE performed by Arash [facility]ardo MD at [facility] OR       Family History  Family History   Problem Relation Age of Onset    Cancer Mother         Uterine    Heart Disease Father         CAD    Diabetes Brother     Heart Disease Brother     Heart Disease Paternal Grandfather     Heart Disease Brother          Symptoms:  1. Angina   [] None   [] Tightness   [x] Shortness of Breath   [] Pressure    [x] LOSS OF ENERGY   [] Sharp, Stabbing  [] Pallor   [] Indigestion, Heartburn [] Sweaty    Where was discomfort located? N/A  Precipitating Factors?  PHYSICAL EXERTION  Relieved by:  coronary artery bypass grafting    2. Arrhythmia   [x] None   [] Irregular Beats (skips) [] Pacer    [] Atrial Fibrillation  [] AICD    On any Medications? TOPROL XL 25 MG, QD / DILTIAZEM 360 MG, QD    3. Congestive Heart Failure   [x] None   [] Pedal Edema  [] Unusual weight gain   [] SOB with mild exertion [] Fatigue    4. Vascular   [x] None   [] Carotid Narrowing  [] R [] L   [] Peripheral claudication [] R [] L    5. Musculoskeletal    [x] None   [] Back Pain  Where? [] Joint discomfort Where? Socio-Economic    Marital Status:     Nutrition    Appetite:  [x]  Too Good    []  Good  []  Poor    Diet: EAT MOSTLY HEALTHY   Eating out 2 times/wk  Alcohol Consumption: [x] Yes [] No   Type:  2 BEERS OR 2 SHOTS  Frequency:  OCCASIONALLY    Caffeine: [x] Yes [] No  Type: COFFEE  Amount: 1 - 2 CUPS / DAY    Water intake per day: 2 BOTTLES  Vitamins/Natural herbal products: NONE    Psychological    [] Depression  [] Tearful  [] Fearful  [x] Cheerful  [] Anxious  [x] Motivated  [] Overwhelmed    Treatment: NONE    Diabetes    [x] Yes  [] No  How long: 10 YEARS  Latest BS: 110  Frequency of Checks: BID  Medication: METFORMIN 1,000 MG, BID / SITAGLIPTIN 100 MG, QD    Stress    Source: DENIES  Relaxation techniques: READING  Hobbies: NONE NOW    Level of Education    [] 8th Grade  [] Associates  [] Masters  [x] High School [] Bachelor  []  Other:    Depression Screening:  PHQ-9 SCORE:   10  Interpretation of Total Score Depression Severity: 1-4 = Minimal depression, 5-9 = Mild depression, 10-14 = Moderate depression, 15-19 = Moderately severe depression, 20-27 = Severe depression    Cardiac Rehab Pre - Test  1. The heart is a muscle that acts like a pump to deliver oxygen and blood to the rest of the body. [x] True   [] False  2. Healing from the damage of a heart attach is complete in two weeks. [] True   [x] False  3. Smoking has no direct effect on the heart - it only effects your lungs. [] True   [x] False  4.  Using all the salt you want is acceptable for all heart patients. [] True   [x] False  5. Chest pain that is relieved by rest or nitroglycerine is called Angina. [x] True   [] False  6. Shortness of breath, indigestion, sweating, tightness or pain in your chest are symptoms of a heart attack. [x] True   [] False  7. Swelling of the feet and ankles only means you've been on your feet too much. [] True   [x] False  8. Saturated fats raised your blood cholesterol level more than anything else in your diet. [x] True   [] False  9. High Blood pressure can take care of itself by rest alone. [] True   [x] False  10. Walking is one of the best exercises for heart attack patients. [x] True   [] False  SCORE 100%    Physical Findings  Weight:247 LB  Height: 71 IN (3.25 M2)  BMI: 34.6  Cardiovascular- No edema, S1-S2 and apically regular to auscultation, strong bilateral upper and lower extremity pulses at +2, no carotid bruits. Monitor rhythm-SINUS RHYTHM  VR- 70  WI- 0.16  QRS- 0.08  QT- 0.42        Ejection Fraction- 55%  Pulmonary- Clear to auscultation bilaterally through out. Neurological- No deficit noted or reported. Peripheral Vascular- No deficit noted or reported. Muscular Skeletal- No deficit noted or reported, EXCEPT STERNAL PRECAUTIONS  Pain Assessment- DENIES AT PRESENT  Pain Level Tolerable <4+/10 on 10 point Likert scale  Location/ radiation/ Frequency/ Duration- N/A  Endocrine- TYPE 2 DIABETIC  Gastrointestinal- No deficit noted or reported EXCEPT HX OF DIVERTICULITIS  Genitourinary- No deficit noted or reported.   Physical limitations- only as per above     Goals: \"GET RID OF THIS BIG BELLY AND LOSE WEIGHT\"  - increased stamina/strength to 30-50 total exercise by increasing 1-2 level/wk and 1-2   min/wk  to achieve THR and RPE 12-16 / INCREASE AVG CARDIO FC BY AT LEAST 0.5-1.0 MET IN THE NEXT 30 DAYS AND TOLERATE >31-45 MIN W/IN EX RX TARGETS    - LOSE 2-4 LB BODY WT IN NEXT 30 DAYS     - Introduce 8-12 bilateral UE and LE progressive resistance exercises at 1-3 sets per lift, on 2-3 non-consecutive days using weights/ GREEN therabands AND WTS TO 8-24 # for 10-15 reps to progressive overload by increasing resistance once reps progressed to at least 15 reps on at least 2 occasions     - MAINTAIN OPTIMAL.  BP     - Improved cholesterol and  Triglycerides      - Develop regular exercise 30 min daily, AT LEAST 3-5 DAYS PER WEEK CONSISTENTLY W/IN THE NEXT 30 DAYS    - Lower daily fasting blood glucose score to <131 and random after meal scores to <181 at home    - To reduce self-reported psycho-social feelings of stress in next 30 days    - To eat at least 2 servings of fruit per day and at least 4-5 servings of vegetables per day     STEFANY DOTSON RN, CEP

## 2021-10-01 ENCOUNTER — HOSPITAL ENCOUNTER (OUTPATIENT)
Dept: CARDIAC REHAB | Age: 66
Setting detail: THERAPIES SERIES
Discharge: HOME | End: 2021-10-01
Payer: MEDICARE

## 2021-10-01 PROCEDURE — 93798 PHYS/QHP OP CAR RHAB W/ECG: CPT

## 2021-10-01 NOTE — PROGRESS NOTES
Phase II Cardiac Rehab Individualized Treatment Plan-Initial     Patient Name: Laureano Hathaway  ACCOUNT #: [de-identified]  Date of Initial Assessment: 9/29/21  Diagnosis: coronary artery bypass grafting  Onset Date: 8/4/21  Referring Physician: DR. Kalani Pan  Risk Stratification: HIGH  Session Number: 1  EXERCISE    Stages of Change:   [] pre-contemplation  [x] Action   [] Contemplate   [] Maintainence   [x] Prep   [] Relapse          Exercise Prescription:  Mode: [x] TM [x] UBE [x] STP [] EL [x] R  Frequency: 3 DAYS PER WEEK  Duration: 31-60 MINUTES  Intensity: 2.5 - 3.5 METS  Target HR 84-96  Progression: INCREASE DURATION PER ABOVE F.I.T.T. Rx  PARAMETERS ON AVG OF 5-10 MIN / 1-2 WEEKS FOR THE FIRST 4-6 WEEKS. AFTER 3-4 WEEKS ARE COMPLETED, CONTINUE TO GRADUALLY INCREASE F. I.T. PARAMETERS GRADUALLY UPWARD AT THE ESTABLISHED DURATION ON AVERAGE 0.5-1.0 METS PER 30 DAYS OVER THE COARSE OF REMAINING PROGRAM AS ESTABLISHED BY PT-CENTERED GOALS AND GUIDELINES. Plan/Goal: Increase 1-2 levels/week or 1-2 min/week to achieve target HR and RPE   12-16.    [] Angina with Exertion THR:    [x] Resistance Training  PROGRESS 8-12 bilateral UE and LE progressive resistance exercises at 1-3 sets per lift, on 2-3 non-consecutive days using weights/ GREEN therabands AND WTS TO 8-24 # for 8-15 reps to progressive overload by increasing resistance once reps progressed to at least 15 reps on at least 2 occasions     Hypertension:  [x] Yes  [] No  Resting BP: 114/66  Peak Exercise BP: 120/76  BP Meds: TOPROL XL 25 MG, QD / LOSARTAN 25 MG, QD    Intervention:  Home Exercise:  Type: WALKING  Duration: 20 - 60 MIN  Frequency: AT LEAST 2 NO REHAB DAYS PER WEEK   [x] Resistance Training   introduce AND PROGRESS 8-12 bilateral UE and LE progressive resistance exercises at 1-3 sets per lift, on 2-3 non-consecutive days using weights/ GREEN therabands AND WTS TO 8-24 # for 8-15 reps to progressive overload by increasing resistance once reps progressed to at least 15 reps on at least 2 occasions     Education:   [x] Equipment [place]  [x] Self pulse   [x] Proper use weights/therabands   [x] S/S to report  [x] Low Na Diet    [x] Warm up/ Cool down  [x] BP Medication    [x]RPE Scale   [x] Understand BP   [x] Ex Safety   [x] Exercise specialist class-Home Exercise       Target Goal:   -Individual Exercise Plan  -BP<130/80  -Aerobic active 30 + minutes 5-7 days per week    Nutrition    Stages of Change:   [] pre-contemplation  [x] Action   [] Contemplate   [] Maintainence   [x] Prep   [] Relapse    Lipids: 7/14/21  Total Cholesterol: 120  Triglycerides: 103  HDL: 43  LDL: 56  Lipid Meds: ROSUVASTATIN 40 MG, qHS    Diabetes:  [x] Yes  [] No  FBS: 116  HbA1c: 7.7% ON 7/23  Diabetes Medication:  [] Monitor BS at home   Frequency?: GLIMEPIRIDE 0.5 MG, QD / METFORMIN 1,000 MG, BID / SITAGLIPTIN 100 MG, QD    Weight Management:    Weight:247 LB  Height: 71 IN (3.25 M2)  BMI: 34.6  Wt Goal: 1-2 lbs/wk / LOSS  Alcohol:   Social History     Substance and Sexual Activity   Alcohol Use Yes    Alcohol/week: 8.0 standard drinks    Types: 6 Cans of beer, 2 Shots of liquor per week    Comment: occasional       Diet Assessment Tool: RATE MY PLATE SCORE:  42  Special Diet: Special Diet:  1,800 Kcal / day, 2 GM Na+, 30% total fat, <10% saturated fat, 25-35 GM fiber, cholesterol reduced, balanced nutrition plan to be promoted and taught in rehab      Intervention:   [] Dietitian Consult       [x] Nurse/Patient Discussion     [x] Diet Class           [] Referred to Diabetes Education     Education:  [x] S&S hypo/hyperglycemia  [x] Low fat/low cholesterol diet  [x] Weight loss methods      [x] Relate Diabetes/CAD     [x] Eating heart healthy handout    Target Goal:  -LDL-C<100 if triglycerides are > 200  -LDL-C < 70 for high risk patients  -HbA1c < 7%  -BMI < 25   Education    Stages of Change:    [] pre-contemplation  [x] Action   [] Contemplate   [] Maintainence   [x] Prep [] Relapse    Learning Barriers:   [] Speech   [] Cognitive   [] Literacy   [] Visions   [] Hearing    [x] Ready Learn    Knowledge test score: 100%      Family support: [x] Yes  [] No    Tobacco use:   Social History     Tobacco Use   Smoking Status Former Smoker    Packs/day: 15.00    Years: 24.00    Pack years: 360.00    Quit date: 1996    Years since quittin.6   Smokeless Tobacco Never Used         Intervention:  [] Referred to smoking cessation counselor     [] Individual education and counseling  [] Tobacco adjunct  [] Informed of education class schedule     Education:   [x] Risk Factors/Modifications  [x] Psychological aspects  [x] Angina    [x] Medications  [] CHF      [x] Cardiac A&P    Target Goal:  -Complete cessation of tobacco use (if applicable)  -Continued risk factor modifications  -Recognizing signs/symptoms to report  -Proper use of meds    Psychosocial  Stages of Change:    [] pre-contemplation  [x] Action   [] Contemplate   [] Maintainence   [x] Prep   [] Relapse    Psychosocial Test:  Tool Used: Fly Bhakta Quality of Life  Score: QLI = 21.3  Depression screening score PHQ-9: 10    Intervention:   [] Psych Consult/  [x] Uses stress management skills    [] Physician Referral    [x] Stress management class  Medications:  OXYBUTYNIN 5 MG, qHS    Education:    [x] Coping Techniques   [x] Relaxation techniques   [x] S/S of Depression    Target Goal:  -Assess presence or absence of depression using a valid screening tool. -Maximize coping skills.  -Positive support system.     Preventative Medication:   [x] Aspirin       [x] Beta Blockade      [x] Statin or other lipid lowering agent     [] Clopidogrel   [] ACE RECEPTOR BLOCKER   [] Other anticoagulation medications     Fall Risk assess: [x] Yes  [] No / LOW FALL RISK  Assistive Device:  [] Cane  [] [place] Else [] Wheel Chair  [] Gait belt    Patient/Program goal: \"GET RID OF THIS BIG BELLY AND LOSE WEIGHT\"    - increased stamina/strength to 30-50 total exercise by increasing 1-2 level/wk and 1-2   min/wk  to achieve THR and RPE 12-16 / INCREASE AVG CARDIO FC BY AT LEAST 0.5-1.0 MET IN THE NEXT 30 DAYS AND TOLERATE >31-45 MIN W/IN EX RX TARGETS     - LOSE 2-4 LB BODY WT IN NEXT 30 DAYS     - Introduce 8-12 bilateral UE and LE progressive resistance exercises at 1-3 sets per lift, on 2-3 non-consecutive days using weights/ GREEN therabands AND WTS TO 8-24 # for 10-15 reps to progressive overload by increasing resistance once reps progressed to at least 15 reps on at least 2 occasions     - MAINTAIN OPTIMAL.  BP     - Improved cholesterol and  Triglycerides      - Develop regular exercise 30 min daily, AT LEAST 3-5 DAYS PER WEEK CONSISTENTLY W/IN THE NEXT 30 DAYS     - Lower daily fasting blood glucose score to <131 and random after meal scores to <181 at home     - To reduce self-reported psycho-social feelings of stress in next 30 days     - To eat at least 2 servings of fruit per day and at least 4-5 servings of vegetables per day     Physician Changes/Comments:    Cardiac Rehab Staff:  Dottie DOTSON, MORENO, CEP

## 2021-10-04 ENCOUNTER — HOSPITAL ENCOUNTER (OUTPATIENT)
Dept: CARDIAC REHAB | Age: 66
Setting detail: THERAPIES SERIES
Discharge: HOME | End: 2021-10-04
Payer: MEDICARE

## 2021-10-04 PROCEDURE — 93798 PHYS/QHP OP CAR RHAB W/ECG: CPT

## 2021-10-06 ENCOUNTER — HOSPITAL ENCOUNTER (OUTPATIENT)
Dept: CARDIAC REHAB | Age: 66
Setting detail: THERAPIES SERIES
Discharge: HOME | End: 2021-10-06
Payer: MEDICARE

## 2021-10-06 PROCEDURE — 93798 PHYS/QHP OP CAR RHAB W/ECG: CPT

## 2021-10-08 ENCOUNTER — HOSPITAL ENCOUNTER (OUTPATIENT)
Dept: CARDIAC REHAB | Age: 66
Setting detail: THERAPIES SERIES
Discharge: HOME | End: 2021-10-08
Payer: MEDICARE

## 2021-10-08 PROCEDURE — 93798 PHYS/QHP OP CAR RHAB W/ECG: CPT

## 2021-10-11 ENCOUNTER — HOSPITAL ENCOUNTER (OUTPATIENT)
Dept: CARDIAC REHAB | Age: 66
Setting detail: THERAPIES SERIES
Discharge: HOME | End: 2021-10-11
Payer: MEDICARE

## 2021-10-11 PROCEDURE — 93798 PHYS/QHP OP CAR RHAB W/ECG: CPT

## 2021-10-13 ENCOUNTER — HOSPITAL ENCOUNTER (OUTPATIENT)
Dept: CARDIAC REHAB | Age: 66
Setting detail: THERAPIES SERIES
Discharge: HOME | End: 2021-10-13
Payer: MEDICARE

## 2021-10-13 PROCEDURE — 93798 PHYS/QHP OP CAR RHAB W/ECG: CPT

## 2021-10-15 ENCOUNTER — HOSPITAL ENCOUNTER (OUTPATIENT)
Dept: CARDIAC REHAB | Age: 66
Setting detail: THERAPIES SERIES
Discharge: HOME | End: 2021-10-15
Payer: MEDICARE

## 2021-10-15 PROCEDURE — 93798 PHYS/QHP OP CAR RHAB W/ECG: CPT

## 2021-10-18 ENCOUNTER — HOSPITAL ENCOUNTER (OUTPATIENT)
Dept: CARDIAC REHAB | Age: 66
Setting detail: THERAPIES SERIES
Discharge: HOME | End: 2021-10-18
Payer: MEDICARE

## 2021-10-18 PROCEDURE — 93798 PHYS/QHP OP CAR RHAB W/ECG: CPT

## 2021-10-18 RX ORDER — OXYBUTYNIN CHLORIDE 5 MG/1
TABLET, EXTENDED RELEASE ORAL
Qty: 90 TABLET | Refills: 3 | Status: SHIPPED | OUTPATIENT
Start: 2021-10-18 | End: 2022-08-08 | Stop reason: SDUPTHER

## 2021-10-20 ENCOUNTER — HOSPITAL ENCOUNTER (OUTPATIENT)
Dept: CARDIAC REHAB | Age: 66
Setting detail: THERAPIES SERIES
Discharge: HOME | End: 2021-10-20
Payer: MEDICARE

## 2021-10-20 PROCEDURE — 93798 PHYS/QHP OP CAR RHAB W/ECG: CPT

## 2021-10-22 ENCOUNTER — HOSPITAL ENCOUNTER (OUTPATIENT)
Dept: CARDIAC REHAB | Age: 66
Setting detail: THERAPIES SERIES
Discharge: HOME | End: 2021-10-22
Payer: MEDICARE

## 2021-10-22 PROCEDURE — 93798 PHYS/QHP OP CAR RHAB W/ECG: CPT

## 2021-10-25 ENCOUNTER — HOSPITAL ENCOUNTER (OUTPATIENT)
Dept: CARDIAC REHAB | Age: 66
Setting detail: THERAPIES SERIES
Discharge: HOME | End: 2021-10-25
Payer: MEDICARE

## 2021-10-25 PROCEDURE — 93798 PHYS/QHP OP CAR RHAB W/ECG: CPT

## 2021-10-27 ENCOUNTER — HOSPITAL ENCOUNTER (OUTPATIENT)
Dept: CARDIAC REHAB | Age: 66
Setting detail: THERAPIES SERIES
Discharge: HOME | End: 2021-10-27
Payer: MEDICARE

## 2021-10-27 PROCEDURE — 93798 PHYS/QHP OP CAR RHAB W/ECG: CPT

## 2021-10-28 RX ORDER — PERPHENAZINE 16 MG/1
TABLET, FILM COATED ORAL
COMMUNITY
Start: 2021-10-14 | End: 2022-10-04 | Stop reason: SDUPTHER

## 2021-10-28 NOTE — PROGRESS NOTES
Phase II Cardiac Rehab Individualized Treatment Plan-30 Day      Patient Name: Julio Kaur  ACCOUNT #: [de-identified]   Date of Initial Assessment: 9/29/21  Diagnosis: coronary artery bypass grafting  Onset Date: 8/4/21  Referring Physician: DR. Krysta Barros  Risk Stratification: HIGH  Session Number: 13   EXERCISE    Stages of Change:   [] pre-contemplation  [x] Action   [] Contemplate   [] Maintainence   [] Prep   [] Relapse          Exercise Prescription:  Mode: [x] TM [x] UBE [x] STP [] EL [x] R  Frequency: 3 DAYS PER WEEK  Duration: 31-60 MINUTES  Intensity: 3.7 avg mets  Plan/Goal: Increase 1-2 levels/week or 1-2 min/week to achieve target HR and RPE   12-16. Progression: INCREASE DURATION PER ABOVE F.I.T.T. Rx  PARAMETERS ON AVG OF 5-10 MIN / 1-2 WEEKS FOR THE FIRST 4-6 WEEKS. AFTER 3-4 WEEKS ARE COMPLETED, CONTINUE TO GRADUALLY INCREASE F. I.T. PARAMETERS GRADUALLY UPWARD AT THE ESTABLISHED DURATION ON AVERAGE 0.5-1.0 METS PER 30 DAYS OVER THE COARSE OF REMAINING PROGRAM AS ESTABLISHED BY PT-CENTERED GOALS AND GUIDELINES. Target HR 84 - 96 bpm      [] Angina with Exertion    [x] Resistance Training  introduce 8-12 bilateral UE and LE progressive resistance exercises at 1-3 sets per lift, on 2-3 non-consecutive days using weights/ green  therabands AND WTS TO 8-24 # for 8-15 reps to progressive overload by increasing resistance once reps progressed to at least 15 reps on at least 2 occasions       Hypertension:  [x] Yes  [] No  Resting BP: 132/64  Peak Exercise BP: 148/64  [] Med change?     Intervention:  Home Exercise:  Type: walking  Duration: 30-60 MINUTES  Frequency: 2-3 DAYS PER WEEK   [x] Resistance Training  introduce 8-12 bilateral UE and LE progressive resistance exercises at 1-3 sets per lift, on 2-3 non-consecutive days using weights/ green  therabands AND WTS TO 8-24 # for 8-15 reps to progressive overload by increasing resistance once reps progressed to at least 15 reps on at least 2 occasions     Education:   [x] Equipment [place]  [x] Self pulse   [x] Proper use weights/therabands   [x] S/S to report  [x] Low Na Diet    [x] Warm up/ Cool down  [x] BP Medication    [x] RPE Scale   [x] Understand BP   [x] Ex Safety   [x] Exercise specialist class-Home Exercise       Target Goal:   -Individual Exercise Plan  -Bp<130/80  -Aerobic active 30 + minutes 5-7 days per week    Nutrition    Stages of Change:   [] pre-contemplation  [x] Action   [] Contemplate   [] Maintainence   [] Prep   [] Relapse    Lipids: NO NEW VALUES  Total Cholesterol:   Triglycerides:   HDL:   LDL:   [] Med Change?      Diabetes:  [x] Yes  [] No  Random Bs: 98      Weight Management:  Current Wt 249.6 LB  Wt Goal: 1-2 lbs/wk    Intervention:   [] Dietitian Consult       [x] Nurse/Patient Discussion     [x] Diet Class           [] Referred to Diabetes Education     Education:  [] S&S hypo/hyperglycemia  [x] Low fat/low cholesterol diet  [x] Weight loss methods      [] Relate Diabetes/CAD     [x] Eating heart healthy handout    Target Goal:  -LDL-C<100 if triglycerides are > 200  -LDL-C < 70 for high risk patients  -HbA1c < 7%  -BMI < 25   Education    Stages of Change:    [] pre-contemplation  [x] Action   [] Contemplate   [] Maintainence   [] Prep   [] Relapse    Family support: [x] Yes  [] No    Tobacco use: [] Yes  [x] No    Intervention:  [] Referred to smoking cessation counselor     [] Individual education and counseling  [] Tobacco adjunct  [] Informed of education class schedule     Education:   [x] Risk Factors/Modifications  [x] Psychological aspects  [x] Angina    [x] Medications  [] CHF      [x] Cardiac A&P    Target Goal:  -Complete cessation of tobacco use (if applicable)  -Continued risk factor modifications  -Recognizing signs/symptoms to report  -Proper use of meds    Psychosocial  Stages of Change:    [] pre-contemplation  [x] Action   [] Contemplate   [] Maintainence   [] Prep   [] Relapse      Intervention:   [] Psych Consult/  [x] Uses stress management skills    [] Physician Referral    [x] Stress management class   [] Med Change? Education:    [x] Coping Techniques   [x] Relaxation techniques   [x] S/S of Depression    Target Goal:  -Assess presence or absence of depression using a valid screening tool. -Maximize coping skills.  -Positive support system. Preventative Medication:   [x] Aspirin       [x] Beta Blockade      [x] Statin or other lipid lowering agent     [] Clopidogrel   [] ACE Inhibitor   [] Other anticoagulation medications     Fall Risk assess: [x] Yes  [] No / REMAINS LOW RISK  Assistive Device:   [] Cane  [] [place] [] Wheel Chair  [] Gait belt    Patient/Program goal:   \"GET RID OF THIS BIG BELLY AND LOSE WEIGHT\"     - increased stamina/strength to 30-50 total exercise by increasing 1-2 level/wk and 1-2   min/wk  to achieve THR and RPE 12-16 / INCREASE AVG CARDIO FC BY AT LEAST 0.5-1.0 MET IN THE NEXT 30 DAYS AND TOLERATE >31-45 MIN W/IN EX RX TARGETS / MEETING GOAL / AVG FC INCREASED TO 3.7 METS / CONTINUE TO PROGRESS GOAL AS WRITTEN OVER THE NEXT 30 DAYS     - LOSE 2-4 LB BODY WT IN NEXT 30 DAYS / NOT MEETING GOAL / PT WEIGHT UP FROM 246 LB .6 LB / CONTINUE TO PROGRESS GOAL AS WRITTEN OVER THE NEXT 30 DAYS      - Introduce 8-12 bilateral UE and LE progressive resistance exercises at 1-3 sets per lift, on 2-3 non-consecutive days using weights/ GREEN therabands AND WTS TO 8-24 # for 10-15 reps to progressive overload by increasing resistance once reps progressed to at least 15 reps on at least 2 occasions / Lakeisha Sutton / CONTINUE TO PROGRESS GOAL AS WRITTEN OVER THE NEXT 30 [address] [place] [address].  BP / MEETING GOAL / LAST BP /64 / CONTINUE TO PROGRESS GOAL AS WRITTEN OVER THE NEXT 30 DAYS     - Improved cholesterol and  Triglycerides  /  NO NEW VALUES     - Develop regular exercise 30 min daily, AT LEAST 3-5 DAYS PER WEEK CONSISTENTLY W/IN THE NEXT 30 DAYS / MEETING GOAL  IN REHAB / CONTINUE TO PROGRESS GOAL AS WRITTEN OVER THE NEXT 30 DAYS     - Lower daily fasting blood glucose score to <131 and random after meal scores to <181 at home / Katerin Kitchen / Kayce Lujan WAS 98 / [place] 459 NEXT 30 DAYS     - To reduce self-reported psycho-social feelings of stress in next 30 days / NO NEW SURVEYS COMPLETED     - To eat at least 2 servings of fruit per day and at least 4-5 servings of vegetables per day / NO [address]     Physician Changes/Comments:    Electronically signed by Kerri Polk RCP on 10/28/21 at 7:07 AM EDT  Cardiac Rehab Staff

## 2021-10-29 ENCOUNTER — HOSPITAL ENCOUNTER (OUTPATIENT)
Dept: CARDIAC REHAB | Age: 66
Setting detail: THERAPIES SERIES
Discharge: HOME | End: 2021-10-29
Payer: MEDICARE

## 2021-10-29 ENCOUNTER — OFFICE VISIT (OUTPATIENT)
Dept: FAMILY MEDICINE CLINIC | Age: 66
End: 2021-10-29
Payer: MEDICARE

## 2021-10-29 VITALS
BODY MASS INDEX: 34.67 KG/M2 | DIASTOLIC BLOOD PRESSURE: 84 MMHG | OXYGEN SATURATION: 98 % | HEART RATE: 75 BPM | RESPIRATION RATE: 16 BRPM | SYSTOLIC BLOOD PRESSURE: 130 MMHG | WEIGHT: 256 LBS | HEIGHT: 72 IN

## 2021-10-29 DIAGNOSIS — I25.10 CORONARY ARTERY DISEASE INVOLVING NATIVE CORONARY ARTERY OF NATIVE HEART WITHOUT ANGINA PECTORIS: ICD-10-CM

## 2021-10-29 DIAGNOSIS — Z95.1 S/P CABG (CORONARY ARTERY BYPASS GRAFT): ICD-10-CM

## 2021-10-29 DIAGNOSIS — G47.33 OSA TREATED WITH BIPAP: ICD-10-CM

## 2021-10-29 DIAGNOSIS — E11.9 TYPE 2 DIABETES MELLITUS WITHOUT COMPLICATION, WITHOUT LONG-TERM CURRENT USE OF INSULIN (HCC): Primary | ICD-10-CM

## 2021-10-29 LAB — HBA1C MFR BLD: 5.9 %

## 2021-10-29 PROCEDURE — G8484 FLU IMMUNIZE NO ADMIN: HCPCS | Performed by: FAMILY MEDICINE

## 2021-10-29 PROCEDURE — 1123F ACP DISCUSS/DSCN MKR DOCD: CPT | Performed by: FAMILY MEDICINE

## 2021-10-29 PROCEDURE — 1036F TOBACCO NON-USER: CPT | Performed by: FAMILY MEDICINE

## 2021-10-29 PROCEDURE — G8427 DOCREV CUR MEDS BY ELIG CLIN: HCPCS | Performed by: FAMILY MEDICINE

## 2021-10-29 PROCEDURE — 99214 OFFICE O/P EST MOD 30 MIN: CPT | Performed by: FAMILY MEDICINE

## 2021-10-29 PROCEDURE — 3044F HG A1C LEVEL LT 7.0%: CPT | Performed by: FAMILY MEDICINE

## 2021-10-29 PROCEDURE — 93798 PHYS/QHP OP CAR RHAB W/ECG: CPT

## 2021-10-29 PROCEDURE — 83036 HEMOGLOBIN GLYCOSYLATED A1C: CPT | Performed by: FAMILY MEDICINE

## 2021-10-29 PROCEDURE — 2022F DILAT RTA XM EVC RTNOPTHY: CPT | Performed by: FAMILY MEDICINE

## 2021-10-29 PROCEDURE — G8417 CALC BMI ABV UP PARAM F/U: HCPCS | Performed by: FAMILY MEDICINE

## 2021-10-29 PROCEDURE — 3017F COLORECTAL CA SCREEN DOC REV: CPT | Performed by: FAMILY MEDICINE

## 2021-10-29 PROCEDURE — 4040F PNEUMOC VAC/ADMIN/RCVD: CPT | Performed by: FAMILY MEDICINE

## 2021-10-29 NOTE — PROGRESS NOTES
Name: Gunner Syed  : 1955         Chief Complaint:     Chief Complaint   Patient presents with    Diabetes     3 mos check up       History of Present Illness:      Gunner Syed is a 74 y.o.  male who presents with Diabetes (3 mos check up)      HPI    CAD status post CABG in August.  Doing cardiac rehab which seems to be doing well. Admits not very active at home. Taking meds as prescribed and feeling well, no chest pain or shortness of breath. F/u DM. Saw Dr Carol Ann Johnson while in the hospital and has been checking sugar BID since CABG. Readings controlled, 100-120 in the morning. Has cut way back on sugar intake. No hypoglycemia. AN on BiPAP, seems like pressure goes up too high sometimes, also has trouble with the straps not staying in right place on his head. Some air leak. Had tried CPAP in the past but didn't like the constant flow. AN symptoms are still helped by use of the device, better in terms of daytime sleepiness and quality of his sleep. Medical History:     Patient Active Problem List   Diagnosis    Essential hypertension, benign    Type 2 diabetes mellitus without complication, without long-term current use of insulin (HCC)    Varicose veins of legs    Tubular adenoma of colon    BPH with obstruction/lower urinary tract symptoms    Diverticulosis of large intestine without hemorrhage    Coronary artery disease involving native coronary artery of native heart without angina pectoris    S/P CABG (coronary artery bypass graft)    AN treated with BiPAP       Medications:       Prior to Admission medications    Medication Sig Start Date End Date Taking?  Authorizing Provider   oxybutynin (DITROPAN-XL) 5 MG extended release tablet TAKE 1 TABLET BY MOUTH EVERY DAY IN THE EVENING 10/18/21  Yes Tejinder Sandhu PA-C   bisacodyl (DULCOLAX) 10 MG suppository Place 10 mg rectally daily 8/10/21  Yes Historical Provider, MD   acetaminophen (TYLENOL) 325 MG tablet Take 650 mg by mouth every 4 hours as needed 8/10/21  Yes Historical Provider, MD   losartan (COZAAR) 25 MG tablet Take 25 mg by mouth Daily with lunch 8/10/21 12/8/21 Yes Historical Provider, MD   glimepiride (AMARYL) 1 MG tablet Take 0.5 mg by mouth daily (with breakfast) 8/11/21  Yes Historical Provider, MD   rosuvastatin (CRESTOR) 40 MG tablet Take 1 tablet by mouth every evening 9/13/21  Yes Mala Whitten MD   tamsulosin (FLOMAX) 0.4 MG capsule TAKE 1 CAPSULE BY MOUTH TWICE A DAY 7/14/21  Yes Tejinder Sandhu PA-C   aspirin 81 MG EC tablet Take 81 mg by mouth daily   Yes Historical Provider, MD   metoprolol succinate (TOPROL XL) 25 MG extended release tablet Take 1 tablet by mouth daily 6/23/21  Yes Speedy Lau DO   metFORMIN (GLUCOPHAGE) 1000 MG tablet 1 by mouth twice a day with meals 6/15/21  Yes Speedy Lau DO   dilTIAZem (CARDIZEM CD) 360 MG extended release capsule 1 by mouth daily 6/15/21  Yes Speedy Lau DO   triamcinolone (ARISTOCORT) 0.5 % cream APPLY THIN COAT TO AFFECTED AREA TWICE A DAY 5/30/21  Yes Historical Provider, MD   SITagliptin (JANUVIA) 100 MG tablet TAKE 1 TABLET BY MOUTH EVERY DAY 4/8/21  Yes Speedy Lau DO   CONTOUR NEXT TEST strip  10/14/21   Historical Provider, MD   OXYGEN Inhale into the lungs    Historical Provider, MD   oxyCODONE-acetaminophen (PERCOCET) 5-325 MG per tablet TAKE 1 TABLET BY MOUTH EVERY 6 HOURS AS NEEDED . Patient not taking: Reported on 9/13/2021 8/10/21   Historical Provider, MD   Blood Glucose Monitoring Suppl (CONTOUR NEXT MONITOR) w/Device KIT TEST TWICE A DAY BEFORE BREAKFAST AND DINNER 8/11/21   Historical Provider, MD   Lancets MISC Use to check BG before breakfast and dinner   Dx E11.65  For Contour next meter . 8/10/21   Historical Provider, MD        Allergies:       Patient has no known allergies.     Physical Exam:     Vitals:  /84   Pulse 75   Resp 16   Ht 6' (1.829 m)   Wt 256 lb (116.1 kg)   SpO2 98%   BMI 34.72 kg/m² Physical Exam  Constitutional:       Appearance: Normal appearance. He is well-developed. He is not ill-appearing. Cardiovascular:      Rate and Rhythm: Normal rate and regular rhythm. Heart sounds: Normal heart sounds. Comments: Healed scars noted on chest from sternotomy and chest drains  Pulmonary:      Effort: Pulmonary effort is normal.      Breath sounds: Normal breath sounds. Neurological:      Mental Status: He is alert. Psychiatric:         Mood and Affect: Mood normal.         Behavior: Behavior normal.         Data:     Lab Results   Component Value Date     07/14/2021    K 4.4 07/14/2021     07/14/2021    CO2 26 07/14/2021    BUN 11 07/26/2021    CREATININE 0.79 07/26/2021    GLUCOSE 181 07/14/2021    PROT 7.3 07/14/2021    LABALBU 4.0 07/14/2021    BILITOT 0.49 07/14/2021    ALKPHOS 61 07/14/2021    AST 44 07/14/2021    ALT 54 07/14/2021     Lab Results   Component Value Date    WBC 6.6 07/14/2021    RBC 4.84 07/14/2021    HGB 15.2 07/14/2021    HCT 44.8 07/14/2021    MCV 92.5 07/14/2021    MCH 31.3 07/14/2021    MCHC 33.9 07/14/2021    RDW 14.1 07/14/2021     07/14/2021    MPV NOT REPORTED 07/14/2021     Lab Results   Component Value Date    TSH 1.56 06/28/2021     Lab Results   Component Value Date    CHOL 120 07/14/2021    HDL 43 07/14/2021    PSA 0.34 05/27/2021    LABA1C 5.9 10/29/2021         Assessment & Plan:        Diagnosis Orders   1. Type 2 diabetes mellitus without complication, without long-term current use of insulin ([facility])  POCT glycosylated hemoglobin (Hb A1C)   2. AN treated with BiPAP     3. Coronary artery disease involving native coronary artery of native heart without angina pectoris     4. S/P CABG (coronary artery bypass graft)     Diabetes very well controlled. He ask about changing meds due to cost of Januvia. I advised that we can stop Januvia and increase glimepiride to 1 mg daily, continue Metformin, and work on diet and exercise.

## 2021-11-01 ENCOUNTER — HOSPITAL ENCOUNTER (OUTPATIENT)
Dept: CARDIAC REHAB | Age: 66
Setting detail: THERAPIES SERIES
Discharge: HOME | End: 2021-11-01
Payer: MEDICARE

## 2021-11-01 PROBLEM — I25.10 CORONARY ARTERY DISEASE INVOLVING NATIVE CORONARY ARTERY OF NATIVE HEART WITHOUT ANGINA PECTORIS: Status: ACTIVE | Noted: 2021-11-01

## 2021-11-01 PROBLEM — Z95.1 S/P CABG (CORONARY ARTERY BYPASS GRAFT): Status: ACTIVE | Noted: 2021-11-01

## 2021-11-01 PROBLEM — G47.33 OSA TREATED WITH BIPAP: Status: ACTIVE | Noted: 2021-11-01

## 2021-11-01 PROCEDURE — 93798 PHYS/QHP OP CAR RHAB W/ECG: CPT

## 2021-11-02 NOTE — PROGRESS NOTES
Patient notified to call Tawny Guillen [phone] to get a drop off time to download his data card from [employer]

## 2021-11-03 ENCOUNTER — HOSPITAL ENCOUNTER (OUTPATIENT)
Dept: CARDIAC REHAB | Age: 66
Setting detail: THERAPIES SERIES
Discharge: HOME | End: 2021-11-03
Payer: MEDICARE

## 2021-11-03 PROCEDURE — 93798 PHYS/QHP OP CAR RHAB W/ECG: CPT

## 2021-11-05 ENCOUNTER — HOSPITAL ENCOUNTER (OUTPATIENT)
Dept: CARDIAC REHAB | Age: 66
Setting detail: THERAPIES SERIES
Discharge: HOME | End: 2021-11-05
Payer: MEDICARE

## 2021-11-05 PROCEDURE — 93798 PHYS/QHP OP CAR RHAB W/ECG: CPT

## 2021-11-08 ENCOUNTER — HOSPITAL ENCOUNTER (OUTPATIENT)
Dept: CARDIAC REHAB | Age: 66
Setting detail: THERAPIES SERIES
Discharge: HOME | End: 2021-11-08
Payer: MEDICARE

## 2021-11-08 PROCEDURE — 93798 PHYS/QHP OP CAR RHAB W/ECG: CPT

## 2021-11-10 ENCOUNTER — HOSPITAL ENCOUNTER (OUTPATIENT)
Dept: CARDIAC REHAB | Age: 66
Setting detail: THERAPIES SERIES
Discharge: HOME | End: 2021-11-10
Payer: MEDICARE

## 2021-11-10 PROCEDURE — 93798 PHYS/QHP OP CAR RHAB W/ECG: CPT

## 2021-11-12 ENCOUNTER — HOSPITAL ENCOUNTER (OUTPATIENT)
Dept: CARDIAC REHAB | Age: 66
Setting detail: THERAPIES SERIES
Discharge: HOME | End: 2021-11-12
Payer: MEDICARE

## 2021-11-12 PROCEDURE — 93798 PHYS/QHP OP CAR RHAB W/ECG: CPT

## 2021-11-15 ENCOUNTER — HOSPITAL ENCOUNTER (OUTPATIENT)
Dept: CARDIAC REHAB | Age: 66
Setting detail: THERAPIES SERIES
Discharge: HOME | End: 2021-11-15
Payer: MEDICARE

## 2021-11-15 PROCEDURE — 93798 PHYS/QHP OP CAR RHAB W/ECG: CPT

## 2021-11-17 ENCOUNTER — HOSPITAL ENCOUNTER (OUTPATIENT)
Dept: CARDIAC REHAB | Age: 66
Setting detail: THERAPIES SERIES
Discharge: HOME | End: 2021-11-17
Payer: MEDICARE

## 2021-11-17 PROCEDURE — 93798 PHYS/QHP OP CAR RHAB W/ECG: CPT

## 2021-11-19 ENCOUNTER — HOSPITAL ENCOUNTER (OUTPATIENT)
Dept: CARDIAC REHAB | Age: 66
Setting detail: THERAPIES SERIES
Discharge: HOME | End: 2021-11-19
Payer: MEDICARE

## 2021-11-19 PROCEDURE — 93798 PHYS/QHP OP CAR RHAB W/ECG: CPT

## 2021-11-22 ENCOUNTER — HOSPITAL ENCOUNTER (OUTPATIENT)
Dept: CARDIAC REHAB | Age: 66
Setting detail: THERAPIES SERIES
Discharge: HOME | End: 2021-11-22
Payer: MEDICARE

## 2021-11-22 PROCEDURE — 93798 PHYS/QHP OP CAR RHAB W/ECG: CPT

## 2021-11-24 ENCOUNTER — HOSPITAL ENCOUNTER (OUTPATIENT)
Dept: CARDIAC REHAB | Age: 66
Setting detail: THERAPIES SERIES
Discharge: HOME | End: 2021-11-24
Payer: MEDICARE

## 2021-11-24 PROCEDURE — 93798 PHYS/QHP OP CAR RHAB W/ECG: CPT

## 2021-11-24 RX ORDER — LOSARTAN POTASSIUM 25 MG/1
25 TABLET ORAL
Qty: 90 TABLET | Refills: 1 | Status: SHIPPED | OUTPATIENT
Start: 2021-11-24 | End: 2022-05-18

## 2021-11-24 NOTE — PROGRESS NOTES
on at least 2 occasions     Education:   [x] Equipment [place]  [x] Self pulse   [x] Proper use weights/therabands   [x] S/S to report  [x] Low Na Diet    [x] Warm up/ Cool down  [x] BP Medication    [x] RPE Scale   [x] Understand BP   [x] Ex Safety   [x] Exercise specialist class-Home Exercise       Target Goal:   -Individual Exercise Plan  -Bp<130/80  -Aerobic active 30 + minutes 5-7 days per week    Nutrition    Stages of Change:   [] pre-contemplation  [x] Action   [] Contemplate   [] Maintainence   [] Prep   [] Relapse    Lipids:   NO NEW VALUES  [] Med Change? Diabetes:  [x] Yes  [] No  Random Bs: 113  HbA1c:7.7% ON 7/23   [] Med Change?     Weight Management:  Current Wt  253 LB  Wt Goal: 1-2 lbs/wk / LOSS    Intervention:   [] Dietitian Consult       [x] Nurse/Patient Discussion     [x] Diet Class           [] Referred to Diabetes Education     Education:  [x] S&S hypo/hyperglycemia  [x] Low fat/low cholesterol diet  [x] Weight loss methods      [x] Relate Diabetes/CAD     [x] Eating heart healthy handout    Target Goal:  -LDL-C<100 if triglycerides are > 200  -LDL-C < 70 for high risk patients  -HbA1c < 7%  -BMI < 25   Education    Stages of Change:    [] pre-contemplation  [x] Action   [] Contemplate   [] Maintainence   [] Prep   [] Relapse    Family support: [x] Yes  [] No    Tobacco use: [] Yes  [x] No    Intervention:  [] Referred to smoking cessation counselor     [] Individual education and counseling  [] Tobacco adjunct  [] Informed of education class schedule     Education:   [x] Risk Factors/Modifications  [x] Psychological aspects  [x] Angina    [x] Medications  [] CHF      [x] Cardiac A&P    Target Goal:  -Complete cessation of tobacco use (if applicable)  -Continued risk factor modifications  -Recognizing signs/symptoms to report  -Proper use of meds    Psychosocial  Stages of Change:    [] pre-contemplation  [x] Action   [] Contemplate   [] Maintainence   [] Prep   [] Relapse      Intervention:   [] Psych Consult/  [x] Uses stress management skills    [] Physician Referral    [x] Stress management class   [] Med Change? Education:    [x] Coping Techniques   [x] Relaxation techniques   [x] S/S of Depression    Target Goal:  -Assess presence or absence of depression using a valid screening tool. -Maximize coping skills.  -Positive support system. Preventative Medication:   [x] Aspirin       [x] Beta Blockade      [x] Statin or other lipid lowering agent     [] Clopidogrel   [] ACE Inhibitor   [] Other anticoagulation medications     Fall Risk assess: [x] Yes  [] No / REMAINS LOW RISK  Assistive Device:   [] Cane  [] Walker [] Wheel Chair  [] Gait belt    Patient/Program goal:    \"GET RID OF THIS BIG BELLY AND LOSE WEIGHT\" / GAINED WT / CONTINUE TO EDUCATE AND REINFORCE SMART GOALS     - increased stamina/strength to 30-50 total exercise by increasing 1-2 level/wk and 1-2   min/wk  to achieve THR and RPE 12-16 / INCREASE AVG CARDIO FC BY AT LEAST 0.5-1.0 MET IN THE NEXT 30 DAYS AND TOLERATE >31-45 MIN W/IN EX RX TARGETS / MEETING GOAL / AVG FC INCREASED TO 3.7 METS / CONTINUE TO PROGRESS GOAL AS WRITTEN OVER THE NEXT 30 DAYS / AVG CARDIO IMPROVED FROM 2.4 METS INITIALLY TO 2.9 METS PRESENTLY / MEETING GOAL LAST 30 DAYS--CONTINUE PROGRESSING GOAL AS WRITTEN NEXT 30 DAYS     - LOSE 2-4 LB BODY WT IN NEXT 30 DAYS / NOT MEETING GOAL / PT WEIGHT UP FROM 246 LB .6 LB / CONTINUE TO EDUCATE AND REINFORCE SMART GOALS      - PROGRESS 8-12 bilateral UE and LE progressive resistance exercises at 1-3 sets per lift, on 2-3 non-consecutive days using weights/ GREEN therabands AND WTS TO 8-24 # for 10-15 reps to progressive overload by increasing resistance once reps progressed to at least 15 reps on at least 2 occasions / Michele Kothari / CONTINUE TO PROGRESS GOAL AS WRITTEN OVER THE NEXT 30 [address] [facility].  BP / MEETING GOAL / LAST BP /72 CONTINUE TO PROGRESS GOAL AS WRITTEN OVER THE NEXT 30 DAYS     - Improved cholesterol and  Triglycerides  /  NO NEW VALUES     - Develop regular exercise 30 min daily, AT LEAST 3-5 DAYS PER WEEK CONSISTENTLY W/IN THE NEXT 30 DAYS / MEETING GOAL  IN REHAB / CONTINUE TO PROGRESS GOAL AS WRITTEN OVER THE NEXT 30 DAYS     - Lower daily fasting blood glucose score to <131 and random after meal scores to <181 at home / MEETING GOAL / LAST MORNING SUGAR  / CONTINUE TO PROGRESS GOAL AS WRITTEN OVER THE NEXT 30 DAYS     - To reduce self-reported psycho-social feelings of stress in next 30 days / NO NEW SURVEYS COMPLETED     - To eat at least 2 servings of fruit per day and at least 4-5 servings of vegetables per day / NO NEW SURVEYS COMPLETED      Physician Changes/Comments:    Electronically signed by Allan Cash RN on 11/24/21 at 1:48 PM EST  Cardiac Rehab Staff

## 2021-11-24 NOTE — TELEPHONE ENCOUNTER
Losartan potassium 25 mg    [facility] said he got this from a Dr. Trinh Clarke but no longer sees him. Should he still continue this medication? Health Maintenance   Topic Date Due    DTaP/Tdap/Td vaccine (1 - Tdap) Never done    Shingles Vaccine (1 of 2) Never done    Diabetic retinal exam  12/07/2021    Diabetic microalbuminuria test  01/27/2022    Diabetic foot exam  01/31/2022    Lipid screen  07/14/2022    Potassium monitoring  07/14/2022    Creatinine monitoring  07/26/2022    Annual Wellness Visit (AWV)  07/31/2022    A1C test (Diabetic or Prediabetic)  10/29/2022    Colon cancer screen colonoscopy  03/01/2029    Flu vaccine  Completed    Pneumococcal 65+ years Vaccine  Completed    COVID-19 Vaccine  Completed    AAA screen  Completed    Hepatitis C screen  Completed    Hepatitis A vaccine  Aged Out    Hib vaccine  Aged Out    Meningococcal (ACWY) vaccine  Aged Out             (applicable per patient's age: Cancer Screenings, Depression Screening, Fall Risk Screening, Immunizations)    Hemoglobin A1C (%)   Date Value   10/29/2021 5.9   01/27/2021 7.4 (H)   07/27/2020 7.0     Microalb/Crt.  Ratio (mcg/mg creat)   Date Value   01/27/2021 8     LDL Cholesterol (mg/dL)   Date Value   07/14/2021 56     AST (U/L)   Date Value   07/14/2021 44 (H)     ALT (U/L)   Date Value   07/14/2021 54 (H)     BUN (mg/dL)   Date Value   07/26/2021 11      (goal A1C is < 7)   (goal LDL is <100) need 30-50% reduction from baseline     BP Readings from Last 3 Encounters:   10/29/21 130/84   09/13/21 100/62   09/13/21 120/64    (goal /80)      All Future Testing planned in [facility]:  Lab Frequency Next Occurrence   PSA screening Once 06/10/2022   XR ABDOMEN (KUB) (SINGLE AP VIEW) Once 06/10/2022   CBC Auto Differential Once 12/13/2021   Comprehensive Metabolic Panel Once 21/48/5576   Vitamin D 25 Hydroxy Once 12/13/2021   Lipid Panel Once 12/13/2021   TSH with Reflex Once 12/13/2021   Magnesium Once 12/13/2021   EKG 12 Lead Once 12/13/2021   Hemoglobin A1C Once 12/13/2021       Next Visit Date:  Future Appointments   Date Time Provider Lashay Rubio   11/24/2021 11:00 AM Osvaldo George Nadege   11/26/2021 11:00 AM [address] [facility] Abraham Mcconnell   11/29/2021 11:00  [address] ARIELA Ocampo   12/1/2021 11:00  [address] Dr ARIELA Ocampo   12/3/2021 11:00  [facility] Dr ARIELA Ocampo   12/6/2021 11:00  [address] Dr ARIELA Ocampo   12/8/2021 11:00  [address] ARIELA Ocampo   12/10/2021 11:00  [address] ARIELA Ocampo   12/13/2021 11:00  [address] Dr ARIELA Ocampo   12/15/2021 11:00  [address] Dr ARIELA Ocampo   12/17/2021 11:00  [address] Dr ARIELA Ocampo   12/20/2021 10:30 AM Mills Lowers, MD Venus Bosworth [facility]   12/20/2021 11:00  [address] Dr ARIELA Ocampo   12/22/2021 11:00  [address] Dr ARIELA Mcconnell   12/27/2021 11:00  [address] Dr ARIELA Ocampo   12/29/2021 11:00 [facility]   1/31/2022  1:40 PM DO Kaylee Ramirez [facility]            Patient Active Problem List:     Essential hypertension, benign     Type 2 diabetes mellitus without complication, without long-term current use of insulin ([address]     Varicose veins of legs     Tubular adenoma of colon     BPH with obstruction/lower urinary tract symptoms     Diverticulosis of large intestine without hemorrhage     Coronary artery disease involving native coronary artery of native heart without angina pectoris     S/P CABG (coronary artery bypass graft)     AN treated with BiPAP

## 2021-11-26 ENCOUNTER — HOSPITAL ENCOUNTER (OUTPATIENT)
Dept: CARDIAC REHAB | Age: 66
Setting detail: THERAPIES SERIES
Discharge: HOME | End: 2021-11-26
Payer: MEDICARE

## 2021-11-26 PROCEDURE — 93798 PHYS/QHP OP CAR RHAB W/ECG: CPT

## 2021-11-29 ENCOUNTER — HOSPITAL ENCOUNTER (OUTPATIENT)
Dept: CARDIAC REHAB | Age: 66
Setting detail: THERAPIES SERIES
Discharge: HOME | End: 2021-11-29
Payer: MEDICARE

## 2021-11-29 PROCEDURE — 93798 PHYS/QHP OP CAR RHAB W/ECG: CPT

## 2021-12-01 ENCOUNTER — HOSPITAL ENCOUNTER (OUTPATIENT)
Dept: CARDIAC REHAB | Age: 66
Setting detail: THERAPIES SERIES
Discharge: HOME | End: 2021-12-01
Payer: MEDICARE

## 2021-12-01 PROCEDURE — 93798 PHYS/QHP OP CAR RHAB W/ECG: CPT

## 2021-12-03 ENCOUNTER — HOSPITAL ENCOUNTER (OUTPATIENT)
Dept: CARDIAC REHAB | Age: 66
Setting detail: THERAPIES SERIES
Discharge: HOME | End: 2021-12-03
Payer: MEDICARE

## 2021-12-03 PROCEDURE — 93798 PHYS/QHP OP CAR RHAB W/ECG: CPT

## 2021-12-06 ENCOUNTER — HOSPITAL ENCOUNTER (OUTPATIENT)
Dept: CARDIAC REHAB | Age: 66
Setting detail: THERAPIES SERIES
Discharge: HOME | End: 2021-12-06
Payer: MEDICARE

## 2021-12-06 PROCEDURE — 93798 PHYS/QHP OP CAR RHAB W/ECG: CPT

## 2021-12-08 ENCOUNTER — HOSPITAL ENCOUNTER (OUTPATIENT)
Dept: CARDIAC REHAB | Age: 66
Setting detail: THERAPIES SERIES
End: 2021-12-08
Payer: MEDICARE

## 2021-12-10 ENCOUNTER — HOSPITAL ENCOUNTER (OUTPATIENT)
Dept: CARDIAC REHAB | Age: 66
Setting detail: THERAPIES SERIES
Discharge: HOME | End: 2021-12-10
Payer: MEDICARE

## 2021-12-10 PROCEDURE — 93798 PHYS/QHP OP CAR RHAB W/ECG: CPT

## 2021-12-10 RX ORDER — METOPROLOL SUCCINATE 25 MG/1
TABLET, EXTENDED RELEASE ORAL
Qty: 90 TABLET | Refills: 1 | Status: SHIPPED | OUTPATIENT
Start: 2021-12-10 | End: 2022-06-14

## 2021-12-13 ENCOUNTER — HOSPITAL ENCOUNTER (OUTPATIENT)
Dept: CARDIAC REHAB | Age: 66
Setting detail: THERAPIES SERIES
Discharge: HOME | End: 2021-12-13
Payer: MEDICARE

## 2021-12-13 ENCOUNTER — HOSPITAL ENCOUNTER (OUTPATIENT)
Age: 66
Discharge: HOME | End: 2021-12-13
Payer: MEDICARE

## 2021-12-13 DIAGNOSIS — I10 ESSENTIAL HYPERTENSION: ICD-10-CM

## 2021-12-13 DIAGNOSIS — E11.9 TYPE 2 DIABETES MELLITUS WITHOUT COMPLICATION, WITHOUT LONG-TERM CURRENT USE OF INSULIN (HCC): ICD-10-CM

## 2021-12-13 DIAGNOSIS — Z95.1 S/P CABG (CORONARY ARTERY BYPASS GRAFT): ICD-10-CM

## 2021-12-13 DIAGNOSIS — E78.5 HYPERLIPIDEMIA, UNSPECIFIED HYPERLIPIDEMIA TYPE: ICD-10-CM

## 2021-12-13 DIAGNOSIS — E55.9 VITAMIN D DEFICIENCY: ICD-10-CM

## 2021-12-13 LAB
ABSOLUTE EOS #: 0.9 K/UL (ref 0–0.4)
ABSOLUTE IMMATURE GRANULOCYTE: ABNORMAL K/UL (ref 0–0.3)
ABSOLUTE LYMPH #: 1.5 K/UL (ref 1–4.8)
ABSOLUTE MONO #: 0.7 K/UL (ref 0–1)
ALBUMIN SERPL-MCNC: 3.9 G/DL (ref 3.5–5.2)
ALBUMIN/GLOBULIN RATIO: ABNORMAL (ref 1–2.5)
ALP BLD-CCNC: 54 U/L (ref 40–129)
ALT SERPL-CCNC: 25 U/L (ref 5–41)
ANION GAP SERPL CALCULATED.3IONS-SCNC: 10 MMOL/L (ref 9–17)
AST SERPL-CCNC: 24 U/L
BASOPHILS # BLD: 1 % (ref 0–2)
BASOPHILS ABSOLUTE: 0 K/UL (ref 0–0.2)
BILIRUB SERPL-MCNC: 0.36 MG/DL (ref 0.3–1.2)
BUN BLDV-MCNC: 14 MG/DL (ref 8–23)
BUN/CREAT BLD: 18 (ref 9–20)
CALCIUM SERPL-MCNC: 9.2 MG/DL (ref 8.6–10.4)
CHLORIDE BLD-SCNC: 103 MMOL/L (ref 98–107)
CHOLESTEROL/HDL RATIO: 2.4
CHOLESTEROL: 94 MG/DL
CO2: 26 MMOL/L (ref 20–31)
CREAT SERPL-MCNC: 0.78 MG/DL (ref 0.7–1.2)
DIFFERENTIAL TYPE: YES
EOSINOPHILS RELATIVE PERCENT: 13 % (ref 0–5)
ESTIMATED AVERAGE GLUCOSE: 137 MG/DL
GFR AFRICAN AMERICAN: >60 ML/MIN
GFR NON-AFRICAN AMERICAN: >60 ML/MIN
GFR SERPL CREATININE-BSD FRML MDRD: ABNORMAL ML/MIN/{1.73_M2}
GFR SERPL CREATININE-BSD FRML MDRD: ABNORMAL ML/MIN/{1.73_M2}
GLUCOSE BLD-MCNC: 102 MG/DL (ref 70–99)
HBA1C MFR BLD: 6.4 % (ref 4–6)
HCT VFR BLD CALC: 42.5 % (ref 41–53)
HDLC SERPL-MCNC: 40 MG/DL
HEMOGLOBIN: 14.1 G/DL (ref 13.5–17.5)
IMMATURE GRANULOCYTES: ABNORMAL %
LDL CHOLESTEROL: 38 MG/DL (ref 0–130)
LYMPHOCYTES # BLD: 23 % (ref 13–44)
MAGNESIUM: 2 MG/DL (ref 1.6–2.6)
MCH RBC QN AUTO: 30.3 PG (ref 26–34)
MCHC RBC AUTO-ENTMCNC: 33.3 G/DL (ref 31–37)
MCV RBC AUTO: 90.9 FL (ref 80–100)
MONOCYTES # BLD: 10 % (ref 5–9)
NRBC AUTOMATED: ABNORMAL PER 100 WBC
PATIENT FASTING?: YES
PDW BLD-RTO: 16.2 % (ref 12.1–15.2)
PLATELET # BLD: 244 K/UL (ref 140–450)
PLATELET ESTIMATE: ABNORMAL
PMV BLD AUTO: ABNORMAL FL (ref 6–12)
POTASSIUM SERPL-SCNC: 4 MMOL/L (ref 3.7–5.3)
RBC # BLD: 4.67 M/UL (ref 4.5–5.9)
RBC # BLD: ABNORMAL 10*6/UL
SEG NEUTROPHILS: 53 % (ref 39–75)
SEGMENTED NEUTROPHILS ABSOLUTE COUNT: 3.6 K/UL (ref 2.1–6.5)
SODIUM BLD-SCNC: 139 MMOL/L (ref 135–144)
TOTAL PROTEIN: 7.1 G/DL (ref 6.4–8.3)
TRIGL SERPL-MCNC: 81 MG/DL
TSH SERPL DL<=0.05 MIU/L-ACNC: 1.28 MIU/L (ref 0.3–5)
VITAMIN D 25-HYDROXY: 18 NG/ML (ref 30–100)
VLDLC SERPL CALC-MCNC: ABNORMAL MG/DL (ref 1–30)
WBC # BLD: 6.7 K/UL (ref 3.5–11)
WBC # BLD: ABNORMAL 10*3/UL

## 2021-12-13 PROCEDURE — 82306 VITAMIN D 25 HYDROXY: CPT

## 2021-12-13 PROCEDURE — 80053 COMPREHEN METABOLIC PANEL: CPT

## 2021-12-13 PROCEDURE — 85025 COMPLETE CBC W/AUTO DIFF WBC: CPT

## 2021-12-13 PROCEDURE — 93798 PHYS/QHP OP CAR RHAB W/ECG: CPT

## 2021-12-13 PROCEDURE — 83036 HEMOGLOBIN GLYCOSYLATED A1C: CPT

## 2021-12-13 PROCEDURE — 83735 ASSAY OF MAGNESIUM: CPT

## 2021-12-13 PROCEDURE — 80061 LIPID PANEL: CPT

## 2021-12-13 PROCEDURE — 84443 ASSAY THYROID STIM HORMONE: CPT

## 2021-12-13 PROCEDURE — 36415 COLL VENOUS BLD VENIPUNCTURE: CPT

## 2021-12-13 PROCEDURE — 93005 ELECTROCARDIOGRAM TRACING: CPT

## 2021-12-14 LAB
EKG ATRIAL RATE: 65 BPM
EKG P AXIS: 49 DEGREES
EKG P-R INTERVAL: 150 MS
EKG Q-T INTERVAL: 398 MS
EKG QRS DURATION: 106 MS
EKG QTC CALCULATION (BAZETT): 413 MS
EKG R AXIS: 77 DEGREES
EKG T AXIS: 41 DEGREES
EKG VENTRICULAR RATE: 65 BPM

## 2021-12-14 PROCEDURE — 93010 ELECTROCARDIOGRAM REPORT: CPT | Performed by: INTERNAL MEDICINE

## 2021-12-15 ENCOUNTER — HOSPITAL ENCOUNTER (OUTPATIENT)
Dept: CARDIAC REHAB | Age: 66
Setting detail: THERAPIES SERIES
Discharge: HOME | End: 2021-12-15
Payer: MEDICARE

## 2021-12-15 PROCEDURE — 93798 PHYS/QHP OP CAR RHAB W/ECG: CPT

## 2021-12-16 RX ORDER — DILTIAZEM HYDROCHLORIDE 360 MG/1
CAPSULE, EXTENDED RELEASE ORAL
Qty: 90 CAPSULE | Refills: 1 | Status: SHIPPED | OUTPATIENT
Start: 2021-12-16 | End: 2022-09-13

## 2021-12-17 ENCOUNTER — HOSPITAL ENCOUNTER (OUTPATIENT)
Dept: CARDIAC REHAB | Age: 66
Setting detail: THERAPIES SERIES
Discharge: HOME | End: 2021-12-17
Payer: MEDICARE

## 2021-12-17 PROCEDURE — 93798 PHYS/QHP OP CAR RHAB W/ECG: CPT

## 2021-12-20 ENCOUNTER — HOSPITAL ENCOUNTER (OUTPATIENT)
Dept: CARDIAC REHAB | Age: 66
Setting detail: THERAPIES SERIES
Discharge: HOME | End: 2021-12-20
Payer: MEDICARE

## 2021-12-20 ENCOUNTER — OFFICE VISIT (OUTPATIENT)
Dept: CARDIOLOGY CLINIC | Age: 66
End: 2021-12-20
Payer: MEDICARE

## 2021-12-20 VITALS
BODY MASS INDEX: 34.58 KG/M2 | OXYGEN SATURATION: 93 % | WEIGHT: 255 LBS | DIASTOLIC BLOOD PRESSURE: 60 MMHG | SYSTOLIC BLOOD PRESSURE: 110 MMHG | HEART RATE: 74 BPM

## 2021-12-20 DIAGNOSIS — I10 ESSENTIAL HYPERTENSION: Primary | ICD-10-CM

## 2021-12-20 DIAGNOSIS — E55.9 VITAMIN D DEFICIENCY: ICD-10-CM

## 2021-12-20 DIAGNOSIS — E78.5 HYPERLIPIDEMIA, UNSPECIFIED HYPERLIPIDEMIA TYPE: ICD-10-CM

## 2021-12-20 PROCEDURE — G8428 CUR MEDS NOT DOCUMENT: HCPCS | Performed by: INTERNAL MEDICINE

## 2021-12-20 PROCEDURE — 4040F PNEUMOC VAC/ADMIN/RCVD: CPT | Performed by: INTERNAL MEDICINE

## 2021-12-20 PROCEDURE — 93798 PHYS/QHP OP CAR RHAB W/ECG: CPT

## 2021-12-20 PROCEDURE — 1123F ACP DISCUSS/DSCN MKR DOCD: CPT | Performed by: INTERNAL MEDICINE

## 2021-12-20 PROCEDURE — G8484 FLU IMMUNIZE NO ADMIN: HCPCS | Performed by: INTERNAL MEDICINE

## 2021-12-20 PROCEDURE — 99214 OFFICE O/P EST MOD 30 MIN: CPT | Performed by: INTERNAL MEDICINE

## 2021-12-20 PROCEDURE — 3017F COLORECTAL CA SCREEN DOC REV: CPT | Performed by: INTERNAL MEDICINE

## 2021-12-20 PROCEDURE — 1036F TOBACCO NON-USER: CPT | Performed by: INTERNAL MEDICINE

## 2021-12-20 PROCEDURE — G8417 CALC BMI ABV UP PARAM F/U: HCPCS | Performed by: INTERNAL MEDICINE

## 2021-12-20 NOTE — PROGRESS NOTES
Ov Dr. Efe Herron for 3 month follow up  In cardiac rehab three times away   Not having any issues while   In rehab  No chest pain   No sob   Feels better         Start taking Vit D 2000 units daily (OTC)    Follow up in one year

## 2021-12-20 NOTE — LETTER
Greta Casanova MD  [facility]  [address]  [phone]      2021      Kayley Ian   [address], [facility] [address]      RE:   Andrei Andrews  :  1955      Dear Dr. Amber Real:    CHIEF COMPLAINT:  1. Severe coronary artery disease. 2.  Status post open heart surgery by Dr. Crystal Charlton on 2021, with a LIMA to the LAD, a vein graft to the OM branch of the circumflex, with the posterior ventricular branch of the right coronary artery too small to bypass. HISTORY OF PRESENT ILLNESS:  I had the pleasure of seeing Mr. Cloyde Gaucher and his wife in our office on 2021. He is a pleasant 58-year-old gentleman who I first met on 2021. At that time, he had never had a cardiac problem. He had multiple risk factors with strong family history, as well as hypertension, hyperlipidemia and diabetes. He was to have a lithotripsy when he saw Dr. Amber Real, who noted shortness of breath and ordered a Lexiscan stress test, which was abnormal.  I therefore saw him on 2021. He developed more shortness of breath. He also had marked loss of energy. He did have chest pain, but it was fairly atypical.    His stress test was abnormal with inferolateral wall infarct with mirna-infarct ischemia. We did an echocardiogram and also did a cardiac catheterization in preparation for his lithotripsy. His catheterization was done on 2021. This showed 60% left main trunk, with 80% proximal LAD, 80% large OM, 90% in the posterior ventricular branch of the right coronary artery, which was quite small, EF of 55%. He had subsequent open heart surgery by Dr. Crystal Charlton on 2021, with a LIMA to the LAD, a vein graft to the OM branch of the circumflex, the posterior ventricular branch of the right coronary artery was too small to bypass. He is finishing cardiac rehab.   He has had no further cardiac issues. No chest pain or shortness of breath. He does have more energy noticed both by him and his wife. No syncope or near syncope, no lightheadedness or dizziness. He really has no complaints as I see him today. CARDIAC RISK FACTORS:  Known CAD:  Positive. Bypass Surgery:  Positive. Hypertension:  Positive. Hyperlipidemia:  Positive. Diabetes:  Positive. Other Family Members:  Positive. Smoking:  Negative. Peripheral Vascular Disease:  Negative. MEDICATIONS AT HOME:  He is currently on Tylenol p.r.n., aspirin 81 mg daily, Cardizem  mg daily, Amaryl 1 mg half a tablet daily, Cozaar 25 mg daily, Glucophage 1000 mg b.i.d., Toprol-XL 25 mg daily, Ditropan XL 5 mg daily, Crestor 40 mg daily, Januvia 100 mg daily, Flomax 0.4 mg daily. PAST MEDICAL AND SURGICAL HISTORY:  1. Cardiac as described above. 2.  He has a long history of hypertension. 3.  Hyperlipidemia. 4.  Severe sleep apnea, on a BiPAP mask at home. 5.  Prostate disease. 6.  History of renal lithiasis, status post lithotripsy. 7.  He has had umbilical hernia repair and multiple colonoscopies. FAMILY HISTORY:  Brother passed away at 76 of an MI. Father passed away at 67. Younger brother had stenting. SOCIAL HISTORY:  He is 74years old, , no children. Retired from [employer] in 2015. Quit smoking in 1996. Does not exercise, although he is finishing cardiac rehab. REVIEW OF SYSTEMS:  Cardiac as above. Other systems reviewed including constitutional, eyes, ears, nose and throat, cardiovascular, respiratory, GI, , musculoskeletal, integumentary, neurologic, endocrine, hematologic and allergic/immunologic are negative except for described above. No weight loss or weight gain. No change in bowel habits. No blood in stool. No fevers, sweats or chills. PHYSICAL EXAMINATION:  VITAL SIGNS:  His blood pressure was 110/60 with a heart rate of 74 and regular. Respiratory rate 18. O2 sat 93%.   Weight 255 pounds. GENERAL:  He is a pleasant 68-year-old gentleman. Denied pain. He was oriented to person, place and time. Answered questions appropriately. SKIN:  No unusual skin changes. HEENT:  The pupils are equally round and intact. Mucous membranes were dry. NECK:  No JVD. Good carotid pulses. No carotid bruits. No lymphadenopathy or thyromegaly. CARDIOVASCULAR EXAM:  S1 and S2 were normal.  No S3 or S4. Soft systolic blowing type murmur. No diastolic murmur. PMI was normal.  No lift, thrust, or pericardial friction rub. LUNGS:  Quite clear to auscultation and percussion. ABDOMEN:  Soft and nontender. Good bowel sounds. EXTREMITIES:  Good femoral pulses. Good pedal pulses. He had 2+ edema, which is about his baseline. Skin was warm and dry. No calf tenderness. Nail beds pink. Good cap refill. PULSES:  Bilateral symmetrical radial, brachial and carotid pulses. No carotid bruits. Good femoral and pedal pulses. NEUROLOGIC EXAM:  Within normal limits. PSYCHIATRIC EXAM:  Within normal limits. LABORATORY DATA:  His sodium was 139, potassium 4.0, BUN 14, creatinine 0.78, GFR greater than 60, magnesium 2.0. His glucose was 102, calcium 9.2. Cholesterol 94 with an HDL of 40, LDL 38, triglycerides 81. ALT was 25, AST was 24. Hemoglobin A1c was 6.4. His TSH was 1.28. Vitamin D was 18. White count 6.7, hemoglobin 14.1, with a platelet count of 412,853. 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 IMPRESSION:  1. Severe coronary artery disease. 2.  Loss of energy with shortness of breath, starting in approximately 02/2021 or 03/2021.  3.  Strong family history of coronary artery disease. 4.  Abnormal stress test with inferolateral wall scar with mirna-infarct ischemia.   5.  Status post cardiac catheterization on 07/23/2021, that showed 60% left main trunk, 80% proximal LAD, 80% large OM, 90% posterior ventricular branch of the right coronary artery, which was small, EF of 55%. 6.  Status post open heart surgery by Dr. Xavier Ng on 08/04/2021, with a LIMA to the LAD, vein graft to the OM branch of the circumflex, with a posterior ventricular branch of the right coronary artery too small to bypass. 7.  Functional class I at this time with no chest pain. 8.  Hypertension, well controlled. 9.  Hyperlipidemia, very well controlled. 10.  Severe sleep apnea, on a BiPAP mask. 11.  Nephrolithiasis, status post lithotripsy. 12.  Normal LV function. PLAN:  1. Start vitamin D 2000 units daily. 2.  Follow up in one year. DISCUSSION:  Mr. Winter Baptiste has had an excellent result from his bypass surgery. He has had no chest pain, shortness of breath or loss of energy. He has been in cardiac rehab and has done well. I made no change in medications. I will plan on seeing him in one year unless a problem would develop. I am delighted about his recovery from his coronary artery disease. He continues to be very thankful for your care. Thank you very much.     Sincerely,        Penny Smith    D: 12/20/2021 11:00:01     T: 12/20/2021 11:04:03     [id number]  Job#: [id number]   Doc#: [id number]

## 2021-12-22 ENCOUNTER — HOSPITAL ENCOUNTER (OUTPATIENT)
Dept: CARDIAC REHAB | Age: 66
Setting detail: THERAPIES SERIES
Discharge: HOME | End: 2021-12-22
Payer: MEDICARE

## 2021-12-22 PROCEDURE — 93798 PHYS/QHP OP CAR RHAB W/ECG: CPT

## 2021-12-22 NOTE — PROGRESS NOTES
Phase II Cardiac Rehab Individualized Treatment Plan-Discharge    Patient Name: Abrahan Morfin  ACCOUNT #: [de-identified]  Date of Initial Assessment: 9/29/21  Diagnosis: coronary artery bypass grafting  Onset Date: 8/4/21  Referring Physician: DR. Jack Winchester  Risk Stratification: HIGH  Session Number: 39   EXERCISE    Stages of Change:   [] pre-contemplation  [x] Action   [] Contemplate   [] Maintainence   [] Prep   [] Relapse          Exercise Prescription:  Mode: [x] TM [x] UBE [x] STP [] EL [x] R  Frequency: 3 DAYS PER WEEK  Duration: 31-60 MINUTES  Intensity: 3.7 AVG METS    Progression: INCREASE DURATION PER ABOVE F.I.T.T. Rx  PARAMETERS ON AVG OF 5-10 MIN / 1-2 WEEKS FOR THE FIRST 4-6 WEEKS. AFTER 3-4 WEEKS ARE COMPLETED, CONTINUE TO GRADUALLY INCREASE F. I.T. PARAMETERS GRADUALLY UPWARD AT THE ESTABLISHED DURATION ON AVERAGE 0.5-1.0 METS PER 30 DAYS OVER THE COARSE OF REMAINING PROGRAM AS ESTABLISHED BY PT-CENTERED GOALS AND GUIDELINES. Target HR 84 - 96 BPM      [] Angina with Exertion    [x] Resistance Training  introduce 8-12 bilateral UE and LE progressive resistance exercises at 1-3 sets per lift, on 2-3 non-consecutive days using weights/ GREEN  therabands AND WTS TO 8-24 # for 8-15 reps to progressive overload by increasing resistance once reps progressed to at least 15 reps on at least 2 occasions     Hypertension:  [x] Yes  [] No  Resting BP: 132/64  Peak Exercise BP: 152/68  [] Med Change?     Intervention:  Home Exercise:  Type: WALKING  Duration: 30-60 minutes  Frequency: 2-3 DAYS PER WEEK   [x] Resistance Training  introduce 8-12 bilateral UE and LE progressive resistance exercises at 1-3 sets per lift, on 2-3 non-consecutive days using weights/ GREEN  therabands AND WTS TO 8-24 # for 8-15 reps to progressive overload by increasing resistance once reps progressed to at least 15 reps on at least 2 occasions       Education:   [x] Education Goals met        Target Goal:   -Individual Exercise Plan  -Bp< 130/80  -Aerobic active 30 + minutes 5-7 days per week    Nutrition    Stages of Change:   [] pre-contemplation  [x] Action   [] Contemplate   [] Maintainenc   [] Prep   [] Relapse    Lipids: 12/13/21  Total Cholesterol: 94 DOWN FROM 120  Triglycerides: 81 DOWN FROM 103  HDL: 40 DOWN FROM 43  LDL: 38 DOWN FROM 56  [] Med Change? Diabetes:  [x] Yes  [] No  FBS: 107  HbA1c: 6.4 ON 12/13/21  [] Med Change? Random BS:   [] BS in range    Weight Management:  Weight: 257 LB  Wt Goal: 1-2 lbs/wk  Special Diet: Special Diet:  1,800 Kcal / day, 2 GM Na+, 30% total fat, <10% saturated fat, 25-35 GM fiber, cholesterol reduced, balanced nutrition plan to be promoted and taught in rehab    Rate My Plate: 40  Intervention:   [] Dietitian Consult       [x] Nurse/Patient Discussion     [x] Diet Class           [] Referred to Diabetes Education     Education:  [x] Education Goals met    Target Goal:  -LDL-C<100 if triglycerides are > 200  -LDL-C < 70 for high risk patients  -HbA1c < 7%  -BMI < 25   Education    Stages of Change:    [] pre-contemplation  [x] Action   [] Contemplate   [] Maintainence   [] Prep   [] Relapse    Knowledge test score: 100%      Family support: [x] Yes  [] No    Tobacco use: [] Yes  [x] No    Intervention:  [] Referred to smoking cessation counselor     [] Individual education and counseling  [] Tobacco adjunct  [] Informed of education class schedule     Education:   [x] Education goals met    Target Goal:  -Complete cessation of tobacco use (if applicable)  -Continued risk factor modifications  -Recognizing signs/symptoms to report  -Proper use of meds    Psychosocial  Stages of Change:    [] pre-contemplation  [x] Action   [] Contemplate   [] Maintainence   [] Prep   [] Relapse    Psychosocial Test:  Tool Used: Fly Bhakta Quality of Life  Score: 18.5  Depression screening score PHQ-9: 10  []Medication change?  NONE    Education:    [x] Education Goals met    Target Goal:  -Assess presence or absence of depression using a valid screening tool. -Maximize coping skills.  -Positive support system. Preventative Medication:   [x] Aspirin       [x] Beta Blockade      [x] Statin or other lipid lowering agent     [] Clopidogrel   [] ACE Inhibitor   [] Other anticoagulation medications     Fall Risk assess: [x] Yes  [] No / LOW FALL RISK  Assistive Device:   [] Cane  [] Saratha Coleman [] Wheel Chair  [] Gait belt    Patient/Program goal:   \"GET RID OF THIS BIG BELLY AND LOSE WEIGHT\"     - increased stamina/strength to 30-50 total exercise by increasing 1-2 level/wk and 1-2   min/wk  to achieve THR and RPE 12-16 / INCREASE AVG CARDIO FC BY AT LEAST 0.5-1.0 MET IN THE NEXT 30 DAYS AND TOLERATE >31-45 MIN W/IN EX RX TARGETS / goal met   Date: 9/29/21 12/22/21     PRE-PROGRAM POST-PROGRAM % Improvement   AVERAGE ENDURANCE LEVEL (mets): 2.3 3.7 61%   MAXIMUM ENDURANCE LEVEL (mets): 2.3 7.1 209%       - LOSE 2-4 LB BODY WT IN NEXT 30 DAYS / goal not met   BODY WEIGHT 252 257 -2%      - Introduce 8-12 bilateral UE and LE progressive resistance exercises at 1-3 sets per lift, on 2-3 non-consecutive days using weights/ GREEN therabands AND WTS TO 8-24 # for 10-15 reps to progressive overload by increasing resistance once reps progressed to at least 15 reps on at least 2 occasions / goal met     - MAINTAIN OPTIMAL.  BP /  GOAL MET         Date: 9/29/21 12/22/21     PRE-PROGRAM POST-PROGRAM % Improvement   BLOOD PRESSURE-SYSTOLIC (RESTING): 130.78 112.00 N/A   BLOOD PRESSURE-DIASTOLIC (RESTING): 52.64 60.00 N/A        - Improved cholesterol and  Triglycerides / GOAL MET   Total Cholesterol: 94 DOWN FROM 120  Triglycerides: 81 DOWN FROM 103  HDL: 40 DOWN FROM 43  LDL: 38 DOWN FROM 56  [] Med Change?      - Develop regular exercise 30 min daily, AT LEAST 3-5 DAYS PER WEEK CONSISTENTLY W/IN THE NEXT 30 DAYS / GOAL MET      - Lower daily fasting blood glucose score to <131 and random after meal scores to <181 at home / GOAL MET / LAST BS      - To reduce self-reported psycho-social feelings of stress in next 30 days / GOAL MET PER PT/ NOT SHOWN IN SURVEYS   Q.OL. HEALTH & FUNCTIONING RATING 19.20 18.30 -5%   Q.O.L. SOCIAL & ECONOMIC RATING 22.90 17.90 -22%   Q.O.L. PSYCHOLOGICAL/SPIRITUAL RATING 22.60 20.00 -12%   Q. O.L FAMILY SELF-RATING 23.60 24.40 3%   Q.O.L. OVERALL INDEX SELF-RATING 21.30 18.50 -13%   PHQ-9 DEPRESSION AND MOOD RATING 10.00 10.00 0%        - To eat at least 2 servings of fruit per day and at least 4-5 servings of vegetables per day / GOAL MET PER PT / NOT SHOWN IN SURVEYS  HEALTHY NUTRITION SELF-RATING 42.00 40.00 -5%         Physician Changes/Comments:    Electronically signed by Pawel Campos RCP on 12/22/21 at 8:19 AM EST  Cardiac Rehab Staff

## 2021-12-23 NOTE — PROGRESS NOTES
Niyah Jennings MD  [facility]  [address], [place] [address]  [phone]      2021      Kirsten Boykin, DO  [address] [facility], [facility]      RE:   Rasheed Julieta  :  1955      Dear Dr. Kalpana Sosa:    CHIEF COMPLAINT:  1. Severe coronary artery disease. 2.  Status post open heart surgery by Dr. Consuelo Reyes on 2021, with a LIMA to the LAD, a vein graft to the OM branch of the circumflex, with the posterior ventricular branch of the right coronary artery too small to bypass. HISTORY OF PRESENT ILLNESS:  I had the pleasure of seeing Mr. Irineo Vasquez and his wife in our office on 2021. He is a pleasant 29-year-old gentleman who I first met on 2021. At that time, he had never had a cardiac problem. He had multiple risk factors with strong family history, as well as hypertension, hyperlipidemia and diabetes. He was to have a lithotripsy when he saw Dr. Kalpana Sosa, who noted shortness of breath and ordered a Lexiscan stress test, which was abnormal.  I therefore saw him on 2021. He developed more shortness of breath. He also had marked loss of energy. He did have chest pain, but it was fairly atypical.    His stress test was abnormal with inferolateral wall infarct with mirna-infarct ischemia. We did an echocardiogram and also did a cardiac catheterization in preparation for his lithotripsy. His catheterization was done on 2021. This showed 60% left main trunk, with 80% proximal LAD, 80% large OM, 90% in the posterior ventricular branch of the right coronary artery, which was quite small, EF of 55%. He had subsequent open heart surgery by Dr. Consuelo Reyes on 2021, with a LIMA to the LAD, a vein graft to the OM branch of the circumflex, the posterior ventricular branch of the right coronary artery was too small to bypass. He is finishing cardiac rehab.   He has had no further cardiac issues. No chest pain or shortness of breath. He does have more energy noticed both by him and his wife. No syncope or near syncope, no lightheadedness or dizziness. He really has no complaints as I see him today. CARDIAC RISK FACTORS:  Known CAD:  Positive. Bypass Surgery:  Positive. Hypertension:  Positive. Hyperlipidemia:  Positive. Diabetes:  Positive. Other Family Members:  Positive. Smoking:  Negative. Peripheral Vascular Disease:  Negative. MEDICATIONS AT HOME:  He is currently on Tylenol p.r.n., aspirin 81 mg daily, Cardizem  mg daily, Amaryl 1 mg half a tablet daily, Cozaar 25 mg daily, Glucophage 1000 mg b.i.d., Toprol-XL 25 mg daily, Ditropan XL 5 mg daily, Crestor 40 mg daily, Januvia 100 mg daily, Flomax 0.4 mg daily. PAST MEDICAL AND SURGICAL HISTORY:  1. Cardiac as described above. 2.  He has a long history of hypertension. 3.  Hyperlipidemia. 4.  Severe sleep apnea, on a BiPAP mask at home. 5.  Prostate disease. 6.  History of renal lithiasis, status post lithotripsy. 7.  He has had umbilical hernia repair and multiple colonoscopies. FAMILY HISTORY:  Brother passed away at 76 of an MI. Father passed away at 67. Younger brother had stenting. SOCIAL HISTORY:  He is 74years old, , no children. Retired from [employer] in 2015. Quit smoking in 1996. Does not exercise, although he is finishing cardiac rehab. REVIEW OF SYSTEMS:  Cardiac as above. Other systems reviewed including constitutional, eyes, ears, nose and throat, cardiovascular, respiratory, GI, , musculoskeletal, integumentary, neurologic, endocrine, hematologic and allergic/immunologic are negative except for described above. No weight loss or weight gain. No change in bowel habits. No blood in stool. No fevers, sweats or chills. PHYSICAL EXAMINATION:  VITAL SIGNS:  His blood pressure was 110/60 with a heart rate of 74 and regular. Respiratory rate 18. O2 sat 93%.   Weight 255 pounds. GENERAL:  He is a pleasant 55-year-old gentleman. Denied pain. He was oriented to person, place and time. Answered questions appropriately. SKIN:  No unusual skin changes. HEENT:  The pupils are equally round and intact. Mucous membranes were dry. NECK:  No JVD. Good carotid pulses. No carotid bruits. No lymphadenopathy or thyromegaly. CARDIOVASCULAR EXAM:  S1 and S2 were normal.  No S3 or S4. Soft systolic blowing type murmur. No diastolic murmur. PMI was normal.  No lift, thrust, or pericardial friction rub. LUNGS:  Quite clear to auscultation and percussion. ABDOMEN:  Soft and nontender. Good bowel sounds. EXTREMITIES:  Good femoral pulses. Good pedal pulses. He had 2+ edema, which is about his baseline. Skin was warm and dry. No calf tenderness. Nail beds pink. Good cap refill. PULSES:  Bilateral symmetrical radial, brachial and carotid pulses. No carotid bruits. Good femoral and pedal pulses. NEUROLOGIC EXAM:  Within normal limits. PSYCHIATRIC EXAM:  Within normal limits. LABORATORY DATA:  His sodium was 139, potassium 4.0, BUN 14, creatinine 0.78, GFR greater than 60, magnesium 2.0. His glucose was 102, calcium 9.2. Cholesterol 94 with an HDL of 40, LDL 38, triglycerides 81. ALT was 25, AST was 24. Hemoglobin A1c was 6.4. His TSH was 1.28. Vitamin D was 18. White count 6.7, hemoglobin 14.1, with a platelet count of 665,411. 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 IMPRESSION:  1. Severe coronary artery disease. 2.  Loss of energy with shortness of breath, starting in approximately 02/2021 or 03/2021.  3.  Strong family history of coronary artery disease. 4.  Abnormal stress test with inferolateral wall scar with mirna-infarct ischemia.   5.  Status post cardiac catheterization on 07/23/2021, that showed 60% left main trunk, 80% proximal LAD, 80% large OM, 90% posterior ventricular branch of the right coronary artery, which

## 2021-12-27 ENCOUNTER — HOSPITAL ENCOUNTER (OUTPATIENT)
Dept: CARDIAC REHAB | Age: 66
Setting detail: THERAPIES SERIES
End: 2021-12-27
Payer: MEDICARE

## 2021-12-29 ENCOUNTER — APPOINTMENT (OUTPATIENT)
Dept: CARDIAC REHAB | Age: 66
End: 2021-12-29
Payer: MEDICARE

## 2022-01-31 ENCOUNTER — OFFICE VISIT (OUTPATIENT)
Dept: FAMILY MEDICINE CLINIC | Age: 67
End: 2022-01-31
Payer: MEDICARE

## 2022-01-31 VITALS
SYSTOLIC BLOOD PRESSURE: 110 MMHG | WEIGHT: 264 LBS | DIASTOLIC BLOOD PRESSURE: 62 MMHG | OXYGEN SATURATION: 95 % | HEART RATE: 81 BPM | BODY MASS INDEX: 35.76 KG/M2 | HEIGHT: 72 IN

## 2022-01-31 DIAGNOSIS — E66.01 SEVERE OBESITY (BMI 35.0-35.9 WITH COMORBIDITY) (HCC): ICD-10-CM

## 2022-01-31 DIAGNOSIS — I25.10 CORONARY ARTERY DISEASE INVOLVING NATIVE CORONARY ARTERY OF NATIVE HEART WITHOUT ANGINA PECTORIS: ICD-10-CM

## 2022-01-31 DIAGNOSIS — G47.33 OSA TREATED WITH BIPAP: ICD-10-CM

## 2022-01-31 DIAGNOSIS — E11.9 TYPE 2 DIABETES MELLITUS WITHOUT COMPLICATION, WITHOUT LONG-TERM CURRENT USE OF INSULIN (HCC): Primary | ICD-10-CM

## 2022-01-31 LAB
CREATININE URINE POCT: 300
MICROALBUMIN/CREAT 24H UR: 80 MG/G{CREAT}
MICROALBUMIN/CREAT UR-RTO: 30

## 2022-01-31 PROCEDURE — 1036F TOBACCO NON-USER: CPT | Performed by: FAMILY MEDICINE

## 2022-01-31 PROCEDURE — 82044 UR ALBUMIN SEMIQUANTITATIVE: CPT | Performed by: FAMILY MEDICINE

## 2022-01-31 PROCEDURE — 99214 OFFICE O/P EST MOD 30 MIN: CPT | Performed by: FAMILY MEDICINE

## 2022-01-31 PROCEDURE — 4040F PNEUMOC VAC/ADMIN/RCVD: CPT | Performed by: FAMILY MEDICINE

## 2022-01-31 PROCEDURE — 3046F HEMOGLOBIN A1C LEVEL >9.0%: CPT | Performed by: FAMILY MEDICINE

## 2022-01-31 PROCEDURE — G8484 FLU IMMUNIZE NO ADMIN: HCPCS | Performed by: FAMILY MEDICINE

## 2022-01-31 PROCEDURE — 3017F COLORECTAL CA SCREEN DOC REV: CPT | Performed by: FAMILY MEDICINE

## 2022-01-31 PROCEDURE — G8417 CALC BMI ABV UP PARAM F/U: HCPCS | Performed by: FAMILY MEDICINE

## 2022-01-31 PROCEDURE — 2022F DILAT RTA XM EVC RTNOPTHY: CPT | Performed by: FAMILY MEDICINE

## 2022-01-31 PROCEDURE — G8427 DOCREV CUR MEDS BY ELIG CLIN: HCPCS | Performed by: FAMILY MEDICINE

## 2022-01-31 PROCEDURE — 1123F ACP DISCUSS/DSCN MKR DOCD: CPT | Performed by: FAMILY MEDICINE

## 2022-01-31 ASSESSMENT — PATIENT HEALTH QUESTIONNAIRE - PHQ9
SUM OF ALL RESPONSES TO PHQ QUESTIONS 1-9: 0
SUM OF ALL RESPONSES TO PHQ QUESTIONS 1-9: 0
2. FEELING DOWN, DEPRESSED OR HOPELESS: 0
SUM OF ALL RESPONSES TO PHQ QUESTIONS 1-9: 0
1. LITTLE INTEREST OR PLEASURE IN DOING THINGS: 0
SUM OF ALL RESPONSES TO PHQ9 QUESTIONS 1 & 2: 0
SUM OF ALL RESPONSES TO PHQ QUESTIONS 1-9: 0

## 2022-01-31 NOTE — PROGRESS NOTES
Name: Sera Hernandez  : 1955         Chief Complaint:     Chief Complaint   Patient presents with    Diabetes    Hypertension    Sleep Apnea       History of Present Illness:      Sera Hernandez is a 74 y.o.  male who presents with Diabetes, Hypertension, and Sleep Apnea      HPI    F/u DM. Fasting readings low 100s. nonfasting higher. AN compliant with tx. Had 2 machines and 1 stopped working. The one he's using works well and has a chip in it. No problems. Benefits from use. Uses distilled water for humidifier. CAD stable, taking meds as directed ,no CP or SOB. Medical History:     Patient Active Problem List   Diagnosis    Essential hypertension, benign    Type 2 diabetes mellitus without complication, without long-term current use of insulin (HCC)    Varicose veins of legs    Tubular adenoma of colon    BPH with obstruction/lower urinary tract symptoms    Diverticulosis of large intestine without hemorrhage    Coronary artery disease involving native coronary artery of native heart without angina pectoris    S/P CABG (coronary artery bypass graft)    AN treated with BiPAP       Medications:       Prior to Admission medications    Medication Sig Start Date End Date Taking?  Authorizing Provider   metFORMIN (GLUCOPHAGE) 1000 MG tablet TAKE 1 TABLET BY MOUTH TWICE A DAY WITH MEALS 21  Yes Nicolas Tan DO   dilTIAZem (CARDIZEM CD) 360 MG extended release capsule TAKE 1 CAPSULE BY MOUTH EVERY DAY 21  Yes Nicolas Tan DO   SITagliptin (JANUVIA) 100 MG tablet TAKE 1 TABLET BY MOUTH EVERY DAY 21  Yes Nicolas Tan DO   metoprolol succinate (TOPROL XL) 25 MG extended release tablet TAKE 1 TABLET BY MOUTH EVERY DAY 12/10/21  Yes Nicolas Tan DO   losartan (COZAAR) 25 MG tablet Take 1 tablet by mouth Daily with lunch 11/24/21 3/24/22 Yes Nicolas Tan DO   CONTOUR NEXT TEST strip  10/14/21  Yes Historical Provider, MD   oxybutynin (DITROPAN-XL) 5 MG extended release tablet TAKE 1 TABLET BY MOUTH EVERY DAY IN THE EVENING 10/18/21  Yes Tejinder Sandhu PA-C   bisacodyl (DULCOLAX) 10 MG suppository Place 10 mg rectally daily 8/10/21  Yes Historical Provider, MD   acetaminophen (TYLENOL) 325 MG tablet Take 650 mg by mouth every 4 hours as needed 8/10/21  Yes Historical Provider, MD   glimepiride (AMARYL) 1 MG tablet Take 0.5 mg by mouth daily (with breakfast) 8/11/21  Yes Historical Provider, MD   OXYGEN Inhale into the lungs   Yes Historical Provider, MD   rosuvastatin (CRESTOR) 40 MG tablet Take 1 tablet by mouth every evening 9/13/21  Yes Allie Valentine MD   Blood Glucose Monitoring Suppl (CONTOUR NEXT MONITOR) w/Device KIT TEST TWICE A DAY BEFORE BREAKFAST AND DINNER 8/11/21  Yes Historical Provider, MD   Lancets MISC Use to check BG before breakfast and dinner   Dx E11.65  For Contour next meter . 8/10/21  Yes Historical Provider, MD   tamsulosin (FLOMAX) 0.4 MG capsule TAKE 1 CAPSULE BY MOUTH TWICE A DAY 7/14/21  Yes Tejinder Sandhu PA-C   aspirin 81 MG EC tablet Take 81 mg by mouth daily   Yes Historical Provider, MD   triamcinolone (ARISTOCORT) 0.5 % cream APPLY THIN COAT TO AFFECTED AREA TWICE A DAY 5/30/21  Yes Historical Provider, MD        Allergies:       Patient has no known allergies. Physical Exam:     Vitals:  /62   Pulse 81   Ht 6' (1.829 m)   Wt 264 lb (119.7 kg)   SpO2 95%   BMI 35.80 kg/m²   Physical Exam  Constitutional:       Appearance: Normal appearance. He is well-developed. He is not ill-appearing. Cardiovascular:      Rate and Rhythm: Normal rate and regular rhythm. Heart sounds: Normal heart sounds. Pulmonary:      Effort: Pulmonary effort is normal.      Breath sounds: Normal breath sounds. Psychiatric:         Mood and Affect: Mood normal.         Behavior: Behavior normal.       DM foot exam: feet warm & well-perfused. DP and PT pulses 2+ bilaterally. No lesions, calluses, or cellulitis.  Toenails in discuss how we could have made your experience exceptional.     Thank you. Clinical Care Team:  Dr. Leanne Reid DO                                           Sagarroseann Ramey, [facility] Team:  Maria D Dawson received counseling on the following healthy behaviors: medication adherence  Reviewed prior labs and health maintenance. Continue current medications, diet and exercise. Discussed use, benefit, and side effects of prescribed medications. Barriers to medication compliance addressed. Patient given educational materials - see patient instructions. All patient questions answered.   Patient voiced understanding.     signed by Leanne Reid DO on 2/1/2022 at 11:20 PM  [address]  Dept: [phone]

## 2022-01-31 NOTE — PATIENT INSTRUCTIONS
SURVEY:    You may be receiving a survey from [employer] regarding your visit today. You may get this in the mail, through your MyChart or in your email. Please complete the survey to enable us to provide the highest quality of care to you and your family. If you cannot score us as very good ( 5 Stars) on any question, please feel free to call the office to discuss how we could have made your experience exceptional.     Thank you.     Clinical Care Team:  Dr. Te Anthony, DO Allen Virk, [address] [place] [address] Team:  [address] [place] [address]

## 2022-03-24 RX ORDER — GLIMEPIRIDE 1 MG/1
0.5 TABLET ORAL
Qty: 45 TABLET | Refills: 1 | Status: SHIPPED | OUTPATIENT
Start: 2022-03-24 | End: 2022-08-08

## 2022-05-05 ENCOUNTER — NURSE ONLY (OUTPATIENT)
Dept: FAMILY MEDICINE CLINIC | Age: 67
End: 2022-05-05
Payer: MEDICARE

## 2022-05-05 DIAGNOSIS — E11.9 TYPE 2 DIABETES MELLITUS WITHOUT COMPLICATION, WITHOUT LONG-TERM CURRENT USE OF INSULIN (HCC): Primary | ICD-10-CM

## 2022-05-05 LAB — HBA1C MFR BLD: 6.6 %

## 2022-05-05 PROCEDURE — 83036 HEMOGLOBIN GLYCOSYLATED A1C: CPT | Performed by: FAMILY MEDICINE

## 2022-05-18 NOTE — TELEPHONE ENCOUNTER
Last OV: 1/31/2022 chronic  Last RX:    Next scheduled apt: 8/1/2022 6 month         surescript requesting a refill

## 2022-05-19 RX ORDER — LOSARTAN POTASSIUM 25 MG/1
25 TABLET ORAL
Qty: 90 TABLET | Refills: 1 | Status: SHIPPED | OUTPATIENT
Start: 2022-05-19 | End: 2022-09-16

## 2022-06-14 RX ORDER — METOPROLOL SUCCINATE 25 MG/1
TABLET, EXTENDED RELEASE ORAL
Qty: 90 TABLET | Refills: 1 | Status: SHIPPED | OUTPATIENT
Start: 2022-06-14

## 2022-06-14 NOTE — TELEPHONE ENCOUNTER
Last OV 1/31/22 for HTN, DM  Requesting refill on januvia and metoprolol thru [facility] script  Next OV 8/1/22

## 2022-08-08 ENCOUNTER — OFFICE VISIT (OUTPATIENT)
Dept: FAMILY MEDICINE CLINIC | Age: 67
End: 2022-08-08
Payer: MEDICARE

## 2022-08-08 VITALS
WEIGHT: 270 LBS | OXYGEN SATURATION: 97 % | DIASTOLIC BLOOD PRESSURE: 68 MMHG | HEART RATE: 80 BPM | BODY MASS INDEX: 36.57 KG/M2 | SYSTOLIC BLOOD PRESSURE: 118 MMHG | HEIGHT: 72 IN

## 2022-08-08 DIAGNOSIS — G47.33 OSA TREATED WITH BIPAP: ICD-10-CM

## 2022-08-08 DIAGNOSIS — E11.9 TYPE 2 DIABETES MELLITUS WITHOUT COMPLICATION, WITHOUT LONG-TERM CURRENT USE OF INSULIN (HCC): ICD-10-CM

## 2022-08-08 DIAGNOSIS — Z12.5 SCREENING FOR PROSTATE CANCER: ICD-10-CM

## 2022-08-08 DIAGNOSIS — R35.1 NOCTURIA: ICD-10-CM

## 2022-08-08 DIAGNOSIS — Z00.00 MEDICARE ANNUAL WELLNESS VISIT, SUBSEQUENT: Primary | ICD-10-CM

## 2022-08-08 LAB — HBA1C MFR BLD: 7.1 %

## 2022-08-08 PROCEDURE — 2022F DILAT RTA XM EVC RTNOPTHY: CPT | Performed by: FAMILY MEDICINE

## 2022-08-08 PROCEDURE — 3017F COLORECTAL CA SCREEN DOC REV: CPT | Performed by: FAMILY MEDICINE

## 2022-08-08 PROCEDURE — 83036 HEMOGLOBIN GLYCOSYLATED A1C: CPT | Performed by: FAMILY MEDICINE

## 2022-08-08 PROCEDURE — G8417 CALC BMI ABV UP PARAM F/U: HCPCS | Performed by: FAMILY MEDICINE

## 2022-08-08 PROCEDURE — G0439 PPPS, SUBSEQ VISIT: HCPCS | Performed by: FAMILY MEDICINE

## 2022-08-08 PROCEDURE — G8427 DOCREV CUR MEDS BY ELIG CLIN: HCPCS | Performed by: FAMILY MEDICINE

## 2022-08-08 PROCEDURE — 99214 OFFICE O/P EST MOD 30 MIN: CPT | Performed by: FAMILY MEDICINE

## 2022-08-08 PROCEDURE — 1123F ACP DISCUSS/DSCN MKR DOCD: CPT | Performed by: FAMILY MEDICINE

## 2022-08-08 PROCEDURE — 1036F TOBACCO NON-USER: CPT | Performed by: FAMILY MEDICINE

## 2022-08-08 PROCEDURE — 3051F HG A1C>EQUAL 7.0%<8.0%: CPT | Performed by: FAMILY MEDICINE

## 2022-08-08 RX ORDER — ROSUVASTATIN CALCIUM 40 MG/1
40 TABLET, COATED ORAL EVERY EVENING
Qty: 90 TABLET | Refills: 3 | Status: SHIPPED | OUTPATIENT
Start: 2022-08-08

## 2022-08-08 RX ORDER — OXYBUTYNIN CHLORIDE 5 MG/1
TABLET, EXTENDED RELEASE ORAL
Qty: 90 TABLET | Refills: 3 | Status: SHIPPED | OUTPATIENT
Start: 2022-08-08

## 2022-08-08 RX ORDER — TAMSULOSIN HYDROCHLORIDE 0.4 MG/1
CAPSULE ORAL
Qty: 180 CAPSULE | Refills: 3 | Status: SHIPPED | OUTPATIENT
Start: 2022-08-08

## 2022-08-08 SDOH — HEALTH STABILITY: PHYSICAL HEALTH: ON AVERAGE, HOW MANY DAYS PER WEEK DO YOU ENGAGE IN MODERATE TO STRENUOUS EXERCISE (LIKE A BRISK WALK)?: 0 DAYS

## 2022-08-08 SDOH — ECONOMIC STABILITY: FOOD INSECURITY: WITHIN THE PAST 12 MONTHS, YOU WORRIED THAT YOUR FOOD WOULD RUN OUT BEFORE YOU GOT MONEY TO BUY MORE.: NEVER TRUE

## 2022-08-08 SDOH — ECONOMIC STABILITY: FOOD INSECURITY: WITHIN THE PAST 12 MONTHS, THE FOOD YOU BOUGHT JUST DIDN'T LAST AND YOU DIDN'T HAVE MONEY TO GET MORE.: NEVER TRUE

## 2022-08-08 ASSESSMENT — PATIENT HEALTH QUESTIONNAIRE - PHQ9
SUM OF ALL RESPONSES TO PHQ QUESTIONS 1-9: 0
SUM OF ALL RESPONSES TO PHQ QUESTIONS 1-9: 0
1. LITTLE INTEREST OR PLEASURE IN DOING THINGS: 0
SUM OF ALL RESPONSES TO PHQ QUESTIONS 1-9: 0
SUM OF ALL RESPONSES TO PHQ9 QUESTIONS 1 & 2: 0
SUM OF ALL RESPONSES TO PHQ QUESTIONS 1-9: 0
2. FEELING DOWN, DEPRESSED OR HOPELESS: 0

## 2022-08-08 ASSESSMENT — SOCIAL DETERMINANTS OF HEALTH (SDOH): HOW HARD IS IT FOR YOU TO PAY FOR THE VERY BASICS LIKE FOOD, HOUSING, MEDICAL CARE, AND HEATING?: NOT HARD AT ALL

## 2022-08-08 ASSESSMENT — LIFESTYLE VARIABLES
HOW OFTEN DO YOU HAVE A DRINK CONTAINING ALCOHOL: 1
HOW OFTEN DO YOU HAVE A DRINK CONTAINING ALCOHOL: NEVER

## 2022-08-08 NOTE — PROGRESS NOTES
Medicare Annual Wellness Visit    Rehana Morrow is here for Medicare AWV, Diabetes, Hypertension, and Sleep Apnea (CPAP- uses nightly)    Assessment & Plan   Type 2 diabetes mellitus without complication, without long-term current use of insulin (HCC)  -     POCT glycosylated hemoglobin (Hb A1C)    Recommendations for Preventive Services Due: see orders and patient instructions/AVS.  Recommended screening schedule for the next 5-10 years is provided to the patient in written form: see Patient Instructions/AVS.     No follow-ups on file. Subjective       Patient's complete Health Risk Assessment and screening values have been reviewed and are found in Flowsheets. The following problems were reviewed today and where indicated follow up appointments were made and/or referrals ordered. Positive Risk Factor Screenings with Interventions:              Health Habits/Nutrition:  Physical Activity: Unknown    Days of Exercise per Week: 0 days    Minutes of Exercise per Session: Not on file     Have you lost any weight without trying in the past 3 months?: No  Body mass index: (!) 36.61  Have you seen the dentist within the past year?: Appointment is scheduled  Health Habits/Nutrition Interventions:  Nutritional issues:  educational materials for healthy, well-balanced diet provided    Hearing/Vision:  Do you or your family notice any trouble with your hearing that hasn't been managed with hearing aids?: No  Do you have difficulty driving, watching TV, or doing any of your daily activities because of your eyesight?: No  Have you had an eye exam within the past year?: (!) No  No results found. Hearing/Vision Interventions:  Vision concerns:  patient encouraged to make appointment with his/her eye specialist            Objective   Vitals:    08/08/22 0846   BP: 118/68   Pulse: 80   SpO2: 97%   Weight: 270 lb (122.5 kg)   Height: 6' (1.829 m)      Body mass index is 36.62 kg/m².              No Known Allergies  Prior to Visit Medications    Medication Sig Taking? Authorizing Provider   tamsulosin (FLOMAX) 0.4 MG capsule TAKE 1 CAPSULE BY MOUTH TWICE A DAY Yes Mray Mata DO   oxybutynin (DITROPAN-XL) 5 MG extended release tablet TAKE 1 TABLET BY MOUTH EVERY DAY IN THE EVENING Yes Ben Duncan DO   rosuvastatin (CRESTOR) 40 MG tablet Take 1 tablet by mouth every evening Yes Ben Duncan DO   metoprolol succinate (TOPROL XL) 25 MG extended release tablet TAKE 1 TABLET BY MOUTH EVERY DAY Yes Mary Mata DO   SITagliptin (JANUVIA) 100 MG tablet TAKE 1 TABLET BY MOUTH EVERY DAY Yes Mayr Mata DO   losartan (COZAAR) 25 MG tablet TAKE 1 TABLET BY MOUTH DAILY WITH LUNCH. Yes Ben Duncan DO   metFORMIN (GLUCOPHAGE) 1000 MG tablet TAKE 1 TABLET BY MOUTH TWICE A DAY WITH MEALS Yes Mary Mata DO   dilTIAZem (CARDIZEM CD) 360 MG extended release capsule TAKE 1 CAPSULE BY MOUTH EVERY DAY Yes Ben Duncan DO   CONTOUR NEXT TEST strip  Yes Historical Provider, MD   bisacodyl (DULCOLAX) 10 MG suppository Place 10 mg rectally daily Yes Historical Provider, MD   acetaminophen (TYLENOL) 325 MG tablet Take 650 mg by mouth every 4 hours as needed Yes Historical Provider, MD   OXYGEN Inhale into the lungs Yes Historical Provider, MD   Blood Glucose Monitoring Suppl (CONTOUR NEXT MONITOR) w/Device KIT TEST TWICE A DAY BEFORE BREAKFAST AND DINNER Yes Historical Provider, MD   Lancets MISC Use to check BG before breakfast and dinner   Dx E11.65  For Contour next meter .  Yes Historical Provider, MD   aspirin 81 MG EC tablet Take 81 mg by mouth daily Yes Historical Provider, MD   triamcinolone (ARISTOCORT) 0.5 % cream APPLY THIN COAT TO AFFECTED AREA TWICE A DAY Yes Historical Provider, MD Patel (Including outside providers/suppliers regularly involved in providing care):   Patient Care Team:  Ben Duncan DO as PCP - General (Family Medicine)  Ben Duncan DO as PCP - REHABILITATION [facility] Provider  Cindy Thompson MD as Consulting Physician (Urology)     Reviewed and updated this visit:  Tobacco  Allergies  Meds  Problems  Med Hx  Surg Hx  Soc Hx  Fam Hx

## 2022-08-08 NOTE — PATIENT INSTRUCTIONS
SURVEY:    You may be receiving a survey from [employer] regarding your visit today. You may get this in the mail, through your MyChart or in your email. Please complete the survey to enable us to provide the highest quality of care to you and your family. If you cannot score us as very good ( 5 Stars) on any question, please feel free to call the office to discuss how we could have made your experience exceptional.     Thank you. Clinical Care Team:  Dr. Shaina Sharma, DO Elizabet Chavez, [facility] Team:  Alessandro Felix                               Personalized Preventive Plan for Rasheed Rendon - 8/8/2022  Medicare offers a range of preventive health benefits. Some of the tests and screenings are paid in full while other may be subject to a deductible, co-insurance, and/or copay. Some of these benefits include a comprehensive review of your medical history including lifestyle, illnesses that may run in your family, and various assessments and screenings as appropriate. After reviewing your medical record and screening and assessments performed today your provider may have ordered immunizations, labs, imaging, and/or referrals for you. A list of these orders (if applicable) as well as your Preventive Care list are included within your After Visit Summary for your review. Other Preventive Recommendations:    A preventive eye exam performed by an eye specialist is recommended every 1-2 years to screen for glaucoma; cataracts, macular degeneration, and other eye disorders. A preventive dental visit is recommended every 6 months. Try to get at least 150 minutes of exercise per week or 10,000 steps per day on a pedometer . Order or download the FREE \"Exercise & Physical Activity: Your Everyday Guide\" from [employer].  Call 1-863.147.1488 or search [employer]

## 2022-08-08 NOTE — PROGRESS NOTES
Name: Vonnie Garcia  : 1955         Chief Complaint:     Chief Complaint   Patient presents with    Medicare AWV    Diabetes    Hypertension    Sleep Apnea     CPAP- uses nightly       History of Present Illness:      Vonnie Garcia is a 69 y.o.  male who presents with Medicare AWV, Diabetes, Hypertension, and Sleep Apnea (CPAP- uses nightly)      HPI    NA compliant with bipap tx, benefiting but still waking up a little more. [place] in [place]. Full face mask. So-Clean machine. F/u DM. Sugar runs about 130 in the mornings. Thinks he can cut back on sugar intake. Nocturia for quite a while, up at least 2-3x/night right now. Had seen Dr Jess Romero in the past and considered laser prostate tx. Medical History:     Patient Active Problem List   Diagnosis    Essential hypertension, benign    Type 2 diabetes mellitus without complication, without long-term current use of insulin (HCC)    Varicose veins of legs    Tubular adenoma of colon    BPH with obstruction/lower urinary tract symptoms    Diverticulosis of large intestine without hemorrhage    Coronary artery disease involving native coronary artery of native heart without angina pectoris    S/P CABG (coronary artery bypass graft)    AN treated with BiPAP       Medications:       Prior to Admission medications    Medication Sig Start Date End Date Taking?  Authorizing Provider   tamsulosin (FLOMAX) 0.4 MG capsule TAKE 1 CAPSULE BY MOUTH TWICE A DAY 22  Yes Sandra Garcia,    oxybutynin (DITROPAN-XL) 5 MG extended release tablet TAKE 1 TABLET BY MOUTH EVERY DAY IN THE EVENING 22  Yes Sandra Garcia DO   rosuvastatin (CRESTOR) 40 MG tablet Take 1 tablet by mouth every evening 22  Yes Sandra Garcia DO   metoprolol succinate (TOPROL XL) 25 MG extended release tablet TAKE 1 TABLET BY MOUTH EVERY DAY 22  Yes Sandra Garcia DO   SITagliptin (JANUVIA) 100 MG tablet TAKE 1 TABLET BY MOUTH EVERY DAY 22  Yes Sandra Garcia, DO   losartan (COZAAR) 25 MG tablet TAKE 1 TABLET BY MOUTH DAILY WITH LUNCH. 5/19/22 9/16/22 Yes Mary Mata DO   metFORMIN (GLUCOPHAGE) 1000 MG tablet TAKE 1 TABLET BY MOUTH TWICE A DAY WITH MEALS 12/16/21  Yes Ben Duncan, DO   dilTIAZem (CARDIZEM CD) 360 MG extended release capsule TAKE 1 CAPSULE BY MOUTH EVERY DAY 12/16/21  Yes Ben Duncan DO   CONTOUR NEXT TEST strip  10/14/21  Yes Historical Provider, MD   bisacodyl (DULCOLAX) 10 MG suppository Place 10 mg rectally daily 8/10/21  Yes Historical Provider, MD   acetaminophen (TYLENOL) 325 MG tablet Take 650 mg by mouth every 4 hours as needed 8/10/21  Yes Historical Provider, MD   OXYGEN Inhale into the lungs   Yes Historical Provider, MD   Blood Glucose Monitoring Suppl (CONTOUR NEXT MONITOR) w/Device KIT TEST TWICE A DAY BEFORE BREAKFAST AND DINNER 8/11/21  Yes Historical Provider, MD   Lancets MISC Use to check BG before breakfast and dinner   Dx E11.65  For Contour next meter . 8/10/21  Yes Historical Provider, MD   aspirin 81 MG EC tablet Take 81 mg by mouth daily   Yes Historical Provider, MD   triamcinolone (ARISTOCORT) 0.5 % cream APPLY THIN COAT TO AFFECTED AREA TWICE A DAY 5/30/21  Yes Historical Provider, MD        Allergies:       Patient has no known allergies. Physical Exam:     Vitals:  /68   Pulse 80   Ht 6' (1.829 m)   Wt 270 lb (122.5 kg)   SpO2 97%   BMI 36.62 kg/m²   Physical Exam  Vitals and nursing note reviewed. Constitutional:       General: He is not in acute distress. Appearance: He is well-developed. HENT:      Right Ear: Tympanic membrane and ear canal normal.      Left Ear: Tympanic membrane and ear canal normal.      Nose: Nose normal.      Mouth/Throat:      Mouth: Mucous membranes are moist.      Pharynx: Oropharynx is clear. Eyes:      Conjunctiva/sclera: Conjunctivae normal.   Cardiovascular:      Rate and Rhythm: Normal rate and regular rhythm. Heart sounds: Normal heart sounds. Pulmonary:      Effort: Pulmonary effort is normal.      Breath sounds: Normal breath sounds. Abdominal:      General: Distension: soft gaseous distension, diastasis recti. Tenderness: There is no abdominal tenderness. Musculoskeletal:      Cervical back: Neck supple. Lymphadenopathy:      Cervical: No cervical adenopathy. Skin:     General: Skin is warm and dry. Neurological:      Mental Status: He is alert and oriented to person, place, and time. Psychiatric:         Mood and Affect: Mood normal.         Behavior: Behavior normal.       Data:     Lab Results   Component Value Date/Time     12/13/2021 09:03 AM    K 4.0 12/13/2021 09:03 AM     12/13/2021 09:03 AM    CO2 26 12/13/2021 09:03 AM    BUN 14 12/13/2021 09:03 AM    CREATININE 0.78 12/13/2021 09:03 AM    GLUCOSE 102 12/13/2021 09:03 AM    PROT 7.1 12/13/2021 09:03 AM    LABALBU 3.9 12/13/2021 09:03 AM    BILITOT 0.36 12/13/2021 09:03 AM    ALKPHOS 54 12/13/2021 09:03 AM    AST 24 12/13/2021 09:03 AM    ALT 25 12/13/2021 09:03 AM     Lab Results   Component Value Date/Time    WBC 6.7 12/13/2021 09:03 AM    RBC 4.67 12/13/2021 09:03 AM    HGB 14.1 12/13/2021 09:03 AM    HCT 42.5 12/13/2021 09:03 AM    MCV 90.9 12/13/2021 09:03 AM    MCH 30.3 12/13/2021 09:03 AM    MCHC 33.3 12/13/2021 09:03 AM    RDW 16.2 12/13/2021 09:03 AM     12/13/2021 09:03 AM    MPV NOT REPORTED 12/13/2021 09:03 AM     Lab Results   Component Value Date/Time    TSH 1.28 12/13/2021 09:03 AM     Lab Results   Component Value Date/Time    CHOL 94 12/13/2021 09:03 AM    HDL 40 12/13/2021 09:03 AM    PSA 0.34 05/27/2021 10:03 AM    LABA1C 7.1 08/08/2022 09:01 AM         Assessment & Plan:        Diagnosis Orders   1. Medicare annual wellness visit, subsequent        2. Type 2 diabetes mellitus without complication, without long-term current use of insulin (HCC)  POCT glycosylated hemoglobin (Hb A1C)      3.  AN treated with BiPAP  DME Order for BiPAP as OP      4. Nocturia        5. Screening for prostate cancer  PSA Screening        DM mildly uncontrolled. Has also had weight gain which I believe may be r/t glimepiride. On very low dose which is not likely helping sugars. Stopping med. Cont others same and work on diet and exercise. 3. AN on bipap, machine quite old, so new one was ordered. Compliant with tx and benefiting from tx. Will obtain compliance report and adjust settings as needed. 4. Nocturia with BPH, helped by flomax and oxybutynin. Cont same and following with urology. PSA with cardiology labs. Requested Prescriptions     Signed Prescriptions Disp Refills    tamsulosin (FLOMAX) 0.4 MG capsule 180 capsule 3     Sig: TAKE 1 CAPSULE BY MOUTH TWICE A DAY    oxybutynin (DITROPAN-XL) 5 MG extended release tablet 90 tablet 3     Sig: TAKE 1 TABLET BY MOUTH EVERY DAY IN THE EVENING    rosuvastatin (CRESTOR) 40 MG tablet 90 tablet 3     Sig: Take 1 tablet by mouth every evening         Patient Instructions   SURVEY:    You may be receiving a survey from [employer] regarding your visit today. You may get this in the mail, through your MyChart or in your email. Please complete the survey to enable us to provide the highest quality of care to you and your family. If you cannot score us as very good ( 5 Stars) on any question, please feel free to call the office to discuss how we could have made your experience exceptional.     Thank you. Clinical Care Team:  DO Cirilo uQiroga, [facility] Team:  Trinh Lopes                               Personalized Preventive Plan for Preethi Francis - 8/8/2022  Medicare offers a range of preventive health benefits. Some of the tests and screenings are paid in full while other may be subject to a deductible, co-insurance, and/or copay.     Some of these benefits include a comprehensive review of your medical history including lifestyle, illnesses that may run in your family, and various assessments and screenings as appropriate. After reviewing your medical record and screening and assessments performed today your provider may have ordered immunizations, labs, imaging, and/or referrals for you. A list of these orders (if applicable) as well as your Preventive Care list are included within your After Visit Summary for your review. Other Preventive Recommendations:    A preventive eye exam performed by an eye specialist is recommended every 1-2 years to screen for glaucoma; cataracts, macular degeneration, and other eye disorders. A preventive dental visit is recommended every 6 months. Try to get at least 150 minutes of exercise per week or 10,000 steps per day on a pedometer . Order or download the FREE \"Exercise & Physical Activity: Your Everyday Guide\" from [employer]. Call 9-484.158.1531 or search [employer] online. You need 0949-2235 mg of calcium and 8822-5770 IU of vitamin D per day. It is possible to meet your calcium requirement with diet alone, but a vitamin D supplement is usually necessary to meet this goal.  When exposed to the sun, use a sunscreen that protects against both UVA and UVB radiation with an SPF of 30 or greater. Reapply every 2 to 3 hours or after sweating, drying off with a towel, or swimming. Always wear a seat belt when traveling in a car.  Always wear a helmet when riding a bicycle or motorcycle.      signed by Mirza Mendenhall DO on 8/8/2022 at 10:46 PM  [address] [address]  Dept: [phone]

## 2022-08-12 NOTE — TELEPHONE ENCOUNTER
Last visit:  8/8/2022  Next Visit Date:    Future Appointments   Date Time Provider Lashay Joanne   12/19/2022 10:00 AM MD Viri Cuevas [facility]         Medication List:  Prior to Admission medications    Medication Sig Start Date End Date Taking? Authorizing Provider   tamsulosin (FLOMAX) 0.4 MG capsule TAKE 1 CAPSULE BY MOUTH TWICE A DAY 8/8/22   [facility], DO   oxybutynin (DITROPAN-XL) 5 MG extended release tablet TAKE 1 TABLET BY MOUTH EVERY DAY IN THE EVENING 8/8/22   [facility], DO   rosuvastatin (CRESTOR) 40 MG tablet Take 1 tablet by mouth every evening 8/8/22   [facility], DO   metoprolol succinate (TOPROL XL) 25 MG extended release tablet TAKE 1 TABLET BY MOUTH EVERY DAY 6/14/22   [facility], DO   SITagliptin (JANUVIA) 100 MG tablet TAKE 1 TABLET BY MOUTH EVERY DAY 6/14/22   [facility], DO   losartan (COZAAR) 25 MG tablet TAKE 1 TABLET BY MOUTH DAILY WITH LUNCH. 5/19/22 9/16/22  [facility], DO   metFORMIN (GLUCOPHAGE) 1000 MG tablet TAKE 1 TABLET BY MOUTH TWICE A DAY WITH MEALS 12/16/21   [facility], DO   dilTIAZem (CARDIZEM CD) 360 MG extended release capsule TAKE 1 CAPSULE BY MOUTH EVERY DAY 12/16/21   [facility], DO   CONTOUR NEXT TEST strip  10/14/21   Historical Provider, MD   bisacodyl (DULCOLAX) 10 MG suppository Place 10 mg rectally daily 8/10/21   Historical Provider, MD   acetaminophen (TYLENOL) 325 MG tablet Take 650 mg by mouth every 4 hours as needed 8/10/21   Historical Provider, MD   OXYGEN Inhale into the lungs    Historical Provider, MD   Blood Glucose Monitoring Suppl (CONTOUR NEXT MONITOR) w/Device KIT TEST TWICE A DAY BEFORE BREAKFAST AND DINNER 8/11/21   Historical Provider, MD   Lancets MISC Use to check BG before breakfast and dinner   Dx E11.65  For Contour next meter .  8/10/21   Historical Provider, MD   aspirin 81 MG EC tablet Take 81 mg by mouth daily    Historical Provider, MD   triamcinolone (ARISTOCORT) 0.5 % cream APPLY THIN COAT TO AFFECTED AREA TWICE A DAY 5/30/21   Historical Provider, MD

## 2022-09-13 RX ORDER — DILTIAZEM HYDROCHLORIDE 360 MG/1
CAPSULE, EXTENDED RELEASE ORAL
Qty: 90 CAPSULE | Refills: 1 | Status: SHIPPED | OUTPATIENT
Start: 2022-09-13

## 2022-09-22 RX ORDER — GLIMEPIRIDE 1 MG/1
TABLET ORAL
Qty: 45 TABLET | Refills: 1 | OUTPATIENT
Start: 2022-09-22

## 2022-10-04 RX ORDER — PERPHENAZINE 16 MG/1
100 TABLET, FILM COATED ORAL DAILY
Qty: 100 EACH | Refills: 3 | Status: SHIPPED | OUTPATIENT
Start: 2022-10-04

## 2022-10-04 NOTE — TELEPHONE ENCOUNTER
Last OV: 8/8/2022 AWV  Last RX:    Next scheduled apt: Visit date not found           Surescript requesting a refill

## 2022-10-04 NOTE — TELEPHONE ENCOUNTER
----- Message from Sylvia Jay sent at 10/4/2022  9:14 AM EDT -----  Subject: Refill Request    QUESTIONS  Name of Medication? CONTOUR NEXT TEST strip  Patient-reported dosage and instructions? once a day  How many days do you have left? 1  Preferred Pharmacy? [id number]  Pharmacy phone number (if available)? [phone]  Additional Information for Provider? Patient states the pharmacy needs a   new script to have them filled. ---------------------------------------------------------------------------  --------------  Sandy HOOKS  What is the best way for the office to contact you? OK to leave message on   voicemail  Preferred Call Back Phone Number? [phone]  ---------------------------------------------------------------------------  --------------  SCRIPT ANSWERS  Relationship to Patient?  Self

## 2022-11-22 RX ORDER — LOSARTAN POTASSIUM 25 MG/1
25 TABLET ORAL
Qty: 90 TABLET | Refills: 1 | Status: SHIPPED | OUTPATIENT
Start: 2022-11-22 | End: 2023-03-22

## 2022-12-12 ENCOUNTER — HOSPITAL ENCOUNTER (OUTPATIENT)
Dept: GENERAL RADIOLOGY | Age: 67
Discharge: HOME | End: 2022-12-14
Payer: MEDICARE

## 2022-12-12 ENCOUNTER — HOSPITAL ENCOUNTER (OUTPATIENT)
Age: 67
Discharge: HOME | End: 2022-12-14
Payer: MEDICARE

## 2022-12-12 ENCOUNTER — HOSPITAL ENCOUNTER (OUTPATIENT)
Age: 67
Discharge: HOME | End: 2022-12-12
Payer: MEDICARE

## 2022-12-12 ENCOUNTER — TELEPHONE (OUTPATIENT)
Dept: CARDIOLOGY CLINIC | Age: 67
End: 2022-12-12

## 2022-12-12 DIAGNOSIS — E78.5 HYPERLIPIDEMIA, UNSPECIFIED HYPERLIPIDEMIA TYPE: ICD-10-CM

## 2022-12-12 DIAGNOSIS — E55.9 VITAMIN D DEFICIENCY: ICD-10-CM

## 2022-12-12 DIAGNOSIS — I10 ESSENTIAL HYPERTENSION: ICD-10-CM

## 2022-12-12 LAB
ABSOLUTE EOS #: 0.5 K/UL (ref 0–0.4)
ABSOLUTE LYMPH #: 1.5 K/UL (ref 1–4.8)
ABSOLUTE MONO #: 0.7 K/UL (ref 0–1)
ALBUMIN SERPL-MCNC: 4 G/DL (ref 3.5–5.2)
ALP BLD-CCNC: 49 U/L (ref 40–129)
ALT SERPL-CCNC: 36 U/L (ref 5–41)
ANION GAP SERPL CALCULATED.3IONS-SCNC: 8 MMOL/L (ref 9–17)
AST SERPL-CCNC: 27 U/L
BASOPHILS # BLD: 1 % (ref 0–2)
BASOPHILS ABSOLUTE: 0 K/UL (ref 0–0.2)
BILIRUB SERPL-MCNC: 0.6 MG/DL (ref 0.3–1.2)
BUN BLDV-MCNC: 14 MG/DL (ref 8–23)
BUN/CREAT BLD: 15 (ref 9–20)
CALCIUM SERPL-MCNC: 9.1 MG/DL (ref 8.6–10.4)
CHLORIDE BLD-SCNC: 106 MMOL/L (ref 98–107)
CO2: 27 MMOL/L (ref 20–31)
CREAT SERPL-MCNC: 0.95 MG/DL (ref 0.7–1.2)
DIFFERENTIAL TYPE: YES
EOSINOPHILS RELATIVE PERCENT: 8 % (ref 0–5)
GFR SERPL CREATININE-BSD FRML MDRD: >60 ML/MIN/1.73M2
GLUCOSE BLD-MCNC: 120 MG/DL (ref 70–99)
HCT VFR BLD CALC: 46.2 % (ref 41–53)
HEMOGLOBIN: 15.2 G/DL (ref 13.5–17.5)
LYMPHOCYTES # BLD: 23 % (ref 13–44)
MAGNESIUM: 1.8 MG/DL (ref 1.6–2.6)
MCH RBC QN AUTO: 31.1 PG (ref 26–34)
MCHC RBC AUTO-ENTMCNC: 33 G/DL (ref 31–37)
MCV RBC AUTO: 94.4 FL (ref 80–100)
MONOCYTES # BLD: 11 % (ref 5–9)
PATIENT FASTING?: YES
PDW BLD-RTO: 14 % (ref 12.1–15.2)
PLATELET # BLD: 261 K/UL (ref 140–450)
POTASSIUM SERPL-SCNC: 4.4 MMOL/L (ref 3.7–5.3)
RBC # BLD: 4.89 M/UL (ref 4.5–5.9)
SEG NEUTROPHILS: 57 % (ref 39–75)
SEGMENTED NEUTROPHILS ABSOLUTE COUNT: 3.6 K/UL (ref 2.1–6.5)
SODIUM BLD-SCNC: 141 MMOL/L (ref 135–144)
TOTAL PROTEIN: 7.3 G/DL (ref 6.4–8.3)
TSH SERPL DL<=0.05 MIU/L-ACNC: 0.96 UIU/ML (ref 0.3–5)
WBC # BLD: 6.4 K/UL (ref 3.5–11)

## 2022-12-12 PROCEDURE — 71046 X-RAY EXAM CHEST 2 VIEWS: CPT

## 2022-12-12 PROCEDURE — 80053 COMPREHEN METABOLIC PANEL: CPT

## 2022-12-12 PROCEDURE — 93005 ELECTROCARDIOGRAM TRACING: CPT

## 2022-12-12 PROCEDURE — 85025 COMPLETE CBC W/AUTO DIFF WBC: CPT

## 2022-12-12 PROCEDURE — 83735 ASSAY OF MAGNESIUM: CPT

## 2022-12-12 PROCEDURE — 36415 COLL VENOUS BLD VENIPUNCTURE: CPT

## 2022-12-12 PROCEDURE — 80061 LIPID PANEL: CPT

## 2022-12-12 PROCEDURE — 82306 VITAMIN D 25 HYDROXY: CPT

## 2022-12-12 PROCEDURE — 84443 ASSAY THYROID STIM HORMONE: CPT

## 2022-12-13 RX ORDER — METOPROLOL SUCCINATE 25 MG/1
TABLET, EXTENDED RELEASE ORAL
Qty: 90 TABLET | Refills: 1 | Status: SHIPPED | OUTPATIENT
Start: 2022-12-13

## 2022-12-14 LAB
CHOLESTEROL/HDL RATIO: 2.4
CHOLESTEROL: 97 MG/DL
EKG Q-T INTERVAL: 372 MS
EKG QRS DURATION: 106 MS
EKG QTC CALCULATION (BAZETT): 429 MS
EKG R AXIS: 43 DEGREES
EKG T AXIS: 6 DEGREES
EKG VENTRICULAR RATE: 80 BPM
HDLC SERPL-MCNC: 41 MG/DL
LDL CHOLESTEROL: 31 MG/DL (ref 0–130)
TRIGL SERPL-MCNC: 123 MG/DL
VITAMIN D 25-HYDROXY: 28.3 NG/ML

## 2022-12-19 ENCOUNTER — OFFICE VISIT (OUTPATIENT)
Dept: CARDIOLOGY CLINIC | Age: 67
End: 2022-12-19
Payer: MEDICARE

## 2022-12-19 ENCOUNTER — TELEPHONE (OUTPATIENT)
Dept: CARDIOLOGY CLINIC | Age: 67
End: 2022-12-19

## 2022-12-19 VITALS
BODY MASS INDEX: 37.16 KG/M2 | OXYGEN SATURATION: 95 % | WEIGHT: 274 LBS | HEART RATE: 83 BPM | DIASTOLIC BLOOD PRESSURE: 70 MMHG | SYSTOLIC BLOOD PRESSURE: 120 MMHG

## 2022-12-19 DIAGNOSIS — R06.02 SOB (SHORTNESS OF BREATH): ICD-10-CM

## 2022-12-19 DIAGNOSIS — I10 ESSENTIAL HYPERTENSION: ICD-10-CM

## 2022-12-19 DIAGNOSIS — I48.91 ATRIAL FIBRILLATION, UNSPECIFIED TYPE (HCC): Primary | ICD-10-CM

## 2022-12-19 DIAGNOSIS — E78.5 HYPERLIPIDEMIA, UNSPECIFIED HYPERLIPIDEMIA TYPE: ICD-10-CM

## 2022-12-19 PROCEDURE — G8484 FLU IMMUNIZE NO ADMIN: HCPCS | Performed by: INTERNAL MEDICINE

## 2022-12-19 PROCEDURE — 99214 OFFICE O/P EST MOD 30 MIN: CPT | Performed by: INTERNAL MEDICINE

## 2022-12-19 PROCEDURE — 3078F DIAST BP <80 MM HG: CPT | Performed by: INTERNAL MEDICINE

## 2022-12-19 PROCEDURE — 3017F COLORECTAL CA SCREEN DOC REV: CPT | Performed by: INTERNAL MEDICINE

## 2022-12-19 PROCEDURE — G8428 CUR MEDS NOT DOCUMENT: HCPCS | Performed by: INTERNAL MEDICINE

## 2022-12-19 PROCEDURE — G8417 CALC BMI ABV UP PARAM F/U: HCPCS | Performed by: INTERNAL MEDICINE

## 2022-12-19 PROCEDURE — 1036F TOBACCO NON-USER: CPT | Performed by: INTERNAL MEDICINE

## 2022-12-19 PROCEDURE — 1123F ACP DISCUSS/DSCN MKR DOCD: CPT | Performed by: INTERNAL MEDICINE

## 2022-12-19 PROCEDURE — 3074F SYST BP LT 130 MM HG: CPT | Performed by: INTERNAL MEDICINE

## 2022-12-19 RX ORDER — DABIGATRAN ETEXILATE 150 MG/1
150 CAPSULE ORAL 2 TIMES DAILY
Qty: 60 CAPSULE | Refills: 11 | Status: SHIPPED | OUTPATIENT
Start: 2022-12-19

## 2022-12-19 NOTE — PATIENT INSTRUCTIONS
Will set up for 30 day event monitor/echo  (Will call with results)    Follow up in 6 months      Will check prices with insurance company   (For anticoagulation medications )  Xarelto 20 mg (daily)  Eliquis 5 mg (2 x day)  Pradaxa 75 mg (2 x day)  Coumadin ( lab draws)

## 2022-12-19 NOTE — LETTER
Nichole Hein M.D. [address] [place] [address]  [phone]        2022        Kristen Seals,   [address] [address]    RE:   Rosendo Flores  :  1955    Dear Dr. Joaquin Dalton:    CHIEF COMPLAINT:  1. Atrial fibrillation, new onset. 2.  Severe coronary artery disease. 3.  Status post open heart surgery by Dr. Ping Roberts on 2021, with a LIMA to the LAD, vein graft to the OM branch of the circumflex with a posterior ventricular branch of the right coronary artery too small to bypass. HISTORY OF PRESENT ILLNESS:  I had the pleasure of seeing Mr. Karoline Gaona and his wife in our office on 2022. He is a pleasant 77-year-old gentleman who I met on 2021. He had multiple risk factors for coronary artery disease including strong family history as well as hypertension, hyperlipidemia and diabetes. He was having lithotripsy and Dr. Joaquin Dalton noticed shortness of breath and ordered a Lexiscan stress test, which was abnormal.  I saw him on 2021. He developed more shortness of breath and loss of energy and did have chest pain, which was fairly atypical.  The stress test showed inferolateral wall infarct and mirna-infarct ischemia. A catheterization was done on 2021, that showed 60% left main trunk, 80% proximal LAD, 80% large OM, 90% posterior ventricular branch of the right coronary artery, which was quite small, EF of 55%. He had subsequent open heart surgery by Dr. Ping Roberts on 2021, with a LIMA to the LAD, a vein graft to the OM branch of the circumflex, the posterior ventricular branch of the right coronary artery was too small to bypass. He has had no hospitalizations or procedures since I saw him on 2021. He has had slightly more shortness of breath with activity, starting several months ago, although he admits he is quite inactive.   Denies any chest pain or chest discomfort. He does have some pedal edema, which is \"not new. \"    In preparation for this visit, he did have an EKG on 12/12, which showed atrial fibrillation with a controlled ventricular response. His ventricular rate was 80 beats per minute. We gave him Eliquis samples of 5 mg b.i.d. and brought him in today for his routine visit. Again, he cannot feel any palpitations. He has had no syncope or near syncope. CARDIAC RISK FACTORS:  Known CAD:  Positive. Bypass Surgery:  Positive. Hypertension:  Positive. Hyperlipidemia:  Positive. Diabetes:  Positive. Other Family Members:  Positive. Smoking:  Negative. Peripheral Vascular Disease:  Negative. MEDICATION AT THIS TIME:  He is on Eliquis 5 mg b.i.d., aspirin 81 mg daily, Cardizem  mg daily, Cozaar 25 mg daily, metformin 1000 mg b.i.d., Toprol-XL 25 mg daily, Ditropan XL 5 mg daily, oxygen 2 liters, Crestor 40 mg daily, Januvia 100 mg daily, Flomax 0.4 mg daily. PAST MEDICAL AND SURGICAL HISTORY:  1. Cardiac as above. 2.  Long history of hypertension. 3.  Hyperlipidemia. 4.  Severe sleep apnea, on a BiPAP machine at home. 5.  Prostate surgery. 6.  Renal lithiasis, status post lithotripsy. 7.  Umbilical hernia repair and multiple colonoscopies. FAMILY HISTORY:  Brother passed away at 76 of an MI. Father passed away at 67. Younger brother had stenting. SOCIAL HISTORY:  He is 69years old, , no children. Retired from [employer] in 2015. Quit smoking in 1996. Does not exercise. Does not drink alcohol. REVIEW OF SYSTEMS:  Cardiac as above. Other systems reviewed including constitutional, eyes, ears, nose and throat, cardiovascular, respiratory, GI, , musculoskeletal, integumentary, neurologic, endocrine, hematologic and allergic/immunologic are negative except for described above. No weight loss or weight gain. No change in bowel habits. No blood in stool. No fevers, sweats or chills.     PHYSICAL EXAMINATION:  VITAL SIGNS:  His blood pressure was 120/70 with a heart rate of 83 and irregular. Respirations 18. O2 sat 95%. Weight 274 pounds. GENERAL:  He is a pleasant 68-year-old gentleman. Denied pain. He was oriented to person, place and time. Answered questions appropriately. SKIN:  No unusual skin changes. HEENT:  The pupils are equally round and intact. Mucous membranes were dry. NECK:  No JVD. Good carotid pulses. No carotid bruits. No lymphadenopathy or thyromegaly. CARDIOVASCULAR EXAM:  S1 and S2 were normal.  No S3 or S4. Soft systolic blowing type murmur. No diastolic murmur. PMI was normal.  No lift, thrust, or pericardial friction rub. LUNGS:  Clear to auscultation and percussion. ABDOMEN:  Soft and nontender. Good bowel sounds. EXTREMITIES:  Good femoral pulses. Good pedal pulses. He had mild pedal edema. Skin was warm and dry. No calf tenderness. Nail beds pink. Good cap refill. PULSES:  Bilateral symmetrical radial, brachial and carotid pulses. No carotid bruits. Good femoral and pedal pulses. NEUROLOGIC EXAM:  Within normal limits. PSYCHIATRIC EXAM:  Within normal limits. LABORATORY DATA:  Sodium 141, potassium 4.4, BUN 14, creatinine 0.95, GFR greater than 60, magnesium 1.8, glucose 120, calcium was 9.1. Cholesterol 97, HDL 41, LDL 31, triglycerides 123. ALT was 36, AST was 27. His hemoglobin A1c was 7.1 on 08/08/2022. TSH 0.96. Vitamin D 28.3. White count 6.4, hemoglobin 15.2 with a platelet count of 740,094. EKG showed atrial fibrillation with a controlled ventricular response. Chest x-ray was unremarkable. IMPRESSION:  1.  New onset of atrial fibrillation with controlled ventricular response. 2.  Strong family history of coronary artery disease.   3.  Catheterization on 07/23/2021, that showed 60% left main trunk, 80% proximal LAD, 80% large OM, 90% posterior ventricular branch of the right coronary artery, which was small, with an EF of 55%.  4.  Open heart surgery by Dr. Ruddy Ugalde on 08/04/2021, with a LIMA to the LAD, vein graft to OM branch of the circumflex, with a posterior ventricular branch of the right coronary artery, which was too small to bypass. 5.  Functional class I.  6.  New onset of atrial fibrillation, which is asymptomatic, and controlled ventricular response. 7.  On Eliquis now by samples. 8.  Hypertension, well controlled. 9.  Hyperlipidemia, well controlled. 10.  Severe sleep apnea, on a BiPAP mask. PLAN:  1. We will do a 30-day event recorder. 2.  Echocardiogram.  3.  He will check the cost of Xarelto, Eliquis and Pradaxa, and if none are economically feasible, he will start Coumadin for long-term anticoagulation. DISCUSSION:  From a cardiac standpoint, Mr. Gavin Matta has been asymptomatic. He has had no chest pain or any unusual shortness of breath. He is not exercising and therefore, I expect him to be deconditioned. His EKG showed atrial fibrillation with a controlled ventricular response. We gave him samples of Eliquis, but he will check the price of all the NOACs to see if any are economically feasible. I will do an echocardiogram to look at LV size and function as well as valve status again. We will also do a 30-day event recorder to see if he is having intermittent atrial fibrillation or whether this is a persistent atrial fibrillation. He has been ______. If he remains in atrial fibrillation, it would be reasonable to try one cardioversion to see if he reverts back to sinus rhythm. Again, his rate is so well controlled that this would be expected that he is asymptomatic. Thank you very much for allowing me the privilege of seeing Mr. Gavin Matta. If you have any questions on my thoughts, please do not hesitate to contact me.     Sincerely,        Zaida Cobos    D: 12/19/2022 13:21:01     T: 12/19/2022 13:26:57     [email or web address]  Job#: [id number]   Doc#: [id number]

## 2022-12-19 NOTE — TELEPHONE ENCOUNTER
Elijah Shea called to state that he called his insurance company and Pradaxa would be half the price of Eliquis and Xarelto. He would like a prescription for Pradaxa sent to [address] [place] [address].

## 2022-12-19 NOTE — PROGRESS NOTES
Ov Dr. Ila Connor for 1 yr follow up   Was put on Eliquis 5 mg bid  Had abn ekg for routine testing for   Appt   No fluttering/palpitations   Little more sob with activity   Started couple months ago   No chest pain or heaviness   Occ dizziness mainly when getting up   Not exercising   Little edema \"not new\"   No hospitalizations/procedures/er visits  Bedside echo done        Will set up for 30 day event monitor/echo  (Will call with results)    Follow up in 6 months      Will check prices with insurance company   (For anticoagulation medications )  Xarelto 20 mg (daily)  Eliquis 5 mg (2 x day)  Pradaxa 75 mg (2 x day)  Coumadin ( lab draws)

## 2022-12-21 NOTE — PROGRESS NOTES
Jake Mabry M.D. [address] [place] [address]  [phone]        2022        Marcy Acosta DO  [address] [facility]    RE:   Gia Dominique  :  1955    Dear Dr. Melissa Stoner:    CHIEF COMPLAINT:  1. Atrial fibrillation, new onset. 2.  Severe coronary artery disease. 3.  Status post open heart surgery by Dr. Wilber Heller on 2021, with a LIMA to the LAD, vein graft to the OM branch of the circumflex with a posterior ventricular branch of the right coronary artery too small to bypass. HISTORY OF PRESENT ILLNESS:  I had the pleasure of seeing Mr. Carlee Silver and his wife in our office on 2022. He is a pleasant 75-year-old gentleman who I met on 2021. He had multiple risk factors for coronary artery disease including strong family history as well as hypertension, hyperlipidemia and diabetes. He was having lithotripsy and Dr. Melissa Stoner noticed shortness of breath and ordered a Lexiscan stress test, which was abnormal.  I saw him on 2021. He developed more shortness of breath and loss of energy and did have chest pain, which was fairly atypical.  The stress test showed inferolateral wall infarct and mirna-infarct ischemia. A catheterization was done on 2021, that showed 60% left main trunk, 80% proximal LAD, 80% large OM, 90% posterior ventricular branch of the right coronary artery, which was quite small, EF of 55%. He had subsequent open heart surgery by Dr. Wilber Heller on 2021, with a LIMA to the LAD, a vein graft to the OM branch of the circumflex, the posterior ventricular branch of the right coronary artery was too small to bypass. He has had no hospitalizations or procedures since I saw him on 2021. He has had slightly more shortness of breath with activity, starting several months ago, although he admits he is quite inactive.   Denies any chest pain or chest discomfort. He does have some pedal edema, which is \"not new. \"    In preparation for this visit, he did have an EKG on 12/12, which showed atrial fibrillation with a controlled ventricular response. His ventricular rate was 80 beats per minute. We gave him Eliquis samples of 5 mg b.i.d. and brought him in today for his routine visit. Again, he cannot feel any palpitations. He has had no syncope or near syncope. CARDIAC RISK FACTORS:  Known CAD:  Positive. Bypass Surgery:  Positive. Hypertension:  Positive. Hyperlipidemia:  Positive. Diabetes:  Positive. Other Family Members:  Positive. Smoking:  Negative. Peripheral Vascular Disease:  Negative. MEDICATION AT THIS TIME:  He is on Eliquis 5 mg b.i.d., aspirin 81 mg daily, Cardizem  mg daily, Cozaar 25 mg daily, metformin 1000 mg b.i.d., Toprol-XL 25 mg daily, Ditropan XL 5 mg daily, oxygen 2 liters, Crestor 40 mg daily, Januvia 100 mg daily, Flomax 0.4 mg daily. PAST MEDICAL AND SURGICAL HISTORY:  1. Cardiac as above. 2.  Long history of hypertension. 3.  Hyperlipidemia. 4.  Severe sleep apnea, on a BiPAP machine at home. 5.  Prostate surgery. 6.  Renal lithiasis, status post lithotripsy. 7.  Umbilical hernia repair and multiple colonoscopies. FAMILY HISTORY:  Brother passed away at 76 of an MI. Father passed away at 67. Younger brother had stenting. SOCIAL HISTORY:  He is 69years old, , no children. Retired from [employer] in 2015. Quit smoking in 1996. Does not exercise. Does not drink alcohol. REVIEW OF SYSTEMS:  Cardiac as above. Other systems reviewed including constitutional, eyes, ears, nose and throat, cardiovascular, respiratory, GI, , musculoskeletal, integumentary, neurologic, endocrine, hematologic and allergic/immunologic are negative except for described above. No weight loss or weight gain. No change in bowel habits. No blood in stool. No fevers, sweats or chills.     PHYSICAL EXAMINATION:  VITAL SIGNS:  His blood pressure was 120/70 with a heart rate of 83 and irregular. Respirations 18. O2 sat 95%. Weight 274 pounds. GENERAL:  He is a pleasant 78-year-old gentleman. Denied pain. He was oriented to person, place and time. Answered questions appropriately. SKIN:  No unusual skin changes. HEENT:  The pupils are equally round and intact. Mucous membranes were dry. NECK:  No JVD. Good carotid pulses. No carotid bruits. No lymphadenopathy or thyromegaly. CARDIOVASCULAR EXAM:  S1 and S2 were normal.  No S3 or S4. Soft systolic blowing type murmur. No diastolic murmur. PMI was normal.  No lift, thrust, or pericardial friction rub. LUNGS:  Clear to auscultation and percussion. ABDOMEN:  Soft and nontender. Good bowel sounds. EXTREMITIES:  Good femoral pulses. Good pedal pulses. He had mild pedal edema. Skin was warm and dry. No calf tenderness. Nail beds pink. Good cap refill. PULSES:  Bilateral symmetrical radial, brachial and carotid pulses. No carotid bruits. Good femoral and pedal pulses. NEUROLOGIC EXAM:  Within normal limits. PSYCHIATRIC EXAM:  Within normal limits. LABORATORY DATA:  Sodium 141, potassium 4.4, BUN 14, creatinine 0.95, GFR greater than 60, magnesium 1.8, glucose 120, calcium was 9.1. Cholesterol 97, HDL 41, LDL 31, triglycerides 123. ALT was 36, AST was 27. His hemoglobin A1c was 7.1 on 08/08/2022. TSH 0.96. Vitamin D 28.3. White count 6.4, hemoglobin 15.2 with a platelet count of 582,646. EKG showed atrial fibrillation with a controlled ventricular response. Chest x-ray was unremarkable. IMPRESSION:  1.  New onset of atrial fibrillation with controlled ventricular response. 2.  Strong family history of coronary artery disease.   3.  Catheterization on 07/23/2021, that showed 60% left main trunk, 80% proximal LAD, 80% large OM, 90% posterior ventricular branch of the right coronary artery, which was small, with an EF of 55%.  4.  Open heart surgery by Dr. Arsenio Scott on 08/04/2021, with a LIMA to the LAD, vein graft to OM branch of the circumflex, with a posterior ventricular branch of the right coronary artery, which was too small to bypass. 5.  Functional class I.  6.  New onset of atrial fibrillation, which is asymptomatic, and controlled ventricular response. 7.  On Eliquis now by samples. 8.  Hypertension, well controlled. 9.  Hyperlipidemia, well controlled. 10.  Severe sleep apnea, on a BiPAP mask. PLAN:  1. We will do a 30-day event recorder. 2.  Echocardiogram.  3.  He will check the cost of Xarelto, Eliquis and Pradaxa, and if none are economically feasible, he will start Coumadin for long-term anticoagulation. DISCUSSION:  From a cardiac standpoint, Mr. Nitza Wise has been asymptomatic. He has had no chest pain or any unusual shortness of breath. He is not exercising and therefore, I expect him to be deconditioned. His EKG showed atrial fibrillation with a controlled ventricular response. We gave him samples of Eliquis, but he will check the price of all the NOACs to see if any are economically feasible. I will do an echocardiogram to look at LV size and function as well as valve status again. We will also do a 30-day event recorder to see if he is having intermittent atrial fibrillation or whether this is a persistent atrial fibrillation. He has been ______. If he remains in atrial fibrillation, it would be reasonable to try one cardioversion to see if he reverts back to sinus rhythm. Again, his rate is so well controlled that this would be expected that he is asymptomatic. Thank you very much for allowing me the privilege of seeing Mr. Nitza Wise. If you have any questions on my thoughts, please do not hesitate to contact me.     Sincerely,        Dusty Kirkland    D: 12/19/2022 13:21:01     T: 12/19/2022 13:26:57     [email or web address]  Job#: [id number]   Doc#: [id number]

## 2022-12-22 ENCOUNTER — HOSPITAL ENCOUNTER (OUTPATIENT)
Dept: NON INVASIVE DIAGNOSTICS | Age: 67
Discharge: HOME | End: 2022-12-22
Payer: MEDICARE

## 2022-12-22 ENCOUNTER — HOSPITAL ENCOUNTER (OUTPATIENT)
Dept: NON INVASIVE DIAGNOSTICS | Age: 67
End: 2022-12-22
Payer: MEDICARE

## 2022-12-22 DIAGNOSIS — R06.02 SOB (SHORTNESS OF BREATH): ICD-10-CM

## 2022-12-22 PROCEDURE — 93270 REMOTE 30 DAY ECG REV/REPORT: CPT

## 2022-12-22 PROCEDURE — 93306 TTE W/DOPPLER COMPLETE: CPT

## 2023-01-23 ENCOUNTER — TELEPHONE (OUTPATIENT)
Dept: CARDIOLOGY CLINIC | Age: 68
End: 2023-01-23

## 2023-01-23 NOTE — TELEPHONE ENCOUNTER
Received letter insurance will not cover pradaxa  Although pt did call them   Will try eliquis 5 mg bid.

## 2023-01-25 DIAGNOSIS — I48.91 ATRIAL FIBRILLATION, UNSPECIFIED TYPE (HCC): Primary | ICD-10-CM

## 2023-01-25 NOTE — PROGRESS NOTES
Pt notified to increase  Metoprolol 25 mg to bid from qd  Will also set up for cardioversion   Per Dr. Yolis Ace scheduled   For Jan 30 @900 am  Will need CBC/CMP few days prior  Will follow up on March 1   With Ekg at 1000 and us at 56  Pt verbalized understanding

## 2023-01-27 ENCOUNTER — HOSPITAL ENCOUNTER (OUTPATIENT)
Age: 68
Discharge: HOME | End: 2023-01-27
Payer: MEDICARE

## 2023-01-27 DIAGNOSIS — I48.91 ATRIAL FIBRILLATION, UNSPECIFIED TYPE (HCC): ICD-10-CM

## 2023-01-27 DIAGNOSIS — Z12.5 SCREENING FOR PROSTATE CANCER: ICD-10-CM

## 2023-01-27 LAB
ABSOLUTE EOS #: 0.8 K/UL (ref 0–0.4)
ABSOLUTE LYMPH #: 1.9 K/UL (ref 1–4.8)
ABSOLUTE MONO #: 1.2 K/UL (ref 0–1)
ALBUMIN SERPL-MCNC: 3.8 G/DL (ref 3.5–5.2)
ALP BLD-CCNC: 51 U/L (ref 40–129)
ALT SERPL-CCNC: 33 U/L (ref 5–41)
ANION GAP SERPL CALCULATED.3IONS-SCNC: 11 MMOL/L (ref 9–17)
AST SERPL-CCNC: 30 U/L
BASOPHILS # BLD: 1 % (ref 0–2)
BASOPHILS ABSOLUTE: 0.1 K/UL (ref 0–0.2)
BILIRUB SERPL-MCNC: 0.5 MG/DL (ref 0.3–1.2)
BUN BLDV-MCNC: 14 MG/DL (ref 8–23)
BUN/CREAT BLD: 13 (ref 9–20)
CALCIUM SERPL-MCNC: 9.3 MG/DL (ref 8.6–10.4)
CHLORIDE BLD-SCNC: 104 MMOL/L (ref 98–107)
CO2: 26 MMOL/L (ref 20–31)
CREAT SERPL-MCNC: 1.11 MG/DL (ref 0.7–1.2)
DIFFERENTIAL TYPE: YES
EOSINOPHILS RELATIVE PERCENT: 11 % (ref 0–5)
GFR SERPL CREATININE-BSD FRML MDRD: >60 ML/MIN/1.73M2
GLUCOSE BLD-MCNC: 108 MG/DL (ref 70–99)
HCT VFR BLD CALC: 46.7 % (ref 41–53)
HEMOGLOBIN: 15.5 G/DL (ref 13.5–17.5)
LYMPHOCYTES # BLD: 25 % (ref 13–44)
MCH RBC QN AUTO: 31.4 PG (ref 26–34)
MCHC RBC AUTO-ENTMCNC: 33.3 G/DL (ref 31–37)
MCV RBC AUTO: 94.4 FL (ref 80–100)
MONOCYTES # BLD: 16 % (ref 5–9)
PDW BLD-RTO: 14.2 % (ref 12.1–15.2)
PLATELET # BLD: 236 K/UL (ref 140–450)
POTASSIUM SERPL-SCNC: 4.8 MMOL/L (ref 3.7–5.3)
RBC # BLD: 4.95 M/UL (ref 4.5–5.9)
SEG NEUTROPHILS: 47 % (ref 39–75)
SEGMENTED NEUTROPHILS ABSOLUTE COUNT: 3.6 K/UL (ref 2.1–6.5)
SODIUM BLD-SCNC: 141 MMOL/L (ref 135–144)
TOTAL PROTEIN: 6.9 G/DL (ref 6.4–8.3)
WBC # BLD: 7.5 K/UL (ref 3.5–11)

## 2023-01-27 PROCEDURE — 80053 COMPREHEN METABOLIC PANEL: CPT

## 2023-01-27 PROCEDURE — G0103 PSA SCREENING: HCPCS

## 2023-01-27 PROCEDURE — 36415 COLL VENOUS BLD VENIPUNCTURE: CPT

## 2023-01-27 PROCEDURE — 85025 COMPLETE CBC W/AUTO DIFF WBC: CPT

## 2023-01-28 LAB — PROSTATE SPECIFIC ANTIGEN: 0.48 NG/ML

## 2023-01-30 ENCOUNTER — HOSPITAL ENCOUNTER (OUTPATIENT)
Dept: NON INVASIVE DIAGNOSTICS | Age: 68
Discharge: HOME | End: 2023-01-30
Payer: MEDICARE

## 2023-01-30 VITALS
DIASTOLIC BLOOD PRESSURE: 89 MMHG | OXYGEN SATURATION: 93 % | HEART RATE: 64 BPM | RESPIRATION RATE: 29 BRPM | SYSTOLIC BLOOD PRESSURE: 127 MMHG

## 2023-01-30 DIAGNOSIS — I48.91 ATRIAL FIBRILLATION, UNSPECIFIED TYPE (HCC): ICD-10-CM

## 2023-01-30 PROCEDURE — 92960 CARDIOVERSION ELECTRIC EXT: CPT

## 2023-01-30 PROCEDURE — 6360000002 HC RX W HCPCS: Performed by: INTERNAL MEDICINE

## 2023-01-30 PROCEDURE — 2580000003 HC RX 258: Performed by: INTERNAL MEDICINE

## 2023-01-30 RX ORDER — PROPOFOL 10 MG/ML
INJECTION, EMULSION INTRAVENOUS
Status: COMPLETED | OUTPATIENT
Start: 2023-01-30 | End: 2023-01-30

## 2023-01-30 RX ADMIN — PROPOFOL 130 MG: 10 INJECTION, EMULSION INTRAVENOUS at 10:36

## 2023-01-30 RX ADMIN — SODIUM CHLORIDE 500 ML: 4.5 INJECTION, SOLUTION INTRAVENOUS at 10:27

## 2023-01-30 NOTE — SEDATION DOCUMENTATION
After pt sedated, Dr. Ila Connor cardioverts patient using 100 j without success, then increased to 200J without success, increased to 300 J and pt converts to SR. Pt tolerated procedure.

## 2023-01-30 NOTE — H&P
Magdalena Torres, DO  [address] [facility] [address]     RE:   Hu Beal  :  1955     Dear Dr. Black Jarvis:     CHIEF COMPLAINT:  1. Atrial fibrillation, new onset. 2.  Severe coronary artery disease. 3.  Status post open heart surgery by Dr. Marva Johnson on 2021, with a LIMA to the LAD, vein graft to the OM branch of the circumflex with a posterior ventricular branch of the right coronary artery too small to bypass. HISTORY OF PRESENT ILLNESS:  I had the pleasure of seeing Mr. Dalton Caballero and his wife in our office on 2022. He is a pleasant 78-year-old gentleman who I met on 2021. He had multiple risk factors for coronary artery disease including strong family history as well as hypertension, hyperlipidemia and diabetes. He was having lithotripsy and Dr. Black Jarvis noticed shortness of breath and ordered a Lexiscan stress test, which was abnormal.  I saw him on 2021. He developed more shortness of breath and loss of energy and did have chest pain, which was fairly atypical.  The stress test showed inferolateral wall infarct and mirna-infarct ischemia. A catheterization was done on 2021, that showed 60% left main trunk, 80% proximal LAD, 80% large OM, 90% posterior ventricular branch of the right coronary artery, which was quite small, EF of 55%. He had subsequent open heart surgery by Dr. Marva Johnson on 2021, with a LIMA to the LAD, a vein graft to the OM branch of the circumflex, the posterior ventricular branch of the right coronary artery was too small to bypass. He has had no hospitalizations or procedures since I saw him on 2021. He has had slightly more shortness of breath with activity, starting several months ago, although he admits he is quite inactive. Denies any chest pain or chest discomfort. He does have some pedal edema, which is \"not new. \"     In preparation for this visit, he did have an EKG on , which showed atrial fibrillation with a controlled ventricular response. His ventricular rate was 80 beats per minute. We gave him Eliquis samples of 5 mg b.i.d. and brought him in today for his routine visit. Again, he cannot feel any palpitations. He has had no syncope or near syncope. CARDIAC RISK FACTORS:  Known CAD:  Positive. Bypass Surgery:  Positive. Hypertension:  Positive. Hyperlipidemia:  Positive. Diabetes:  Positive. Other Family Members:  Positive. Smoking:  Negative. Peripheral Vascular Disease:  Negative. MEDICATION AT THIS TIME:  He is on Eliquis 5 mg b.i.d., aspirin 81 mg daily, Cardizem  mg daily, Cozaar 25 mg daily, metformin 1000 mg b.i.d., Toprol-XL 25 mg daily, Ditropan XL 5 mg daily, oxygen 2 liters, Crestor 40 mg daily, Januvia 100 mg daily, Flomax 0.4 mg daily. PAST MEDICAL AND SURGICAL HISTORY:  1. Cardiac as above. 2.  Long history of hypertension. 3.  Hyperlipidemia. 4.  Severe sleep apnea, on a BiPAP machine at home. 5.  Prostate surgery. 6.  Renal lithiasis, status post lithotripsy. 7.  Umbilical hernia repair and multiple colonoscopies. FAMILY HISTORY:  Brother passed away at 76 of an MI. Father passed away at 67. Younger brother had stenting. SOCIAL HISTORY:  He is 69years old, , no children. Retired from [employer] in 2015. Quit smoking in 1996. Does not exercise. Does not drink alcohol. REVIEW OF SYSTEMS:  Cardiac as above. Other systems reviewed including constitutional, eyes, ears, nose and throat, cardiovascular, respiratory, GI, , musculoskeletal, integumentary, neurologic, endocrine, hematologic and allergic/immunologic are negative except for described above. No weight loss or weight gain. No change in bowel habits. No blood in stool. No fevers, sweats or chills. PHYSICAL EXAMINATION:  VITAL SIGNS:  His blood pressure was 120/70 with a heart rate of 83 and irregular. Respirations 18. O2 sat 95%.   Weight 274 pounds. GENERAL:  He is a pleasant 49-year-old gentleman. Denied pain. He was oriented to person, place and time. Answered questions appropriately. SKIN:  No unusual skin changes. HEENT:  The pupils are equally round and intact. Mucous membranes were dry. NECK:  No JVD. Good carotid pulses. No carotid bruits. No lymphadenopathy or thyromegaly. CARDIOVASCULAR EXAM:  S1 and S2 were normal.  No S3 or S4. Soft systolic blowing type murmur. No diastolic murmur. PMI was normal.  No lift, thrust, or pericardial friction rub. LUNGS:  Clear to auscultation and percussion. ABDOMEN:  Soft and nontender. Good bowel sounds. EXTREMITIES:  Good femoral pulses. Good pedal pulses. He had mild pedal edema. Skin was warm and dry. No calf tenderness. Nail beds pink. Good cap refill. PULSES:  Bilateral symmetrical radial, brachial and carotid pulses. No carotid bruits. Good femoral and pedal pulses. NEUROLOGIC EXAM:  Within normal limits. PSYCHIATRIC EXAM:  Within normal limits. LABORATORY DATA:  Sodium 141, potassium 4.4, BUN 14, creatinine 0.95, GFR greater than 60, magnesium 1.8, glucose 120, calcium was 9.1. Cholesterol 97, HDL 41, LDL 31, triglycerides 123. ALT was 36, AST was 27. His hemoglobin A1c was 7.1 on 08/08/2022. TSH 0.96. Vitamin D 28.3. White count 6.4, hemoglobin 15.2 with a platelet count of 510,824. EKG showed atrial fibrillation with a controlled ventricular response. Chest x-ray was unremarkable. IMPRESSION:  1.  New onset of atrial fibrillation with controlled ventricular response. 2.  Strong family history of coronary artery disease. 3.  Catheterization on 07/23/2021, that showed 60% left main trunk, 80% proximal LAD, 80% large OM, 90% posterior ventricular branch of the right coronary artery, which was small, with an EF of 55%.   4.  Open heart surgery by Dr. Tata Eagle on 08/04/2021, with a LIMA to the LAD, vein graft to OM branch of the circumflex, with a posterior ventricular branch of the right coronary artery, which was too small to bypass. 5.  Functional class I.  6.  New onset of atrial fibrillation, which is asymptomatic, and controlled ventricular response. 7.  On Eliquis now by samples. 8.  Hypertension, well controlled. 9.  Hyperlipidemia, well controlled. 10.  Severe sleep apnea, on a BiPAP mask. PLAN:  1. We will do a 30-day event recorder. 2.  Echocardiogram.  3.  He will check the cost of Xarelto, Eliquis and Pradaxa, and if none are economically feasible, he will start Coumadin for long-term anticoagulation. DISCUSSION:  From a cardiac standpoint, Mr. Philip Victor has been asymptomatic. He has had no chest pain or any unusual shortness of breath. He is not exercising and therefore, I expect him to be deconditioned. His EKG showed atrial fibrillation with a controlled ventricular response. We gave him samples of Eliquis, but he will check the price of all the NOACs to see if any are economically feasible. I will do an echocardiogram to look at LV size and function as well as valve status again. We will also do a 30-day event recorder to see if he is having intermittent atrial fibrillation or whether this is a persistent atrial fibrillation. He has been ______. If he remains in atrial fibrillation, it would be reasonable to try one cardioversion to see if he reverts back to sinus rhythm. Again, his rate is so well controlled that this would be expected that he is asymptomatic. Thank you very much for allowing me the privilege of seeing Mr. Philip Victor. If you have any questions on my thoughts, please do not hesitate to contact me.      Sincerely,           Michele Cannon

## 2023-01-30 NOTE — PRE SEDATION
Sedation Pre-Procedure Note    Patient Name: Mendel Acosta   YOB: 1955  Room/Bed: Room/bed info not found  Medical Record Number: [id number]  Date: 1/30/2023   Time: 8:56 AM       Indication:  atrial fibrillation    Consent: I have discussed with the patient and/or the patient representative the indication, alternatives, and the possible risks and/or complications of the planned procedure and the anesthesia methods. The patient and/or patient representative appear to understand and agree to proceed. Vital Signs: There were no vitals filed for this visit. Past Medical History:   has a past medical history of Diabetes mellitus ([facility] [address] Hernia cerebri ([facility] [address] Hyperlipidemia, Hypertension, Obesity (BMI 30-39.9), Obstructive sleep apnea, and Prostate disease. Past Surgical History:   has a past surgical history that includes hernia repair; Cystoscopy (Left, 08/07/2018); pr cysto w/insert ureteral stent (Left, 08/07/2018); pr office/outpt visit,procedure only (Left, 08/23/2018); Colonoscopy (08/2012); Colonoscopy (02/12/2015); Colonoscopy (03/01/2019); Colonoscopy (N/A, 03/01/2019); and Cardiac catheterization (Left, 07/23/2021). Medications:   Scheduled Meds:   Continuous Infusions:   PRN Meds:   Home Meds:   Prior to Admission medications    Medication Sig Start Date End Date Taking?  Authorizing Provider   apixaban (ELIQUIS) 5 MG TABS tablet Take 1 tablet by mouth 2 times daily 1/23/23   Ernie Garces MD   metoprolol succinate (TOPROL XL) 25 MG extended release tablet TAKE 1 TABLET BY MOUTH EVERY DAY 12/13/22   Alexandre Antu, DO   SITagliptin (JANUVIA) 100 MG tablet TAKE 1 TABLET BY MOUTH EVERY DAY 12/13/22   Alexandre Antu, DO   losartan (COZAAR) 25 MG tablet TAKE 1 TABLET BY MOUTH DAILY WITH LUNCH. 11/22/22 3/22/23  Alexandre Antu, DO   CONTOUR NEXT TEST strip 100 each by Other route daily 10/4/22   Alexandre Antu, DO   dilTIAZem (CARDIZEM CD) 360 MG extended release capsule TAKE 1 CAPSULE BY MOUTH EVERY DAY 9/13/22   Dennys Leyva, DO   metFORMIN (GLUCOPHAGE) 1000 MG tablet TAKE 1 TABLET BY MOUTH TWICE A DAY WITH MEALS 8/12/22   Dennys Leyva, DO   tamsulosin (FLOMAX) 0.4 MG capsule TAKE 1 CAPSULE BY MOUTH TWICE A DAY 8/8/22   Dennys Leyva, DO   oxybutynin (DITROPAN-XL) 5 MG extended release tablet TAKE 1 TABLET BY MOUTH EVERY DAY IN THE EVENING 8/8/22   Dennys Leyva, DO   rosuvastatin (CRESTOR) 40 MG tablet Take 1 tablet by mouth every evening 8/8/22   Dennys Leyva, DO   bisacodyl (DULCOLAX) 10 MG suppository Place 10 mg rectally daily 8/10/21   Historical Provider, MD   acetaminophen (TYLENOL) 325 MG tablet Take 650 mg by mouth every 4 hours as needed 8/10/21   Historical Provider, MD   OXYGEN Inhale into the lungs    Historical Provider, MD   Blood Glucose Monitoring Suppl (CONTOUR NEXT MONITOR) w/Device KIT TEST TWICE A DAY BEFORE BREAKFAST AND DINNER 8/11/21   Historical Provider, MD   Lancets MISC Use to check BG before breakfast and dinner   Dx E11.65  For Contour next meter . 8/10/21   Historical Provider, MD   aspirin 81 MG EC tablet Take 81 mg by mouth daily    Historical Provider, MD   triamcinolone (ARISTOCORT) 0.5 % cream APPLY THIN COAT TO AFFECTED AREA TWICE A DAY 5/30/21   Historical Provider, MD     Coumadin Use Last 7 Days:  no  Antiplatelet drug therapy use last 7 days: yes - asa  Other anticoagulant use last 7 days: yes - eliquis  Additional Medication Information:     Pre-Sedation Documentation and Exam:   I have personally completed a history, physical exam & review of systems for this patient (see notes).     Mallampati Airway Assessment:  normal    Prior History of Anesthesia Complications:   none    ASA Classification:  Class 2 - A normal healthy patient with mild systemic disease    Sedation/ Anesthesia Plan:   intravenous sedation    Medications Planned:   propofol intravenously    Patient is an appropriate candidate for plan of sedation: yes    Electronically signed by Johny Sommers MD on 1/30/2023 at 8:56 AM

## 2023-01-31 ENCOUNTER — HOSPITAL ENCOUNTER (OUTPATIENT)
Age: 68
Setting detail: SPECIMEN
Discharge: HOME | End: 2023-01-31

## 2023-01-31 DIAGNOSIS — E11.9 TYPE 2 DIABETES MELLITUS WITHOUT COMPLICATION, WITHOUT LONG-TERM CURRENT USE OF INSULIN (HCC): ICD-10-CM

## 2023-01-31 DIAGNOSIS — E11.9 TYPE 2 DIABETES MELLITUS WITHOUT COMPLICATION, WITHOUT LONG-TERM CURRENT USE OF INSULIN (HCC): Primary | ICD-10-CM

## 2023-01-31 PROCEDURE — 92960 CARDIOVERSION ELECTRIC EXT: CPT | Performed by: INTERNAL MEDICINE

## 2023-01-31 NOTE — PROCEDURES
[place] [id number]                            CARDIAC CATHETERIZATION    PATIENT NAME: Shayla Arzola                   :        1955  MED REC NO:   [id number]                              ROOM:  ACCOUNT NO:   [de-identified]                           ADMIT DATE: 2023  PROVIDER:     Mahendra Lujan MD    DATE OF PROCEDURE:  2023    INDICATION:  Atrial fibrillation. With the help of two RNs and a respiratory therapist, we sedated the  patient with propofol. After he was asleep, I attempted cardioversion  at 100 and 200 joules unsuccessfully. At 300 joules, he converted to  sinus rhythm. He awoke without difficulty. No complications. IMPRESSION:  Successful cardioversion from atrial fibrillation to normal  sinus rhythm with 300 joules.         Tom Miranda MD    D: 2023 5:03:07       T: 2023 5:05:39     [email or web address]  Job#: [id number]     Doc#: [id number]    CC:

## 2023-02-01 LAB
EST. AVERAGE GLUCOSE BLD GHB EST-MCNC: 157 MG/DL
HBA1C MFR BLD: 7.1 % (ref 4–6)

## 2023-02-02 NOTE — TELEPHONE ENCOUNTER
Last OV: 8/8/2022 AWV    Next scheduled apt: 2/9/2023 DM check      Surescripts requesting refill  Medication pending

## 2023-02-09 ENCOUNTER — OFFICE VISIT (OUTPATIENT)
Dept: FAMILY MEDICINE CLINIC | Age: 68
End: 2023-02-09
Payer: MEDICARE

## 2023-02-09 VITALS
DIASTOLIC BLOOD PRESSURE: 66 MMHG | WEIGHT: 272 LBS | SYSTOLIC BLOOD PRESSURE: 118 MMHG | HEART RATE: 76 BPM | OXYGEN SATURATION: 96 % | HEIGHT: 72 IN | BODY MASS INDEX: 36.84 KG/M2

## 2023-02-09 DIAGNOSIS — E11.9 TYPE 2 DIABETES MELLITUS WITHOUT COMPLICATION, WITHOUT LONG-TERM CURRENT USE OF INSULIN (HCC): Primary | ICD-10-CM

## 2023-02-09 DIAGNOSIS — I48.0 PAROXYSMAL ATRIAL FIBRILLATION (HCC): ICD-10-CM

## 2023-02-09 DIAGNOSIS — R05.8 POST-VIRAL COUGH SYNDROME: ICD-10-CM

## 2023-02-09 DIAGNOSIS — G47.33 OSA TREATED WITH BIPAP: ICD-10-CM

## 2023-02-09 PROBLEM — I48.91 ATRIAL FIBRILLATION (HCC): Status: ACTIVE | Noted: 2023-02-09

## 2023-02-09 PROBLEM — I48.20 CHRONIC ATRIAL FIBRILLATION (HCC): Status: ACTIVE | Noted: 2023-02-09

## 2023-02-09 LAB
CREATININE URINE POCT: 300
MICROALBUMIN/CREAT 24H UR: 80 MG/G{CREAT}
MICROALBUMIN/CREAT UR-RTO: NORMAL

## 2023-02-09 PROCEDURE — 1036F TOBACCO NON-USER: CPT | Performed by: FAMILY MEDICINE

## 2023-02-09 PROCEDURE — 3074F SYST BP LT 130 MM HG: CPT | Performed by: FAMILY MEDICINE

## 2023-02-09 PROCEDURE — G8417 CALC BMI ABV UP PARAM F/U: HCPCS | Performed by: FAMILY MEDICINE

## 2023-02-09 PROCEDURE — 3078F DIAST BP <80 MM HG: CPT | Performed by: FAMILY MEDICINE

## 2023-02-09 PROCEDURE — 2022F DILAT RTA XM EVC RTNOPTHY: CPT | Performed by: FAMILY MEDICINE

## 2023-02-09 PROCEDURE — G8427 DOCREV CUR MEDS BY ELIG CLIN: HCPCS | Performed by: FAMILY MEDICINE

## 2023-02-09 PROCEDURE — 1123F ACP DISCUSS/DSCN MKR DOCD: CPT | Performed by: FAMILY MEDICINE

## 2023-02-09 PROCEDURE — 3017F COLORECTAL CA SCREEN DOC REV: CPT | Performed by: FAMILY MEDICINE

## 2023-02-09 PROCEDURE — G8484 FLU IMMUNIZE NO ADMIN: HCPCS | Performed by: FAMILY MEDICINE

## 2023-02-09 PROCEDURE — 3051F HG A1C>EQUAL 7.0%<8.0%: CPT | Performed by: FAMILY MEDICINE

## 2023-02-09 PROCEDURE — 99214 OFFICE O/P EST MOD 30 MIN: CPT | Performed by: FAMILY MEDICINE

## 2023-02-09 PROCEDURE — 82044 UR ALBUMIN SEMIQUANTITATIVE: CPT | Performed by: FAMILY MEDICINE

## 2023-02-09 RX ORDER — DILTIAZEM HYDROCHLORIDE 360 MG/1
CAPSULE, EXTENDED RELEASE ORAL
Qty: 90 CAPSULE | Refills: 1 | Status: SHIPPED | OUTPATIENT
Start: 2023-02-09

## 2023-02-09 SDOH — ECONOMIC STABILITY: INCOME INSECURITY: HOW HARD IS IT FOR YOU TO PAY FOR THE VERY BASICS LIKE FOOD, HOUSING, MEDICAL CARE, AND HEATING?: NOT HARD AT ALL

## 2023-02-09 SDOH — ECONOMIC STABILITY: HOUSING INSECURITY
IN THE LAST 12 MONTHS, WAS THERE A TIME WHEN YOU DID NOT HAVE A STEADY PLACE TO SLEEP OR SLEPT IN A SHELTER (INCLUDING NOW)?: NO

## 2023-02-09 SDOH — ECONOMIC STABILITY: FOOD INSECURITY: WITHIN THE PAST 12 MONTHS, YOU WORRIED THAT YOUR FOOD WOULD RUN OUT BEFORE YOU GOT MONEY TO BUY MORE.: NEVER TRUE

## 2023-02-09 SDOH — ECONOMIC STABILITY: FOOD INSECURITY: WITHIN THE PAST 12 MONTHS, THE FOOD YOU BOUGHT JUST DIDN'T LAST AND YOU DIDN'T HAVE MONEY TO GET MORE.: NEVER TRUE

## 2023-02-09 ASSESSMENT — PATIENT HEALTH QUESTIONNAIRE - PHQ9
SUM OF ALL RESPONSES TO PHQ QUESTIONS 1-9: 0
2. FEELING DOWN, DEPRESSED OR HOPELESS: 0
SUM OF ALL RESPONSES TO PHQ QUESTIONS 1-9: 0
SUM OF ALL RESPONSES TO PHQ QUESTIONS 1-9: 0
1. LITTLE INTEREST OR PLEASURE IN DOING THINGS: 0
SUM OF ALL RESPONSES TO PHQ QUESTIONS 1-9: 0
SUM OF ALL RESPONSES TO PHQ9 QUESTIONS 1 & 2: 0

## 2023-02-09 NOTE — PROGRESS NOTES
Name: Ruthie Seen  : 1955         Chief Complaint:     Chief Complaint   Patient presents with    Diabetes    Hypertension    Sleep Apnea       History of Present Illness:      Ruthie Seen is a 69 y.o.  male who presents with Diabetes, Hypertension, and Sleep Apnea      HPI    Has had a cold for about 2 wks, feeling better but still coughing and wheezing a little bit. No fever or significant malaise at onset. F/u DM. Recent fasting readings around 130-140. taking metformin BID and [place] and [place] daily. Discontinued glimepiride a while ago. AN on BiPAP, compliant and benefiting from use. Machine about 9 yrs old, recalls he got it after retiring in . [place]. Recent diagnosis of A-fib, completely asymptomatic. Underwent cardioversion and has done well. On Eliquis and not having any symptoms of bleeding. Medical History:     Patient Active Problem List   Diagnosis    Essential hypertension, benign    Type 2 diabetes mellitus without complication, without long-term current use of insulin (HCC)    Varicose veins of legs    Tubular adenoma of colon    BPH with obstruction/lower urinary tract symptoms    Diverticulosis of large intestine without hemorrhage    Coronary artery disease involving native coronary artery of native heart without angina pectoris    S/P CABG (coronary artery bypass graft)    AN treated with BiPAP       Medications:       Prior to Admission medications    Medication Sig Start Date End Date Taking?  Authorizing Provider   dilTIAZem (CARDIZEM CD) 360 MG extended release capsule TAKE 1 CAPSULE BY MOUTH EVERY DAY 23  Yes Paulina Less, DO   metFORMIN (GLUCOPHAGE) 1000 MG tablet TAKE 1 TABLET BY MOUTH TWICE A DAY WITH MEALS 23  Yes Montrose Less, DO   apixaban (ELIQUIS) 5 MG TABS tablet Take 1 tablet by mouth 2 times daily 23  Yes Payton Yanes MD   metoprolol succinate (TOPROL XL) 25 MG extended release tablet TAKE 1 TABLET BY MOUTH EVERY DAY 22 Yes Reanna Estrellas, DO   SITagliptin (JANUVIA) 100 MG tablet TAKE 1 TABLET BY MOUTH EVERY DAY 12/13/22  Yes Reanna Seats, DO   losartan (COZAAR) 25 MG tablet TAKE 1 TABLET BY MOUTH DAILY WITH LUNCH. 11/22/22 3/22/23 Yes Reanna Conteh, DO   CONTOUR NEXT TEST strip 100 each by Other route daily 10/4/22  Yes Reanna Estrellas, DO   tamsulosin (FLOMAX) 0.4 MG capsule TAKE 1 CAPSULE BY MOUTH TWICE A DAY 8/8/22  Yes Reanna Estrellas, DO   oxybutynin (DITROPAN-XL) 5 MG extended release tablet TAKE 1 TABLET BY MOUTH EVERY DAY IN THE EVENING 8/8/22  Yes Reanna Estrellas, DO   rosuvastatin (CRESTOR) 40 MG tablet Take 1 tablet by mouth every evening 8/8/22  Yes Reanna Estrellas, DO   bisacodyl (DULCOLAX) 10 MG suppository Place 10 mg rectally daily 8/10/21  Yes Historical Provider, MD   acetaminophen (TYLENOL) 325 MG tablet Take 650 mg by mouth every 4 hours as needed 8/10/21  Yes Historical Provider, MD   Blood Glucose Monitoring Suppl (CONTOUR NEXT MONITOR) w/Device KIT TEST TWICE A DAY BEFORE BREAKFAST AND DINNER 8/11/21  Yes Historical Provider, MD   Lancets MISC Use to check BG before breakfast and dinner   Dx E11.65  For Contour next meter . 8/10/21  Yes Historical Provider, MD   aspirin 81 MG EC tablet Take 81 mg by mouth daily   Yes Historical Provider, MD   triamcinolone (ARISTOCORT) 0.5 % cream APPLY THIN COAT TO AFFECTED AREA TWICE A DAY 5/30/21  Yes Historical Provider, MD   OXYGEN Inhale into the lungs    Historical Provider, MD        Allergies:       Patient has no known allergies. Physical Exam:     Vitals:  /66   Pulse 76   Ht 6' (1.829 m)   Wt 272 lb (123.4 kg)   SpO2 96%   BMI 36.89 kg/m²   Physical Exam  Vitals and nursing note reviewed. Constitutional:       General: He is not in acute distress. Appearance: He is well-developed.    HENT:      Right Ear: Tympanic membrane and ear canal normal.      Left Ear: Tympanic membrane and ear canal normal.      Nose: Congestion (signs of PND) present. Mouth/Throat:      Mouth: Mucous membranes are moist.      Pharynx: Oropharynx is clear. Eyes:      Conjunctiva/sclera: Conjunctivae normal.   Cardiovascular:      Rate and Rhythm: Normal rate and regular rhythm. Heart sounds: Normal heart sounds. Pulmonary:      Effort: Pulmonary effort is normal.      Breath sounds: Normal breath sounds. No wheezing or rales. Comments: No cough during visit  Musculoskeletal:      Cervical back: Neck supple. Lymphadenopathy:      Cervical: No cervical adenopathy. Skin:     General: Skin is warm and dry. Neurological:      Mental Status: He is alert and oriented to person, place, and time. Psychiatric:         Mood and Affect: Mood normal.         Behavior: Behavior normal.       Data:     Lab Results   Component Value Date/Time     01/27/2023 01:55 PM    K 4.8 01/27/2023 01:55 PM     01/27/2023 01:55 PM    CO2 26 01/27/2023 01:55 PM    BUN 14 01/27/2023 01:55 PM    CREATININE 1.11 01/27/2023 01:55 PM    GLUCOSE 108 01/27/2023 01:55 PM    PROT 6.9 01/27/2023 01:55 PM    LABALBU 3.8 01/27/2023 01:55 PM    BILITOT 0.5 01/27/2023 01:55 PM    ALKPHOS 51 01/27/2023 01:55 PM    AST 30 01/27/2023 01:55 PM    ALT 33 01/27/2023 01:55 PM     Lab Results   Component Value Date/Time    WBC 7.5 01/27/2023 01:55 PM    RBC 4.95 01/27/2023 01:55 PM    HGB 15.5 01/27/2023 01:55 PM    HCT 46.7 01/27/2023 01:55 PM    MCV 94.4 01/27/2023 01:55 PM    MCH 31.4 01/27/2023 01:55 PM    MCHC 33.3 01/27/2023 01:55 PM    RDW 14.2 01/27/2023 01:55 PM     01/27/2023 01:55 PM    MPV NOT REPORTED 12/13/2021 09:03 AM     Lab Results   Component Value Date/Time    TSH 0.96 12/12/2022 10:10 AM     Lab Results   Component Value Date/Time    CHOL 97 12/12/2022 10:10 AM    HDL 41 12/12/2022 10:10 AM    PSA 0.48 01/27/2023 01:54 PM    LABA1C 7.1 01/27/2023 01:55 PM         Assessment & Plan:        Diagnosis Orders   1.  Type 2 diabetes mellitus without complication, without long-term current use of insulin (HCC)  POCT microalbumin    HM DIABETES FOOT EXAM      2. Paroxysmal atrial fibrillation (HCC)        3. AN treated with BiPAP        4. Post-viral cough syndrome          1. Diabetes mildly uncontrolled. Work more on diet and exercise and continue same Rx. 2.  Recent diagnosis of A-fib which was found incidentally on routine cardiac testing. Status post cardioversion and was still in regular rate and rhythm today on exam.  High risk of recurrence with previous A-fib, sleep apnea, and again he was asymptomatic so he could have recurrence without awareness. Continue anticoagulation. Discussed cost and possible change to Coumadin if needed. 3.  AN on BiPAP, compliant with treatment and benefiting in terms of quality of sleep and daytime wakefulness. Machine very old and patient is concerned that it could stop working. Has not been refilling supplies routinely so he was unsure about his status with a DME company. We will look into this and try to get him a new machine. 4.  Recent viral URI, still coughing. Benign exam.  Reassured. May also try Flonase, Zyrtec.       Requested Prescriptions     Signed Prescriptions Disp Refills    dilTIAZem (CARDIZEM CD) 360 MG extended release capsule 90 capsule 1     Sig: TAKE 1 CAPSULE BY MOUTH EVERY DAY       signed by Kiet Gonzalez DO on 2/10/2023 at 6:24 AM  [facility] [address] [facility] [address]  Dept: [phone]

## 2023-02-09 NOTE — PATIENT INSTRUCTIONS
SURVEY:    You may be receiving a survey from [employer] regarding your visit today. You may get this in the mail, through your MyChart or in your email. Please complete the survey to enable us to provide the highest quality of care to you and your family. If you cannot score us as very good ( 5 Stars) on any question, please feel free to call the office to discuss how we could have made your experience exceptional.     Thank you.     Clinical Care Team:  Dr. Hamida Pelayo, DO Fallon Zuniga, [address] [place] [address] Team:  [address] [place] [address]

## 2023-02-10 PROBLEM — I48.20 CHRONIC ATRIAL FIBRILLATION (HCC): Status: RESOLVED | Noted: 2023-02-09 | Resolved: 2023-02-10

## 2023-02-15 RX ORDER — OXYBUTYNIN CHLORIDE 5 MG/1
TABLET, EXTENDED RELEASE ORAL
Qty: 90 TABLET | Refills: 1 | Status: SHIPPED | OUTPATIENT
Start: 2023-02-15

## 2023-02-15 RX ORDER — ROSUVASTATIN CALCIUM 40 MG/1
40 TABLET, COATED ORAL EVERY EVENING
Qty: 90 TABLET | Refills: 1 | Status: SHIPPED | OUTPATIENT
Start: 2023-02-15

## 2023-02-15 NOTE — TELEPHONE ENCOUNTER
Patient asking for new scripts for Oxybutynin & Rosuvastatin - patient uses [facility]  - also on 02/02 Metformin was sent to [facility] in [place] but they don't use that pharmacy any longer - could we resend to [facility] Maintenance   Topic Date Due    DTaP/Tdap/Td vaccine (1 - Tdap) Never done    Shingles vaccine (1 of 2) Never done    COVID-19 Vaccine (4 - Booster for Pfizer series) 12/02/2021    Diabetic retinal exam  12/07/2021    Flu vaccine (1) 08/01/2022    Annual Wellness Visit (AWV)  08/09/2023    Lipids  12/12/2023    A1C test (Diabetic or Prediabetic)  01/27/2024    GFR test (Diabetes, CKD 3-4, OR last GFR 15-59)  01/27/2024    Diabetic foot exam  02/09/2024    Diabetic Alb to Cr ratio (uACR) test  02/09/2024    Depression Screen  02/09/2024    Colorectal Cancer Screen  03/01/2029    Pneumococcal 65+ years Vaccine  Completed    AAA screen  Completed    Hepatitis C screen  Completed    Hepatitis A vaccine  Aged Out    Hib vaccine  Aged Out    Meningococcal (ACWY) vaccine  Aged Out             (applicable per patient's age: Cancer Screenings, Depression Screening, Fall Risk Screening, Immunizations)    Hemoglobin A1C (%)   Date Value   01/27/2023 7.1 (H)   08/08/2022 7.1   05/05/2022 6.6     Microalb/Crt.  Ratio (mcg/mg creat)   Date Value   01/27/2021 8     LDL Cholesterol (mg/dL)   Date Value   12/12/2022 31     AST (U/L)   Date Value   01/27/2023 30     ALT (U/L)   Date Value   01/27/2023 33     BUN (mg/dL)   Date Value   01/27/2023 14      (goal A1C is < 7)   (goal LDL is <100) need 30-50% reduction from baseline     BP Readings from Last 3 Encounters:   02/09/23 118/66   01/30/23 127/89   12/19/22 120/70    (goal /80)      All Future Testing planned in [facility]:  Lab Frequency Next Occurrence   CBC with Auto Differential Once 06/19/2023   Comprehensive Metabolic Panel Once 09/25/5927   Lipid Panel Once 06/19/2023   TSH with Reflex Once 06/19/2023   Magnesium Once 06/19/2023   EKG 12 Lead Once 06/19/2023   XR CHEST (2 VW) Once 06/19/2023   Cardiac event monitor Once 12/19/2022   EKG 12 Lead Once 03/01/2023       Next Visit Date:  Future Appointments   Date Time Provider Lashay Moorei   3/1/2023 10:30 AM MD Laura Gonzalez [facility]   6/20/2023  9:30 AM MD Laura Gonzalez [facility]   8/11/2023 10:00 AM DO Radha Koehler [facility]            Patient Active Problem List:     Essential hypertension, benign     Type 2 diabetes mellitus without complication, without long-term current use of insulin (HCC)     Varicose veins of legs     Tubular adenoma of colon     BPH with obstruction/lower urinary tract symptoms     Diverticulosis of large intestine without hemorrhage     Coronary artery disease involving native coronary artery of native heart without angina pectoris     S/P CABG (coronary artery bypass graft)     AN treated with BiPAP

## 2023-02-16 NOTE — TELEPHONE ENCOUNTER
Patient scheduled with Dr. Lulu Zapata to repeat sleep study and restart nabeel tx    Last visit:  2/9/2023  Next Visit Date:    Future Appointments   Date Time Provider Lashay Rubio   3/1/2023 10:30 AM MD Jessie Cunningham [facility]   6/20/2023  9:30 AM MD Jessie Cunningham W   8/11/2023 10:00 AM [facility], DO Giorgio Kevin [facility]         Medication List:  Prior to Admission medications    Medication Sig Start Date End Date Taking?  Authorizing Provider   rosuvastatin (CRESTOR) 40 MG tablet Take 1 tablet by mouth every evening 2/15/23  Yes [facility], DO   oxybutynin (DITROPAN-XL) 5 MG extended release tablet TAKE 1 TABLET BY MOUTH EVERY DAY IN THE EVENING 2/15/23  Yes [facility], DO   metFORMIN (GLUCOPHAGE) 1000 MG tablet TAKE 1 TABLET BY MOUTH TWICE A DAY WITH MEALS 2/15/23  Yes [facility], DO   dilTIAZem (CARDIZEM CD) 360 MG extended release capsule TAKE 1 CAPSULE BY MOUTH EVERY DAY 2/9/23   [facility], DO   apixaban (ELIQUIS) 5 MG TABS tablet Take 1 tablet by mouth 2 times daily 1/23/23   Tien Sutton MD   metoprolol succinate (TOPROL XL) 25 MG extended release tablet TAKE 1 TABLET BY MOUTH EVERY DAY 12/13/22   [facility], DO   SITagliptin (JANUVIA) 100 MG tablet TAKE 1 TABLET BY MOUTH EVERY DAY 12/13/22   [facility], DO   losartan (COZAAR) 25 MG tablet TAKE 1 TABLET BY MOUTH DAILY WITH LUNCH. 11/22/22 3/22/23  [facility], DO   CONTOUR NEXT TEST strip 100 each by Other route daily 10/4/22   [facility], DO   tamsulosin (FLOMAX) 0.4 MG capsule TAKE 1 CAPSULE BY MOUTH TWICE A DAY 8/8/22   [facility], DO   bisacodyl (DULCOLAX) 10 MG suppository Place 10 mg rectally daily 8/10/21   Historical Provider, MD   acetaminophen (TYLENOL) 325 MG tablet Take 650 mg by mouth every 4 hours as needed 8/10/21   Historical Provider, MD   OXYGEN Inhale into the lungs    Historical Provider, MD   Blood Glucose Monitoring Suppl (CONTOUR NEXT MONITOR) w/Device KIT TEST TWICE A DAY BEFORE BREAKFAST AND DINNER 8/11/21   Historical Provider, MD   Lancets MISC Use to check BG before breakfast and dinner   Dx E11.65  For Contour next meter .  8/10/21   Historical Provider, MD   aspirin 81 MG EC tablet Take 81 mg by mouth daily    Historical Provider, MD   triamcinolone (ARISTOCORT) 0.5 % cream APPLY THIN COAT TO AFFECTED AREA TWICE A DAY 5/30/21   Historical Provider, MD

## 2023-02-20 RX ORDER — METOPROLOL SUCCINATE 25 MG/1
TABLET, EXTENDED RELEASE ORAL
Qty: 90 TABLET | Refills: 1 | Status: SHIPPED | OUTPATIENT
Start: 2023-02-20

## 2023-02-20 RX ORDER — LOSARTAN POTASSIUM 25 MG/1
25 TABLET ORAL
Qty: 90 TABLET | Refills: 1 | Status: SHIPPED | OUTPATIENT
Start: 2023-02-20 | End: 2023-06-20

## 2023-02-20 RX ORDER — TAMSULOSIN HYDROCHLORIDE 0.4 MG/1
CAPSULE ORAL
Qty: 180 CAPSULE | Refills: 3 | Status: SHIPPED | OUTPATIENT
Start: 2023-02-20

## 2023-02-20 NOTE — TELEPHONE ENCOUNTER
Patient uses [facility] now. Patient is asking for a refill on Tamsulosin 0.4 mg, Losartan 25 mg and Metoprolol Succinate 25 mg.    Drug [address] Maintenance   Topic Date Due    DTaP/Tdap/Td vaccine (1 - Tdap) Never done    Shingles vaccine (1 of 2) Never done    COVID-19 Vaccine (4 - Booster for Pfizer series) 12/02/2021    Diabetic retinal exam  12/07/2021    Flu vaccine (1) 08/01/2022    Annual Wellness Visit (AWV)  08/09/2023    Lipids  12/12/2023    A1C test (Diabetic or Prediabetic)  01/27/2024    GFR test (Diabetes, CKD 3-4, OR last GFR 15-59)  01/27/2024    Diabetic foot exam  02/09/2024    Diabetic Alb to Cr ratio (uACR) test  02/09/2024    Depression Screen  02/09/2024    Colorectal Cancer Screen  03/01/2029    Pneumococcal 65+ years Vaccine  Completed    AAA screen  Completed    Hepatitis C screen  Completed    Hepatitis A vaccine  Aged Out    Hib vaccine  Aged Out    Meningococcal (ACWY) vaccine  Aged Out             (applicable per patient's age: Cancer Screenings, Depression Screening, Fall Risk Screening, Immunizations)    Hemoglobin A1C (%)   Date Value   01/27/2023 7.1 (H)   08/08/2022 7.1   05/05/2022 6.6     Microalb/Crt.  Ratio (mcg/mg creat)   Date Value   01/27/2021 8     LDL Cholesterol (mg/dL)   Date Value   12/12/2022 31     AST (U/L)   Date Value   01/27/2023 30     ALT (U/L)   Date Value   01/27/2023 33     BUN (mg/dL)   Date Value   01/27/2023 14      (goal A1C is < 7)   (goal LDL is <100) need 30-50% reduction from baseline     BP Readings from Last 3 Encounters:   02/09/23 118/66   01/30/23 127/89   12/19/22 120/70    (goal /80)      All Future Testing planned in [facility]:  Lab Frequency Next Occurrence   CBC with Auto Differential Once 06/19/2023   Comprehensive Metabolic Panel Once 32/82/2555   Lipid Panel Once 06/19/2023   TSH with Reflex Once 06/19/2023   Magnesium Once 06/19/2023   EKG 12 Lead Once 06/19/2023   XR CHEST (2 VW) Once 06/19/2023   Cardiac event monitor Once 12/19/2022   EKG 12 Lead Once 03/01/2023       Next Visit Date:  Future Appointments   Date Time Provider Lashay Moorei   3/1/2023 10:30 AM MD Jovanni Branham [facility]   6/20/2023  9:30 AM MD Jovanni Branham [facility]   8/11/2023 10:00 AM DO Burgess Candy [facility]            Patient Active Problem List:     Essential hypertension, benign     Type 2 diabetes mellitus without complication, without long-term current use of insulin (HCC)     Varicose veins of legs     Tubular adenoma of colon     BPH with obstruction/lower urinary tract symptoms     Diverticulosis of large intestine without hemorrhage     Coronary artery disease involving native coronary artery of native heart without angina pectoris     S/P CABG (coronary artery bypass graft)     AN treated with BiPAP

## 2023-03-01 ENCOUNTER — HOSPITAL ENCOUNTER (OUTPATIENT)
Age: 68
Discharge: HOME | End: 2023-03-01
Payer: MEDICARE

## 2023-03-01 ENCOUNTER — OFFICE VISIT (OUTPATIENT)
Dept: CARDIOLOGY CLINIC | Age: 68
End: 2023-03-01
Payer: MEDICARE

## 2023-03-01 DIAGNOSIS — I48.91 ATRIAL FIBRILLATION, UNSPECIFIED TYPE (HCC): ICD-10-CM

## 2023-03-01 DIAGNOSIS — I48.91 ATRIAL FIBRILLATION, UNSPECIFIED TYPE (HCC): Primary | ICD-10-CM

## 2023-03-01 LAB
EKG ATRIAL RATE: 73 BPM
EKG P AXIS: 58 DEGREES
EKG P-R INTERVAL: 162 MS
EKG Q-T INTERVAL: 360 MS
EKG QRS DURATION: 100 MS
EKG QTC CALCULATION (BAZETT): 396 MS
EKG R AXIS: 69 DEGREES
EKG T AXIS: 0 DEGREES
EKG VENTRICULAR RATE: 73 BPM

## 2023-03-01 PROCEDURE — 93010 ELECTROCARDIOGRAM REPORT: CPT | Performed by: INTERNAL MEDICINE

## 2023-03-01 PROCEDURE — 93005 ELECTROCARDIOGRAM TRACING: CPT

## 2023-03-01 PROCEDURE — 99213 OFFICE O/P EST LOW 20 MIN: CPT | Performed by: INTERNAL MEDICINE

## 2023-03-01 RX ORDER — DILTIAZEM HYDROCHLORIDE 300 MG/1
300 CAPSULE, COATED, EXTENDED RELEASE ORAL DAILY
COMMUNITY
End: 2023-03-01 | Stop reason: SDUPTHER

## 2023-03-01 RX ORDER — DILTIAZEM HYDROCHLORIDE 300 MG/1
300 CAPSULE, COATED, EXTENDED RELEASE ORAL DAILY
Qty: 30 CAPSULE | Refills: 11 | Status: SHIPPED | OUTPATIENT
Start: 2023-03-01

## 2023-03-01 NOTE — PATIENT INSTRUCTIONS
Will Switch Cardizem  to 300 mg daily   (D/t cost)    Will switch Eliquis to Coumadin   (Order to coumadin clinic put in )    Will keep appt in June with routine testing

## 2023-03-01 NOTE — PROGRESS NOTES
Ov Dr. Brielle Reeves for 1 month f/u   Post cardioversion       Will Switch Cardizem  to 300 mg daily   (D/t cost)    Will switch Eliquis to Coumadin   (Order to coumadin clinic put in )    Will keep appt in June with routine testing

## 2023-03-01 NOTE — LETTER
Arianna Degroot M.D. [address] [place] [address]  [phone]        2023        Jordan Gill DO  [place] [id number]    RE:   Selina Garcia  :  1955    Dear Dr. Grace El Portal:    CHIEF COMPLAINT:  1. Atrial fibrillation. 2.  Status post cardioversion on 2023. HISTORY OF PRESENT ILLNESS:  I met with Mr. Selina Garcia in the office on 2023. I trust you received my full H and P from 2022. He had developed atrial fibrillation which is a new onset. He had had open heart surgery by Dr. Maria T Shea on 2021. EKG in preparation for the appointment on  showed atrial fibrillation. He was put on Eliquis and I did a cardioversion on 2023. We brought him back today for reevaluation. An EKG showed that he was still in normal sinus rhythm. He was totally asymptomatic when he had his atrial fibrillation; therefore, does not feel any different now that he is back in sinus rhythm. He has been on Eliquis, but it is extremely expensive and therefore he is switching to Coumadin with our [facility]. He has no chest pain, no unusual shortness of breath. He does very little activity at home, much to his wife's dismay. MEDICATIONS:  His medications today are Tylenol p.r.n., Eliquis 5 mg b.i.d. (will be switching to Coumadin), Cardizem  mg daily, Cozaar 25 mg daily, Glucophage 1000 mg b.i.d., metoprolol 25 mg b.i.d., Ditropan XL 5 mg daily, Crestor 40 mg daily, Januvia 100 mg daily, Flomax 0.4 mg daily. PHYSICAL EXAMINATION:  VITAL SIGNS:  His blood pressure was excellent at 120/70 with a heart rate of 60 and regular. Respiratory rate 18. HEENT:  Unremarkable. NECK:  No JVD. Good carotid pulses. No carotid bruits. No lymphadenopathy or thyromegaly. CARDIOVASCULAR EXAM:  S1 and S2 were normal.  No S3 or S4. Soft systolic blowing type murmur.   No diastolic murmur. PMI was normal.  No lift, thrust, or pericardial friction rub. LUNGS:  Clear to auscultation and percussion. ABDOMEN:  Soft and nontender. Good bowel sounds. EXTREMITIES:  Good femoral pulses. Good pedal pulses. No pedal edema. EKG showed sinus rhythm with nonspecific ST changes. IMPRESSION:  1.  New onset of atrial fibrillation on 12/12/2022.  2.  Anticoagulated with Eliquis, although this will be switched to Coumadin. 3.  Status post cardioversion on 01/30/2023. PLAN:  1. Change from Cardizem 360 CD to 300 mg CD tablet daily. 2.  See in June for full evaluation. 3.  If he develops atrial fibrillation again, we will not attempt to cardiovert, we will leave on rate control. DISCUSSION:  Mr. Cleo Medrano is maintaining sinus rhythm. The Cardizem 360 CD is on back order. Therefore, it is very expensive at 300 dollars per month. We changed him to Cardizem  mg daily for cost constraints. I will see him in June. It would be helpful if he would do activity such as exercising. Thank you very much for allowing me the privilege of seeing Mr. Cleo Medrano. If you have any questions on my thoughts, please do not hesitate to contact me.     Sincerely,        Inga Kolb MD    D: 03/01/2023 11:09:16     T: 03/01/2023 23:54:47     [email or web address]  Job#: [id number]   Doc#: [id number]    <>

## 2023-03-02 NOTE — PROGRESS NOTES
Vinita Farrar M.D. [address]   [phone]        2023        DO Philippe Lujan [address]    RE:   Julita Rizvi  :  1955    Dear Dr. Tabby Nolan:    CHIEF COMPLAINT:  1. Atrial fibrillation. 2.  Status post cardioversion on 2023. HISTORY OF PRESENT ILLNESS:  I met with Mr. Julita Rizvi in the office on 2023. I trust you received my full H and P from 2022. He had developed atrial fibrillation which is a new onset. He had had open heart surgery by Dr. Yoli Coleman on 2021. EKG in preparation for the appointment on  showed atrial fibrillation. He was put on Eliquis and I did a cardioversion on 2023. We brought him back today for reevaluation. An EKG showed that he was still in normal sinus rhythm. He was totally asymptomatic when he had his atrial fibrillation; therefore, does not feel any different now that he is back in sinus rhythm. He has been on Eliquis, but it is extremely expensive and therefore he is switching to Coumadin with our [facility]. He has no chest pain, no unusual shortness of breath. He does very little activity at home, much to his wife's dismay. MEDICATIONS:  His medications today are Tylenol p.r.n., Eliquis 5 mg b.i.d. (will be switching to Coumadin), Cardizem  mg daily, Cozaar 25 mg daily, Glucophage 1000 mg b.i.d., metoprolol 25 mg b.i.d., Ditropan XL 5 mg daily, Crestor 40 mg daily, Januvia 100 mg daily, Flomax 0.4 mg daily. PHYSICAL EXAMINATION:  VITAL SIGNS:  His blood pressure was excellent at 120/70 with a heart rate of 60 and regular. Respiratory rate 18. HEENT:  Unremarkable. NECK:  No JVD. Good carotid pulses. No carotid bruits. No lymphadenopathy or thyromegaly. CARDIOVASCULAR EXAM:  S1 and S2 were normal.  No S3 or S4. Soft systolic blowing type murmur.   No diastolic murmur. PMI was normal.  No lift, thrust, or pericardial friction rub. LUNGS:  Clear to auscultation and percussion. ABDOMEN:  Soft and nontender. Good bowel sounds. EXTREMITIES:  Good femoral pulses. Good pedal pulses. No pedal edema. EKG showed sinus rhythm with nonspecific ST changes. IMPRESSION:  1.  New onset of atrial fibrillation on 12/12/2022.  2.  Anticoagulated with Eliquis, although this will be switched to Coumadin. 3.  Status post cardioversion on 01/30/2023. PLAN:  1. Change from Cardizem 360 CD to 300 mg CD tablet daily. 2.  See in June for full evaluation. 3.  If he develops atrial fibrillation again, we will not attempt to cardiovert, we will leave on rate control. DISCUSSION:  Mr. Alaina Khalil is maintaining sinus rhythm. The Cardizem 360 CD is on back order. Therefore, it is very expensive at 300 dollars per month. We changed him to Cardizem  mg daily for cost constraints. I will see him in June. It would be helpful if he would do activity such as exercising. Thank you very much for allowing me the privilege of seeing Mr. Alaina Khalil. If you have any questions on my thoughts, please do not hesitate to contact me.     Sincerely,        Baldemar Patricio MD    D: 03/01/2023 11:09:16     T: 03/01/2023 23:54:47     [email or web address]  Job#: [id number]   Doc#: [id number]    <>

## 2023-03-03 ENCOUNTER — ANTI-COAG VISIT (OUTPATIENT)
Dept: PHARMACY | Age: 68
End: 2023-03-03

## 2023-03-03 NOTE — PROGRESS NOTES
Spoke with patient after receiving a referral for warfarin therapy management per Dr. Margaree Litten. He is currently on Eliquis and wants to switch to warfarin due to the cost of Eliquis. He says he currently still has \"about a month\" supply of Eliquis remaining so he would like to \"use up\" what he has before switching to warfarin. I have set him up for a telephone encounter in 2 weeks on 3/17/23 and we will reassess at that time. Patient verbalizes understanding via the phone.

## 2023-03-09 RX ORDER — DILTIAZEM HYDROCHLORIDE 360 MG/1
CAPSULE, EXTENDED RELEASE ORAL
Qty: 90 CAPSULE | Refills: 1 | OUTPATIENT
Start: 2023-03-09

## 2023-03-15 NOTE — TELEPHONE ENCOUNTER
Januvia 100 mg    DM- satish    Last check up 2/9/23  Appt 8/11/23    Health Maintenance   Topic Date Due    DTaP/Tdap/Td vaccine (1 - Tdap) Never done    Shingles vaccine (1 of 2) Never done    COVID-19 Vaccine (4 - Booster for Pfizer series) 12/02/2021    Diabetic retinal exam  12/07/2021    Flu vaccine (1) 08/01/2022    Annual Wellness Visit (AWV)  08/09/2023    Lipids  12/12/2023    A1C test (Diabetic or Prediabetic)  01/27/2024    GFR test (Diabetes, CKD 3-4, OR last GFR 15-59)  01/27/2024    Diabetic foot exam  02/09/2024    Diabetic Alb to Cr ratio (uACR) test  02/09/2024    Depression Screen  02/09/2024    Colorectal Cancer Screen  03/01/2029    Pneumococcal 65+ years Vaccine  Completed    AAA screen  Completed    Hepatitis C screen  Completed    Hepatitis A vaccine  Aged Out    Hib vaccine  Aged Out    Meningococcal (ACWY) vaccine  Aged Out             (applicable per patient's age: Cancer Screenings, Depression Screening, Fall Risk Screening, Immunizations)    Hemoglobin A1C (%)   Date Value   01/27/2023 7.1 (H)   08/08/2022 7.1   05/05/2022 6.6     Microalb/Crt.  Ratio (mcg/mg creat)   Date Value   01/27/2021 8     LDL Cholesterol (mg/dL)   Date Value   12/12/2022 31     AST (U/L)   Date Value   01/27/2023 30     ALT (U/L)   Date Value   01/27/2023 33     BUN (mg/dL)   Date Value   01/27/2023 14      (goal A1C is < 7)   (goal LDL is <100) need 30-50% reduction from baseline     BP Readings from Last 3 Encounters:   02/09/23 118/66   01/30/23 127/89   12/19/22 120/70    (goal /80)      All Future Testing planned in [facility]:  Lab Frequency Next Occurrence   CBC with Auto Differential Once 06/19/2023   Comprehensive Metabolic Panel Once 48/40/3691   Lipid Panel Once 06/19/2023   TSH with Reflex Once 06/19/2023   Magnesium Once 06/19/2023   EKG 12 Lead Once 06/19/2023   XR CHEST (2 VW) Once 06/19/2023   Cardiac event monitor Once 12/19/2022       Next Visit Date:  Future Appointments   Date Time Provider Lashay Rubio   3/17/2023  5:00 AM RODRICK MEDICATION [facility] MED [facility] Castrovini Rosenberg   6/20/2023  9:30 AM MD Jorge Gonzalez [facility]   8/11/2023 10:00 AM DO Jay Holm [facility]            Patient Active Problem List:     Essential hypertension, benign     Type 2 diabetes mellitus without complication, without long-term current use of insulin (HCC)     Varicose veins of legs     Tubular adenoma of colon     BPH with obstruction/lower urinary tract symptoms     Diverticulosis of large intestine without hemorrhage     Coronary artery disease involving native coronary artery of native heart without angina pectoris     S/P CABG (coronary artery bypass graft)     AN treated with BiPAP

## 2023-03-17 ENCOUNTER — HOSPITAL ENCOUNTER (OUTPATIENT)
Dept: PHARMACY | Age: 68
Setting detail: THERAPIES SERIES
Discharge: HOME | End: 2023-03-17

## 2023-03-17 RX ORDER — WARFARIN SODIUM 5 MG/1
TABLET ORAL
Qty: 30 TABLET | Refills: 3 | OUTPATIENT
Start: 2023-03-17

## 2023-03-17 NOTE — PROGRESS NOTES
Called in new Rx for warfarin to physician refill line at [facility], 3/17/2023 at 1400

## 2023-03-17 NOTE — PROGRESS NOTES
Spoke with patient about referral to Medication management clinic for warfarin therapy management. He will be switching from Eliquis to warfarin due to cost. He is currently taking Eliquis 5 mg BID and will start warfarin 5 mg once daily in the evenings on Saturday, 3/18/2023. He will take warfarin x 3 doses (3/18 through 3/20) and continue taking Eliquis 5 mg BID as he is bridged to therapeutic INR. I have called in a new Rx for warfarin to his local pharmacy for pickup later today. We will check his INR in the clinic on Tuesday, 3/21/2023 at 12:20 PM and reassess further warfarin and Eliquis dosing at that time. Keiko Aguila verifies understanding of his instructions via the phone.

## 2023-03-21 ENCOUNTER — HOSPITAL ENCOUNTER (OUTPATIENT)
Dept: PHARMACY | Age: 68
Setting detail: THERAPIES SERIES
Discharge: HOME | End: 2023-03-21
Payer: MEDICARE

## 2023-03-21 VITALS — SYSTOLIC BLOOD PRESSURE: 108 MMHG | HEART RATE: 87 BPM | DIASTOLIC BLOOD PRESSURE: 69 MMHG

## 2023-03-21 LAB — INR BLD: 1.5

## 2023-03-21 PROCEDURE — 85610 PROTHROMBIN TIME: CPT

## 2023-03-21 PROCEDURE — 99211 OFF/OP EST MAY X REQ PHY/QHP: CPT

## 2023-03-21 PROCEDURE — 99202 OFFICE O/P NEW SF 15 MIN: CPT

## 2023-03-21 RX ORDER — WARFARIN SODIUM 5 MG/1
TABLET ORAL
Qty: 30 TABLET | Refills: 3 | Status: SHIPPED
Start: 2023-03-21

## 2023-03-21 RX ORDER — CHOLECALCIFEROL (VITAMIN D3) 50 MCG
2000 TABLET ORAL DAILY
COMMUNITY

## 2023-03-21 NOTE — PROGRESS NOTES
working in consult agreement with pharmacist as referred by his/her physician.                   For Pharmacy Admin Tracking Only    Intervention Detail: Adherence Monitorin, Dose Adjustment: 1, reason: Therapy Optimization, and New Rx: 1, reason: Patient Preference  Total # of Interventions Recommended: 3  Total # of Interventions Accepted: 3  Time Spent (min): Lashay Ma [facility], PharmD

## 2023-03-24 ENCOUNTER — HOSPITAL ENCOUNTER (OUTPATIENT)
Dept: PHARMACY | Age: 68
Setting detail: THERAPIES SERIES
Discharge: HOME | End: 2023-03-24
Payer: MEDICARE

## 2023-03-24 VITALS
WEIGHT: 276 LBS | BODY MASS INDEX: 37.43 KG/M2 | DIASTOLIC BLOOD PRESSURE: 74 MMHG | HEART RATE: 76 BPM | SYSTOLIC BLOOD PRESSURE: 124 MMHG

## 2023-03-24 LAB — INR BLD: 2.9

## 2023-03-24 PROCEDURE — 99211 OFF/OP EST MAY X REQ PHY/QHP: CPT

## 2023-03-24 PROCEDURE — 85610 PROTHROMBIN TIME: CPT

## 2023-03-24 NOTE — PROGRESS NOTES
[place] [address] [place] [address]/[place]  Medication Management  ANTICOAGULATION    Referring Provider: Dr. Muriel Brown INR: 2.0 - 3.0    TODAY'S INR: 2.9    WARFARIN Dosage: 7.5 mg Sa, 5 mg all other days    INR (no units)   Date Value   2023 2.9   2023 1.5   2018 1.0       Medication changes:  Stopping Eliquis    Notes:    Fingerstick INR drawn per clinic protocol. Patient states no visible blood in urine, no black tarry stool and no falls. Denies any missed or extra doses of warfarin. Therapeutic INR achieved so estelita was instructed to stop Eliquis. No change in other maintenance medications or in diet. Will initially try 7.5 mg Sa, 5 mg AOD for a maintenance dose and will recheck INR in 4 days. Patient acknowledges working in consult agreement with pharmacist as referred by his/her physician.                   For Pharmacy Admin Tracking Only    Intervention Detail: Adherence Monitorin and Dose Adjustment: 1, reason: Therapy Optimization  Total # of Interventions Recommended: 2  Total # of Interventions Accepted: 2  Time Spent (min): 20    Jessica Stewart PharmD 3/24/2023 9:43 AM

## 2023-03-29 ENCOUNTER — HOSPITAL ENCOUNTER (OUTPATIENT)
Dept: PHARMACY | Age: 68
Setting detail: THERAPIES SERIES
Discharge: HOME | End: 2023-03-29
Payer: MEDICARE

## 2023-03-29 VITALS
WEIGHT: 277 LBS | BODY MASS INDEX: 37.57 KG/M2 | DIASTOLIC BLOOD PRESSURE: 78 MMHG | HEART RATE: 78 BPM | SYSTOLIC BLOOD PRESSURE: 137 MMHG

## 2023-03-29 LAB — INR BLD: 3.1

## 2023-03-29 PROCEDURE — 99211 OFF/OP EST MAY X REQ PHY/QHP: CPT

## 2023-03-29 PROCEDURE — 85610 PROTHROMBIN TIME: CPT

## 2023-03-29 NOTE — PROGRESS NOTES
[address]/[place]  Medication Management  ANTICOAGULATION    Referring Provider: Dr. Renny Goodwin INR: 2.0 - 3.0     TODAY'S INR: 3.1     WARFARIN Dosage: Decrease dose to 5 mg daily    INR (no units)   Date Value   2023 3.1   2023 2.9   2023 1.5   2018 1.0       Medication changes:  None    Notes:    Fingerstick INR drawn per clinic protocol. Patient states no visible blood in urine, no black tarry stool and no falls. Denies any missed or extra doses of warfarin. No change in other maintenance medications or in diet. Slightly supratherapeutic INR today so will decrease dose to 5 mg daily and will recheck INR in 1 week. Patient acknowledges working in consult agreement with pharmacist as referred by his/her physician.                       For Pharmacy Admin Tracking Only    Intervention Detail: Adherence Monitorin and Dose Adjustment: 1, reason: Therapy Optimization  Total # of Interventions Recommended: 2  Total # of Interventions Accepted: 2  Time Spent (min): 20    Maggie Merino, PharmD 3/29/2023 10:25 AM

## 2023-04-05 ENCOUNTER — HOSPITAL ENCOUNTER (OUTPATIENT)
Dept: PHARMACY | Age: 68
Setting detail: THERAPIES SERIES
Discharge: HOME | End: 2023-04-05
Payer: MEDICARE

## 2023-04-05 VITALS
BODY MASS INDEX: 37.24 KG/M2 | SYSTOLIC BLOOD PRESSURE: 122 MMHG | WEIGHT: 274.6 LBS | HEART RATE: 84 BPM | DIASTOLIC BLOOD PRESSURE: 77 MMHG

## 2023-04-05 LAB — INR BLD: 2.4

## 2023-04-05 PROCEDURE — 99211 OFF/OP EST MAY X REQ PHY/QHP: CPT

## 2023-04-05 PROCEDURE — 85610 PROTHROMBIN TIME: CPT

## 2023-04-05 NOTE — PROGRESS NOTES
[address]/[place]  Medication Management  ANTICOAGULATION     Referring Provider: Dr. Brandi Peng INR: 2.0 - 3.0     TODAY'S INR: 2.4     WARFARIN Dosage: Continue 5 mg daily    INR (no units)   Date Value   2023 2.4   2023 3.1   2023 2.9   2023 1.5   2018 1.0       Hemoglobin   Date Value Ref Range Status   2023 15.5 13.5 - 17.5 g/dL Final     Hematocrit   Date Value Ref Range Status   2023 46.7 41 - 53 % Final     ALT   Date Value Ref Range Status   2023 33 5 - 41 U/L Final     AST   Date Value Ref Range Status   2023 30 <40 U/L Final       Medication changes:  None    Notes:    Fingerstick INR drawn per clinic protocol. Patient states no visible blood in urine, no black tarry stool, no falls. Denies any missed or extra doses of warfarin. No change in other maintenance medications or in diet. INR is therapeutic today so will continue 5 mg daily and will recheck INR in 2 weeks. Patient acknowledges working in consult agreement with pharmacist as referred by his/her physician.                   For Pharmacy Admin Tracking Only    Intervention Detail: Adherence Monitorin  Total # of Interventions Recommended: 1  Total # of Interventions Accepted: 1  Time Spent (min): 20    Bridger Zhou PharmD 2023 10:16 AM

## 2023-04-19 ENCOUNTER — HOSPITAL ENCOUNTER (OUTPATIENT)
Dept: PHARMACY | Age: 68
Setting detail: THERAPIES SERIES
Discharge: HOME | End: 2023-04-19
Payer: MEDICARE

## 2023-04-19 VITALS
HEART RATE: 85 BPM | BODY MASS INDEX: 36.86 KG/M2 | DIASTOLIC BLOOD PRESSURE: 74 MMHG | WEIGHT: 271.8 LBS | SYSTOLIC BLOOD PRESSURE: 129 MMHG

## 2023-04-19 LAB — INR BLD: 2

## 2023-04-19 PROCEDURE — 99211 OFF/OP EST MAY X REQ PHY/QHP: CPT

## 2023-04-19 PROCEDURE — 85610 PROTHROMBIN TIME: CPT

## 2023-04-19 NOTE — PROGRESS NOTES
[address]/[place]  Medication Management  ANTICOAGULATION    Referring Provider: Dr. Katiana Riojas INR: 2.0 - 3.0     TODAY'S INR: 2.0     WARFARIN Dosage: Continue 5 mg daily    INR (no units)   Date Value   2023 2   2023 2.4   2023 3.1   2023 2.9   2023 1.5   2018 1.0       Hemoglobin   Date Value Ref Range Status   2023 15.5 13.5 - 17.5 g/dL Final     Hematocrit   Date Value Ref Range Status   2023 46.7 41 - 53 % Final     ALT   Date Value Ref Range Status   2023 33 5 - 41 U/L Final     AST   Date Value Ref Range Status   2023 30 <40 U/L Final       Medication changes:  None    Notes:    Fingerstick INR drawn per clinic protocol. Patient states no visible blood in urine, no black tarry stool, no falls. Denies any missed or extra doses of warfarin. No change in other maintenance medications or in diet. INR remains therapeutic so will continue 5 mg daily and will recheck INR in 4 weeks. Patient acknowledges working in consult agreement with pharmacist as referred by his/her physician.                  For Pharmacy Admin Tracking Only    Intervention Detail: Adherence Monitorin  Total # of Interventions Recommended: 1  Total # of Interventions Accepted: 1  Time Spent (min): 20    Claude Camper, PharmD 2023 10:18 AM

## 2023-05-17 ENCOUNTER — HOSPITAL ENCOUNTER (OUTPATIENT)
Dept: PHARMACY | Age: 68
Setting detail: THERAPIES SERIES
Discharge: HOME | End: 2023-05-17
Payer: MEDICARE

## 2023-05-17 VITALS
WEIGHT: 272.2 LBS | DIASTOLIC BLOOD PRESSURE: 88 MMHG | SYSTOLIC BLOOD PRESSURE: 131 MMHG | BODY MASS INDEX: 36.92 KG/M2 | HEART RATE: 80 BPM

## 2023-05-17 LAB — INR BLD: 2.5

## 2023-05-17 PROCEDURE — 99211 OFF/OP EST MAY X REQ PHY/QHP: CPT

## 2023-05-17 PROCEDURE — 85610 PROTHROMBIN TIME: CPT

## 2023-05-17 NOTE — PROGRESS NOTES
[address]  Medication Management  ANTICOAGULATION    Referring Provider: Dr. Merced Christensen INR: 2.0 - 3.0     TODAY'S INR: 2.5     WARFARIN Dosage: Continue 5 mg daily    INR (no units)   Date Value   2023 2.5   2023 2   2023 2.4   2023 3.1   2023 2.9   2023 1.5   2018 1.0       Hemoglobin   Date Value Ref Range Status   2023 15.5 13.5 - 17.5 g/dL Final     Hematocrit   Date Value Ref Range Status   2023 46.7 41 - 53 % Final     ALT   Date Value Ref Range Status   2023 33 5 - 41 U/L Final     AST   Date Value Ref Range Status   2023 30 <40 U/L Final       Medication changes:  None    Notes:    Fingerstick INR drawn per clinic protocol. Patient states no visible blood in urine, no black tarry stool, no falls. Denies any missed or extra doses of warfarin. No change in medications or in diet. Therapeutic INR so will continue 5 mg daily and will recheck INR in 5 weeks. Patient acknowledges working in consult agreement with pharmacist as referred by his/her physician.                   For Pharmacy Admin Tracking Only    Intervention Detail: Adherence Monitorin  Total # of Interventions Recommended: 1  Total # of Interventions Accepted: 1  Time Spent (min): 20    Amarilis Ruggiero, PharmD 2023 10:58 AM

## 2023-06-20 ENCOUNTER — OFFICE VISIT (OUTPATIENT)
Dept: CARDIOLOGY CLINIC | Age: 68
End: 2023-06-20
Payer: MEDICARE

## 2023-06-20 VITALS
DIASTOLIC BLOOD PRESSURE: 78 MMHG | OXYGEN SATURATION: 90 % | HEART RATE: 86 BPM | SYSTOLIC BLOOD PRESSURE: 120 MMHG | WEIGHT: 275 LBS | BODY MASS INDEX: 37.3 KG/M2

## 2023-06-20 DIAGNOSIS — I35.0 AORTIC VALVE STENOSIS, ETIOLOGY OF CARDIAC VALVE DISEASE UNSPECIFIED: ICD-10-CM

## 2023-06-20 DIAGNOSIS — I48.91 ATRIAL FIBRILLATION, UNSPECIFIED TYPE (HCC): Primary | ICD-10-CM

## 2023-06-20 DIAGNOSIS — E55.9 VITAMIN D DEFICIENCY: ICD-10-CM

## 2023-06-20 DIAGNOSIS — I10 ESSENTIAL HYPERTENSION: ICD-10-CM

## 2023-06-20 DIAGNOSIS — E11.9 TYPE 2 DIABETES MELLITUS WITHOUT COMPLICATION, WITHOUT LONG-TERM CURRENT USE OF INSULIN (HCC): ICD-10-CM

## 2023-06-20 DIAGNOSIS — E78.5 HYPERLIPIDEMIA, UNSPECIFIED HYPERLIPIDEMIA TYPE: ICD-10-CM

## 2023-06-20 PROCEDURE — 3051F HG A1C>EQUAL 7.0%<8.0%: CPT | Performed by: INTERNAL MEDICINE

## 2023-06-20 PROCEDURE — 2022F DILAT RTA XM EVC RTNOPTHY: CPT | Performed by: INTERNAL MEDICINE

## 2023-06-20 PROCEDURE — 99214 OFFICE O/P EST MOD 30 MIN: CPT | Performed by: INTERNAL MEDICINE

## 2023-06-20 PROCEDURE — 3078F DIAST BP <80 MM HG: CPT | Performed by: INTERNAL MEDICINE

## 2023-06-20 PROCEDURE — G8417 CALC BMI ABV UP PARAM F/U: HCPCS | Performed by: INTERNAL MEDICINE

## 2023-06-20 PROCEDURE — 1123F ACP DISCUSS/DSCN MKR DOCD: CPT | Performed by: INTERNAL MEDICINE

## 2023-06-20 PROCEDURE — 1036F TOBACCO NON-USER: CPT | Performed by: INTERNAL MEDICINE

## 2023-06-20 PROCEDURE — G8427 DOCREV CUR MEDS BY ELIG CLIN: HCPCS | Performed by: INTERNAL MEDICINE

## 2023-06-20 PROCEDURE — 3074F SYST BP LT 130 MM HG: CPT | Performed by: INTERNAL MEDICINE

## 2023-06-20 PROCEDURE — 3017F COLORECTAL CA SCREEN DOC REV: CPT | Performed by: INTERNAL MEDICINE

## 2023-06-20 NOTE — PROGRESS NOTES
Ov DR Wyatt November 6 mth follow up   No chest pain   Some sob not new. Some edema rt leg. No hospitalizations or procedures  Since seen. Will see in 1 year.

## 2023-06-21 ENCOUNTER — HOSPITAL ENCOUNTER (OUTPATIENT)
Dept: PHARMACY | Age: 68
Setting detail: THERAPIES SERIES
Discharge: HOME | End: 2023-06-21
Payer: MEDICARE

## 2023-06-21 VITALS
BODY MASS INDEX: 37.3 KG/M2 | WEIGHT: 275 LBS | HEART RATE: 84 BPM | DIASTOLIC BLOOD PRESSURE: 70 MMHG | SYSTOLIC BLOOD PRESSURE: 123 MMHG

## 2023-06-21 LAB — INR BLD: 2

## 2023-06-21 PROCEDURE — 85610 PROTHROMBIN TIME: CPT

## 2023-06-21 PROCEDURE — 99211 OFF/OP EST MAY X REQ PHY/QHP: CPT

## 2023-06-21 NOTE — PROGRESS NOTES
[address]  Medication Management  ANTICOAGULATION    Referring Provider: Dr. Katiana Riojas INR: 2.0 - 3.0     TODAY'S INR: 2.0     WARFARIN Dosage: Continue 5 mg daily    INR (no units)   Date Value   2023 2   2023 2.5   2023 2   2023 2.4   2023 3.1   2023 2.9   2023 1.5       Hemoglobin   Date Value Ref Range Status   2023 15.4 13.5 - 17.5 g/dL Final     Hematocrit   Date Value Ref Range Status   2023 46.7 41 - 53 % Final     ALT   Date Value Ref Range Status   2023 41 5 - 41 U/L Final     AST   Date Value Ref Range Status   2023 34 <40 U/L Final       Medication changes:  None    Notes:    Fingerstick INR drawn per clinic protocol. Patient states no visible blood in urine, no black tarry stool, no falls. Denies any missed or extra doses of warfarin. No change in medications or in diet. Therapeutic INR so will continue 5 mg daily and will recheck INR in 6 weeks. Patient acknowledges working in consult agreement with pharmacist as referred by his/her physician.     For Pharmacy Admin Tracking Only    Intervention Detail: Adherence Monitorin  Total # of Interventions Recommended: 1  Total # of Interventions Accepted: 1  Time Spent (min): 20    Claude Camper, PharmD 2023 11:02 AM

## 2023-06-22 NOTE — PROGRESS NOTES
Jarrett Lehman M.D. [address] [place] [address]  [phone]        2023        [address] [place] [address] Joe (Aglanrakan), DO  [address] [address]    RE:   Reyna Holden  :  1955    Dear Dr. Diaz (Aglanrakan):    CHIEF COMPLAINT:  1. Paroxysmal atrial fibrillation. 2.  Status post cardioversion on 2023, to sinus rhythm, which he has remained. 3.  Coronary artery disease status post open heart surgery by Dr. Brittanie Mera on 2021, with a LIMA to the LAD, vein graft to the OM branch of the circumflex with a posterior ventricular branch of the right coronary artery too small to bypass. HISTORY OF PRESENT ILLNESS:  I had the pleasure of seeing Mr. Adriana Argueta and his wife, Aakash Randall, in our office on 2023. He is a pleasant 61-year-old gentleman with multiple risk factors for coronary artery disease including strong family history as well as hypertension, hyperlipidemia and diabetes. He was having lithotripsy and Dr. Diaz (Brown) noticed shortness of breath and ordered a Lexiscan stress test, which was abnormal, and I saw him on 2021. Stress test showed inferolateral infarct and mirna-infarct ischemia. I did a cardiac catheterization on 2021, that showed 60% left main trunk, with 80% proximal LAD, 80% large OM, 90% posterior ventricular branch of the right coronary artery, which was quite small, with an EF of 55%. He had subsequent open heart surgery by Dr. Brittanie Mera on 2021, with a LIMA to the LAD and vein graft to the OM branch of the circumflex, posterior ventricular branch of the right coronary artery was too small to bypass. He had an EKG on 2022, that showed atrial fibrillation with a controlled ventricular response. I placed him on Eliquis and brought him into the office on 2022. We did an echocardiogram and a 30-day event recorder.   We then brought him in for a cardioversion on

## 2023-08-02 ENCOUNTER — HOSPITAL ENCOUNTER (OUTPATIENT)
Dept: PHARMACY | Age: 68
Setting detail: THERAPIES SERIES
Discharge: HOME | End: 2023-08-02
Payer: MEDICARE

## 2023-08-02 VITALS
DIASTOLIC BLOOD PRESSURE: 76 MMHG | HEART RATE: 75 BPM | SYSTOLIC BLOOD PRESSURE: 123 MMHG | BODY MASS INDEX: 37.51 KG/M2 | WEIGHT: 276.6 LBS

## 2023-08-02 LAB — INR BLD: 1.9

## 2023-08-02 PROCEDURE — 85610 PROTHROMBIN TIME: CPT

## 2023-08-02 PROCEDURE — 99211 OFF/OP EST MAY X REQ PHY/QHP: CPT

## 2023-08-02 NOTE — PROGRESS NOTES
[address] [facility]  Medication Management  ANTICOAGULATION    Referring Provider: Dr. Heidi Hull INR: 2.0 - 3.0     TODAY'S INR: 1.9     WARFARIN Dosage: Continue 5 mg daily    INR (no units)   Date Value   2023 1.9   2023 2   2023 2.5   2023 2   2023 2.4   2023 3.1   2023 2.9       Hemoglobin   Date Value Ref Range Status   2023 15.4 13.5 - 17.5 g/dL Final     Hematocrit   Date Value Ref Range Status   2023 46.7 41 - 53 % Final     ALT   Date Value Ref Range Status   2023 41 5 - 41 U/L Final     AST   Date Value Ref Range Status   2023 34 <40 U/L Final       Medication changes:  None     Notes:    Fingerstick INR drawn per clinic protocol. Patient states no visible blood in urine, no black tarry stool, no falls. Denies any missed or extra doses of warfarin. Has been eating a side salad a couple times a week but tries to avoid other green leafy vegetables. No other changes in medications or in diet. INR is slightly subtherapeutic today but will continue current dosing of 5 mg daily and will recheck INR in 6 weeks. Patient acknowledges working in consult agreement with pharmacist as referred by his/her physician.                   For Pharmacy Admin Tracking Only    Intervention Detail: Adherence Monitorin  Total # of Interventions Recommended: 1  Total # of Interventions Accepted: 1  Time Spent (min): 20    Jesus OsborneD 2023 10:46 AM

## 2023-08-11 ENCOUNTER — OFFICE VISIT (OUTPATIENT)
Dept: FAMILY MEDICINE CLINIC | Age: 68
End: 2023-08-11
Payer: MEDICARE

## 2023-08-11 VITALS
OXYGEN SATURATION: 94 % | HEART RATE: 78 BPM | HEIGHT: 72 IN | SYSTOLIC BLOOD PRESSURE: 118 MMHG | WEIGHT: 274 LBS | DIASTOLIC BLOOD PRESSURE: 68 MMHG | BODY MASS INDEX: 37.11 KG/M2

## 2023-08-11 DIAGNOSIS — E11.9 TYPE 2 DIABETES MELLITUS WITHOUT COMPLICATION, WITHOUT LONG-TERM CURRENT USE OF INSULIN (HCC): ICD-10-CM

## 2023-08-11 DIAGNOSIS — I25.10 CORONARY ARTERY DISEASE INVOLVING NATIVE CORONARY ARTERY OF NATIVE HEART WITHOUT ANGINA PECTORIS: ICD-10-CM

## 2023-08-11 DIAGNOSIS — G47.33 OSA TREATED WITH BIPAP: ICD-10-CM

## 2023-08-11 DIAGNOSIS — Z00.00 MEDICARE ANNUAL WELLNESS VISIT, SUBSEQUENT: Primary | ICD-10-CM

## 2023-08-11 LAB — HBA1C MFR BLD: 7.8 %

## 2023-08-11 PROCEDURE — 83037 HB GLYCOSYLATED A1C HOME DEV: CPT | Performed by: FAMILY MEDICINE

## 2023-08-11 PROCEDURE — 3078F DIAST BP <80 MM HG: CPT | Performed by: FAMILY MEDICINE

## 2023-08-11 PROCEDURE — 1123F ACP DISCUSS/DSCN MKR DOCD: CPT | Performed by: FAMILY MEDICINE

## 2023-08-11 PROCEDURE — G8427 DOCREV CUR MEDS BY ELIG CLIN: HCPCS | Performed by: FAMILY MEDICINE

## 2023-08-11 PROCEDURE — 3017F COLORECTAL CA SCREEN DOC REV: CPT | Performed by: FAMILY MEDICINE

## 2023-08-11 PROCEDURE — 3074F SYST BP LT 130 MM HG: CPT | Performed by: FAMILY MEDICINE

## 2023-08-11 PROCEDURE — 2022F DILAT RTA XM EVC RTNOPTHY: CPT | Performed by: FAMILY MEDICINE

## 2023-08-11 PROCEDURE — 3051F HG A1C>EQUAL 7.0%<8.0%: CPT | Performed by: FAMILY MEDICINE

## 2023-08-11 PROCEDURE — G8417 CALC BMI ABV UP PARAM F/U: HCPCS | Performed by: FAMILY MEDICINE

## 2023-08-11 PROCEDURE — 1036F TOBACCO NON-USER: CPT | Performed by: FAMILY MEDICINE

## 2023-08-11 PROCEDURE — 99214 OFFICE O/P EST MOD 30 MIN: CPT | Performed by: FAMILY MEDICINE

## 2023-08-11 PROCEDURE — G0439 PPPS, SUBSEQ VISIT: HCPCS | Performed by: FAMILY MEDICINE

## 2023-08-11 RX ORDER — OXYBUTYNIN CHLORIDE 5 MG/1
TABLET, EXTENDED RELEASE ORAL
Qty: 90 TABLET | Refills: 1 | Status: SHIPPED | OUTPATIENT
Start: 2023-08-11

## 2023-08-11 RX ORDER — ROSUVASTATIN CALCIUM 40 MG/1
40 TABLET, COATED ORAL EVERY EVENING
Qty: 90 TABLET | Refills: 1 | Status: SHIPPED | OUTPATIENT
Start: 2023-08-11

## 2023-08-11 RX ORDER — LOSARTAN POTASSIUM 25 MG/1
25 TABLET ORAL
Qty: 90 TABLET | Refills: 1 | Status: SHIPPED | OUTPATIENT
Start: 2023-08-11 | End: 2023-12-09

## 2023-08-11 RX ORDER — WARFARIN SODIUM 5 MG/1
TABLET ORAL
Qty: 30 TABLET | Refills: 3 | Status: SHIPPED | OUTPATIENT
Start: 2023-08-11

## 2023-08-11 RX ORDER — METOPROLOL SUCCINATE 25 MG/1
TABLET, EXTENDED RELEASE ORAL
Qty: 90 TABLET | Refills: 1 | Status: SHIPPED | OUTPATIENT
Start: 2023-08-11

## 2023-08-11 SDOH — HEALTH STABILITY: PHYSICAL HEALTH: ON AVERAGE, HOW MANY DAYS PER WEEK DO YOU ENGAGE IN MODERATE TO STRENUOUS EXERCISE (LIKE A BRISK WALK)?: 2 DAYS

## 2023-08-11 SDOH — HEALTH STABILITY: PHYSICAL HEALTH: ON AVERAGE, HOW MANY MINUTES DO YOU ENGAGE IN EXERCISE AT THIS LEVEL?: 40 MIN

## 2023-08-11 ASSESSMENT — LIFESTYLE VARIABLES
HOW OFTEN DO YOU HAVE A DRINK CONTAINING ALCOHOL: MONTHLY OR LESS
HOW OFTEN DO YOU HAVE SIX OR MORE DRINKS ON ONE OCCASION: 1
HOW MANY STANDARD DRINKS CONTAINING ALCOHOL DO YOU HAVE ON A TYPICAL DAY: 1 OR 2
HOW MANY STANDARD DRINKS CONTAINING ALCOHOL DO YOU HAVE ON A TYPICAL DAY: 1
HOW OFTEN DO YOU HAVE A DRINK CONTAINING ALCOHOL: 2

## 2023-08-11 ASSESSMENT — PATIENT HEALTH QUESTIONNAIRE - PHQ9
SUM OF ALL RESPONSES TO PHQ QUESTIONS 1-9: 0
SUM OF ALL RESPONSES TO PHQ QUESTIONS 1-9: 0
1. LITTLE INTEREST OR PLEASURE IN DOING THINGS: 0
SUM OF ALL RESPONSES TO PHQ9 QUESTIONS 1 & 2: 0
2. FEELING DOWN, DEPRESSED OR HOPELESS: 0
SUM OF ALL RESPONSES TO PHQ QUESTIONS 1-9: 0
SUM OF ALL RESPONSES TO PHQ QUESTIONS 1-9: 0

## 2023-08-11 NOTE — PROGRESS NOTES
Name: Stephani Funes  : 1955         Chief Complaint:     Chief Complaint   Patient presents with    Medicare AWV    Diabetes    Hypertension    Sleep Apnea       History of Present Illness:      Stephani Funes is a 77 y.o.  male who presents with Medicare AWV, Diabetes, Hypertension, and Sleep Apnea      HPI    AN doing better, new machine, bipap . Compliant with nightly use and benefits with quality of sleep and daytime wakefulness. F/u DM. Fasting readings 135-150. Big bowl of \"kids' cereal\" for breakfast. Bought some juice and drinks little zee cup of it in the morning. Thinks it's low sugar. Water otherwise. CAD - feeling ok, no recent CP or SOB. Taking meds as directed. 73-yo brother in long-term care with ESRD and COPD, also diabetic. Medical History:     Patient Active Problem List   Diagnosis    Essential hypertension, benign    Type 2 diabetes mellitus without complication, without long-term current use of insulin (HCC)    Varicose veins of legs    Tubular adenoma of colon    BPH with obstruction/lower urinary tract symptoms    Diverticulosis of large intestine without hemorrhage    Coronary artery disease involving native coronary artery of native heart without angina pectoris    S/P CABG (coronary artery bypass graft)    AN treated with BiPAP       Medications:       Prior to Admission medications    Medication Sig Start Date End Date Taking?  Authorizing Provider   metFORMIN (GLUCOPHAGE) 1000 MG tablet TAKE 1 TABLET BY MOUTH TWICE A DAY WITH MEALS 23  Yes Lencho Granger,    oxybutynin (DITROPAN-XL) 5 MG extended release tablet TAKE 1 TABLET BY MOUTH EVERY DAY IN THE EVENING 23  Yes Lencho Granger DO   rosuvastatin (CRESTOR) 40 MG tablet Take 1 tablet by mouth every evening 23  Yes Lencho Granger DO   metoprolol succinate (TOPROL XL) 25 MG extended release tablet TAKE 1 TABLET BY MOUTH EVERY DAY 23  Yes Lencho Granger DO   losartan

## 2023-09-13 ENCOUNTER — HOSPITAL ENCOUNTER (OUTPATIENT)
Dept: PHARMACY | Age: 68
Setting detail: THERAPIES SERIES
Discharge: HOME | End: 2023-09-13
Payer: MEDICARE

## 2023-09-13 VITALS
HEART RATE: 73 BPM | WEIGHT: 276.8 LBS | DIASTOLIC BLOOD PRESSURE: 67 MMHG | SYSTOLIC BLOOD PRESSURE: 132 MMHG | BODY MASS INDEX: 37.54 KG/M2

## 2023-09-13 LAB — INR BLD: 1.9

## 2023-09-13 PROCEDURE — 99211 OFF/OP EST MAY X REQ PHY/QHP: CPT | Performed by: FAMILY MEDICINE

## 2023-09-13 PROCEDURE — 85610 PROTHROMBIN TIME: CPT | Performed by: FAMILY MEDICINE

## 2023-09-13 NOTE — PROGRESS NOTES
[facility]  Medication Management  ANTICOAGULATION    Referring Provider: Dr Mike Caldwell INR: 2.0-3.0    TODAY'S INR: 1.9    WARFARIN Dosage: increase to 7.5mg W, 5mg all other days    INR (no units)   Date Value   2023 1.9   2023 1.9   2023 2   2023 2.5   2023 2   2023 2.4   2023 3.1       Hemoglobin   Date Value Ref Range Status   2023 15.4 13.5 - 17.5 g/dL Final     Hematocrit   Date Value Ref Range Status   2023 46.7 41 - 53 % Final     ALT   Date Value Ref Range Status   2023 41 5 - 41 U/L Final     AST   Date Value Ref Range Status   2023 34 <40 U/L Final       Medication changes:  No change    Notes:    Fingerstick INR drawn per clinic protocol. Patient states no visible blood in urine and no black tarry stool. Denies any missed doses of warfarin. No change in other maintenance medications or in diet. Will recheck INR in 3 weeks. Patient acknowledges working in consult agreement with pharmacist as referred by his/her physician.                   For Pharmacy Admin Tracking Only    Intervention Detail: Adherence Monitorin and Dose Adjustment: 1, reason: Therapy Optimization  Total # of Interventions Recommended: 3  Total # of Interventions Accepted: 3  Time Spent (min): 20    Nicole Fields R.Ph., 2023,10:44 AM

## 2023-09-13 NOTE — PATIENT INSTRUCTIONS
Please increase your dose to take warfarin 7.5mg (1 1/2 tablets) on Wednesdays and 5mg (1 tablet) all other days. Continue to monitor urine and stool for signs and symptoms of bleeding. Please notify the clinic of any medication changes. Please remember to bring all medications (both prescription and OTC) to your next visit. Kindly notify the clinic if you are unable to make to your next appointment. Continue current dose of warfarin as instructed on dosing calendar provided.

## 2023-10-02 ENCOUNTER — OFFICE VISIT (OUTPATIENT)
Dept: FAMILY MEDICINE CLINIC | Age: 68
End: 2023-10-02
Payer: MEDICARE

## 2023-10-02 ENCOUNTER — HOSPITAL ENCOUNTER (OUTPATIENT)
Age: 68
Discharge: HOME | End: 2023-10-02
Payer: MEDICARE

## 2023-10-02 VITALS
WEIGHT: 275 LBS | BODY MASS INDEX: 37.3 KG/M2 | OXYGEN SATURATION: 92 % | HEART RATE: 96 BPM | DIASTOLIC BLOOD PRESSURE: 70 MMHG | SYSTOLIC BLOOD PRESSURE: 110 MMHG

## 2023-10-02 DIAGNOSIS — N39.0 COMPLICATED UTI (URINARY TRACT INFECTION): ICD-10-CM

## 2023-10-02 DIAGNOSIS — R39.15 URINARY URGENCY: ICD-10-CM

## 2023-10-02 DIAGNOSIS — R39.15 URINARY URGENCY: Primary | ICD-10-CM

## 2023-10-02 LAB
ANION GAP SERPL CALCULATED.3IONS-SCNC: 12 MMOL/L (ref 9–17)
BASOPHILS # BLD: 0.02 K/UL (ref 0–0.2)
BASOPHILS NFR BLD: 0 % (ref 0–2)
BILIRUBIN, POC: NORMAL
BLOOD URINE, POC: NORMAL
BUN SERPL-MCNC: 16 MG/DL (ref 8–23)
BUN/CREAT SERPL: 15 (ref 9–20)
CALCIUM SERPL-MCNC: 9.4 MG/DL (ref 8.6–10.4)
CHLORIDE SERPL-SCNC: 101 MMOL/L (ref 98–107)
CLARITY, POC: NORMAL
CO2 SERPL-SCNC: 24 MMOL/L (ref 20–31)
COLOR, POC: NORMAL
CREAT SERPL-MCNC: 1.1 MG/DL (ref 0.7–1.2)
EOSINOPHIL # BLD: 0.33 K/UL (ref 0–0.4)
EOSINOPHILS RELATIVE PERCENT: 4 % (ref 0–5)
ERYTHROCYTE [DISTWIDTH] IN BLOOD BY AUTOMATED COUNT: 13.6 % (ref 12.1–15.2)
GFR SERPL CREATININE-BSD FRML MDRD: >60 ML/MIN/1.73M2
GLUCOSE SERPL-MCNC: 162 MG/DL (ref 70–99)
GLUCOSE URINE, POC: NEGATIVE
HCT VFR BLD AUTO: 43.7 % (ref 41–53)
HGB BLD-MCNC: 14.9 G/DL (ref 13.5–17.5)
IMM GRANULOCYTES # BLD AUTO: 0.01 K/UL (ref 0–0.3)
IMM GRANULOCYTES NFR BLD: 0 % (ref 0–5)
KETONES, POC: NORMAL
LEUKOCYTE EST, POC: NORMAL
LYMPHOCYTES NFR BLD: 1.28 K/UL (ref 1–4.8)
LYMPHOCYTES RELATIVE PERCENT: 17 % (ref 13–44)
MCH RBC QN AUTO: 31 PG (ref 26–34)
MCHC RBC AUTO-ENTMCNC: 34.1 G/DL (ref 31–37)
MCV RBC AUTO: 90.9 FL (ref 80–100)
MONOCYTES NFR BLD: 1.07 K/UL (ref 0–1)
MONOCYTES NFR BLD: 14 % (ref 5–9)
NEUTROPHILS NFR BLD: 64 % (ref 39–75)
NEUTS SEG NFR BLD: 4.85 K/UL (ref 2.1–6.5)
NITRITE, POC: POSITIVE
PH, POC: 5.5
PLATELET # BLD AUTO: 264 K/UL (ref 140–450)
PMV BLD AUTO: 9.7 FL (ref 6–12)
POTASSIUM SERPL-SCNC: 4.3 MMOL/L (ref 3.7–5.3)
PROTEIN, POC: 100
RBC # BLD AUTO: 4.81 M/UL (ref 4.5–5.9)
SODIUM SERPL-SCNC: 137 MMOL/L (ref 135–144)
SPECIFIC GRAVITY, POC: 1.03
UROBILINOGEN, POC: 0.2
WBC OTHER # BLD: 7.6 K/UL (ref 3.5–11)

## 2023-10-02 PROCEDURE — 1036F TOBACCO NON-USER: CPT | Performed by: STUDENT IN AN ORGANIZED HEALTH CARE EDUCATION/TRAINING PROGRAM

## 2023-10-02 PROCEDURE — 36415 COLL VENOUS BLD VENIPUNCTURE: CPT

## 2023-10-02 PROCEDURE — 85025 COMPLETE CBC W/AUTO DIFF WBC: CPT

## 2023-10-02 PROCEDURE — G8484 FLU IMMUNIZE NO ADMIN: HCPCS | Performed by: STUDENT IN AN ORGANIZED HEALTH CARE EDUCATION/TRAINING PROGRAM

## 2023-10-02 PROCEDURE — G8417 CALC BMI ABV UP PARAM F/U: HCPCS | Performed by: STUDENT IN AN ORGANIZED HEALTH CARE EDUCATION/TRAINING PROGRAM

## 2023-10-02 PROCEDURE — 81003 URINALYSIS AUTO W/O SCOPE: CPT | Performed by: STUDENT IN AN ORGANIZED HEALTH CARE EDUCATION/TRAINING PROGRAM

## 2023-10-02 PROCEDURE — 99214 OFFICE O/P EST MOD 30 MIN: CPT | Performed by: STUDENT IN AN ORGANIZED HEALTH CARE EDUCATION/TRAINING PROGRAM

## 2023-10-02 PROCEDURE — 80048 BASIC METABOLIC PNL TOTAL CA: CPT

## 2023-10-02 PROCEDURE — 87186 SC STD MICRODIL/AGAR DIL: CPT

## 2023-10-02 PROCEDURE — G8427 DOCREV CUR MEDS BY ELIG CLIN: HCPCS | Performed by: STUDENT IN AN ORGANIZED HEALTH CARE EDUCATION/TRAINING PROGRAM

## 2023-10-02 PROCEDURE — 87077 CULTURE AEROBIC IDENTIFY: CPT

## 2023-10-02 PROCEDURE — 3078F DIAST BP <80 MM HG: CPT | Performed by: STUDENT IN AN ORGANIZED HEALTH CARE EDUCATION/TRAINING PROGRAM

## 2023-10-02 PROCEDURE — 87086 URINE CULTURE/COLONY COUNT: CPT

## 2023-10-02 PROCEDURE — 3074F SYST BP LT 130 MM HG: CPT | Performed by: STUDENT IN AN ORGANIZED HEALTH CARE EDUCATION/TRAINING PROGRAM

## 2023-10-02 PROCEDURE — 1123F ACP DISCUSS/DSCN MKR DOCD: CPT | Performed by: STUDENT IN AN ORGANIZED HEALTH CARE EDUCATION/TRAINING PROGRAM

## 2023-10-02 PROCEDURE — 3017F COLORECTAL CA SCREEN DOC REV: CPT | Performed by: STUDENT IN AN ORGANIZED HEALTH CARE EDUCATION/TRAINING PROGRAM

## 2023-10-02 RX ORDER — SULFAMETHOXAZOLE AND TRIMETHOPRIM 800; 160 MG/1; MG/1
1 TABLET ORAL 2 TIMES DAILY
Qty: 10 TABLET | Refills: 0 | Status: SHIPPED | OUTPATIENT
Start: 2023-10-02 | End: 2023-10-07

## 2023-10-02 ASSESSMENT — ENCOUNTER SYMPTOMS
SINUS PAIN: 0
VOMITING: 0
ABDOMINAL PAIN: 0
NAUSEA: 0
BACK PAIN: 0
COUGH: 0
WHEEZING: 0
DIARRHEA: 0
SORE THROAT: 0

## 2023-10-02 NOTE — PROGRESS NOTES
HPI Notes    Name: Nirmala Zhang  : 1955         Chief Complaint:     Chief Complaint   Patient presents with    Urinary Tract Infection     3 days ago patient had body chills and some dizziness. Moving forward started experiencing urinary urgency and burning upon urination. History of Present Illness:      HPI    This is a 68-year-old man presenting for 3 days of chills, dizziness, urinary urgency, dysuria. He is concerned for acute cystitis. He does have a history of acute cystitis as well. No recent illnesses, denies fevers, penile trauma, recent urinary catheter use, hematuria although noting urine is somewhat dark. Past Medical History:     Past Medical History:   Diagnosis Date    Diabetes mellitus ([address])     Hernia cerebri ([address])     Hyperlipidemia     Hypertension     Obesity (BMI 30-39. 9)     Obstructive sleep apnea     on bipap at home    Prostate disease       Reviewed all health maintenance requirements and ordered appropriate tests  Health Maintenance Due   Topic Date Due    DTaP/Tdap/Td vaccine (1 - Tdap) Never done    Shingles vaccine (1 of 2) Never done    Hepatitis B vaccine (1 of 3 - Risk 3-dose series) Never done    Diabetic retinal exam  2021    COVID-19 Vaccine (5 - Pfizer series) 2023    Flu vaccine (1) 2023       Past Surgical History:     Past Surgical History:   Procedure Laterality Date    CARDIAC CATHETERIZATION Left 2021    Dr. Aron Schwartz @ [facility]--Refer to Dr. Pablito Gomez  2012    Yola Noguera MD    COLONOSCOPY  2015    Francisco Silvestre MD; Colon polyps x2, sigmoid diverticulosis, internal and external hemorrhoids    COLONOSCOPY  2019    Dr. Eugenia Guerra -- screening; no bx/polyps    COLONOSCOPY N/A 2019    COLORECTAL CANCER SCREENING performed by Jamie Cobian DO at [address]  Left 2018    with stent per Dr. Senia Rivero      umbilical hernia    OK CYSTO W/INSERT URETERAL STENT Left 2018

## 2023-10-02 NOTE — PATIENT INSTRUCTIONS
SURVEY:    You may be receiving a survey from [employer] regarding your visit today. You may get this in the mail, through your MyChart or in your email. Please complete the survey to enable us to provide the highest quality of care to you and your family. If you cannot score us as very good ( 5 Stars) on any question, please feel free to call the office to discuss how we could have made your experience exceptional.     Thank you.     Clinical Care Team:  DO Myrtle Kellogg LPN    Triage:  Bruno Og, [address] [facility] Team:  Roberta Cornejo

## 2023-10-04 ENCOUNTER — HOSPITAL ENCOUNTER (OUTPATIENT)
Dept: PHARMACY | Age: 68
Setting detail: THERAPIES SERIES
Discharge: HOME | End: 2023-10-04
Payer: MEDICARE

## 2023-10-04 VITALS
WEIGHT: 269 LBS | SYSTOLIC BLOOD PRESSURE: 117 MMHG | DIASTOLIC BLOOD PRESSURE: 73 MMHG | BODY MASS INDEX: 36.48 KG/M2 | HEART RATE: 102 BPM

## 2023-10-04 LAB
INR BLD: 2.1
MICROORGANISM SPEC CULT: ABNORMAL
SPECIMEN DESCRIPTION: ABNORMAL

## 2023-10-04 PROCEDURE — 99211 OFF/OP EST MAY X REQ PHY/QHP: CPT

## 2023-10-04 PROCEDURE — 85610 PROTHROMBIN TIME: CPT

## 2023-10-04 NOTE — PROGRESS NOTES
[facility]  Medication Management  ANTICOAGULATION    Referring Provider: Dr Brenna Latif INR: 2.0-3.0     TODAY'S INR: 2.1     WARFARIN Dosage: Continue 7.5 mg W, 5 mg all other days    INR (no units)   Date Value   10/04/2023 2.1   2023 1.9   2023 1.9   2023 2   2023 2.5   2023 2   2023 2.4       Hemoglobin   Date Value Ref Range Status   10/02/2023 14.9 13.5 - 17.5 g/dL Final     Hematocrit   Date Value Ref Range Status   10/02/2023 43.7 41.0 - 53.0 % Final     ALT   Date Value Ref Range Status   2023 41 5 - 41 U/L Final     AST   Date Value Ref Range Status   2023 34 <40 U/L Final       Medication changes:  Started Bactrim 800-160 mg BID x 5 days (10/2-10/7)    Notes:    Fingerstick INR drawn per clinic protocol. Patient states no visible blood in urine, black tarry stool, falls or ER visits and denies any missed or extra doses of warfarin. Started Bactrim 800-160 mg BID x 5 days (10/2-10/7) but no change in other maintenance medications or in diet. INR is therapeutic after the dose increase last visit so will continue 7.5 mg W, 5 mg all other days and will recheck INR in 6 weeks. Patient acknowledges working in consult agreement with pharmacist as referred by his/her physician.      For Pharmacy Admin Tracking Only    Intervention Detail: Adherence Monitorin  Total # of Interventions Recommended: 1  Total # of Interventions Accepted: 1  Time Spent (min): [address], PharmD 10/4/2023 3:49 PM

## 2023-10-10 RX ORDER — PERPHENAZINE 16 MG/1
100 TABLET, FILM COATED ORAL DAILY
Qty: 100 EACH | Refills: 3 | Status: SHIPPED | OUTPATIENT
Start: 2023-10-10

## 2023-10-10 NOTE — TELEPHONE ENCOUNTER
Last OV: 10/2/2023  Next scheduled apt: 2/12/2024      Pt called for refill on test strips, pending provider approval.    Thank you

## 2023-11-15 ENCOUNTER — TELEPHONE (OUTPATIENT)
Dept: FAMILY MEDICINE CLINIC | Age: 68
End: 2023-11-15

## 2023-11-15 ENCOUNTER — HOSPITAL ENCOUNTER (OUTPATIENT)
Dept: PHARMACY | Age: 68
Setting detail: THERAPIES SERIES
Discharge: HOME | End: 2023-11-15
Payer: MEDICARE

## 2023-11-15 VITALS
WEIGHT: 277.8 LBS | HEART RATE: 75 BPM | SYSTOLIC BLOOD PRESSURE: 115 MMHG | BODY MASS INDEX: 37.68 KG/M2 | DIASTOLIC BLOOD PRESSURE: 69 MMHG

## 2023-11-15 DIAGNOSIS — E11.9 TYPE 2 DIABETES MELLITUS WITHOUT COMPLICATION, WITHOUT LONG-TERM CURRENT USE OF INSULIN (HCC): Primary | ICD-10-CM

## 2023-11-15 LAB — INR BLD: 2.2

## 2023-11-15 PROCEDURE — 85610 PROTHROMBIN TIME: CPT

## 2023-11-15 PROCEDURE — 99211 OFF/OP EST MAY X REQ PHY/QHP: CPT

## 2023-11-15 RX ORDER — PERPHENAZINE 16 MG/1
100 TABLET, FILM COATED ORAL DAILY
Qty: 200 EACH | Refills: 3 | Status: CANCELLED | OUTPATIENT
Start: 2023-11-15

## 2023-11-15 RX ORDER — BLOOD-GLUCOSE METER
EACH MISCELLANEOUS
Qty: 1 KIT | Refills: 0 | Status: CANCELLED | OUTPATIENT
Start: 2023-11-15

## 2023-11-15 RX ORDER — LANCETS 30 GAUGE
EACH MISCELLANEOUS
Qty: 200 EACH | Refills: 3 | Status: CANCELLED | OUTPATIENT
Start: 2023-11-15

## 2023-11-15 NOTE — PROGRESS NOTES
[facility]  Medication Management  ANTICOAGULATION    Referring Provider: Dr Brenna Latif INR: 2.0 - 3.0     TODAY'S INR: 2.2     WARFARIN Dosage: Continue 7.5 mg W, 5 mg all other days      INR (no units)   Date Value   11/15/2023 2.2   10/04/2023 2.1   2023 1.9   2023 1.9   2023 2   2023 2.5   2023 2   2023 2.4       Hemoglobin   Date Value Ref Range Status   10/02/2023 14.9 13.5 - 17.5 g/dL Final     Hematocrit   Date Value Ref Range Status   10/02/2023 43.7 41.0 - 53.0 % Final     ALT   Date Value Ref Range Status   2023 41 5 - 41 U/L Final     AST   Date Value Ref Range Status   2023 34 <40 U/L Final       Medication changes:  None      Notes:    Fingerstick INR drawn per clinic protocol. Patient states no visible blood in urine and no black tarry stool. Denies any missed doses of warfarin. No change in other maintenance medications or in diet. Will recheck INR in 6 weeks. Patient acknowledges working in consult agreement with pharmacist as referred by his/her physician.                   For Pharmacy Admin Tracking Only    Intervention Detail: Adherence Monitorin  Total # of Interventions Recommended: 1  Total # of Interventions Accepted: 1  Time Spent (min): HARVINDER Lynn [facility], PharmD

## 2023-11-15 NOTE — TELEPHONE ENCOUNTER
Patient stopped in and has to use [facility] now for his diabetic supplies. He is asking for a refill on test strips. He also states they were going to send him a new meter but he needs a RX for it.        Health Maintenance   Topic Date Due    DTaP/Tdap/Td vaccine (1 - Tdap) Never done    Shingles vaccine (1 of 2) Never done    Hepatitis B vaccine (1 of 3 - Risk 3-dose series) Never done    Diabetic retinal exam  12/07/2021    COVID-19 Vaccine (5 - 2023-24 season) 09/01/2023    Diabetic foot exam  02/09/2024    Diabetic Alb to Cr ratio (uACR) test  02/09/2024    Lipids  06/14/2024    A1C test (Diabetic or Prediabetic)  08/11/2024    Depression Screen  08/11/2024    Annual Wellness Visit (AWV)  08/11/2024    GFR test (Diabetes, CKD 3-4, OR last GFR 15-59)  10/02/2024    Colorectal Cancer Screen  03/01/2029    Flu vaccine  Completed    Pneumococcal 65+ years Vaccine  Completed    AAA screen  Completed    Hepatitis C screen  Completed    Hepatitis A vaccine  Aged Out    Hib vaccine  Aged Out    Meningococcal (ACWY) vaccine  Aged Out    Prostate Specific Antigen (PSA) Screening or Monitoring  Discontinued             (applicable per patient's age: Cancer Screenings, Depression Screening, Fall Risk Screening, Immunizations)    Hemoglobin A1C (%)   Date Value   08/11/2023 7.8   01/27/2023 7.1 (H)   08/08/2022 7.1     LDL Cholesterol (mg/dL)   Date Value   06/14/2023 40     AST (U/L)   Date Value   06/14/2023 34     ALT (U/L)   Date Value   06/14/2023 41     BUN (mg/dL)   Date Value   10/02/2023 16      (goal A1C is < 7)   (goal LDL is <100) need 30-50% reduction from baseline     BP Readings from Last 3 Encounters:   11/15/23 115/69   10/04/23 117/73   10/02/23 110/70    (goal /80)      All Future Testing planned in [facility]:  Lab Frequency Next Occurrence   Cardiac event monitor Once 12/19/2022   TSH with Reflex Once 06/20/2024   CBC with Auto Differential Once 06/20/2024   Comprehensive Metabolic Panel Once

## 2023-11-17 NOTE — PATIENT INSTRUCTIONS
Continued Stay SW/CM Assessment/Plan of Care Note       Active Substitute Decision Maker (SDM)    There are no active Substitute Decision Maker (SDM) on file.           Progress note:  CM RN reviewed POC and HH plan with MD and patient.  Referral sent to [facility]  for home RN which is pending final determination.  CM RN will follow to finalize HH plan.         See MARJORIE/ARLENE flowsheets for other objective data.    Disposition Recommendations:  Preliminary discharge destination: Home with HH  SW/CM recommendation for discharge: Home, Home care    Discharge Plan/Needs:     Continued Care and Services - Admitted Since 11/14/2023     Home Medical Care Coordination complete. Patient indicates having no preference.    Service Provider Request Status Selected Services Address Phone Fax    [facility]  Selected Home Health Services [address] [facility] [id number] [phone]                      Prior To Hospitalization:    Living Situation: Alone and residing at Apartment    .  Support Systems: Friends   Home Devices/Equipment: None            Mobility Assist Devices: None   Type of Service Prior to Hospitalization: None               Patient/Family discharge goal (s):  Home, Home Care           Therapy Recommendations for Discharge:   PT:      Last Filed Values       Value Time User    PT Discharge Needs  does not require ongoing therapy 11/17/2023 11:22 AM Sihreen Cabrera PT        OT:       Last Filed Values       Value Time User    OT Discharge Needs  does not require ongoing therapy 11/17/2023 12:44 PM Magaly High OTR/L        SLP:    Last Filed Values     None          Mobility Equipment Recommended for Discharge: tbd      Barriers to Discharge  Identified Barriers to Discharge/Transition Planning: medical clearance.         Sommer FERNANDEZ  [id number]        Addendum:   Patient is ALL Set with with [facility] .  Possible DC home this wknd.    Continue to monitor urine and stool for signs and symptoms of bleeding. Please notify the clinic of any medication changes. Please remember to bring all medications (both prescription and OTC) to your next visit. Kindly notify the clinic if you are unable to make to your next appointment. Continue current dose of warfarin as instructed on dosing calendar provided.

## 2023-12-27 ENCOUNTER — APPOINTMENT (OUTPATIENT)
Dept: PHARMACY | Age: 68
End: 2023-12-27
Payer: MEDICARE

## 2023-12-28 RX ORDER — WARFARIN SODIUM 5 MG/1
TABLET ORAL
Qty: 96 TABLET | Refills: 3 | OUTPATIENT
Start: 2023-12-28

## 2023-12-29 ENCOUNTER — HOSPITAL ENCOUNTER (OUTPATIENT)
Dept: PHARMACY | Age: 68
Setting detail: THERAPIES SERIES
Discharge: HOME | End: 2023-12-29
Payer: MEDICARE

## 2023-12-29 VITALS
WEIGHT: 279.6 LBS | DIASTOLIC BLOOD PRESSURE: 86 MMHG | SYSTOLIC BLOOD PRESSURE: 142 MMHG | HEART RATE: 63 BPM | BODY MASS INDEX: 37.92 KG/M2

## 2023-12-29 LAB — INR BLD: 3.1

## 2023-12-29 PROCEDURE — 85610 PROTHROMBIN TIME: CPT

## 2023-12-29 PROCEDURE — 99211 OFF/OP EST MAY X REQ PHY/QHP: CPT

## 2023-12-29 NOTE — PROGRESS NOTES
[address] [facility]/[place]  Medication Management  ANTICOAGULATION    Referring Provider: Dr Clifton Yousif INR: 2.0-3.0     TODAY'S INR: 3.1     WARFARIN Dosage: 2.5 mg x 1, then resume 7.5 mg W, 5 mg all other days    INR (no units)   Date Value   2023 3.1   11/15/2023 2.2   10/04/2023 2.1   2023 1.9   2023 1.9   2023 2   2023 2.5       Hemoglobin   Date Value Ref Range Status   10/02/2023 14.9 13.5 - 17.5 g/dL Final     Hematocrit   Date Value Ref Range Status   10/02/2023 43.7 41.0 - 53.0 % Final     ALT   Date Value Ref Range Status   2023 41 5 - 41 U/L Final     AST   Date Value Ref Range Status   2023 34 <40 U/L Final       Medication changes:  Has not had Januvia x 5 days; Keflex 500 mng TID x 7 days (-); Hematuria/UTI     Notes:    Fingerstick INR drawn per clinic protocol. Patient states no visible black tarry stool, falls or ER visits but did see his PCP on  for hematuria and was diagnosed with a UTI. He was put on Keflex 500 mg TID x 7 days (-). He also has not had Januvia for 5 days as he is out of medication and is in the 'doughnut hole' with his insurance so he is holding off on restarting until after the first of the year. He denies any missed or extra doses of warfarin. No change in other maintenance medications or in diet. INR is slightly supratherapeutic today so will order 2.5 mg for this evening before resuming 7.5 mg W, 5 mg all other days and will recheck INR in 6 weeks. Patient acknowledges working in consult agreement with pharmacist as referred by his/her physician.     For Pharmacy Admin Tracking Only    Intervention Detail: Adherence Monitorin and Dose Adjustment: 1, reason: Therapy Optimization  Total # of Interventions Recommended: 2  Total # of Interventions Accepted: 2  Time Spent (min): 20    Junior Faulkner PharmD 2023 10:33 AM

## 2024-02-02 RX ORDER — OXYBUTYNIN CHLORIDE 5 MG/1
TABLET, EXTENDED RELEASE ORAL
Qty: 90 TABLET | Refills: 1 | Status: SHIPPED | OUTPATIENT
Start: 2024-02-02

## 2024-02-02 RX ORDER — ROSUVASTATIN CALCIUM 40 MG/1
40 TABLET, COATED ORAL EVERY EVENING
Qty: 90 TABLET | Refills: 1 | Status: SHIPPED | OUTPATIENT
Start: 2024-02-02

## 2024-02-09 ENCOUNTER — HOSPITAL ENCOUNTER (OUTPATIENT)
Dept: PHARMACY | Age: 69
Setting detail: THERAPIES SERIES
Discharge: HOME | End: 2024-02-09
Payer: MEDICARE

## 2024-02-09 VITALS
DIASTOLIC BLOOD PRESSURE: 81 MMHG | HEART RATE: 74 BPM | SYSTOLIC BLOOD PRESSURE: 134 MMHG | BODY MASS INDEX: 38.16 KG/M2 | WEIGHT: 281.4 LBS

## 2024-02-09 LAB — INR BLD: 2.5

## 2024-02-09 PROCEDURE — 85610 PROTHROMBIN TIME: CPT | Performed by: FAMILY MEDICINE

## 2024-02-09 PROCEDURE — 99211 OFF/OP EST MAY X REQ PHY/QHP: CPT | Performed by: FAMILY MEDICINE

## 2024-02-09 NOTE — PATIENT INSTRUCTIONS
Continue to take warfarin 7.5mg (1 1/2 tablets) Wednesdays and 5mg (1 tablet) all other days.  Continue to monitor urine and stool for signs and symptoms of bleeding.   Please notify the clinic of any medication changes.   Please remember to bring all medications (both prescription and OTC) to your next visit.   Kindly notify the clinic if you are unable to make to your next appointment.   Follow warfarin dosing instructions on dosing calendar provided.     
No

## 2024-02-09 NOTE — PROGRESS NOTES
[facility]  Medication [facility]  ANTICOAGULATION    Referring Provider: Dr Lennon    GOAL INR: 2.0-3.0    TODAY'S INR: 2.5    WARFARIN Dosage: continye 7.5mg W, 5mg all other days    INR (no units)   Date Value   2024 2.5   2023 3.1   11/15/2023 2.2   10/04/2023 2.1   2023 1.9   2023 1.9   2023 2       Hemoglobin   Date Value Ref Range Status   10/02/2023 14.9 13.5 - 17.5 g/dL Final     Hematocrit   Date Value Ref Range Status   10/02/2023 43.7 41.0 - 53.0 % Final     ALT   Date Value Ref Range Status   2023 41 5 - 41 U/L Final     AST   Date Value Ref Range Status   2023 34 <40 U/L Final       Medication changes:  No changes    Notes:    Fingerstick INR drawn per clinic protocol. Patient states no visible blood in urine and no black tarry stool. Denies any missed doses of warfarin. No change in other maintenance medications or in diet. Will recheck INR in 6 weeks. Patient has appt with Dr Mata Monday for routine check. Patient acknowledges working in consult agreement with pharmacist as referred by his/her physician.                  For Pharmacy Admin Tracking Only    Intervention Detail: Adherence Monitorin  Total # of Interventions Recommended: 2  Total # of Interventions Accepted: 2  Time Spent (min): 20      Janiya Parrish R.Ph., 2024,10:13 AM

## 2024-02-12 ENCOUNTER — OFFICE VISIT (OUTPATIENT)
Dept: FAMILY MEDICINE CLINIC | Age: 69
End: 2024-02-12
Payer: MEDICARE

## 2024-02-12 VITALS
OXYGEN SATURATION: 96 % | HEART RATE: 78 BPM | HEIGHT: 72 IN | SYSTOLIC BLOOD PRESSURE: 120 MMHG | WEIGHT: 281 LBS | DIASTOLIC BLOOD PRESSURE: 74 MMHG | BODY MASS INDEX: 38.06 KG/M2

## 2024-02-12 DIAGNOSIS — G47.33 OSA TREATED WITH BIPAP: ICD-10-CM

## 2024-02-12 DIAGNOSIS — E11.65 TYPE 2 DIABETES MELLITUS WITH HYPERGLYCEMIA, WITHOUT LONG-TERM CURRENT USE OF INSULIN (HCC): Primary | ICD-10-CM

## 2024-02-12 DIAGNOSIS — N13.8 BPH WITH OBSTRUCTION/LOWER URINARY TRACT SYMPTOMS: ICD-10-CM

## 2024-02-12 DIAGNOSIS — N40.1 BPH WITH OBSTRUCTION/LOWER URINARY TRACT SYMPTOMS: ICD-10-CM

## 2024-02-12 DIAGNOSIS — I48.0 PAROXYSMAL ATRIAL FIBRILLATION (HCC): ICD-10-CM

## 2024-02-12 LAB
CREATININE URINE POCT: NORMAL
HBA1C MFR BLD: 8.6 %
MICROALBUMIN/CREAT 24H UR: NORMAL MG/G{CREAT}
MICROALBUMIN/CREAT UR-RTO: NORMAL

## 2024-02-12 PROCEDURE — 83036 HEMOGLOBIN GLYCOSYLATED A1C: CPT | Performed by: FAMILY MEDICINE

## 2024-02-12 PROCEDURE — G8484 FLU IMMUNIZE NO ADMIN: HCPCS | Performed by: FAMILY MEDICINE

## 2024-02-12 PROCEDURE — 99214 OFFICE O/P EST MOD 30 MIN: CPT | Performed by: FAMILY MEDICINE

## 2024-02-12 PROCEDURE — 2022F DILAT RTA XM EVC RTNOPTHY: CPT | Performed by: FAMILY MEDICINE

## 2024-02-12 PROCEDURE — G8417 CALC BMI ABV UP PARAM F/U: HCPCS | Performed by: FAMILY MEDICINE

## 2024-02-12 PROCEDURE — 3078F DIAST BP <80 MM HG: CPT | Performed by: FAMILY MEDICINE

## 2024-02-12 PROCEDURE — 3052F HG A1C>EQUAL 8.0%<EQUAL 9.0%: CPT | Performed by: FAMILY MEDICINE

## 2024-02-12 PROCEDURE — 1123F ACP DISCUSS/DSCN MKR DOCD: CPT | Performed by: FAMILY MEDICINE

## 2024-02-12 PROCEDURE — 3074F SYST BP LT 130 MM HG: CPT | Performed by: FAMILY MEDICINE

## 2024-02-12 PROCEDURE — 3017F COLORECTAL CA SCREEN DOC REV: CPT | Performed by: FAMILY MEDICINE

## 2024-02-12 PROCEDURE — 1036F TOBACCO NON-USER: CPT | Performed by: FAMILY MEDICINE

## 2024-02-12 PROCEDURE — 82044 UR ALBUMIN SEMIQUANTITATIVE: CPT | Performed by: FAMILY MEDICINE

## 2024-02-12 PROCEDURE — G8427 DOCREV CUR MEDS BY ELIG CLIN: HCPCS | Performed by: FAMILY MEDICINE

## 2024-02-12 RX ORDER — METOPROLOL SUCCINATE 25 MG/1
TABLET, EXTENDED RELEASE ORAL
Qty: 90 TABLET | Refills: 1 | Status: SHIPPED | OUTPATIENT
Start: 2024-02-12

## 2024-02-12 RX ORDER — LOSARTAN POTASSIUM 25 MG/1
25 TABLET ORAL
Qty: 90 TABLET | Refills: 1 | Status: SHIPPED | OUTPATIENT
Start: 2024-02-12 | End: 2024-08-10

## 2024-02-12 RX ORDER — TAMSULOSIN HYDROCHLORIDE 0.4 MG/1
CAPSULE ORAL
Qty: 180 CAPSULE | Refills: 3 | Status: SHIPPED | OUTPATIENT
Start: 2024-02-12

## 2024-02-12 RX ORDER — GLIMEPIRIDE 2 MG/1
2 TABLET ORAL 2 TIMES DAILY WITH MEALS
Qty: 180 TABLET | Refills: 0 | Status: SHIPPED | OUTPATIENT
Start: 2024-02-12

## 2024-02-12 NOTE — PROGRESS NOTES
Mary Mata DO   metoprolol succinate (TOPROL XL) 25 MG extended release tablet TAKE 1 TABLET BY MOUTH EVERY DAY 2/12/24  Yes Mary Mata DO   tamsulosin (FLOMAX) 0.4 MG capsule TAKE 1 CAPSULE BY MOUTH TWICE A DAY 2/12/24  Yes Mary Mata DO   glimepiride (AMARYL) 2 MG tablet Take 1 tablet by mouth 2 times daily (with meals) 2/12/24  Yes Mary Mata DO   oxyBUTYnin (DITROPAN-XL) 5 MG extended release tablet TAKE 1 TABLET BY MOUTH EVERY DAY IN THE EVENING 2/2/24  Yes Mary Mata DO   metFORMIN (GLUCOPHAGE) 1000 MG tablet TAKE 1 TABLET BY MOUTH TWICE DAILY WITH MEALS 2/2/24  Yes Mary Mata DO   rosuvastatin (CRESTOR) 40 MG tablet TAKE 1 TABLET BY MOUTH EVERY EVENING 2/2/24  Yes Mary Mata DO   warfarin (COUMADIN) 5 MG tablet TAKE 1 & 1/2 (ONE AND ONE-HALF) TABLETS BY MOUTH EVERY WEDNESDAY AND 1 TABLET ALL OTHER DAYS or AS DIRECTED by medication management clinic 12/28/23  Yes Mary Mata DO   CONTOUR NEXT TEST strip 100 each by Other route daily 10/10/23  Yes Mary Mata DO   vitamin D (CHOLECALCIFEROL) 50 MCG (2000 UT) TABS tablet Take 1 tablet by mouth daily   Yes Lorenza Mon MD   dilTIAZem (CARDIZEM CD) 300 MG extended release capsule Take 1 capsule by mouth daily 3/1/23  Yes Arnulfo Lennon MD   acetaminophen (TYLENOL) 325 MG tablet Take 2 tablets by mouth every 4 hours as needed 8/10/21  Yes Lorenza Mon MD   Blood Glucose Monitoring Suppl (CONTOUR NEXT MONITOR) w/Device KIT TEST TWICE A DAY BEFORE BREAKFAST AND DINNER 8/11/21  Yes Lorenza Mon MD   Lancets MISC  8/10/21  Yes Lorenza Mon MD   aspirin 81 MG EC tablet Take 1 tablet by mouth daily   Yes Lorenza Mon MD        Allergies:       Patient has no known allergies.    Physical Exam:     Vitals:  /74   Pulse 78   Ht 1.829 m (6' 0.01\")   Wt 127.5 kg (281 lb)   SpO2 96%   BMI 38.10 kg/m²   Physical Exam  Constitutional:       Appearance:

## 2024-02-13 PROBLEM — I48.0 PAROXYSMAL ATRIAL FIBRILLATION (HCC): Status: ACTIVE | Noted: 2024-02-13

## 2024-03-11 RX ORDER — DILTIAZEM HYDROCHLORIDE 300 MG/1
300 CAPSULE, COATED, EXTENDED RELEASE ORAL DAILY
Qty: 30 CAPSULE | Refills: 11 | Status: SHIPPED | OUTPATIENT
Start: 2024-03-11

## 2024-03-20 ENCOUNTER — HOSPITAL ENCOUNTER (OUTPATIENT)
Dept: PHARMACY | Age: 69
Setting detail: THERAPIES SERIES
Discharge: HOME | End: 2024-03-20
Payer: MEDICARE

## 2024-03-20 VITALS
HEART RATE: 80 BPM | DIASTOLIC BLOOD PRESSURE: 69 MMHG | SYSTOLIC BLOOD PRESSURE: 118 MMHG | BODY MASS INDEX: 37.59 KG/M2 | WEIGHT: 277.2 LBS

## 2024-03-20 LAB — INR BLD: 2.1

## 2024-03-20 PROCEDURE — 85610 PROTHROMBIN TIME: CPT

## 2024-03-20 PROCEDURE — 99211 OFF/OP EST MAY X REQ PHY/QHP: CPT

## 2024-03-20 NOTE — PROGRESS NOTES
[facility]  [facility]ication [facility]  ANTICOAGULATION    Referring Provider: Dr Clovis Lennon     GOAL INR: 2.0-3.0     TODAY'S INR: 2.1     WARFARIN Dosage: Continue 7.5 mg W, 5 mg all other days    INR (no units)   Date Value   2024 2.1   2024 2.5   2023 3.1   11/15/2023 2.2   10/04/2023 2.1   2023 1.9   2023 1.9       Hemoglobin   Date Value Ref Range Status   10/02/2023 14.9 13.5 - 17.5 g/dL Final     Hematocrit   Date Value Ref Range Status   10/02/2023 43.7 41.0 - 53.0 % Final     ALT   Date Value Ref Range Status   2023 41 5 - 41 U/L Final     AST   Date Value Ref Range Status   2023 34 <40 U/L Final       Medication changes:  Started glimepiride 2 mg BID  Still taking Januvia but will stop the medication when his supply runs out (states he has a couple of months worth)    Notes:    Fingerstick INR drawn per clinic protocol. Patient states no visible blood in urine, black tarry stool, falls or ER visits and denies any missed or extra doses of warfarin. He was restarted on glimepiride 2 mg twice daily and also advises that he is still taking Januvia but will stop the medication when his current supply runs out (states he has a couple of months worth). No other changes in medications or diet. INR is therapeutic so will continue 7.5 mg W, 5 mg all other days and will recheck INR in 6 weeks. Patient acknowledges working in consult agreement with pharmacist as referred by his/her physician.    For Pharmacy Admin Tracking Only    Intervention Detail: Adherence Monitorin  Total # of Interventions Recommended: 1  Total # of Interventions Accepted: 1  Time Spent (min): 20    Anthony Francois, PharmD 3/20/2024 10:30 AM

## 2024-05-01 ENCOUNTER — HOSPITAL ENCOUNTER (OUTPATIENT)
Dept: PHARMACY | Age: 69
Setting detail: THERAPIES SERIES
Discharge: HOME | End: 2024-05-01
Payer: MEDICARE

## 2024-05-01 VITALS
SYSTOLIC BLOOD PRESSURE: 128 MMHG | BODY MASS INDEX: 37.67 KG/M2 | DIASTOLIC BLOOD PRESSURE: 86 MMHG | WEIGHT: 277.8 LBS | HEART RATE: 74 BPM

## 2024-05-01 LAB — INR BLD: 2.5

## 2024-05-01 PROCEDURE — 85610 PROTHROMBIN TIME: CPT

## 2024-05-01 PROCEDURE — 99211 OFF/OP EST MAY X REQ PHY/QHP: CPT

## 2024-05-01 NOTE — PROGRESS NOTES
[facility]  Medication Management  ANTICOAGULATION    Referring Provider: Dr Clovis Lennon     GOAL INR: 2.0-3.0     TODAY'S INR: 2.5     WARFARIN Dosage: Continue 7.5 mg W, 5 mg all other days    INR (no units)   Date Value   2024 2.5   2024 2.1   2024 2.5   2023 3.1   11/15/2023 2.2   10/04/2023 2.1   2023 1.9       Hemoglobin   Date Value Ref Range Status   10/02/2023 14.9 13.5 - 17.5 g/dL Final     Hematocrit   Date Value Ref Range Status   10/02/2023 43.7 41.0 - 53.0 % Final     ALT   Date Value Ref Range Status   2023 41 5 - 41 U/L Final     AST   Date Value Ref Range Status   2023 34 <40 U/L Final       Medication changes:  Stopped Januvia    Notes:    Fingerstick INR drawn per clinic protocol. Patient states no visible blood in urine, black tarry stool, falls or ER visits and denies any missed or extra doses of warfarin. He did officially stop Januvia but no change in other maintenance medications or in diet. INR is therapeutic so will continue 7.5 mg W, 5 mg all other days and will recheck INR in 6 weeks. Patient acknowledges working in consult agreement with pharmacist as referred by his/her physician.    For Pharmacy Admin Tracking Only    Intervention Detail: Adherence Monitorin  Total # of Interventions Recommended: 1  Total # of Interventions Accepted: 1  Time Spent (min): 20    Anthony Francois, PharmD 2024 2:08 PM

## 2024-05-13 ENCOUNTER — OFFICE VISIT (OUTPATIENT)
Dept: FAMILY MEDICINE CLINIC | Age: 69
End: 2024-05-13
Payer: MEDICARE

## 2024-05-13 VITALS
OXYGEN SATURATION: 94 % | WEIGHT: 280 LBS | DIASTOLIC BLOOD PRESSURE: 74 MMHG | HEIGHT: 72 IN | HEART RATE: 85 BPM | SYSTOLIC BLOOD PRESSURE: 120 MMHG | BODY MASS INDEX: 37.93 KG/M2

## 2024-05-13 DIAGNOSIS — R35.1 NOCTURIA: ICD-10-CM

## 2024-05-13 DIAGNOSIS — E11.65 TYPE 2 DIABETES MELLITUS WITH HYPERGLYCEMIA, WITHOUT LONG-TERM CURRENT USE OF INSULIN (HCC): Primary | ICD-10-CM

## 2024-05-13 DIAGNOSIS — N13.8 BPH WITH OBSTRUCTION/LOWER URINARY TRACT SYMPTOMS: ICD-10-CM

## 2024-05-13 DIAGNOSIS — I25.10 CORONARY ARTERY DISEASE INVOLVING NATIVE CORONARY ARTERY OF NATIVE HEART WITHOUT ANGINA PECTORIS: ICD-10-CM

## 2024-05-13 DIAGNOSIS — N40.1 BPH WITH OBSTRUCTION/LOWER URINARY TRACT SYMPTOMS: ICD-10-CM

## 2024-05-13 LAB — HBA1C MFR BLD: 8.7 %

## 2024-05-13 PROCEDURE — 3017F COLORECTAL CA SCREEN DOC REV: CPT | Performed by: FAMILY MEDICINE

## 2024-05-13 PROCEDURE — 3074F SYST BP LT 130 MM HG: CPT | Performed by: FAMILY MEDICINE

## 2024-05-13 PROCEDURE — 3078F DIAST BP <80 MM HG: CPT | Performed by: FAMILY MEDICINE

## 2024-05-13 PROCEDURE — 99214 OFFICE O/P EST MOD 30 MIN: CPT | Performed by: FAMILY MEDICINE

## 2024-05-13 PROCEDURE — 83036 HEMOGLOBIN GLYCOSYLATED A1C: CPT | Performed by: FAMILY MEDICINE

## 2024-05-13 PROCEDURE — G8417 CALC BMI ABV UP PARAM F/U: HCPCS | Performed by: FAMILY MEDICINE

## 2024-05-13 PROCEDURE — 3052F HG A1C>EQUAL 8.0%<EQUAL 9.0%: CPT | Performed by: FAMILY MEDICINE

## 2024-05-13 PROCEDURE — 2022F DILAT RTA XM EVC RTNOPTHY: CPT | Performed by: FAMILY MEDICINE

## 2024-05-13 PROCEDURE — 1036F TOBACCO NON-USER: CPT | Performed by: FAMILY MEDICINE

## 2024-05-13 PROCEDURE — 1123F ACP DISCUSS/DSCN MKR DOCD: CPT | Performed by: FAMILY MEDICINE

## 2024-05-13 PROCEDURE — G8427 DOCREV CUR MEDS BY ELIG CLIN: HCPCS | Performed by: FAMILY MEDICINE

## 2024-05-13 RX ORDER — GLIMEPIRIDE 4 MG/1
4 TABLET ORAL 2 TIMES DAILY WITH MEALS
Qty: 180 TABLET | Refills: 1 | Status: SHIPPED | OUTPATIENT
Start: 2024-05-13

## 2024-05-13 NOTE — PROGRESS NOTES
Name: River Buckley  : 1955         Chief Complaint:     Chief Complaint   Patient presents with    Diabetes    Hypertension    Sleep Apnea     Using CPAP nightly, benefits from use. Follows with Dr. Ulrich    Atrial Fibrillation     Coumadin, follows with Dr. Lennon       History of Present Illness:      River Buckley is a 68 y.o.  male who presents with Diabetes, Hypertension, Sleep Apnea (Using CPAP nightly, benefits from use. Follows with Dr. Ulrich), and Atrial Fibrillation (Coumadin, follows with Dr. Lennon)      HPI    F/u CAD. Still SOB but no pain or tightness. 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 Taking meds as directed.    DM - usually 150 or below. Stopped januvia and has been taking glimepiride 2 mg BID along with metformin. No hypoglycemia or other symptoms. Has cut back on candy intake and maybe a little on potatoes and pasta.     Getting up more often to urinate overnight, sometimes even an hr apart. Frequently during the day also. Weak stream but not much dribbling. On flomax BID, oxybutynin 5 mg xl nightly. Can't remember if that helped at initiation 3 yrs ago. A while ago was planning on laser prostate procedure but then that's when he had his CABG.     Medical History:     Patient Active Problem List   Diagnosis    Essential hypertension, benign    Type 2 diabetes mellitus without complication, without long-term current use of insulin (HCC)    Varicose veins of legs    Tubular adenoma of colon    BPH with obstruction/lower urinary tract symptoms    Diverticulosis of large intestine without hemorrhage    Coronary artery disease involving native coronary artery of native heart without angina pectoris    S/P CABG (coronary artery bypass graft)    AN treated with BiPAP    Paroxysmal atrial fibrillation (HCC)       Medications:       Prior to Admission medications    Medication Sig Start Date End Date Taking? Authorizing Provider   glimepiride

## 2024-06-10 ENCOUNTER — HOSPITAL ENCOUNTER (OUTPATIENT)
Age: 69
Discharge: HOME | End: 2024-06-12
Attending: INTERNAL MEDICINE
Payer: MEDICARE

## 2024-06-10 ENCOUNTER — HOSPITAL ENCOUNTER (OUTPATIENT)
Age: 69
Discharge: HOME | End: 2024-06-10
Attending: INTERNAL MEDICINE
Payer: MEDICARE

## 2024-06-10 VITALS — WEIGHT: 270 LBS | BODY MASS INDEX: 36.57 KG/M2 | HEIGHT: 72 IN

## 2024-06-10 DIAGNOSIS — E11.9 TYPE 2 DIABETES MELLITUS WITHOUT COMPLICATION, WITHOUT LONG-TERM CURRENT USE OF INSULIN (HCC): ICD-10-CM

## 2024-06-10 DIAGNOSIS — E55.9 VITAMIN D DEFICIENCY: ICD-10-CM

## 2024-06-10 DIAGNOSIS — I48.91 ATRIAL FIBRILLATION, UNSPECIFIED TYPE (HCC): ICD-10-CM

## 2024-06-10 DIAGNOSIS — I35.0 AORTIC VALVE STENOSIS, ETIOLOGY OF CARDIAC VALVE DISEASE UNSPECIFIED: ICD-10-CM

## 2024-06-10 DIAGNOSIS — I10 ESSENTIAL HYPERTENSION: ICD-10-CM

## 2024-06-10 DIAGNOSIS — E78.5 HYPERLIPIDEMIA, UNSPECIFIED HYPERLIPIDEMIA TYPE: ICD-10-CM

## 2024-06-10 LAB
25(OH)D3 SERPL-MCNC: 35.7 NG/ML (ref 30–100)
ALBUMIN SERPL-MCNC: 3.9 G/DL (ref 3.5–5.2)
ALP SERPL-CCNC: 55 U/L (ref 40–129)
ALT SERPL-CCNC: 37 U/L (ref 5–41)
ANION GAP SERPL CALCULATED.3IONS-SCNC: 10 MMOL/L (ref 9–17)
AST SERPL-CCNC: 28 U/L
BASOPHILS # BLD: 0.02 K/UL (ref 0–0.2)
BASOPHILS NFR BLD: 0 % (ref 0–2)
BILIRUB SERPL-MCNC: 0.4 MG/DL (ref 0.3–1.2)
BUN SERPL-MCNC: 16 MG/DL (ref 8–23)
BUN/CREAT SERPL: 20 (ref 9–20)
CALCIUM SERPL-MCNC: 9.2 MG/DL (ref 8.6–10.4)
CHLORIDE SERPL-SCNC: 104 MMOL/L (ref 98–107)
CHOLEST SERPL-MCNC: 105 MG/DL (ref 0–199)
CHOLESTEROL/HDL RATIO: 2
CO2 SERPL-SCNC: 24 MMOL/L (ref 20–31)
CREAT SERPL-MCNC: 0.8 MG/DL (ref 0.7–1.2)
EOSINOPHIL # BLD: 0.58 K/UL (ref 0–0.4)
EOSINOPHILS RELATIVE PERCENT: 10 % (ref 0–5)
ERYTHROCYTE [DISTWIDTH] IN BLOOD BY AUTOMATED COUNT: 14.3 % (ref 12.1–15.2)
GFR, ESTIMATED: >90 ML/MIN/1.73M2
GLUCOSE SERPL-MCNC: 143 MG/DL (ref 70–99)
HCT VFR BLD AUTO: 46.5 % (ref 41–53)
HDLC SERPL-MCNC: 44 MG/DL
HGB BLD-MCNC: 15.3 G/DL (ref 13.5–17.5)
IMM GRANULOCYTES # BLD AUTO: 0.02 K/UL (ref 0–0.3)
IMM GRANULOCYTES NFR BLD: 0 % (ref 0–5)
LDLC SERPL CALC-MCNC: 42 MG/DL (ref 0–100)
LYMPHOCYTES NFR BLD: 1.69 K/UL (ref 1–4.8)
LYMPHOCYTES RELATIVE PERCENT: 29 % (ref 13–44)
MAGNESIUM SERPL-MCNC: 2 MG/DL (ref 1.6–2.6)
MCH RBC QN AUTO: 30.7 PG (ref 26–34)
MCHC RBC AUTO-ENTMCNC: 32.9 G/DL (ref 31–37)
MCV RBC AUTO: 93.4 FL (ref 80–100)
MONOCYTES NFR BLD: 0.69 K/UL (ref 0–1)
MONOCYTES NFR BLD: 12 % (ref 5–9)
NEUTROPHILS NFR BLD: 49 % (ref 39–75)
NEUTS SEG NFR BLD: 2.82 K/UL (ref 2.1–6.5)
PLATELET # BLD AUTO: 238 K/UL (ref 140–450)
PMV BLD AUTO: 9.7 FL (ref 6–12)
POTASSIUM SERPL-SCNC: 4.4 MMOL/L (ref 3.7–5.3)
PROT SERPL-MCNC: 7.3 G/DL (ref 6.4–8.3)
RBC # BLD AUTO: 4.98 M/UL (ref 4.5–5.9)
SODIUM SERPL-SCNC: 138 MMOL/L (ref 135–144)
TRIGL SERPL-MCNC: 99 MG/DL
TSH SERPL DL<=0.05 MIU/L-ACNC: 1.55 UIU/ML (ref 0.3–5)
VLDLC SERPL CALC-MCNC: 20 MG/DL
WBC OTHER # BLD: 5.8 K/UL (ref 3.5–11)

## 2024-06-10 PROCEDURE — 84443 ASSAY THYROID STIM HORMONE: CPT

## 2024-06-10 PROCEDURE — 83735 ASSAY OF MAGNESIUM: CPT

## 2024-06-10 PROCEDURE — 80053 COMPREHEN METABOLIC PANEL: CPT

## 2024-06-10 PROCEDURE — 93306 TTE W/DOPPLER COMPLETE: CPT

## 2024-06-10 PROCEDURE — 82306 VITAMIN D 25 HYDROXY: CPT

## 2024-06-10 PROCEDURE — 85025 COMPLETE CBC W/AUTO DIFF WBC: CPT

## 2024-06-10 PROCEDURE — 36415 COLL VENOUS BLD VENIPUNCTURE: CPT

## 2024-06-10 PROCEDURE — 93005 ELECTROCARDIOGRAM TRACING: CPT

## 2024-06-10 PROCEDURE — 80061 LIPID PANEL: CPT

## 2024-06-11 LAB
ECHO AO ASC DIAM: 3.8 CM
ECHO AO ASCENDING AORTA INDEX: 1.57 CM/M2
ECHO AO ROOT DIAM: 3.9 CM
ECHO AO ROOT INDEX: 1.61 CM/M2
ECHO AV AREA PEAK VELOCITY: 2.1 CM2
ECHO AV AREA VTI: 2.2 CM2
ECHO AV AREA/BSA PEAK VELOCITY: 0.9 CM2/M2
ECHO AV AREA/BSA VTI: 0.9 CM2/M2
ECHO AV CUSP MM: 2.2 CM
ECHO AV MEAN GRADIENT: 12 MMHG
ECHO AV MEAN VELOCITY: 1.6 M/S
ECHO AV PEAK GRADIENT: 19 MMHG
ECHO AV PEAK VELOCITY: 2.2 M/S
ECHO AV VELOCITY RATIO: 0.36
ECHO AV VTI: 52.3 CM
ECHO BSA: 2.49 M2
ECHO EST RA PRESSURE: 5 MMHG
ECHO LA DIAMETER INDEX: 1.61 CM/M2
ECHO LA DIAMETER: 3.9 CM
ECHO LA TO AORTIC ROOT RATIO: 1
ECHO LV E' LATERAL VELOCITY: 8 CM/S
ECHO LV INTERNAL DIMENSION DIASTOLIC MMODE: 6.2 CM (ref 4.2–5.9)
ECHO LV INTERNAL DIMENSION SYSTOLIC MMODE: 4.9 CM
ECHO LV IVSD MMODE: 1.2 CM (ref 0.6–1)
ECHO LV IVSS MMODE: 1.6 CM
ECHO LV POSTERIOR WALL DIASTOLIC MMODE: 1 CM (ref 0.6–1)
ECHO LV POSTERIOR WALL SYSTOLIC MMODE: 1.1 CM
ECHO LVOT AREA: 5.7 CM2
ECHO LVOT AV VTI INDEX: 0.39
ECHO LVOT DIAM: 2.7 CM
ECHO LVOT MEAN GRADIENT: 1 MMHG
ECHO LVOT PEAK GRADIENT: 3 MMHG
ECHO LVOT PEAK VELOCITY: 0.8 M/S
ECHO LVOT STROKE VOLUME INDEX: 48.2 ML/M2
ECHO LVOT SV: 116.7 ML
ECHO LVOT VTI: 20.4 CM
ECHO MV A VELOCITY: 1 M/S
ECHO MV AREA PHT: 2.9 CM2
ECHO MV E DECELERATION TIME (DT): 263.6 MS
ECHO MV E VELOCITY: 0.56 M/S
ECHO MV E/A RATIO: 0.56
ECHO MV E/E' LATERAL: 7
ECHO MV PRESSURE HALF TIME (PHT): 76.4 MS
ECHO PV MAX VELOCITY: 0.7 M/S
ECHO PV PEAK GRADIENT: 2 MMHG
ECHO RIGHT VENTRICULAR SYSTOLIC PRESSURE (RVSP): 22 MMHG
ECHO TV REGURGITANT MAX VELOCITY: 2.05 M/S
ECHO TV REGURGITANT PEAK GRADIENT: 17 MMHG
EKG ATRIAL RATE: 68 BPM
EKG P AXIS: 30 DEGREES
EKG P-R INTERVAL: 164 MS
EKG Q-T INTERVAL: 380 MS
EKG QRS DURATION: 102 MS
EKG QTC CALCULATION (BAZETT): 404 MS
EKG R AXIS: 33 DEGREES
EKG T AXIS: 25 DEGREES
EKG VENTRICULAR RATE: 68 BPM

## 2024-06-12 ENCOUNTER — HOSPITAL ENCOUNTER (OUTPATIENT)
Dept: PHARMACY | Age: 69
Setting detail: THERAPIES SERIES
Discharge: HOME | End: 2024-06-12
Payer: MEDICARE

## 2024-06-12 VITALS
BODY MASS INDEX: 38.46 KG/M2 | HEART RATE: 72 BPM | WEIGHT: 283.6 LBS | DIASTOLIC BLOOD PRESSURE: 78 MMHG | SYSTOLIC BLOOD PRESSURE: 132 MMHG

## 2024-06-12 LAB — INR BLD: 2.2

## 2024-06-12 PROCEDURE — 85610 PROTHROMBIN TIME: CPT

## 2024-06-12 PROCEDURE — 99211 OFF/OP EST MAY X REQ PHY/QHP: CPT

## 2024-06-12 NOTE — PROGRESS NOTES
[facility]  Medication Management  ANTICOAGULATION    Referring Provider: Dr Clovis Lennon     GOAL INR: 2.0-3.0     TODAY'S INR: 2.2     WARFARIN Dosage: Continue 7.5 mg W, 5 mg all other days    INR (no units)   Date Value   2024 2.2   2024 2.5   2024 2.1   2024 2.5   2023 3.1   11/15/2023 2.2   10/04/2023 2.1       Hemoglobin   Date Value Ref Range Status   06/10/2024 15.3 13.5 - 17.5 g/dL Final     Hematocrit   Date Value Ref Range Status   06/10/2024 46.5 41.0 - 53.0 % Final     ALT   Date Value Ref Range Status   06/10/2024 37 5 - 41 U/L Final     AST   Date Value Ref Range Status   06/10/2024 28 <40 U/L Final       Medication changes:  None     Notes:    Fingerstick INR drawn per clinic protocol. Patient states no visible blood in urine, black tarry stool, falls or ER visits and denies any missed or extra doses of warfarin. No change in other maintenance medications or in diet. INR is therapeutic so will continue 7.5 mg W, 5 mg all other days and will recheck INR in 6 week(s). Patient acknowledges working in consult agreement with pharmacist as referred by his/her physician.    For Pharmacy Admin Tracking Only    Intervention Detail: Adherence Monitorin  Total # of Interventions Recommended: 1  Total # of Interventions Accepted: 1  Time Spent (min): 20    Anthony Francois, [place] 2024 10:33 AM

## 2024-06-14 ENCOUNTER — OFFICE VISIT (OUTPATIENT)
Dept: UROLOGY | Age: 69
End: 2024-06-14

## 2024-06-14 VITALS
RESPIRATION RATE: 18 BRPM | OXYGEN SATURATION: 98 % | HEART RATE: 77 BPM | DIASTOLIC BLOOD PRESSURE: 75 MMHG | SYSTOLIC BLOOD PRESSURE: 113 MMHG | WEIGHT: 280 LBS | BODY MASS INDEX: 37.97 KG/M2

## 2024-06-14 DIAGNOSIS — N40.1 BPH WITH OBSTRUCTION/LOWER URINARY TRACT SYMPTOMS: Primary | ICD-10-CM

## 2024-06-14 DIAGNOSIS — R35.1 NOCTURIA: ICD-10-CM

## 2024-06-14 DIAGNOSIS — N13.8 BPH WITH OBSTRUCTION/LOWER URINARY TRACT SYMPTOMS: Primary | ICD-10-CM

## 2024-06-14 DIAGNOSIS — N20.0 RENAL CALCULUS: ICD-10-CM

## 2024-06-14 RX ORDER — OXYBUTYNIN CHLORIDE 10 MG/1
10 TABLET, EXTENDED RELEASE ORAL EVERY EVENING
Qty: 30 TABLET | Refills: 11 | Status: SHIPPED | OUTPATIENT
Start: 2024-06-14

## 2024-06-14 NOTE — PROGRESS NOTES
Patient voided approx 5 min ago.  119ml  
UT) TABS tablet Take 1 tablet by mouth daily      acetaminophen (TYLENOL) 325 MG tablet Take 2 tablets by mouth every 4 hours as needed      Blood Glucose Monitoring Suppl (CONTOUR NEXT MONITOR) w/Device KIT TEST TWICE A DAY BEFORE BREAKFAST AND DINNER      Lancets MISC       aspirin 81 MG EC tablet Take 1 tablet by mouth daily      [DISCONTINUED] oxyBUTYnin (DITROPAN-XL) 5 MG extended release tablet TAKE 1 TABLET BY MOUTH EVERY DAY IN THE EVENING 90 tablet 1     No facility-administered encounter medications on file as of 6/14/2024.      Current Outpatient Medications on File Prior to Visit   Medication Sig Dispense Refill    glimepiride (AMARYL) 4 MG tablet Take 1 tablet by mouth 2 times daily (with meals) 180 tablet 1    dilTIAZem (CARDIZEM CD) 300 MG extended release capsule TAKE 1 CAPSULE BY MOUTH DAILY 30 capsule 11    losartan (COZAAR) 25 MG tablet Take 1 tablet by mouth Daily with lunch 90 tablet 1    metoprolol succinate (TOPROL XL) 25 MG extended release tablet TAKE 1 TABLET BY MOUTH EVERY DAY 90 tablet 1    tamsulosin (FLOMAX) 0.4 MG capsule TAKE 1 CAPSULE BY MOUTH TWICE A  capsule 3    metFORMIN (GLUCOPHAGE) 1000 MG tablet TAKE 1 TABLET BY MOUTH TWICE DAILY WITH MEALS 180 tablet 1    rosuvastatin (CRESTOR) 40 MG tablet TAKE 1 TABLET BY MOUTH EVERY EVENING 90 tablet 1    warfarin (COUMADIN) 5 MG tablet TAKE 1 & 1/2 (ONE AND ONE-HALF) TABLETS BY MOUTH EVERY WEDNESDAY AND 1 TABLET ALL OTHER DAYS or AS DIRECTED by medication management clinic 96 tablet 3    CONTOUR NEXT TEST strip 100 each by Other route daily 100 each 3    vitamin D (CHOLECALCIFEROL) 50 MCG (2000 UT) TABS tablet Take 1 tablet by mouth daily      acetaminophen (TYLENOL) 325 MG tablet Take 2 tablets by mouth every 4 hours as needed      Blood Glucose Monitoring Suppl (CONTOUR NEXT MONITOR) w/Device KIT TEST TWICE A DAY BEFORE BREAKFAST AND DINNER      Lancets MISC       aspirin 81 MG EC tablet Take 1 tablet by mouth daily       No

## 2024-06-17 ENCOUNTER — OFFICE VISIT (OUTPATIENT)
Dept: CARDIOLOGY CLINIC | Age: 69
End: 2024-06-17
Payer: MEDICARE

## 2024-06-17 VITALS
BODY MASS INDEX: 38.38 KG/M2 | DIASTOLIC BLOOD PRESSURE: 70 MMHG | HEART RATE: 68 BPM | SYSTOLIC BLOOD PRESSURE: 140 MMHG | OXYGEN SATURATION: 90 % | WEIGHT: 283 LBS

## 2024-06-17 DIAGNOSIS — I35.0 AORTIC VALVE STENOSIS, ETIOLOGY OF CARDIAC VALVE DISEASE UNSPECIFIED: Primary | ICD-10-CM

## 2024-06-17 DIAGNOSIS — I10 ESSENTIAL HYPERTENSION: ICD-10-CM

## 2024-06-17 DIAGNOSIS — E78.5 HYPERLIPIDEMIA, UNSPECIFIED HYPERLIPIDEMIA TYPE: ICD-10-CM

## 2024-06-17 DIAGNOSIS — E55.9 VITAMIN D DEFICIENCY: ICD-10-CM

## 2024-06-17 DIAGNOSIS — I48.91 ATRIAL FIBRILLATION, UNSPECIFIED TYPE (HCC): ICD-10-CM

## 2024-06-17 PROCEDURE — 1036F TOBACCO NON-USER: CPT | Performed by: INTERNAL MEDICINE

## 2024-06-17 PROCEDURE — G8417 CALC BMI ABV UP PARAM F/U: HCPCS | Performed by: INTERNAL MEDICINE

## 2024-06-17 PROCEDURE — 99214 OFFICE O/P EST MOD 30 MIN: CPT | Performed by: INTERNAL MEDICINE

## 2024-06-17 PROCEDURE — 3078F DIAST BP <80 MM HG: CPT | Performed by: INTERNAL MEDICINE

## 2024-06-17 PROCEDURE — G8427 DOCREV CUR MEDS BY ELIG CLIN: HCPCS | Performed by: INTERNAL MEDICINE

## 2024-06-17 PROCEDURE — 3017F COLORECTAL CA SCREEN DOC REV: CPT | Performed by: INTERNAL MEDICINE

## 2024-06-17 PROCEDURE — 1123F ACP DISCUSS/DSCN MKR DOCD: CPT | Performed by: INTERNAL MEDICINE

## 2024-06-17 PROCEDURE — 3075F SYST BP GE 130 - 139MM HG: CPT | Performed by: INTERNAL MEDICINE

## 2024-06-17 NOTE — PROGRESS NOTES
Ov Dr Lennon 1 year follow up   With echo   No chest pain or sob  Some ankle edema   Rt worse than lt.   No dizziness.  Does get sob when mowing  Lawn.    Will see in 1 year

## 2024-07-16 ENCOUNTER — HOSPITAL ENCOUNTER (OUTPATIENT)
Dept: GENERAL RADIOLOGY | Age: 69
Discharge: HOME | End: 2024-07-18
Payer: MEDICARE

## 2024-07-16 ENCOUNTER — HOSPITAL ENCOUNTER (OUTPATIENT)
Age: 69
Discharge: HOME | End: 2024-07-18
Payer: MEDICARE

## 2024-07-16 DIAGNOSIS — N20.0 RENAL CALCULUS: ICD-10-CM

## 2024-07-16 PROCEDURE — 74018 RADEX ABDOMEN 1 VIEW: CPT

## 2024-07-23 ENCOUNTER — HOSPITAL ENCOUNTER (INPATIENT)
Age: 69
LOS: 1 days | Discharge: HOME | End: 2024-07-25
Attending: EMERGENCY MEDICINE | Admitting: INTERNAL MEDICINE
Payer: MEDICARE

## 2024-07-23 DIAGNOSIS — R09.02 HYPOXEMIA: ICD-10-CM

## 2024-07-23 DIAGNOSIS — N39.0 URINARY TRACT INFECTION WITH HEMATURIA, SITE UNSPECIFIED: ICD-10-CM

## 2024-07-23 DIAGNOSIS — R31.9 URINARY TRACT INFECTION WITH HEMATURIA, SITE UNSPECIFIED: ICD-10-CM

## 2024-07-23 DIAGNOSIS — N20.0 KIDNEY STONE: Primary | ICD-10-CM

## 2024-07-23 PROCEDURE — 2580000003 HC RX 258: Performed by: EMERGENCY MEDICINE

## 2024-07-23 PROCEDURE — 96361 HYDRATE IV INFUSION ADD-ON: CPT

## 2024-07-23 PROCEDURE — 81001 URINALYSIS AUTO W/SCOPE: CPT

## 2024-07-23 PROCEDURE — 96375 TX/PRO/DX INJ NEW DRUG ADDON: CPT

## 2024-07-23 PROCEDURE — 87040 BLOOD CULTURE FOR BACTERIA: CPT

## 2024-07-23 PROCEDURE — 85025 COMPLETE CBC W/AUTO DIFF WBC: CPT

## 2024-07-23 PROCEDURE — 6360000002 HC RX W HCPCS: Performed by: EMERGENCY MEDICINE

## 2024-07-23 PROCEDURE — 87154 CUL TYP ID BLD PTHGN 6+ TRGT: CPT

## 2024-07-23 PROCEDURE — 87205 SMEAR GRAM STAIN: CPT

## 2024-07-23 PROCEDURE — 87186 SC STD MICRODIL/AGAR DIL: CPT

## 2024-07-23 PROCEDURE — 99285 EMERGENCY DEPT VISIT HI MDM: CPT

## 2024-07-23 PROCEDURE — 83880 ASSAY OF NATRIURETIC PEPTIDE: CPT

## 2024-07-23 PROCEDURE — 80048 BASIC METABOLIC PNL TOTAL CA: CPT

## 2024-07-23 PROCEDURE — 87086 URINE CULTURE/COLONY COUNT: CPT

## 2024-07-23 PROCEDURE — 87077 CULTURE AEROBIC IDENTIFY: CPT

## 2024-07-23 RX ORDER — 0.9 % SODIUM CHLORIDE 0.9 %
1000 INTRAVENOUS SOLUTION INTRAVENOUS ONCE
Status: COMPLETED | OUTPATIENT
Start: 2024-07-24 | End: 2024-07-24

## 2024-07-23 RX ORDER — KETOROLAC TROMETHAMINE 15 MG/ML
15 INJECTION, SOLUTION INTRAMUSCULAR; INTRAVENOUS ONCE
Status: COMPLETED | OUTPATIENT
Start: 2024-07-24 | End: 2024-07-23

## 2024-07-23 RX ORDER — MORPHINE SULFATE 10 MG/ML
8 INJECTION, SOLUTION INTRAMUSCULAR; INTRAVENOUS ONCE
Status: COMPLETED | OUTPATIENT
Start: 2024-07-24 | End: 2024-07-23

## 2024-07-23 RX ORDER — ONDANSETRON 2 MG/ML
4 INJECTION INTRAMUSCULAR; INTRAVENOUS ONCE
Status: COMPLETED | OUTPATIENT
Start: 2024-07-24 | End: 2024-07-23

## 2024-07-23 RX ADMIN — SODIUM CHLORIDE 1000 ML: 9 INJECTION, SOLUTION INTRAVENOUS at 23:57

## 2024-07-23 RX ADMIN — MORPHINE SULFATE 8 MG: 10 INJECTION, SOLUTION INTRAMUSCULAR; INTRAVENOUS at 23:59

## 2024-07-23 RX ADMIN — ONDANSETRON 4 MG: 2 INJECTION INTRAMUSCULAR; INTRAVENOUS at 23:58

## 2024-07-23 RX ADMIN — KETOROLAC TROMETHAMINE 15 MG: 15 INJECTION, SOLUTION INTRAMUSCULAR; INTRAVENOUS at 23:58

## 2024-07-23 ASSESSMENT — PAIN DESCRIPTION - LOCATION: LOCATION: FLANK

## 2024-07-23 ASSESSMENT — PAIN - FUNCTIONAL ASSESSMENT: PAIN_FUNCTIONAL_ASSESSMENT: 0-10

## 2024-07-23 ASSESSMENT — PAIN SCALES - GENERAL: PAINLEVEL_OUTOF10: 6

## 2024-07-23 ASSESSMENT — PAIN DESCRIPTION - ORIENTATION: ORIENTATION: LEFT

## 2024-07-23 ASSESSMENT — PAIN DESCRIPTION - DESCRIPTORS: DESCRIPTORS: STABBING

## 2024-07-24 ENCOUNTER — APPOINTMENT (OUTPATIENT)
Dept: CT IMAGING | Age: 69
End: 2024-07-24
Payer: MEDICARE

## 2024-07-24 ENCOUNTER — TELEPHONE (OUTPATIENT)
Dept: UROLOGY | Age: 69
End: 2024-07-24

## 2024-07-24 ENCOUNTER — APPOINTMENT (OUTPATIENT)
Dept: PHARMACY | Age: 69
End: 2024-07-24
Payer: MEDICARE

## 2024-07-24 ENCOUNTER — APPOINTMENT (OUTPATIENT)
Dept: GENERAL RADIOLOGY | Age: 69
End: 2024-07-24
Payer: MEDICARE

## 2024-07-24 PROBLEM — N39.0 SEPSIS DUE TO URINARY TRACT INFECTION (HCC): Status: ACTIVE | Noted: 2024-07-24

## 2024-07-24 PROBLEM — A41.9 SEPSIS DUE TO URINARY TRACT INFECTION (HCC): Status: ACTIVE | Noted: 2024-07-24

## 2024-07-24 PROBLEM — I50.9 CHF (CONGESTIVE HEART FAILURE) (HCC): Status: ACTIVE | Noted: 2024-07-24

## 2024-07-24 PROBLEM — N20.0 LEFT NEPHROLITHIASIS: Status: ACTIVE | Noted: 2024-07-24

## 2024-07-24 PROBLEM — N39.0 COMPLICATED UTI (URINARY TRACT INFECTION): Status: ACTIVE | Noted: 2024-07-24

## 2024-07-24 PROBLEM — D68.69 SECONDARY HYPERCOAGULABLE STATE (HCC): Status: ACTIVE | Noted: 2024-07-24

## 2024-07-24 LAB
ANION GAP SERPL CALCULATED.3IONS-SCNC: 11 MMOL/L (ref 9–17)
ANION GAP SERPL CALCULATED.3IONS-SCNC: 13 MMOL/L (ref 9–17)
BACTERIA URNS QL MICRO: ABNORMAL
BASOPHILS # BLD: 0 K/UL (ref 0–0.2)
BASOPHILS # BLD: 0.04 K/UL (ref 0–0.2)
BASOPHILS NFR BLD: 0 % (ref 0–2)
BASOPHILS NFR BLD: 0 % (ref 0–2)
BILIRUB UR QL STRIP: NEGATIVE
BNP SERPL-MCNC: 258 PG/ML
BUN SERPL-MCNC: 14 MG/DL (ref 8–23)
BUN SERPL-MCNC: 15 MG/DL (ref 8–23)
BUN/CREAT SERPL: 13 (ref 9–20)
BUN/CREAT SERPL: 14 (ref 9–20)
CALCIUM SERPL-MCNC: 9.4 MG/DL (ref 8.6–10.4)
CALCIUM SERPL-MCNC: 9.7 MG/DL (ref 8.6–10.4)
CHLORIDE SERPL-SCNC: 102 MMOL/L (ref 98–107)
CHLORIDE SERPL-SCNC: 103 MMOL/L (ref 98–107)
CLARITY UR: ABNORMAL
CO2 SERPL-SCNC: 21 MMOL/L (ref 20–31)
CO2 SERPL-SCNC: 25 MMOL/L (ref 20–31)
COLOR UR: YELLOW
CREAT SERPL-MCNC: 1 MG/DL (ref 0.7–1.2)
CREAT SERPL-MCNC: 1.2 MG/DL (ref 0.7–1.2)
EKG ATRIAL RATE: 87 BPM
EKG P AXIS: 25 DEGREES
EKG P-R INTERVAL: 158 MS
EKG Q-T INTERVAL: 334 MS
EKG QRS DURATION: 98 MS
EKG QTC CALCULATION (BAZETT): 401 MS
EKG R AXIS: 21 DEGREES
EKG T AXIS: 5 DEGREES
EKG VENTRICULAR RATE: 87 BPM
EOSINOPHIL # BLD: 0.2 K/UL (ref 0–0.44)
EOSINOPHIL # BLD: <0.03 K/UL (ref 0–0.44)
EOSINOPHILS RELATIVE PERCENT: 0 % (ref 1–4)
EOSINOPHILS RELATIVE PERCENT: 2 % (ref 1–4)
EPI CELLS #/AREA URNS HPF: ABNORMAL /HPF (ref 0–5)
ERYTHROCYTE [DISTWIDTH] IN BLOOD BY AUTOMATED COUNT: 14.1 % (ref 11.8–14.4)
ERYTHROCYTE [DISTWIDTH] IN BLOOD BY AUTOMATED COUNT: 14.1 % (ref 11.8–14.4)
GFR, ESTIMATED: 65 ML/MIN/1.73M2
GFR, ESTIMATED: 81 ML/MIN/1.73M2
GLUCOSE SERPL-MCNC: 148 MG/DL (ref 70–99)
GLUCOSE SERPL-MCNC: 166 MG/DL (ref 70–99)
GLUCOSE UR STRIP-MCNC: NEGATIVE MG/DL
HCT VFR BLD AUTO: 45.2 % (ref 40.7–50.3)
HCT VFR BLD AUTO: 48.5 % (ref 40.7–50.3)
HGB BLD-MCNC: 15 G/DL (ref 13–17)
HGB BLD-MCNC: 16.4 G/DL (ref 13–17)
HGB UR QL STRIP.AUTO: ABNORMAL
IMM GRANULOCYTES # BLD AUTO: 0 K/UL (ref 0–0.3)
IMM GRANULOCYTES # BLD AUTO: 0.07 K/UL (ref 0–0.3)
IMM GRANULOCYTES NFR BLD: 0 %
IMM GRANULOCYTES NFR BLD: 1 %
INR PPP: 2
KETONES UR STRIP-MCNC: NEGATIVE MG/DL
LACTATE BLDV-SCNC: 2.7 MMOL/L (ref 0.5–2.2)
LACTATE BLDV-SCNC: 2.8 MMOL/L (ref 0.5–1.9)
LACTATE BLDV-SCNC: 3.1 MMOL/L (ref 0.5–1.9)
LACTATE BLDV-SCNC: 3.2 MMOL/L (ref 0.5–2.2)
LEUKOCYTE ESTERASE UR QL STRIP: ABNORMAL
LYMPHOCYTES NFR BLD: 0.2 K/UL (ref 1.1–3.7)
LYMPHOCYTES NFR BLD: 0.53 K/UL (ref 1.1–3.7)
LYMPHOCYTES RELATIVE PERCENT: 2 % (ref 24–43)
LYMPHOCYTES RELATIVE PERCENT: 4 % (ref 24–43)
MCH RBC QN AUTO: 31.4 PG (ref 25.2–33.5)
MCH RBC QN AUTO: 31.5 PG (ref 25.2–33.5)
MCHC RBC AUTO-ENTMCNC: 33.2 G/DL (ref 28.4–34.8)
MCHC RBC AUTO-ENTMCNC: 33.8 G/DL (ref 28.4–34.8)
MCV RBC AUTO: 93.3 FL (ref 82.6–102.9)
MCV RBC AUTO: 94.8 FL (ref 82.6–102.9)
MONOCYTES NFR BLD: 0.41 K/UL (ref 0.1–1.2)
MONOCYTES NFR BLD: 0.81 K/UL (ref 0.1–1.2)
MONOCYTES NFR BLD: 4 % (ref 3–12)
MONOCYTES NFR BLD: 6 % (ref 3–12)
MORPHOLOGY: ABNORMAL
MORPHOLOGY: ABNORMAL
MUCOUS THREADS URNS QL MICRO: ABNORMAL
NEUTROPHILS NFR BLD: 89 % (ref 36–65)
NEUTROPHILS NFR BLD: 92 % (ref 36–65)
NEUTS SEG NFR BLD: 11.98 K/UL (ref 1.5–8.1)
NEUTS SEG NFR BLD: 9.39 K/UL (ref 1.5–8.1)
NITRITE UR QL STRIP: POSITIVE
NRBC BLD-RTO: 0 PER 100 WBC
NRBC BLD-RTO: 0 PER 100 WBC
PH UR STRIP: 5.5 [PH] (ref 5–9)
PLATELET # BLD AUTO: 207 K/UL (ref 138–453)
PLATELET # BLD AUTO: 232 K/UL (ref 138–453)
PMV BLD AUTO: 10 FL (ref 8.1–13.5)
PMV BLD AUTO: 9.4 FL (ref 8.1–13.5)
POTASSIUM SERPL-SCNC: 4.1 MMOL/L (ref 3.7–5.3)
POTASSIUM SERPL-SCNC: 4.4 MMOL/L (ref 3.7–5.3)
PROT UR STRIP-MCNC: NEGATIVE MG/DL
PROTHROMBIN TIME: 21.8 SEC (ref 11.7–14.1)
RBC # BLD AUTO: 4.77 M/UL (ref 4.21–5.77)
RBC # BLD AUTO: 5.2 M/UL (ref 4.21–5.77)
RBC #/AREA URNS HPF: ABNORMAL /HPF (ref 0–2)
SODIUM SERPL-SCNC: 137 MMOL/L (ref 135–144)
SODIUM SERPL-SCNC: 138 MMOL/L (ref 135–144)
SP GR UR STRIP: 1.02 (ref 1.01–1.02)
UROBILINOGEN UR STRIP-ACNC: NORMAL EU/DL (ref 0–1)
WBC #/AREA URNS HPF: ABNORMAL /HPF (ref 0–5)
WBC OTHER # BLD: 10.2 K/UL (ref 3.5–11.3)
WBC OTHER # BLD: 13.5 K/UL (ref 3.5–11.3)

## 2024-07-24 PROCEDURE — 6370000000 HC RX 637 (ALT 250 FOR IP)

## 2024-07-24 PROCEDURE — 2580000003 HC RX 258: Performed by: EMERGENCY MEDICINE

## 2024-07-24 PROCEDURE — 71045 X-RAY EXAM CHEST 1 VIEW: CPT

## 2024-07-24 PROCEDURE — 6370000000 HC RX 637 (ALT 250 FOR IP): Performed by: INTERNAL MEDICINE

## 2024-07-24 PROCEDURE — 97166 OT EVAL MOD COMPLEX 45 MIN: CPT

## 2024-07-24 PROCEDURE — 96365 THER/PROPH/DIAG IV INF INIT: CPT

## 2024-07-24 PROCEDURE — 85610 PROTHROMBIN TIME: CPT

## 2024-07-24 PROCEDURE — 93010 ELECTROCARDIOGRAM REPORT: CPT | Performed by: FAMILY MEDICINE

## 2024-07-24 PROCEDURE — 74176 CT ABD & PELVIS W/O CONTRAST: CPT

## 2024-07-24 PROCEDURE — 36415 COLL VENOUS BLD VENIPUNCTURE: CPT

## 2024-07-24 PROCEDURE — 97116 GAIT TRAINING THERAPY: CPT

## 2024-07-24 PROCEDURE — 6360000002 HC RX W HCPCS

## 2024-07-24 PROCEDURE — 1200000000 HC SEMI PRIVATE

## 2024-07-24 PROCEDURE — 2580000003 HC RX 258

## 2024-07-24 PROCEDURE — 94761 N-INVAS EAR/PLS OXIMETRY MLT: CPT

## 2024-07-24 PROCEDURE — 6360000002 HC RX W HCPCS: Performed by: EMERGENCY MEDICINE

## 2024-07-24 PROCEDURE — 83605 ASSAY OF LACTIC ACID: CPT

## 2024-07-24 PROCEDURE — 97110 THERAPEUTIC EXERCISES: CPT

## 2024-07-24 PROCEDURE — 2700000000 HC OXYGEN THERAPY PER DAY

## 2024-07-24 PROCEDURE — 97162 PT EVAL MOD COMPLEX 30 MIN: CPT

## 2024-07-24 PROCEDURE — 93005 ELECTROCARDIOGRAM TRACING: CPT | Performed by: NURSE PRACTITIONER

## 2024-07-24 RX ORDER — OXYCODONE HYDROCHLORIDE AND ACETAMINOPHEN 5; 325 MG/1; MG/1
2 TABLET ORAL EVERY 4 HOURS PRN
Status: DISCONTINUED | OUTPATIENT
Start: 2024-07-24 | End: 2024-07-25 | Stop reason: HOSPADM

## 2024-07-24 RX ORDER — POLYETHYLENE GLYCOL 3350 17 G/17G
17 POWDER, FOR SOLUTION ORAL DAILY PRN
Status: DISCONTINUED | OUTPATIENT
Start: 2024-07-24 | End: 2024-07-25 | Stop reason: HOSPADM

## 2024-07-24 RX ORDER — METOPROLOL SUCCINATE 25 MG/1
25 TABLET, EXTENDED RELEASE ORAL DAILY
Status: DISCONTINUED | OUTPATIENT
Start: 2024-07-24 | End: 2024-07-25 | Stop reason: HOSPADM

## 2024-07-24 RX ORDER — ASPIRIN 81 MG/1
81 TABLET ORAL DAILY
Status: DISCONTINUED | OUTPATIENT
Start: 2024-07-24 | End: 2024-07-25 | Stop reason: HOSPADM

## 2024-07-24 RX ORDER — SODIUM CHLORIDE 9 MG/ML
INJECTION, SOLUTION INTRAVENOUS PRN
Status: DISCONTINUED | OUTPATIENT
Start: 2024-07-24 | End: 2024-07-25 | Stop reason: HOSPADM

## 2024-07-24 RX ORDER — OXYBUTYNIN CHLORIDE 5 MG/1
10 TABLET, EXTENDED RELEASE ORAL EVERY EVENING
Status: DISCONTINUED | OUTPATIENT
Start: 2024-07-24 | End: 2024-07-25 | Stop reason: HOSPADM

## 2024-07-24 RX ORDER — WARFARIN SODIUM 7.5 MG/1
7.5 TABLET ORAL
Status: COMPLETED | OUTPATIENT
Start: 2024-07-24 | End: 2024-07-24

## 2024-07-24 RX ORDER — SODIUM CHLORIDE 9 MG/ML
INJECTION, SOLUTION INTRAVENOUS CONTINUOUS
Status: DISCONTINUED | OUTPATIENT
Start: 2024-07-24 | End: 2024-07-25

## 2024-07-24 RX ORDER — TAMSULOSIN HYDROCHLORIDE 0.4 MG/1
0.4 CAPSULE ORAL DAILY
Status: DISCONTINUED | OUTPATIENT
Start: 2024-07-24 | End: 2024-07-25 | Stop reason: HOSPADM

## 2024-07-24 RX ORDER — ONDANSETRON 4 MG/1
4 TABLET, ORALLY DISINTEGRATING ORAL EVERY 8 HOURS PRN
Status: DISCONTINUED | OUTPATIENT
Start: 2024-07-24 | End: 2024-07-25 | Stop reason: HOSPADM

## 2024-07-24 RX ORDER — SODIUM CHLORIDE 0.9 % (FLUSH) 0.9 %
5-40 SYRINGE (ML) INJECTION EVERY 12 HOURS SCHEDULED
Status: DISCONTINUED | OUTPATIENT
Start: 2024-07-24 | End: 2024-07-25 | Stop reason: HOSPADM

## 2024-07-24 RX ORDER — OXYCODONE HYDROCHLORIDE AND ACETAMINOPHEN 5; 325 MG/1; MG/1
1 TABLET ORAL EVERY 4 HOURS PRN
Status: DISCONTINUED | OUTPATIENT
Start: 2024-07-24 | End: 2024-07-25 | Stop reason: HOSPADM

## 2024-07-24 RX ORDER — ACETAMINOPHEN 650 MG/1
650 SUPPOSITORY RECTAL EVERY 6 HOURS PRN
Status: DISCONTINUED | OUTPATIENT
Start: 2024-07-24 | End: 2024-07-25 | Stop reason: HOSPADM

## 2024-07-24 RX ORDER — VITAMIN B COMPLEX
2000 TABLET ORAL DAILY
Status: DISCONTINUED | OUTPATIENT
Start: 2024-07-24 | End: 2024-07-25 | Stop reason: HOSPADM

## 2024-07-24 RX ORDER — LOSARTAN POTASSIUM 25 MG/1
25 TABLET ORAL
Status: DISCONTINUED | OUTPATIENT
Start: 2024-07-24 | End: 2024-07-25 | Stop reason: HOSPADM

## 2024-07-24 RX ORDER — WARFARIN SODIUM 7.5 MG/1
7.5 TABLET ORAL
Status: DISCONTINUED | OUTPATIENT
Start: 2024-07-24 | End: 2024-07-24

## 2024-07-24 RX ORDER — SODIUM CHLORIDE 0.9 % (FLUSH) 0.9 %
5-40 SYRINGE (ML) INJECTION PRN
Status: DISCONTINUED | OUTPATIENT
Start: 2024-07-24 | End: 2024-07-25 | Stop reason: HOSPADM

## 2024-07-24 RX ORDER — ROSUVASTATIN CALCIUM 40 MG/1
40 TABLET, COATED ORAL EVERY EVENING
Status: DISCONTINUED | OUTPATIENT
Start: 2024-07-24 | End: 2024-07-25 | Stop reason: HOSPADM

## 2024-07-24 RX ORDER — FUROSEMIDE 10 MG/ML
20 INJECTION INTRAMUSCULAR; INTRAVENOUS ONCE
Status: COMPLETED | OUTPATIENT
Start: 2024-07-24 | End: 2024-07-24

## 2024-07-24 RX ORDER — GLIPIZIDE 5 MG/1
10 TABLET ORAL
Status: DISCONTINUED | OUTPATIENT
Start: 2024-07-24 | End: 2024-07-25 | Stop reason: HOSPADM

## 2024-07-24 RX ORDER — ONDANSETRON 2 MG/ML
4 INJECTION INTRAMUSCULAR; INTRAVENOUS EVERY 6 HOURS PRN
Status: DISCONTINUED | OUTPATIENT
Start: 2024-07-24 | End: 2024-07-25 | Stop reason: HOSPADM

## 2024-07-24 RX ORDER — ACETAMINOPHEN 325 MG/1
650 TABLET ORAL EVERY 6 HOURS PRN
Status: DISCONTINUED | OUTPATIENT
Start: 2024-07-24 | End: 2024-07-25 | Stop reason: HOSPADM

## 2024-07-24 RX ORDER — WARFARIN SODIUM 5 MG/1
5 TABLET ORAL
Status: DISCONTINUED | OUTPATIENT
Start: 2024-07-25 | End: 2024-07-24

## 2024-07-24 RX ADMIN — Medication 2000 UNITS: at 07:47

## 2024-07-24 RX ADMIN — METFORMIN HYDROCHLORIDE 1000 MG: 500 TABLET ORAL at 16:12

## 2024-07-24 RX ADMIN — OXYBUTYNIN CHLORIDE 10 MG: 5 TABLET, EXTENDED RELEASE ORAL at 16:42

## 2024-07-24 RX ADMIN — FUROSEMIDE 20 MG: 10 INJECTION, SOLUTION INTRAMUSCULAR; INTRAVENOUS at 05:28

## 2024-07-24 RX ADMIN — SODIUM CHLORIDE: 9 INJECTION, SOLUTION INTRAVENOUS at 16:55

## 2024-07-24 RX ADMIN — METOPROLOL SUCCINATE 25 MG: 25 TABLET, EXTENDED RELEASE ORAL at 07:48

## 2024-07-24 RX ADMIN — GLIPIZIDE 10 MG: 5 TABLET ORAL at 16:12

## 2024-07-24 RX ADMIN — WARFARIN SODIUM 7.5 MG: 7.5 TABLET ORAL at 16:42

## 2024-07-24 RX ADMIN — ROSUVASTATIN 40 MG: 40 TABLET, FILM COATED ORAL at 16:42

## 2024-07-24 RX ADMIN — CEFTRIAXONE SODIUM 1000 MG: 1 INJECTION, POWDER, FOR SOLUTION INTRAMUSCULAR; INTRAVENOUS at 00:58

## 2024-07-24 RX ADMIN — GLIPIZIDE 10 MG: 5 TABLET ORAL at 07:47

## 2024-07-24 RX ADMIN — LOSARTAN POTASSIUM 25 MG: 25 TABLET, FILM COATED ORAL at 11:59

## 2024-07-24 RX ADMIN — DILTIAZEM HYDROCHLORIDE 300 MG: 180 CAPSULE, COATED, EXTENDED RELEASE ORAL at 07:47

## 2024-07-24 RX ADMIN — ASPIRIN 81 MG: 81 TABLET, COATED ORAL at 07:47

## 2024-07-24 RX ADMIN — ACETAMINOPHEN 650 MG: 325 TABLET ORAL at 19:52

## 2024-07-24 RX ADMIN — OXYCODONE HYDROCHLORIDE AND ACETAMINOPHEN 2 TABLET: 5; 325 TABLET ORAL at 05:27

## 2024-07-24 RX ADMIN — SODIUM CHLORIDE: 9 INJECTION, SOLUTION INTRAVENOUS at 03:49

## 2024-07-24 RX ADMIN — METFORMIN HYDROCHLORIDE 1000 MG: 500 TABLET ORAL at 07:48

## 2024-07-24 RX ADMIN — TAMSULOSIN HYDROCHLORIDE 0.4 MG: 0.4 CAPSULE ORAL at 07:47

## 2024-07-24 ASSESSMENT — PAIN DESCRIPTION - FREQUENCY
FREQUENCY: CONTINUOUS
FREQUENCY: CONTINUOUS

## 2024-07-24 ASSESSMENT — PAIN - FUNCTIONAL ASSESSMENT
PAIN_FUNCTIONAL_ASSESSMENT: ACTIVITIES ARE NOT PREVENTED
PAIN_FUNCTIONAL_ASSESSMENT: ACTIVITIES ARE NOT PREVENTED
PAIN_FUNCTIONAL_ASSESSMENT: NONE - DENIES PAIN

## 2024-07-24 ASSESSMENT — PAIN DESCRIPTION - LOCATION
LOCATION: FLANK

## 2024-07-24 ASSESSMENT — LIFESTYLE VARIABLES
HOW MANY STANDARD DRINKS CONTAINING ALCOHOL DO YOU HAVE ON A TYPICAL DAY: 1 OR 2
HOW OFTEN DO YOU HAVE A DRINK CONTAINING ALCOHOL: MONTHLY OR LESS

## 2024-07-24 ASSESSMENT — PAIN SCALES - GENERAL
PAINLEVEL_OUTOF10: 3
PAINLEVEL_OUTOF10: 4
PAINLEVEL_OUTOF10: 7

## 2024-07-24 ASSESSMENT — PAIN DESCRIPTION - DESCRIPTORS
DESCRIPTORS: DISCOMFORT
DESCRIPTORS: ACHING;DULL
DESCRIPTORS: SHARP

## 2024-07-24 ASSESSMENT — PAIN DESCRIPTION - ORIENTATION
ORIENTATION: LEFT

## 2024-07-24 ASSESSMENT — PAIN DESCRIPTION - PAIN TYPE
TYPE: ACUTE PAIN
TYPE: ACUTE PAIN

## 2024-07-24 ASSESSMENT — PAIN DESCRIPTION - ONSET
ONSET: SUDDEN
ONSET: ON-GOING

## 2024-07-24 NOTE — PROGRESS NOTES
Vital signs and assessment completed and charted. Navigator complete. Home medications gone over. Telemetry applied.    Patient educated on call light, bed, lights, and room. Writer gone over plan of care and orders with patient. Questions answered.    Patient educated on using call light to call out for assistance or for using the bathroom. Patient acknowledged. Call light, bedside table and personal belongings within reach.

## 2024-07-24 NOTE — PROGRESS NOTES
Progress Note    SUBJECTIVE:    Patient seen for f/u of Sepsis due to urinary tract infection (HCC).  He resting in bed no distress.  No complaints. Dr ARRIAGA at bedside     ROS:   Constitutional: negative  for fevers, and negative for chills.  Respiratory: negative for shortness of breath, negative for cough, and negative for wheezing  Cardiovascular: negative for chest pain, and negative for palpitations  Gastrointestinal: negative for abdominal pain, negative for nausea,negative for vomiting, negative for diarrhea, and negative for constipation     All other systems were reviewed with the patient and are negative unless otherwise stated in HPI      OBJECTIVE:      Vitals:   Vitals:    07/24/24 0641   BP: 116/62   Pulse: 99   Resp: 26   Temp: 98.2 °F (36.8 °C)   SpO2: 94%     Weight - Scale: 129.3 kg (285 lb)   Height: 182.9 cm (6')     Weight  Wt Readings from Last 3 Encounters:   07/24/24 129.3 kg (285 lb)   06/17/24 128.4 kg (283 lb)   06/14/24 127 kg (280 lb)     Body mass index is 38.65 kg/m².    24HR INTAKE/OUTPUT:      Intake/Output Summary (Last 24 hours) at 7/24/2024 0742  Last data filed at 7/24/2024 0649  Gross per 24 hour   Intake 1046.67 ml   Output 475 ml   Net 571.67 ml     -----------------------------------------------------------------  Exam:    GEN:    Awake, alert and oriented x3.   EYES:  EOMI, pupils equal   NECK: Supple. No lymphadenopathy.  No carotid bruit  CVS:    regular rate and rhythm, no audible murmur  PULM:  CTA, no wheezes, rales or rhonchi, no acute respiratory distress  ABD:    Bowels sounds normal.  Abdomen is soft.  No distention.  no tenderness to palpation.   EXT:   no edema bilaterally .  No calf tenderness.   NEURO: Moves all extremities.  Motor and sensory are grossly intact  SKIN:  No rashes.  No skin lesions.    -----------------------------------------------------------------    Diagnostic Data:      Complete Blood Count:   Recent Labs     07/23/24  2358 07/24/24  0515   WBC  were discussed with the patient today  Social determinants of health that may impact management: none  Code status: Full Code   Disposition: Discharge plan is pending    [facility] Advanced Care Planning documentation:  [x] I have confirmed that the patient's Advance Care Plan is present, Code Status is documented, or surrogate decision maker is listed in the patient's medical record  [If \"yes\", STOP HERE]     [] The patient's Advance Care Plan is NOT present because:    []  I confirmed today that the patient does not wish or was not able to name a   surrogate decision maker or provide and advance care plan.    [] Hospice care is currently being provided or has been provided within the   calendar year.    []  I did NOT confirm today the presence of an Advance Care Plan or surrogate   decision maker documented within the patient's medical record.   [DOES NOT SATISFY [facility] PERFORMANCE]    MATTEO Spivey - CNP , MATTEO, NP-C  Hospitalist Medicine        7/24/2024, 7:42 AM

## 2024-07-24 NOTE — CONSULTS
[facility]  Pharmacy Department    Clinical Pharmacy Note-Warfarin Consult    River Buckley is a 68 y.o. male for whom pharmacy has been asked to manage warfarin therapy.     Consulting Physician: Dr Verdin  Reason for Admission: UTI sepsis    Warfarin dose prior to admission: 7.5mg W, 5mg all other days  Warfarin indication: A Fib  Target INR range: 2.0-3.0     Past Medical History:   Diagnosis Date    Diabetes mellitus (HCC)     Hernia cerebri (HCC)     Hyperlipidemia     Hypertension     Obesity (BMI 30-39.9)     Obstructive sleep apnea     on bipap at home    Prostate disease                   INR (no units)   Date Value   07/24/2024 2.0   06/12/2024 2.2   05/01/2024 2.5   03/20/2024 2.1   02/09/2024 2.5   12/29/2023 3.1   11/15/2023 2.2       Hemoglobin (g/dL)   Date Value   07/24/2024 15.0   07/23/2024 16.4   06/10/2024 15.3     Hematocrit (%)   Date Value   07/24/2024 45.2   07/23/2024 48.5   06/10/2024 46.5     Platelets (k/uL)   Date Value   07/24/2024 207   07/23/2024 232   06/10/2024 238       Current warfarin drug-drug interactions: acetaminophen, percocet, rosuvastatin, aspirin      Date             INR        Dose   7/24/2024         2.0    7.5mg      Per urology note, patient will have stone therapy next week and will discuss need for warfarin hold with cardiology for procedure.    Daily PT/INR until stable within therapeutic range.     Thank you for the consult.     Janiya Parrish [facility],  7/24/2024, 10:48 AM

## 2024-07-24 NOTE — PROGRESS NOTES
Occupational Therapy  Facility/Department: [facility] MED SURG  Occupational Therapy Initial Assessment    Name: River Buckley  : 1955  MRN: [id number]  Date of Service: 2024    Discharge Recommendations:  Defer OT at this time          Patient Diagnosis(es): The primary encounter diagnosis was Kidney stone. Diagnoses of Urinary tract infection with hematuria, site unspecified and Hypoxemia were also pertinent to this visit.  Past Medical History:  has a past medical history of Diabetes mellitus (HCC), Hernia cerebri (HCC), Hyperlipidemia, Hypertension, Obesity (BMI 30-39.9), Obstructive sleep apnea, and Prostate disease.  Past Surgical History:  has a past surgical history that includes hernia repair; Cystoscopy (Left, 2018); pr cysto w/insert ureteral stent (Left, 2018); pr office/outpt visit,procedure only (Left, 2018); Colonoscopy (2012); Colonoscopy (2015); Colonoscopy (2019); Colonoscopy (N/A, 2019); and Cardiac catheterization (Left, 2021).           Assessment   Assessment: Pt is 67 y/o male being evaluated by skilled OT services following hospitalization for UTI/kidney stone. Pt is to have surgery to remove stone next week. This date, pt was assessed for functional mobility and ADLs, and presents functioning at his baseline. Pt has no concerns regarding returing to his prior routines / self care tasks and PLOF. Pt is functioning at his baseline and does no warrant skilled acute OT at this time. Pt also declines need for skilled therapy this date. Writer educated pt on notifying nursing/physician if need/desire for skilled therapy changes after operation or before discharge. Pt verbalized understanding. Pt has no further questions or concerns at this time.  Prognosis: Good  Decision Making: Medium Complexity  REQUIRES OT FOLLOW-UP: No  Activity Tolerance  Activity Tolerance: Patient Tolerated treatment well        Plan   Occupational Therapy Plan  Times

## 2024-07-24 NOTE — PROGRESS NOTES
Vitals and assessment complete at this time. See flowsheets for details. HR is 99 noted to be intermittently tachy on tele. Respirations are 26. SPO2 is 94% on 4L O2 nasal cannula. Patient is resting in bed. Patient is A&O x4. Breathing is shallow and tachypneic. Dyspnea noted at rest and with exertion. Lung sounds are clear. Patient denies pain. Patient denies dysuria or frequency. Slight edema noted to BLE. Patient denies further needs at this time. Call light is within reach. Care ongoing.

## 2024-07-24 NOTE — PROGRESS NOTES
Spoke to pt about home CPAP. He said he usually wears one at home, but will be fine going one or two nights without. Offered one of the hospital NIVs or to have someone bring in his home unit but pt declined.

## 2024-07-24 NOTE — PROGRESS NOTES
Urology consult completed at this time. Dr. Adams's answering service called and information given for consult. Per answering service, routine consult, Dr. Adams will receive consult informations in the morning.

## 2024-07-24 NOTE — CONSULTS
Urology Consultation    Patient:  River Buckley  MRN: [id number]  YOB: 1955  Consult requested from Dr. Verdin  for purpose of evaluation of left flank pain.    CHIEF COMPLAINT: Left flank pain    HISTORY OF PRESENT ILLNESS:   The patient is a 68 y.o. male who presents with left flank pain.  Patient was admitted to the hospital last night with left flank pain.  Patient did subsequently have laboratory values.  This did show mild leukocytosis at 13.5.  Patient did have a CT scan performed.  This does show a large stone in the left kidney.  Patient is well-known to our service and we have known about the stone for a number of years.  Patient did have positive urinalysis with positive nitrites.  He is currently on empiric Rocephin.  Patient's pain is under control.    Patient's old records, notes and chart reviewed and summarized above.    Past Medical History:    Past Medical History:   Diagnosis Date    Diabetes mellitus (HCC)     Hernia cerebri (HCC)     Hyperlipidemia     Hypertension     Obesity (BMI 30-39.9)     Obstructive sleep apnea     on bipap at home    Prostate disease        Past Surgical History:    Past Surgical History:   Procedure Laterality Date    CARDIAC CATHETERIZATION Left 07/23/2021    Dr. Lennon @ [facility]--Refer to Dr. Roger HAND    COLONOSCOPY  08/2012    Kendra SAUL    COLONOSCOPY  02/12/2015    Nikolas SAUL; Colon polyps x2, sigmoid diverticulosis, internal and external hemorrhoids    COLONOSCOPY  03/01/2019    Dr. Cabrera -- screening; no bx/polyps    COLONOSCOPY N/A 03/01/2019    COLORECTAL CANCER SCREENING performed by Anabell Cabrera DO at [facility] OR    CYSTOSCOPY Left 08/07/2018    with stent per Dr. Adams    HERNIA REPAIR      umbilical hernia    IN CYSTO W/INSERT URETERAL STENT Left 08/07/2018    CYSTOSCOPY  STENT INSERTION-LEFT performed by Marito Adams MD at [facility] OR    IN OFFICE/OUTPT VISIT,PROCEDURE ONLY Left 08/23/2018    CYSTOSCOPY URETEROSCOPY- LEFT HLL,

## 2024-07-24 NOTE — TELEPHONE ENCOUNTER
Patient is currently admitted.     River Buckley     1955        male    [address] [facility] [address]                    Legal Guardian NO  If yes, Name:       Skilled Facility NO     If yes, Name:                                             Home Phone: [phone]         Cell Phone:    Telephone Information:   Mobile [phone]                                           Surgeon: Bryan Surgery Date: 07.30                    Time: NA    Procedure: Left Ureteroscopy Thulium Lithotripsy Laser with Left Ureteral Stent placement/exchange  Duration:    Diagnosis:  Renal Calculus  CPT Codes:94568    Important Medical History:  In Epic    First Assistant NO  Special Inst/Equip/Implants: Regular    Nickel allergy  NO  Latex Allergy: NO      Cardiac Device:  No  If yes, need most recent pacemaker interrogation from Cardiologist:  Type of pacemaker:    Anesthesia:    General                       Admission Type:  Same Day                        Admit Prior to Day of Surgery: No    Case Location:  Ambulatory            Preadmission Testing:  Phone Call             PAT Date and Time: NA    Need Preop Cardiac Clearance: YES  Need Pre-op/Medical Clearance:NO    Does Patient have Cardiologist/physician? Name of Physician:    Saji    Special Needs Communication:  Amada Lift NO    needed NO

## 2024-07-24 NOTE — CARE COORDINATION
Case Management Assessment  Initial Evaluation    Date/Time of Evaluation: 7/24/2024 12:26 PM  Assessment Completed by: FATOU Langston    If patient is discharged prior to next notation, then this note serves as note for discharge by case management.    Patient Name: River Buckley                   YOB: 1955  Diagnosis: Hypoxemia [R09.02]  Kidney stone [N20.0]  Complicated UTI (urinary tract infection) [N39.0]  Urinary tract infection with hematuria, site unspecified [N39.0, R31.9]                   Date / Time: 7/23/2024 11:48 PM    Patient Admission Status: Inpatient   Readmission Risk (Low < 19, Mod (19-27), High > 27): Readmission Risk Score: 8.6    Current PCP: Mary Mata, DO  PCP verified by CM? Yes    Chart Reviewed: Yes      History Provided by: Patient  Patient Orientation: Alert and Oriented, Person, Place, Situation, Self    Patient Cognition: Alert    Hospitalization in the last 30 days (Readmission):  No    If yes, Readmission Assessment in CM Navigator will be completed.    Advance Directives:      Code Status: Full Code   Patient's Primary Decision Maker is: Named in Scanned ACP Document    Primary Decision Maker: Bettina Buckley - Spouse - [phone]    Discharge Planning:    Patient lives with: Spouse/Significant Other Type of Home: House  Primary Care Giver: Self  Patient Support Systems include: Spouse/Significant Other, Family Members   Current Financial resources: Medicare  Current community resources: None  Current services prior to admission: C-pap            Current DME:              Type of Home Care services:  None    ADLS  Prior functional level: Independent in ADLs/IADLs  Current functional level: Independent in ADLs/IADLs    PT AM-PAC:   /24  OT AM-PAC: 24 /24    Family can provide assistance at DC: Yes  Would you like Case Management to discuss the discharge plan with any other family members/significant others, and if so, who? Yes  Plans to Return to  Agree with the Discharge Plan?  Yes     The discharge plan is home with no services at this time.    FATOU Langston  Case Management Department  Ph: [phone]

## 2024-07-24 NOTE — PROGRESS NOTES
Physical Therapy  Facility/Department: [facility] MED SURG  Physical Therapy Initial Assessment    Name: River Buckley  : 1955  MRN: [id number]  Date of Service: 2024    Discharge Recommendations:  Continue to assess pending progress, Home independently          Patient Diagnosis(es): The primary encounter diagnosis was Kidney stone. Diagnoses of Urinary tract infection with hematuria, site unspecified and Hypoxemia were also pertinent to this visit.  Past Medical History:  has a past medical history of Diabetes mellitus (HCC), Hernia cerebri (HCC), Hyperlipidemia, Hypertension, Obesity (BMI 30-39.9), Obstructive sleep apnea, and Prostate disease.  Past Surgical History:  has a past surgical history that includes hernia repair; Cystoscopy (Left, 2018); pr cysto w/insert ureteral stent (Left, 2018); pr office/outpt visit,procedure only (Left, 2018); Colonoscopy (2012); Colonoscopy (2015); Colonoscopy (2019); Colonoscopy (N/A, 2019); and Cardiac catheterization (Left, 2021).    Assessment   Body Structures, Functions, Activity Limitations Requiring Skilled Therapeutic Intervention: Decreased functional mobility ;Decreased endurance;Increased pain;Decreased strength;Decreased balance  Assessment: Pt was referred for general debility. Pt is SBA +1 without device for transfers and ambulation. Pt should be progressed as tolerated for endurance and safety on stairs.  Treatment Diagnosis: difficulty walking  Therapy Prognosis: Good  Decision Making: Medium Complexity  Requires PT Follow-Up: Yes  Activity Tolerance  Activity Tolerance: Patient tolerated evaluation without incident     Plan   Physical Therapy Plan  General Plan: 2 times a day 7 days a week (with exception of weekends, daily)  Current Treatment Recommendations: Strengthening, Balance training, Functional mobility training, Transfer training, Endurance training, Home exercise program, Neuromuscular  re-education, Gait training, Stair training, Safety education & training, Therapeutic activities  Safety Devices  Type of Devices: Call light within reach, Chair alarm in place, Left in chair, Patient at risk for falls     Restrictions  Restrictions/Precautions  Restrictions/Precautions: General Precautions     Subjective   General  Patient assessed for rehabilitation services?: Yes  Follows Commands: Within Functional Limits  Subjective  Subjective: Pt reports that at the moment, he has no pain. Pt normally does not have to use O2 much.         Social/Functional History  Social/Functional History  Lives With: Spouse  Type of Home: House  Home Layout: Two level  Home Access: Stairs to enter with rails  Entrance Stairs - Number of Steps: 4  Entrance Stairs - Rails: Right  Bathroom Shower/Tub: Tub/Shower unit  Bathroom Toilet: Standard  Has the patient had two or more falls in the past year or any fall with injury in the past year?: No  Receives Help From: Family  ADL Assistance: Independent  Homemaking Assistance: Independent  Ambulation Assistance: Independent  Transfer Assistance: Independent  Additional Comments: Pt reports he is indep with all ADLs/IADLs, drives, and uses no device for mobility at baseline.  Vision/Hearing  Vision  Vision: Within Functional Limits  Hearing  Hearing: Within functional limits    Cognition   Orientation  Overall Orientation Status: Within Functional Limits  Cognition  Overall Cognitive Status: WFL     Objective   Observation/Palpation  Posture: Good  Observation: 1L O2 NC  Gross Assessment  AROM: Within functional limits  Strength: Generally decreased, functional     Balance  Sitting: Intact  Standing: Impaired  Standing - Static: Good  Standing - Dynamic: Good  Transfer Training  Transfer Training: Yes  Overall Level of Assistance: Stand-by assistance;Assist X1  Interventions: Verbal cues  Sit to Stand: Stand-by assistance;Assist X1  Stand to Sit: Stand-by assistance;Assist

## 2024-07-24 NOTE — PROGRESS NOTES
Pharmacy Note  Warfarin Consult    River Buckley is a 68 y.o. male for whom pharmacy has been consulted to manage warfarin therapy.     Consulting Physician: Dr. Ramez Verdin  Reason for Admission: UTI    Warfarin dose prior to admission: 7.5 mg on Wed an 5 mg all other days  Warfarin indication: Afib  Target INR range: 2-3     Past Medical History:   Diagnosis Date    Diabetes mellitus (HCC)     Hernia cerebri (HCC)     Hyperlipidemia     Hypertension     Obesity (BMI 30-39.9)     Obstructive sleep apnea     on bipap at home    Prostate disease                 No results for input(s): \"INR\" in the last 72 hours.  Recent Labs     07/23/24  2358   HGB 16.4   HCT 48.5          Current warfarin drug-drug interactions: aspirin/crestor/tylenol      Date             INR        Dose   7/24/2024       pending           Daily PT/INR while inpatient.    Thank you for the consult.  Will continue to follow.    Lisandro Riley Pharm.D.    7/24/2024  3:54 AM

## 2024-07-24 NOTE — ED PROVIDER NOTES
HPI:  7/23/24,   Time: 11:51 PM EDT         River Buckley is a 68 y.o. male presenting to the ED for acute onset of left-sided flank pain and history of kidney stones, beginning last 4 and half hours ago.  The complaint has been constant, severe in severity, and worsened by nothing.  No alleviating factors no fever chills    ROS:   Pertinent positives and negatives are stated within HPI, all other systems reviewed and are negative.  --------------------------------------------- PAST HISTORY ---------------------------------------------  Past Medical History:  has a past medical history of Diabetes mellitus (HCC), Hernia cerebri (HCC), Hyperlipidemia, Hypertension, Obesity (BMI 30-39.9), Obstructive sleep apnea, and Prostate disease.    Past Surgical History:  has a past surgical history that includes hernia repair; Cystoscopy (Left, 08/07/2018); pr cysto w/insert ureteral stent (Left, 08/07/2018); pr office/outpt visit,procedure only (Left, 08/23/2018); Colonoscopy (08/2012); Colonoscopy (02/12/2015); Colonoscopy (03/01/2019); Colonoscopy (N/A, 03/01/2019); and Cardiac catheterization (Left, 07/23/2021).    Social History:  reports that he quit smoking about 28 years ago. His smoking use included cigarettes. He started smoking about 52 years ago. He has a 360.0 pack-year smoking history. He has never used smokeless tobacco. He reports current alcohol use of about 8.0 standard drinks of alcohol per week. He reports that he does not use drugs.    Family History: family history includes Cancer in his mother; Diabetes in his brother; Heart Disease in his brother, brother, father, and paternal grandfather.     The patient’s home medications have been reviewed.    Allergies: Patient has no known allergies.    -------------------------------------------------- RESULTS -------------------------------------------------  All laboratory and radiology results have been personally reviewed by myself   LABS:  Results for

## 2024-07-24 NOTE — PROGRESS NOTES
Patient arrived to [facility], room 320, at this time via cart. Patient independently stood from cart and ambulated a few steps to bed. Patient is alert and orientated and on 4L of nasal cannula O2. Patient is on ROOM AIR at home. Weight was obtained.

## 2024-07-24 NOTE — H&P
History and Physical    Patient:  River Buckley  MRN: [id number]    Chief Complaint: Flank pain    History Obtained From:  patient, spouse, electronic medical record    PCP: Mary Mata DO    History of Present Illness:   The patient is a 68 y.o. male who presents with sudden onset of left-sided flank pain last evening.  Patient states he developed a fever and chills with it.  He was tachycardic on arrival with a heart rate of 122.  He has a history of kidney stones in the past and stated this felt similar.  He follows with Dr. Adams and has had lithotripsy in the past.  Patient was given a dose of morphine for his pain and his SpO2 dropped into the mid 80s.  He was placed on 2 L of oxygen.  Attempts to wean him off the oxygen a few hours later were unsuccessful.  He states he has not had increasing shortness of breath recently.  He denies any cough, nasal congestion, or sick exposure.  Patient has a past medical history of AN, CAD, CABG x 2, type 2 diabetes, BPH, hypertension, atrial fibrillation with anticoagulation by warfarin, and mild aortic stenosis.  Urinalysis today showed infection with moderate leukocyte esterase, positive nitrites,  WBCs,  RBCs, and 3+ bacteria.  WBC 10.2.  Lactic 2.7.  Chest x-ray showed CHF with a small right pleural effusion.    Past Medical History:        Diagnosis Date    Diabetes mellitus (HCC)     Hernia cerebri (HCC)     Hyperlipidemia     Hypertension     Obesity (BMI 30-39.9)     Obstructive sleep apnea     on bipap at home    Prostate disease        Past Surgical History:        Procedure Laterality Date    CARDIAC CATHETERIZATION Left 07/23/2021    Dr. Lennon @ [facility]--Refer to Dr. Roger HAND    COLONOSCOPY  08/2012    Kendra SAUL    COLONOSCOPY  02/12/2015    Nikolas SAUL; Colon polyps x2, sigmoid diverticulosis, internal and external hemorrhoids    COLONOSCOPY  03/01/2019    Dr. Cabrera -- screening; no bx/polyps    COLONOSCOPY N/A 03/01/2019     %  24HR INTAKE/OUTPUT:    Intake/Output Summary (Last 24 hours) at 7/24/2024 0430  Last data filed at 7/24/2024 0127  Gross per 24 hour   Intake 1046.67 ml   Output --   Net 1046.67 ml       Weight  Wt Readings from Last 1 Encounters:   07/24/24 129.3 kg (285 lb)       Body mass index is 38.65 kg/m².    Exam:  GEN:    Awake, alert and oriented x3.   EYES:  EOMI, pupils equal   NECK: Supple. No lymphadenopathy.  No carotid bruit  CVS:    regular but tachycardic, no audible murmur  PULM: Fine Rales in the left lower lobe otherwise clear, no acute respiratory distress  ABD:    Bowels sounds normal.  Abdomen is soft.  No distention.  no tenderness to palpation.  Left CVA tenderness  EXT:   no edema bilaterally .  No calf tenderness.   NEURO: Moves all extremities.  Motor and sensory are grossly intact  SKIN:  No rashes.  No skin lesions.    -----------------------------------------------------------------  Diagnostic Data:     DATA:    CBC:   Lab Results   Component Value Date    WBC 10.2 07/23/2024    RBC 5.20 07/23/2024    HGB 16.4 07/23/2024    HCT 48.5 07/23/2024    MCV 93.3 07/23/2024     07/23/2024        CMP:   Lab Results   Component Value Date    GLUCOSE 148 (H) 07/23/2024    BUN 14 07/23/2024    CREATININE 1.0 07/23/2024     07/23/2024    K 4.1 07/23/2024    CALCIUM 9.7 07/23/2024     07/23/2024    CO2 21 07/23/2024    BILITOT 0.4 06/10/2024    ALKPHOS 55 06/10/2024    ALT 37 06/10/2024    AST 28 06/10/2024       UA:   Lab Results   Component Value Date    COLORU Yellow 07/23/2024    CLARITYU cloudy 12/22/2023    SPECGRAV 1.030 12/22/2023    WBCUA 50  07/23/2024    RBCUA 50  07/23/2024    LEUKOCYTESUR MODERATE (A) 07/23/2024    GLUCOSEU NEGATIVE 07/23/2024    BLOODU large 12/22/2023    KETUA NEGATIVE 07/23/2024    PROTEINU NEGATIVE 07/23/2024    HGBUR 3+ (A) 07/23/2024    CASTUA NOT REPORTED 05/21/2020    BACTERIA 3+ (A) 07/23/2024    YEAST NOT REPORTED 05/21/2020       Lactic

## 2024-07-24 NOTE — TELEPHONE ENCOUNTER
Writer did fax a clearance form to Dr Lennon's office this morning. I also spoke with Suki to ensure the office received the form. Suki printed it off while I was on the phone with her and gave it Dr Lennon. She stated as soon as he gives it back to her; she will let us know.

## 2024-07-24 NOTE — PLAN OF CARE
Problem: Discharge Planning  Goal: Discharge to home or other facility with appropriate resources  Outcome: Progressing     Problem: Safety - Adult  Goal: Free from fall injury  Outcome: Progressing     Problem: ABCDS Injury Assessment  Goal: Absence of physical injury  Outcome: Progressing     Problem: Respiratory - Adult  Goal: Achieves optimal ventilation and oxygenation  Outcome: Progressing     Problem: Genitourinary - Adult  Goal: Absence of urinary retention  Outcome: Progressing     Problem: Infection - Adult  Goal: Absence of infection at discharge  Outcome: Progressing  Goal: Absence of infection during hospitalization  Outcome: Progressing     Problem: Metabolic/Fluid and Electrolytes - Adult  Goal: Electrolytes maintained within normal limits  Outcome: Progressing  Goal: Glucose maintained within prescribed range  Outcome: Progressing

## 2024-07-24 NOTE — ED NOTES
Attempted to wean patient off NC, SpO2 87% on RA at rest. Pt denies SOB. NC reapplied, SpO2 increased to 90-91%.

## 2024-07-24 NOTE — PROGRESS NOTES
Comprehensive Nutrition Assessment    Type and Reason for Visit:  Initial    Nutrition Recommendations/Plan:   Recommend to add 2 gm sodium to CC diet.   Recommend addition of probiotic due to ATB therapy.      Malnutrition Assessment:  Malnutrition Status:  No malnutrition (07/24/24 1349)    Context:  Acute Illness     Findings of the 6 clinical characteristics of malnutrition:  Energy Intake:  No significant decrease in energy intake  Weight Loss:  No significant weight loss     Body Fat Loss:  No significant body fat loss     Muscle Mass Loss:  No significant muscle mass loss    Fluid Accumulation:  Mild Extremities (+ 1 BLE non pitting edema)   Strength:  Not Performed    Nutrition Assessment:    Altered nutrition related labs r/t endocrine dysfunction aeb A1c 8.7. Pt admitted with kidney stone, UTI, and hypoxemia. Vitamin D 35.7. Pt noted to have CHF with R pleural effusion. Recommend addition of 2 gm sodium to diet and probiotic due to ATB therapy.  Pt is also on coumadin, prior knowledge of vitamin K foods. Pt wife states Pt \"eats non stop, has gained weight.\" Pt acknowledges eating out of boredom. Discussed healthier snack ideas and how to snack less. Suggested increasing raw vegetables and PA, encouraged 150 minutes of PA weekly. Pt reports he \"salts everything, but at least he tastes it first, didn't used to.\" Pt encouraged to decreased added salt and hidden sodium due to kidney stones and CHF. Pt provided CC heart healthy diet information, heart healthy food label, and kidney stone nutrition therapy.    Nutrition Related Findings:    no malnutrition indices Wound Type: None       Current Nutrition Intake & Therapies:    Average Meal Intake: %  Average Supplements Intake: None Ordered  ADULT DIET; Regular; 4 carb choices (60 gm/meal)    Anthropometric Measures:  Height: 182.9 cm (6')  Ideal Body Weight (IBW): 178 lbs (81 kg)    Admission Body Weight: 129.3 kg (285 lb)  Current Body Weight: 129.3  Readiness for Change  Food/Nutrient Intake Outcomes: Food and Nutrient Intake  Physical Signs/Symptoms Outcomes: Biochemical Data, Weight, Fluid Status or Edema, GI Status    Discharge Planning:    Too soon to determine     THOMAS ROGERS RD, LD  Contact: [phone]

## 2024-07-24 NOTE — ED NOTES
Patient destatted to 86% on 3L, patient increased to 5L at this time, saturation improved to 89-90%. Patient denies shortness of breath at this time. Dr. Kirkland notified

## 2024-07-25 ENCOUNTER — APPOINTMENT (OUTPATIENT)
Dept: GENERAL RADIOLOGY | Age: 69
End: 2024-07-25
Payer: MEDICARE

## 2024-07-25 VITALS
OXYGEN SATURATION: 94 % | BODY MASS INDEX: 38.4 KG/M2 | HEART RATE: 86 BPM | HEIGHT: 72 IN | DIASTOLIC BLOOD PRESSURE: 79 MMHG | WEIGHT: 283.5 LBS | RESPIRATION RATE: 22 BRPM | SYSTOLIC BLOOD PRESSURE: 149 MMHG | TEMPERATURE: 96.7 F

## 2024-07-25 LAB
ANION GAP SERPL CALCULATED.3IONS-SCNC: 9 MMOL/L (ref 9–17)
BASOPHILS # BLD: 0.03 K/UL (ref 0–0.2)
BASOPHILS NFR BLD: 0 % (ref 0–2)
BUN SERPL-MCNC: 12 MG/DL (ref 8–23)
BUN/CREAT SERPL: 13 (ref 9–20)
CALCIUM SERPL-MCNC: 8.8 MG/DL (ref 8.6–10.4)
CHLORIDE SERPL-SCNC: 101 MMOL/L (ref 98–107)
CO2 SERPL-SCNC: 27 MMOL/L (ref 20–31)
CREAT SERPL-MCNC: 0.9 MG/DL (ref 0.7–1.2)
EOSINOPHIL # BLD: 0.18 K/UL (ref 0–0.44)
EOSINOPHILS RELATIVE PERCENT: 3 % (ref 1–4)
ERYTHROCYTE [DISTWIDTH] IN BLOOD BY AUTOMATED COUNT: 14.4 % (ref 11.8–14.4)
GFR, ESTIMATED: >90 ML/MIN/1.73M2
GLUCOSE SERPL-MCNC: 133 MG/DL (ref 70–99)
HCT VFR BLD AUTO: 41.8 % (ref 40.7–50.3)
HGB BLD-MCNC: 13.9 G/DL (ref 13–17)
IMM GRANULOCYTES # BLD AUTO: 0.03 K/UL (ref 0–0.3)
IMM GRANULOCYTES NFR BLD: 0 %
INR PPP: 1.4
LYMPHOCYTES NFR BLD: 0.94 K/UL (ref 1.1–3.7)
LYMPHOCYTES RELATIVE PERCENT: 13 % (ref 24–43)
MCH RBC QN AUTO: 31.6 PG (ref 25.2–33.5)
MCHC RBC AUTO-ENTMCNC: 33.3 G/DL (ref 28.4–34.8)
MCV RBC AUTO: 95 FL (ref 82.6–102.9)
MONOCYTES NFR BLD: 1.07 K/UL (ref 0.1–1.2)
MONOCYTES NFR BLD: 15 % (ref 3–12)
NEUTROPHILS NFR BLD: 69 % (ref 36–65)
NEUTS SEG NFR BLD: 5.04 K/UL (ref 1.5–8.1)
NRBC BLD-RTO: 0 PER 100 WBC
PLATELET # BLD AUTO: 170 K/UL (ref 138–453)
PMV BLD AUTO: 9.9 FL (ref 8.1–13.5)
POTASSIUM SERPL-SCNC: 4.2 MMOL/L (ref 3.7–5.3)
PROTHROMBIN TIME: 17 SEC (ref 11.7–14.1)
RBC # BLD AUTO: 4.4 M/UL (ref 4.21–5.77)
SODIUM SERPL-SCNC: 137 MMOL/L (ref 135–144)
WBC OTHER # BLD: 7.3 K/UL (ref 3.5–11.3)

## 2024-07-25 PROCEDURE — 6370000000 HC RX 637 (ALT 250 FOR IP)

## 2024-07-25 PROCEDURE — 94618 PULMONARY STRESS TESTING: CPT

## 2024-07-25 PROCEDURE — 2580000003 HC RX 258

## 2024-07-25 PROCEDURE — 94761 N-INVAS EAR/PLS OXIMETRY MLT: CPT

## 2024-07-25 PROCEDURE — 97116 GAIT TRAINING THERAPY: CPT

## 2024-07-25 PROCEDURE — 6360000002 HC RX W HCPCS

## 2024-07-25 PROCEDURE — 85610 PROTHROMBIN TIME: CPT

## 2024-07-25 PROCEDURE — 36415 COLL VENOUS BLD VENIPUNCTURE: CPT

## 2024-07-25 PROCEDURE — 97110 THERAPEUTIC EXERCISES: CPT

## 2024-07-25 PROCEDURE — 80048 BASIC METABOLIC PNL TOTAL CA: CPT

## 2024-07-25 PROCEDURE — 6370000000 HC RX 637 (ALT 250 FOR IP): Performed by: NURSE PRACTITIONER

## 2024-07-25 PROCEDURE — 85025 COMPLETE CBC W/AUTO DIFF WBC: CPT

## 2024-07-25 PROCEDURE — 71046 X-RAY EXAM CHEST 2 VIEWS: CPT

## 2024-07-25 PROCEDURE — 2700000000 HC OXYGEN THERAPY PER DAY

## 2024-07-25 RX ORDER — LEVOFLOXACIN 500 MG/1
500 TABLET, FILM COATED ORAL DAILY
Qty: 6 TABLET | Refills: 0 | Status: SHIPPED | OUTPATIENT
Start: 2024-07-26 | End: 2024-08-01

## 2024-07-25 RX ORDER — LEVOFLOXACIN 500 MG/1
500 TABLET, FILM COATED ORAL DAILY
Status: DISCONTINUED | OUTPATIENT
Start: 2024-07-25 | End: 2024-07-25 | Stop reason: HOSPADM

## 2024-07-25 RX ORDER — TAMSULOSIN HYDROCHLORIDE 0.4 MG/1
0.4 CAPSULE ORAL DAILY
Qty: 30 CAPSULE | Refills: 3 | Status: SHIPPED | OUTPATIENT
Start: 2024-07-26

## 2024-07-25 RX ORDER — OXYCODONE HYDROCHLORIDE AND ACETAMINOPHEN 5; 325 MG/1; MG/1
1 TABLET ORAL EVERY 4 HOURS PRN
Qty: 12 TABLET | Refills: 0 | Status: SHIPPED | OUTPATIENT
Start: 2024-07-25 | End: 2024-07-28

## 2024-07-25 RX ADMIN — GLIPIZIDE 10 MG: 5 TABLET ORAL at 16:12

## 2024-07-25 RX ADMIN — OXYBUTYNIN CHLORIDE 10 MG: 5 TABLET, EXTENDED RELEASE ORAL at 16:12

## 2024-07-25 RX ADMIN — CEFTRIAXONE SODIUM 1000 MG: 1 INJECTION, POWDER, FOR SOLUTION INTRAMUSCULAR; INTRAVENOUS at 00:30

## 2024-07-25 RX ADMIN — LEVOFLOXACIN 500 MG: 500 TABLET, FILM COATED ORAL at 10:18

## 2024-07-25 RX ADMIN — Medication 2000 UNITS: at 10:18

## 2024-07-25 RX ADMIN — METFORMIN HYDROCHLORIDE 1000 MG: 500 TABLET ORAL at 16:11

## 2024-07-25 RX ADMIN — ROSUVASTATIN 40 MG: 40 TABLET, FILM COATED ORAL at 16:12

## 2024-07-25 RX ADMIN — METFORMIN HYDROCHLORIDE 1000 MG: 500 TABLET ORAL at 10:23

## 2024-07-25 RX ADMIN — DILTIAZEM HYDROCHLORIDE 300 MG: 180 CAPSULE, COATED, EXTENDED RELEASE ORAL at 10:18

## 2024-07-25 RX ADMIN — LOSARTAN POTASSIUM 25 MG: 25 TABLET, FILM COATED ORAL at 12:15

## 2024-07-25 RX ADMIN — METOPROLOL SUCCINATE 25 MG: 25 TABLET, EXTENDED RELEASE ORAL at 10:23

## 2024-07-25 RX ADMIN — SODIUM CHLORIDE: 9 INJECTION, SOLUTION INTRAVENOUS at 07:27

## 2024-07-25 RX ADMIN — GLIPIZIDE 10 MG: 5 TABLET ORAL at 10:23

## 2024-07-25 RX ADMIN — ASPIRIN 81 MG: 81 TABLET, COATED ORAL at 10:18

## 2024-07-25 RX ADMIN — TAMSULOSIN HYDROCHLORIDE 0.4 MG: 0.4 CAPSULE ORAL at 10:23

## 2024-07-25 NOTE — PLAN OF CARE
Problem: Discharge Planning  Goal: Discharge to home or other facility with appropriate resources  7/25/2024 1300 by Carmen Dietz RN  Outcome: Adequate for Discharge  7/25/2024 0218 by Shaina Fabian RN  Outcome: Progressing  Flowsheets (Taken 7/25/2024 0218)  Discharge to home or other facility with appropriate resources: Identify barriers to discharge with patient and caregiver     Problem: Safety - Adult  Goal: Free from fall injury  7/25/2024 1300 by Carmen Dietz RN  Outcome: Adequate for Discharge  7/25/2024 0218 by Shaina Fabian RN  Outcome: Progressing  Flowsheets (Taken 7/25/2024 0218)  Free From Fall Injury: Instruct family/caregiver on patient safety     Problem: ABCDS Injury Assessment  Goal: Absence of physical injury  7/25/2024 1300 by Carmen Dietz RN  Outcome: Adequate for Discharge  7/25/2024 0218 by Shaina Fabian RN  Outcome: Progressing  Flowsheets (Taken 7/25/2024 0218)  Absence of Physical Injury: Implement safety measures based on patient assessment     Problem: Respiratory - Adult  Goal: Achieves optimal ventilation and oxygenation  7/25/2024 1300 by Carmen Dietz RN  Outcome: Adequate for Discharge  7/25/2024 0218 by Shaina Fabian RN  Outcome: Progressing  Flowsheets (Taken 7/25/2024 0218)  Achieves optimal ventilation and oxygenation:   Assess for changes in respiratory status   Assess for changes in mentation and behavior   Position to facilitate oxygenation and minimize respiratory effort   Oxygen supplementation based on oxygen saturation or arterial blood gases     Problem: Genitourinary - Adult  Goal: Absence of urinary retention  7/25/2024 1300 by Carmen Dietz RN  Outcome: Adequate for Discharge  7/25/2024 0218 by Shaina Fabian RN  Outcome: Progressing  Flowsheets (Taken 7/25/2024 0218)  Absence of urinary retention:   Assess patient’s ability to void and empty bladder   Monitor intake/output and perform bladder scan as needed     Problem:

## 2024-07-25 NOTE — DISCHARGE INSTR - DIET
Good nutrition is important when healing from an illness, injury, or surgery.  Follow any nutrition recommendations given to you during your hospital stay.   If you were given an oral nutrition supplement while in the hospital, continue to take this supplement at home.  You can take it with meals, in-between meals, and/or before bedtime. These supplements can be purchased at most local grocery stores, pharmacies, and chain healthfinch-stores.   If you have any questions about your diet or nutrition, call the hospital and ask for the dietitian.  Diabetic, Low Sodium, do not exceed 2,000 mg of sodium in a day  Increase fluid intakes

## 2024-07-25 NOTE — DISCHARGE INSTR - ACTIVITY
[facility] CARDIOLOGY OFFICE FOLLOW UP NOTE       Date of Encounter: 9/3/2020   YOB: 1959   MRN: [id number]   Primary Care Provider: Zev Pcp       CHIEF COMPLAINT:  Patient presents today for follow-up for history of nonobstructive coronary disease on catheterization in 2005, abnormal nuclear stress test, hypertension, hypercholesterolemia, history of tobacco use, concerns of worsening shortness of breath, chest pain, palpitations, LAURA without treatment due to broken CPAP.    ASSESSMENT AND PLAN       ASSESSMENT:   1. Coronary artery disease due to lipid rich plaque    2. Abnormal cardiovascular stress test    3. History of tobacco use    4. Dyslipidemia    5. Essential hypertension    6. Type 2 diabetes mellitus without complication, without long-term current use of insulin (CMS/[facility])    7. Precordial pain    8. Shortness of breath    9. Palpitations    10. LAURA (obstructive sleep apnea)        Orders Placed This Encounter   • SERVICE TO SLEEP MEDICINE   • Electrocardiogram 12-Lead   • HOLTER MONITOR   • Echo M-Mode/2D/Doppler (Routine)   • isosorbide mononitrate (IMDUR) 30 MG 24 hr tablet       Return in about 6 weeks (around 10/15/2020).    RECOMMENDATIONS:  -by history, his palpitations sound to be related to ectopic beats or atrial tachyarrhythmia.  48 hour Holter monitor is ordered to assess. This may be exacerbated by recent lack of CPAP use.    -given concerns of shortness of breath and occasional chest discomfort, ECG is stable today.  Given similarity to previous discomfort prior to stress testing, obtain echocardiogram.  If this is stable, would likely recommend medical management.  Begin isosorbide 30 mg daily relative to history of abnormal stress test and chest discomfort.  Once current evaluation is completed, I will discuss his case with Dr. Han to determine whether any additional evaluation will be warranted.    -referral is placed to Sleep Medicine for history of obstructive sleep  As tolerated   apnea with broken CPAP.  Also obtain fasting lipid panel with primary care as scheduled.    -follow with Cardiology office in 6 weeks, call with questions or concerns prior.     HISTORY OF PRESENT ILLNESS       Noah CisseMaricruzGil was seen in follow up at [facility] on 9/3/2020.      He is a very pleasant 61 year old male who presents for follow-up for history of nonobstructive coronary disease on catheterization in 2005, abnormal nuclear stress test, hypertension, hypercholesterolemia, history of tobacco use.     He has a history of nonobstructive coronary artery disease on catheterization in 2005. He also has a history of chest discomfort in May 2019.     Echocardiogram performed on 05/06/2019:  Normal left ventricular size, systolic function and wall thickness, with no regional wall motion abnormalities.  Left ventricular ejection fraction, 63 %.  Grade I/IV diastolic dysfunction (abnormal relaxation filling pattern), normal to mildly elevated filling pressures.  Aortic valve sclerosis without stenosis or regurgitation.  Right ventricular systolic pressure 24.2 mmHg.  No previous study.     Lexiscan Cardiolite stress test performed on 05/06/2019 and demonstrated an abnormal Cardiolite compatible with moderate inferior and mild mid anterior wall ischemia with normal LV function.  Cardiac catheterization was discussed, patient had declined this.  His chest discomfort has since resolved.  He is therefore managed medically.     He has a history of rash with aspirin, though is able to take low-dose aspirin without issue.    Most Recent Lipid Panel:  No lipid panel on file    Cholesterol Management: Atorvastatin 10mg daily  Antiplatelet Therapy: ASA 81mg  Tobacco Use: Quit smoking in 2007    Today's Discussion-  He presents for cardiology office follow-up with his son today with few concerns.  He notes over the past week or 2 he has had a worsening complex of symptoms including chest discomfort,  worsening shortness of breath with exertion, occasional palpitations.  He notes this is most prominent in the evening, he was recently using a CPAP machine though this broke a few weeks ago and he notes at night while lying in bed he experiences palpitations and shortness of breath with occasional chest discomfort.  Palpitations feel like “skipping beats” without racing.  This resolves with the sublingual nitroglycerin, very similarly to the previous discomfort at the time of abnormal stress testing.  He notes his palpitations occur every evening, though is generally without chest pain or shortness of breath at night, this is primarily during the day with exertion.      He notes a worsening shortness of breath with exertion and occasional chest discomfort with exertion as well, also very similar to previous discomfort around the time of stress testing. This is not limiting to his daily activity, is mild in severity.    He notes he would like to reestablish with Sleep Medicine, he is not currently using his CPAP because this broke.  He does have an upcoming lipid panel with primary care in approximately 2-3 weeks.    Denies syncope or near syncope, leg swelling or claudication, orthopnea.    I have reviewed the patient's past medical history, surgical history, family history, social history, allergies, and current medications in the electronic medical record. I verify that they are complete and accurate.       PAST MEDICAL HISTORY     History reviewed. No pertinent past medical history.  Past Surgical History:   Procedure Laterality Date   • Colonoscopy  2007     Family History   Problem Relation Age of Onset   • Diabetes Mother    • Diabetes Father    • Hypertension Father    • Other Sister         meningitis          SOCIAL HISTORY     Social History     Tobacco Use   Smoking Status Former Smoker   • Packs/day: 1.00   • Start date: 1978   • Quit date: 2007   • Years since quittin.6   Smokeless  Tobacco Never Used          ALLERGIES     ALLERGIES:   Allergen Reactions   • Aspirin RASH     If he takes it with niaspan   • Niacin DIARRHEA and SWELLING        CURRENT MEDICATIONS       Current Outpatient Medications   Medication Sig Dispense Refill   • potassium chloride (K-TAB) 20 MEQ ER tablet Take 20 mEq by mouth daily. 20 tablet 0   • atorvastatin (LIPITOR) 10 MG tablet TAKE 1 TABLET BY MOUTH DAILY 90 tablet 3   • benazepril (LOTENSIN) 20 MG tablet TAKE 1 TABLET BY MOUTH DAILY 90 tablet 3   • metoPROLOL succinate (TOPROL-XL) 100 MG 24 hr tablet 100 mg po daily 90 tablet 3   • nitroGLYcerin (NITROSTAT) 0.4 MG sublingual tablet Place 1 tablet under the tongue every 5 minutes as needed for Chest pain. 25 tablet 1   • amLODIPine (NORVASC) 10 MG tablet Take 1 tablet by mouth daily. 90 tablet 3   • nortriptyline (PAMELOR) 50 MG capsule Take 2 capsules by mouth daily. 60 capsule 0   • isosorbide mononitrate (IMDUR) 30 MG 24 hr tablet Take 1 tablet by mouth daily. 30 tablet 1   • aspirin 81 MG tablet Take 1 tablet by mouth daily. 30 tablet 3   • Eszopiclone 3 MG tablet Take 1 tablet by mouth nightly as needed for Sleep. 90 tablet 0   • ramelteon (ROZEREM) 8 MG tablet Take 1 tablet by mouth nightly. 30 tablet 0   • Multiple Vitamins-Minerals (MENS MULTI VITAMIN & MINERAL) TABS Take 1 tablet by mouth daily. 30 tablet    • Omega-3 Fatty Acids (FISH OIL) 1000 MG capsule Take 2 g by mouth daily.       No current facility-administered medications for this visit.           REVIEW OF SYSTEMS       A review of systems was performed and relevant findings are present in history of present illness.      Physical Exam     Visit Vitals  BP (!) 142/84 (BP Location: LUE - Left upper extremity, Patient Position: Sitting, Cuff Size: Regular)   Pulse 79   Resp 18   Ht 5' 9\" (1.753 m)   Wt 104 kg   SpO2 97%   BMI 33.86 kg/m²       Wt Readings from Last 4 Encounters:   09/03/20 104 kg   02/24/20 102.7 kg   02/03/20 100.6 kg   01/17/20  99.8 kg       Constitutional:  Patient Refused  male in no acute distress.  Skin: Warm and dry. No rash.    HEENT:  Normocephalic without evidence of trauma.Conjunctiva clear without scleral icterus.  Mucous membranes moist.  Neck: Supple without JVD.  Carotids pulsations normal strength with normal upstroke. Carotids without bruits.  Lungs:   Breath sounds clear across all lung fields, without wheezes, rales, or rhonchi.  Heart: Regular rhythm and rate.  Normal S1, S2 with equal components.  S4 present without S3.  A 1/6 early peaking systolic ejection murmur is best appreciated at the base, no diastolic murmur or rub.  Extremities:  No clubbing or cyanosis. No edema noted.  Normal strength posterior tibial and dorsalis pedis pulses, equal bilaterally. Normal strength radial pulses, equal bilaterally.   Neurologic:  Alert & oriented x 3. Normal affect and mood.  No gross motor or sensory deficits.      LABS      Lab Results   Component Value Date    POTASSIUM 3.4 02/14/2020    SODIUM 143 02/04/2020    CO2 31 02/04/2020    CHLORIDE 105 02/04/2020    BUN 18 02/04/2020    CREATININE 1.09 02/04/2020    HGBA1C 5.5 02/04/2020    GLUCOSE 140 (H) 02/04/2020    WBC 7.1 02/04/2020    RBC 4.72 02/04/2020    HCT 40.6 02/04/2020    HGB 14.1 02/04/2020     02/04/2020       No results found for: NTPROB     Most Recent Lipid Panel:  No results found for: CHOLESTEROL  No results found for: HDL  No results found for: CHOHDL  No results found for: TRIGLYCERIDE  No results found for: CALCLDL      IMAGING       ECG (04/09/2019)  EKG normal with borderline QT. Compared to the prior EKG of March 2018, question QT prolongation otherwise without change. Abnormal.     ECHOCARDIOGRAM (05/06/2019)  Normal left ventricular size, systolic function and wall thickness, with no regional wall motion abnormalities.  Left ventricular ejection fraction, 63 %.  Grade I/IV diastolic dysfunction (abnormal relaxation filling pattern), normal to  mildly elevated filling pressures.  Aortic valve sclerosis without stenosis or regurgitation.  Right ventricular systolic pressure 24.2 mmHg.  No previous study.     STRESS TEST (05/06/2019)  Nuclear interpretation:  1. Raw data analysis reveals satisfactory myocardial uptake.  2. Analysis left ventricular function with t.i.d. of 1.05 which is upper limits of normal limits and may not be accurate in the setting of Lexiscan. Lung to heart ratio is normal at 0.29. Ejection fraction 0.58 at rest and 0.55 with stress. No discrete wall motion abnormalities are seen. Size normal at rest and some dilatation occurs which can be a normal response to vasodilator challenge.  3. Processed SPECT images with and without CT attenuation correction reveal a mild mid anterior wall defect with stress which normalizes at rest. There is also a moderate defect of the inferior wall base to and including the apex with stress which has significant improvement at rest.     Impression: Abnormal Cardiolite compatible with moderate inferior and mild mid anterior wall ischemia with normal ventricular function.     Cardiac catheterization may be a consideration.      There is no prior study for review although one was reported from 2013 at [facility] in [place] that was normal.      ECG INTERPRETATION   Normal sinus rhythm, 78 beats per minute.  Suggestion of inferior wall infarction is noted with Q-wave in lead 3, this is stable compared to previous EKGs dating back to 2018, borderline ECG today.  Stable compared to previous ECGs.    Dr. Barroso was available for phone consultation at the time of office visit.     Reuben Montes PA-C Cardiology.  9/3/2020  12:56 PM

## 2024-07-25 NOTE — DISCHARGE INSTR - PHARMACY
Please do not take warfarin until evening of your procedure on Tuesday July 30.    You will restart warfarin with your \"normal\" home dosing of 7.5mg (1 1/2 tablets) Wednesdays and 5mg (1 tablet) all other days.  You will return to [facility] for INR check on Wednesday August 14 at 9:40 prior to appointment with Dr Mata at 10:20  Please call [facility] at [phone] with any questions or concerns

## 2024-07-25 NOTE — DISCHARGE INSTRUCTIONS
No Coumadin starting on today until after your procedure with Dr Adams on Tuesday July 30    Wear oxygen @ 3lpm via nasal cannula while walking or exercise.

## 2024-07-25 NOTE — PROGRESS NOTES
Writer called Dr. ARRIAGA's office to verify patient is to stop coumadin today. This was verified at this time. Patient aware.

## 2024-07-25 NOTE — PROGRESS NOTES
Patient can not get his oxygen from [facility] because he o2 a year ago and has to use the same DME do to a medicare rule.  Referral made to [facility].  Paper work fax.    FATOU Langston

## 2024-07-25 NOTE — PROGRESS NOTES
Referral made & paper work fax to [facility] for home oxygen.  FATUO Langston

## 2024-07-25 NOTE — PLAN OF CARE
Problem: Discharge Planning  Goal: Discharge to home or other facility with appropriate resources  Outcome: Progressing  Flowsheets (Taken 7/25/2024 0218)  Discharge to home or other facility with appropriate resources: Identify barriers to discharge with patient and caregiver     Problem: Safety - Adult  Goal: Free from fall injury  Outcome: Progressing  Flowsheets (Taken 7/25/2024 0218)  Free From Fall Injury: Instruct family/caregiver on patient safety     Problem: ABCDS Injury Assessment  Goal: Absence of physical injury  Outcome: Progressing  Flowsheets (Taken 7/25/2024 0218)  Absence of Physical Injury: Implement safety measures based on patient assessment     Problem: Respiratory - Adult  Goal: Achieves optimal ventilation and oxygenation  Outcome: Progressing  Flowsheets (Taken 7/25/2024 0218)  Achieves optimal ventilation and oxygenation:   Assess for changes in respiratory status   Assess for changes in mentation and behavior   Position to facilitate oxygenation and minimize respiratory effort   Oxygen supplementation based on oxygen saturation or arterial blood gases     Problem: Genitourinary - Adult  Goal: Absence of urinary retention  Outcome: Progressing  Flowsheets (Taken 7/25/2024 0218)  Absence of urinary retention:   Assess patient’s ability to void and empty bladder   Monitor intake/output and perform bladder scan as needed     Problem: Infection - Adult  Goal: Absence of infection at discharge  Outcome: Progressing  Flowsheets (Taken 7/25/2024 0218)  Absence of infection at discharge: Assess and monitor for signs and symptoms of infection     Problem: Pain  Goal: Verbalizes/displays adequate comfort level or baseline comfort level  Outcome: Progressing  Flowsheets (Taken 7/25/2024 0218)  Verbalizes/displays adequate comfort level or baseline comfort level:   Encourage patient to monitor pain and request assistance   Assess pain using appropriate pain scale   Administer analgesics based on type

## 2024-07-25 NOTE — PROGRESS NOTES
Physical Therapy  Facility/Department: [facility] MED SURG  Daily Treatment Note  NAME: River Buckley  : 1955  MRN: [id number]    Date of Service: 2024    Discharge Recommendations:  Continue to assess pending progress, Home independently     Patient Diagnosis(es): The primary encounter diagnosis was Kidney stone. Diagnoses of Urinary tract infection with hematuria, site unspecified and Hypoxemia were also pertinent to this visit.    Assessment   Assessment: Pt. able to ambulate [id number] with no AD, SBA/SUP wiht O2. Transfers:SBA/SUP. Seated exercises B LE x20. STS x10  Activity Tolerance: Patient tolerated treatment well     Plan    Physical Therapy Plan  General Plan: 2 times a day 7 days a week  Specific Instructions for Next Treatment: 1x daily on weeekends  Current Treatment Recommendations: Strengthening;Balance training;Functional mobility training;Transfer training;Endurance training;Home exercise program;Neuromuscular re-education;Gait training;Stair training;Safety education & training;Therapeutic activities     Restrictions  Restrictions/Precautions  Restrictions/Precautions: General Precautions     Subjective    Subjective  Subjective: P. up in chair upon arrival, agreeable to therapy at this time.  Pain: denies  Orientation  Overall Orientation Status: Within Functional Limits     Objective   Bed Mobility Training  Bed Mobility Training: No  Transfer Training  Transfer Training: Yes  Overall Level of Assistance: Stand-by assistance;Assist X1;Supervision  Interventions: Verbal cues;Safety awareness training  Sit to Stand: Stand-by assistance;Assist X1;Supervision  Stand to Sit: Stand-by assistance;Assist X1;Supervision  Gait  Gait Training: Yes  Overall Level of Assistance: Stand-by assistance;Assist X1;Supervision  Distance (ft): 150 Feet  Assistive Device: None  PT Exercises  Exercise Treatment: Seated exercises B LE x20. STSx10  Safety Devices  Type of Devices: Call light within reach;Left

## 2024-07-25 NOTE — TELEPHONE ENCOUNTER
has not personally spoken with patient inregards to him holding his coumadin due to him currently being admitted.  will check back before end of day today; if patient has not been discharged yet, I will call in the morning.

## 2024-07-25 NOTE — PROGRESS NOTES
Progress Note    SUBJECTIVE:    Patient seen for f/u of Sepsis due to urinary tract infection (HCC).  He resting in chair no distress.  No complaints. Currently on 4L of oxygen.  Stated he used to have it at home but does not anymore.  No other complaints    ROS:   Constitutional: negative  for fevers, and negative for chills.  Respiratory: negative for shortness of breath, negative for cough, and negative for wheezing  Cardiovascular: negative for chest pain, and negative for palpitations  Gastrointestinal: negative for abdominal pain, negative for nausea,negative for vomiting, negative for diarrhea, and negative for constipation     All other systems were reviewed with the patient and are negative unless otherwise stated in HPI      OBJECTIVE:      Vitals:   Vitals:    07/25/24 0030   BP: 118/69   Pulse: 75   Resp:    Temp: 96.9 °F (36.1 °C)   SpO2: 94%     Weight - Scale: 128.6 kg (283 lb 8 oz)   Height: 182.9 cm (6')     Weight  Wt Readings from Last 3 Encounters:   07/25/24 128.6 kg (283 lb 8 oz)   06/17/24 128.4 kg (283 lb)   06/14/24 127 kg (280 lb)     Body mass index is 38.45 kg/m².    24HR INTAKE/OUTPUT:      Intake/Output Summary (Last 24 hours) at 7/25/2024 0748  Last data filed at 7/25/2024 0627  Gross per 24 hour   Intake 4326.2 ml   Output 3725 ml   Net 601.2 ml     -----------------------------------------------------------------  Exam:    GEN:    Awake, alert and oriented x3.   EYES:  EOMI, pupils equal   NECK: Supple. No lymphadenopathy.  No carotid bruit  CVS:    regular rate and rhythm, no audible murmur  PULM:  CTA, no wheezes, rales or rhonchi, no acute respiratory distress  ABD:    Bowels sounds normal.  Abdomen is soft.  No distention.  no tenderness to palpation.   EXT:   no edema bilaterally .  No calf tenderness.   NEURO: Moves all extremities.  Motor and sensory are grossly intact  SKIN:  No rashes.  No skin lesions.   hours.      Radiology/Imaging:  XR CHEST PORTABLE   Final Result   CHF.         CT ABDOMEN PELVIS WO CONTRAST Additional Contrast? None   Final Result   1. No acute intra-abdominal or pelvic process.   2. Nonobstructing left renal stone.   3. Cholelithiasis.   4. Diverticulosis.               ASSESSMENT / PLAN:    MEDICAL DECISION MAKING:    Primary Problem(s): Sepsis due to urinary tract infection (HCC)  Differential diagnoses: Sepsis, nephrolithiasis  Condition is an undiagnosed new problem with uncertain prognosis  Condition is stable  Treatment plan:   Blood cultures-Negative  Urine culture-pending  Consult urology-Discussed with him  Telemetry monitoring  Monitor labs and replace electrolytes  Imaging: No further imaging studies ordered today  Medications:   Stop  IV fluids  Patient given 1 L bolus in ED  30 mL/kg bolus not given due to CHF on chest x-ray  Stop IV Rocephin  Start Levoquin for 7 days  Continue Flomax and oxybutynin  Medication Monitoring / High Risk Medications: none      Diastolic CHF  Condition is stable  Treatment plan:  Strict I&O  Daily weights  Telemetry monitoring  Oxygen therapy protocol  PT OT  Imaging:   Last echo from 6/10/2024 showed an EF of 50 to 55%, mildly increased wall thickness, mild aortic stenosis with peak gradient 18 and mean gradient of 12 mmHg with no regurgitation, mild mitral regurgitation, mild tricuspid regurgitation  Medications:  IV Lasix x 1 given  Continue metoprolol  Continue losartan     Paroxysmal atrial fibrillation  Condition is a chronic stable condition  Treatment plan:   Telemetry monitoring  Imaging: no further imaging studies ordered today  Medications:   Continue metoprolol  Continue diltiazem  Pharmacy to dose warfarin (will need off for surgery, awaiting Dr ARRIAGA for finalization of day-likely Tuesday)       Type 2 diabetes  Condition is a chronic stable condition  Treatment plan:   Continue current treatment  Imaging: no further imaging studies ordered

## 2024-07-25 NOTE — DISCHARGE SUMMARY
Discharge Summary    River Buckley  :  1955  MRN:  [id number]    Admit date:  2024      Discharge date: 2024     Admitting Physician:  Ramez Verdin MD    Discharge Diagnoses:    Principal Problem:    Sepsis due to urinary tract infection (HCC)  Active Problems:    Essential hypertension, benign    Type 2 diabetes mellitus without complication, without long-term current use of insulin (HCC)    BPH with obstruction/lower urinary tract symptoms    AN treated with BiPAP    Paroxysmal atrial fibrillation (HCC)    Complicated UTI (urinary tract infection)    Left nephrolithiasis    Secondary hypercoagulable state (HCC)    CHF (congestive heart failure) (HCC)  Resolved Problems:    * No resolved hospital problems. *      Hospital Course:   River Buckley is a 68 y.o. male admitted with sepsis due to UTI.  He presented with sudden onset of left-sided flank pain.  Patient reported fever and chills.  He reported tachycardia and upon arrival heart rate was 122.  He does have history of kidney stones in the past.  He does follow with Dr. Adams.  Patient was placed on supplemental oxygen as his SpO2 decreased into the 80s upon arrival.  Patient does have history of AN, CAD, CABG x 2, type 2 diabetes, hypertension and atrial fibrillation chronically on Coumadin and mild aortic stenosis.  Urinalysis was positive for UTI and initial lactic acid was 2.7.  Patient was given some IV fluids and urology was consulted he was placed on IV antibiotics.  Blood cultures are negative.  Urine culture still pending however all previous urine cultures have grew out Klebsiella.  Patient will be placed on oral Levaquin.  In talking to the patient about oxygen he states he did at 1 point have oxygen at home but was removed from the home.  He does not follow with pulmonology in the recent past however will be set up with pulmonology on discharge for outpatient evaluation.  He does qualify for 3 L of oxygen with

## 2024-07-25 NOTE — PROGRESS NOTES
Entered room and patient is resting in the chair. Vitals and assessment completed at this time, see flowsheet for details. A&O x4. Patient rated pain 3/10 in left flank and requested PRN pain medication. Lung sounds clear throughout. Patient denies any further needs at this time. Call light in reach.

## 2024-07-25 NOTE — PROGRESS NOTES
[address] [address]        Patient Name: River Buckley 1955           A home oxygen evaluation has been completed.       Patient was placed on room air for 15 minutes. SpO2 was 91 % on room air at rest. Patient walked for approximately 3 minutes with oxygen saturation of 86%.  Oxygen was applied at 3 lpm via nasal cannula to maintain a SpO2 between 90-92% while walking. Actual SpO2 was 92-93 %. Patients SpO2 was below 88% and qualified for home oxygen.      Patient uses [email or web address] for his home care needs.

## 2024-07-25 NOTE — PROGRESS NOTES
Spiritual Services Interventions  0320/0320-01   7/25/2024  Anthony Buckley  68 y.o. year old male    Encounter Summary  Encounter Overview/Reason: (P) Spiritual/Emotional Needs  Service Provided For: (P) Patient  Referral/Consult From: (P) Rounding  Support System: Spouse, Family members  Last Encounter : (P) 07/25/24  Complexity of Encounter: (P) Moderate  Begin Time: (P) 1400  End Time : (P) 1415  Total Time Calculated: (P) 15 min     Spiritual/Emotional needs  Type: (P) Spiritual Support                    Assessment/Intervention/Outcome  Assessment: (P) Calm  Intervention: (P) Discussed belief system/Scientologist practices/sanjay, Discussed illness injury and it’s impact  Outcome: (P) Encouraged, Engaged in conversation

## 2024-07-25 NOTE — PROGRESS NOTES
Oxygen delivered to patient.  Medical Services personnel explained oxygen usage.   Writer reviewed discharge instructions with patient and spouse.  Patient aware of need to  prescriptions.   Reviewed new medications and side effects to monitor for.  Reviewed home medications and when next dose is due.   Patient aware to not take his coumadin until evening of 7/30/2024.  Patient aware of date/time of follow up appointments.  Instructed to follow a diabetic, low salt diet and to increase fluid intakes.  Aware to wear oxygen @ 3 lpm via nasal cannula while walking/exercising.   Educational handouts given on UTI, Sepsis, Oxygen therapy, Laser lithotripsy: pre-op, and Ureteral Stent Placement: Pre-op.   Questions answered.   Verbalizes understanding.   Copy of discharge instructions given to patient.

## 2024-07-25 NOTE — TELEPHONE ENCOUNTER
Dr Lennon has given instructions for patient to come off Coumadin. Mr. Buckley's coumadin instructions have been added to his discharge summary. Writer will call patient this afternoon to ensure understanding.

## 2024-07-25 NOTE — PROGRESS NOTES
Physical Therapy  Facility/Department: [facility] MED SURG  Daily Treatment Note  NAME: River Buckley  : 1955  MRN: [id number]    Date of Service: 2024    Discharge Recommendations:  Continue to assess pending progress, Home independently     Patient Diagnosis(es): The primary encounter diagnosis was Kidney stone. Diagnoses of Urinary tract infection with hematuria, site unspecified and Hypoxemia were also pertinent to this visit.    Assessment   Assessment: Pt. ambulated 80ftx1 with no AD, SBA for safety. Transfers:SBA. V/cfor safety with line management. Seated exercises B EL x20. STS 5x2/  Activity Tolerance: Patient tolerated treatment well     Plan    Physical Therapy Plan  General Plan: 2 times a day 7 days a week  Specific Instructions for Next Treatment: 1x daily on weeekends  Current Treatment Recommendations: Strengthening;Balance training;Functional mobility training;Transfer training;Endurance training;Home exercise program;Neuromuscular re-education;Gait training;Stair training;Safety education & training;Therapeutic activities     Restrictions  Restrictions/Precautions  Restrictions/Precautions: General Precautions     Subjective    Subjective  Subjective: Pt. up in chair upon arrival with RN present, agreeable to therapy at this time.  Pain: denies  Orientation  Overall Orientation Status: Within Functional Limits     Objective   Bed Mobility Training  Bed Mobility Training: No  Transfer Training  Transfer Training: Yes  Overall Level of Assistance: Stand-by assistance;Assist X1  Interventions: Verbal cues;Safety awareness training  Sit to Stand: Stand-by assistance;Assist X1  Stand to Sit: Stand-by assistance;Assist X1  Gait  Gait Training: Yes  Overall Level of Assistance: Stand-by assistance;Assist X1  Distance (ft): 80 Feet  Assistive Device: None  PT Exercises  Exercise Treatment: Seated exercises B LE x20. STS 5x2  Safety Devices  Type of Devices: Call light within reach;Chair alarm

## 2024-07-25 NOTE — PROGRESS NOTES
Vitals and assessment complete. See flowsheets for details. Patient is sitting up in the chair. Patient is A&O x4. Patient denies pain. Breathing is unlabored. Dyspnea noted with exertion. SPO2 is 94% on 2L O2 nasal cannula. Lung sounds are clear throughout. Patient reports feeling better today compared to yesterday. Patient denies further needs at this time. Call light is within reach. Care ongoing.

## 2024-07-26 ENCOUNTER — TELEPHONE (OUTPATIENT)
Dept: FAMILY MEDICINE CLINIC | Age: 69
End: 2024-07-26

## 2024-07-26 ENCOUNTER — TELEPHONE (OUTPATIENT)
Dept: PREADMISSION TESTING | Age: 69
End: 2024-07-26

## 2024-07-26 ENCOUNTER — APPOINTMENT (OUTPATIENT)
Dept: PHARMACY | Age: 69
End: 2024-07-26
Payer: MEDICARE

## 2024-07-26 DIAGNOSIS — R09.02 HYPOXIA: Primary | ICD-10-CM

## 2024-07-26 DIAGNOSIS — J96.01 ACUTE RESPIRATORY FAILURE WITH HYPOXIA (HCC): ICD-10-CM

## 2024-07-26 DIAGNOSIS — I50.9 CHRONIC CONGESTIVE HEART FAILURE, UNSPECIFIED HEART FAILURE TYPE (HCC): ICD-10-CM

## 2024-07-26 LAB
MICROORGANISM SPEC CULT: ABNORMAL
SERVICE CMNT-IMP: ABNORMAL
SERVICE CMNT-IMP: ABNORMAL
SPECIMEN DESCRIPTION: ABNORMAL
SPECIMEN DESCRIPTION: ABNORMAL

## 2024-07-26 NOTE — TELEPHONE ENCOUNTER
Functional class 1.    Paroxysmal atrial fibrillation, totally asymptomatic with a controlled response documented on December 19, 2022.  Cardioversion on January 30, 2023, with him remaining in sinus rhythm, although he could tell no difference whether he is in sinus rhythm or atrial fibrillation.  Strong family history of coronary artery disease.  Catheterization on July 23, 2021, that showed 60% left main trunk, 80% proximal left anterior descending, 80% large obtuse marginal branch, 90% small posterior ventricular branch of the right coronary artery, ejection fraction 55%.  Open heart surgery by Dr. Duran on August 4, 2021, with left internal mammary artery to left anterior descending, vein graft to the obtuse marginal branch of the circumflex with the posterior ventricular branch of the right coronary artery too small to bypass.  On Coumadin for anticoagulation for his paroxysmal atrial fibrillation.  Hypertension, well controlled.  Non-insulin-dependent diabetes with his last hemoglobin A1c of 8.7.  Hyperlipidemia, under excellent control.  Sleep apnea, on BiPAP mask.  Mild aortic stenosis with normal left ventricular function    Has recenlty seen Dr Pruitt and has clearance with orders for when to hold coumadin. OK to proceed with anesthesia.

## 2024-07-26 NOTE — PROGRESS NOTES
Patient instructed on the pre-operative, intra-operative, and post-operative process. Patient instructed on NPO status. Medication instructions and pre operative instruction sheet reviewed with the patient. Instructed pt to take metoprolol and cardizem with a small sip of water prior to arriving to the hospital the day of surgery. Patient states he was instructed to stop aspirin and coumadin as of today per Dr. Adams's office.

## 2024-07-26 NOTE — TELEPHONE ENCOUNTER
Patient is calling to see if Dr. Mata can order him a portable oxygen tank.  I believe patient was discharged from hospital with oxygen.  Patient last seen 5/13/24.    Next appt 8/14/24    Patient knows Dr. Mata is out of office until Monday.    Health Maintenance   Topic Date Due    DTaP/Tdap/Td vaccine (1 - Tdap) Never done    Shingles vaccine (1 of 2) Never done    Respiratory Syncytial Virus (RSV) Pregnant or age 60 yrs+ (1 - 1-dose 60+ series) Never done    Diabetic retinal exam  12/07/2021    COVID-19 Vaccine (5 - 2023-24 season) 09/01/2023    Flu vaccine (1) 08/01/2024    Annual Wellness Visit (Medicare)  08/11/2024    Diabetic foot exam  02/12/2025    Diabetic Alb to Cr ratio (uACR) test  02/12/2025    Depression Screen  02/12/2025    A1C test (Diabetic or Prediabetic)  05/13/2025    Lipids  06/10/2025    GFR test (Diabetes, CKD 3-4, OR last GFR 15-59)  07/25/2025    Colorectal Cancer Screen  03/01/2029    Pneumococcal 65+ years Vaccine  Completed    AAA screen  Completed    Hepatitis C screen  Completed    Hepatitis A vaccine  Aged Out    Hepatitis B vaccine  Aged Out    Hib vaccine  Aged Out    Polio vaccine  Aged Out    Meningococcal (ACWY) vaccine  Aged Out    Prostate Specific Antigen (PSA) Screening or Monitoring  Discontinued             (applicable per patient's age: Cancer Screenings, Depression Screening, Fall Risk Screening, Immunizations)    Hemoglobin A1C (%)   Date Value   05/13/2024 8.7   02/12/2024 8.6   08/11/2023 7.8     AST (U/L)   Date Value   06/10/2024 28     ALT (U/L)   Date Value   06/10/2024 37     BUN (mg/dL)   Date Value   07/25/2024 12      (goal A1C is < 7)   (goal LDL is <100) need 30-50% reduction from baseline     BP Readings from Last 3 Encounters:   07/25/24 (!) 149/79   06/17/24 (!) 140/70   06/14/24 113/75    (goal /80)      All Future Testing planned in [facility]:  Lab Frequency Next Occurrence   TSH with Reflex Once 06/17/2025   CBC with Auto Differential Once

## 2024-07-26 NOTE — TELEPHONE ENCOUNTER
Writer was able to speak to Mr Buckley personally this afternoon.  Macrio ensured writer, he did receive his instructions for holding his Coumadin yesterday. Patient verbalized understanding and has not taken his Coumadin today.

## 2024-07-28 LAB
MICROORGANISM SPEC CULT: NORMAL
SERVICE CMNT-IMP: NORMAL
SPECIMEN DESCRIPTION: NORMAL

## 2024-07-29 ENCOUNTER — ANESTHESIA EVENT (OUTPATIENT)
Dept: OPERATING ROOM | Age: 69
End: 2024-07-29
Payer: MEDICARE

## 2024-07-29 LAB
MICROORGANISM SPEC CULT: NORMAL
SERVICE CMNT-IMP: NORMAL
SPECIMEN DESCRIPTION: NORMAL

## 2024-07-29 NOTE — TELEPHONE ENCOUNTER
Care Transitions Initial Follow Up Call    Outreach made within 2 business days of discharge: Yes    Patient: River Buckley Patient : 1955   MRN: [id number]  Reason for Admission: sepsis, hypoxia  Discharge Date: 24       Spoke with: River    Discharge department/facility: [facility] Interactive Patient Contact:  Was patient able to fill all prescriptions: Yes  Was patient instructed to bring all medications to the follow-up visit: Yes  Is patient taking all medications as directed in the discharge summary? Yes  Does patient understand their discharge instructions: Yes  Does patient have questions or concerns that need addressed prior to 7-14 day follow up office visit: no    Additional needs identified to be addressed with provider  Portable O2 tank needed, on 3L continuous for hypoxia, [facility] [place]             Scheduled appointment with PCP within 7-14 days    Follow Up  Future Appointments   Date Time Provider Department Center   2024  1:15 PM Tejinder Sandhu PA-C [facility]   2024  9:20 AM Paul Tobias DO [facility]   2024  3:00 PM [facility]   2024  9:40 AM TERRENCE MEDICATION [facility]   2024 10:20 AM Mary Mata DO [facility]   2025 10:30 AM [facility]ulfo Lennon [facility]       SIENNA CHRISTINA MA

## 2024-07-29 NOTE — PROGRESS NOTES
Physician Progress Note      PATIENT:               JACE SUN  CSN #:                  [id number]  :                       1955  ADMIT DATE:       2024 11:48 PM  DISCH DATE:        2024 5:05 PM  RESPONDING  PROVIDER #:        ROSE MARIE ALBERTS APRN - CNP          QUERY TEXT:    Rose Marie Nogueira,    Pt admitted Sepsis with 86%, Placed on 3-5 LNC to maintain SPO2 94%. Home 02   Evaluation completed & Pt qualified for 3LNC with activity.  If possible,   please document in the progress notes and discharge summary if you are   evaluating and/or treating any of the following:    The medical record reflects the following:  Risk Factors: AN  Clinical Indicators: ED RN: Patient destatted to 86% on 3L, patient increased   to 5L at this time, saturation improved to 89-90% Attempted to wean patient   off NC, SpO2 87% on RA.  Home 02 Evaluation completed & Pt qualified for 3LNC   with activity.  Treatment: 3-5LNC continuous supplemental 02 to maintain SPOC 94%    Leatha Vaughan BSN, RN  Options provided:  -- Acute respiratory failure with hypoxia  -- Acute on chronic respiratory failure with hypoxia  -- No respiratory failure  -- Other - I will add my own diagnosis  -- Disagree - Not applicable / Not valid  -- Disagree - Clinically unable to determine / Unknown  -- Refer to Clinical Documentation Reviewer    PROVIDER RESPONSE TEXT:    This patient is in acute on chronic respiratory failure with hypoxia.    Query created by: Rossy Vaughan on 2024 9:38 AM      Electronically signed by:  ROSE MARIE FELIPE - CNP 2024 4:01 PM

## 2024-07-30 ENCOUNTER — HOSPITAL ENCOUNTER (OUTPATIENT)
Age: 69
Setting detail: OUTPATIENT SURGERY
Discharge: HOME | End: 2024-07-30
Attending: UROLOGY | Admitting: UROLOGY
Payer: MEDICARE

## 2024-07-30 ENCOUNTER — ANESTHESIA (OUTPATIENT)
Dept: OPERATING ROOM | Age: 69
End: 2024-07-30
Payer: MEDICARE

## 2024-07-30 ENCOUNTER — APPOINTMENT (OUTPATIENT)
Dept: CT IMAGING | Age: 69
End: 2024-07-30
Payer: MEDICARE

## 2024-07-30 ENCOUNTER — APPOINTMENT (OUTPATIENT)
Dept: GENERAL RADIOLOGY | Age: 69
End: 2024-07-30
Attending: UROLOGY
Payer: MEDICARE

## 2024-07-30 ENCOUNTER — HOSPITAL ENCOUNTER (EMERGENCY)
Age: 69
Discharge: HOME | End: 2024-07-30
Attending: EMERGENCY MEDICINE
Payer: MEDICARE

## 2024-07-30 ENCOUNTER — APPOINTMENT (OUTPATIENT)
Dept: GENERAL RADIOLOGY | Age: 69
End: 2024-07-30
Payer: MEDICARE

## 2024-07-30 VITALS
BODY MASS INDEX: 36.57 KG/M2 | RESPIRATION RATE: 19 BRPM | SYSTOLIC BLOOD PRESSURE: 113 MMHG | HEIGHT: 72 IN | DIASTOLIC BLOOD PRESSURE: 73 MMHG | OXYGEN SATURATION: 91 % | HEART RATE: 104 BPM | TEMPERATURE: 99 F | WEIGHT: 270 LBS

## 2024-07-30 VITALS
SYSTOLIC BLOOD PRESSURE: 153 MMHG | DIASTOLIC BLOOD PRESSURE: 79 MMHG | OXYGEN SATURATION: 94 % | HEIGHT: 72 IN | HEART RATE: 72 BPM | BODY MASS INDEX: 36.79 KG/M2 | TEMPERATURE: 96.9 F | WEIGHT: 271.6 LBS | RESPIRATION RATE: 18 BRPM

## 2024-07-30 DIAGNOSIS — R31.0 GROSS HEMATURIA: Primary | ICD-10-CM

## 2024-07-30 DIAGNOSIS — N20.0 KIDNEY STONE: ICD-10-CM

## 2024-07-30 LAB
ALBUMIN SERPL-MCNC: 3.8 G/DL (ref 3.5–5.2)
ALP SERPL-CCNC: 80 U/L (ref 40–129)
ALT SERPL-CCNC: 64 U/L (ref 5–41)
ANION GAP SERPL CALCULATED.3IONS-SCNC: 17 MMOL/L (ref 9–17)
AST SERPL-CCNC: 62 U/L
BASOPHILS # BLD: 0.01 K/UL (ref 0–0.2)
BASOPHILS NFR BLD: 0 % (ref 0–2)
BILIRUB SERPL-MCNC: 1 MG/DL (ref 0.3–1.2)
BUN SERPL-MCNC: 19 MG/DL (ref 8–23)
BUN/CREAT SERPL: 19 (ref 9–20)
CALCIUM SERPL-MCNC: 9.2 MG/DL (ref 8.6–10.4)
CHLORIDE SERPL-SCNC: 100 MMOL/L (ref 98–107)
CO2 SERPL-SCNC: 21 MMOL/L (ref 20–31)
CREAT SERPL-MCNC: 1 MG/DL (ref 0.7–1.2)
EOSINOPHIL # BLD: 0.04 K/UL (ref 0–0.4)
EOSINOPHILS RELATIVE PERCENT: 0 % (ref 0–5)
ERYTHROCYTE [DISTWIDTH] IN BLOOD BY AUTOMATED COUNT: 13.7 % (ref 12.1–15.2)
GFR, ESTIMATED: 80 ML/MIN/1.73M2
GLUCOSE SERPL-MCNC: 200 MG/DL (ref 70–99)
HCT VFR BLD AUTO: 47.7 % (ref 41–53)
HGB BLD-MCNC: 16.2 G/DL (ref 13.5–17.5)
IMM GRANULOCYTES # BLD AUTO: 0.29 K/UL (ref 0–0.3)
IMM GRANULOCYTES NFR BLD: 3 % (ref 0–5)
LYMPHOCYTES NFR BLD: 0.29 K/UL (ref 1–4.8)
LYMPHOCYTES RELATIVE PERCENT: 3 % (ref 13–44)
MCH RBC QN AUTO: 31.5 PG (ref 26–34)
MCHC RBC AUTO-ENTMCNC: 34 G/DL (ref 31–37)
MCV RBC AUTO: 92.8 FL (ref 80–100)
MONOCYTES NFR BLD: 0.58 K/UL (ref 0–1)
MONOCYTES NFR BLD: 5 % (ref 5–9)
NEUTROPHILS NFR BLD: 89 % (ref 39–75)
NEUTS SEG NFR BLD: 9.76 K/UL (ref 2.1–6.5)
PLATELET # BLD AUTO: 170 K/UL (ref 140–450)
PMV BLD AUTO: 10 FL (ref 6–12)
POTASSIUM SERPL-SCNC: 4.4 MMOL/L (ref 3.7–5.3)
PROT SERPL-MCNC: 7 G/DL (ref 6.4–8.3)
RBC # BLD AUTO: 5.14 M/UL (ref 4.5–5.9)
SODIUM SERPL-SCNC: 138 MMOL/L (ref 135–144)
WBC OTHER # BLD: 11 K/UL (ref 3.5–11)

## 2024-07-30 PROCEDURE — 99284 EMERGENCY DEPT VISIT MOD MDM: CPT

## 2024-07-30 PROCEDURE — 85025 COMPLETE CBC W/AUTO DIFF WBC: CPT

## 2024-07-30 PROCEDURE — C2617 STENT, NON-COR, TEM W/O DEL: HCPCS | Performed by: UROLOGY

## 2024-07-30 PROCEDURE — 7100000001 HC PACU RECOVERY - ADDTL 15 MIN: Performed by: UROLOGY

## 2024-07-30 PROCEDURE — 3700000000 HC ANESTHESIA ATTENDED CARE: Performed by: UROLOGY

## 2024-07-30 PROCEDURE — 74176 CT ABD & PELVIS W/O CONTRAST: CPT

## 2024-07-30 PROCEDURE — 7100000000 HC PACU RECOVERY - FIRST 15 MIN: Performed by: UROLOGY

## 2024-07-30 PROCEDURE — C1769 GUIDE WIRE: HCPCS | Performed by: UROLOGY

## 2024-07-30 PROCEDURE — 2720000010 HC SURG SUPPLY STERILE: Performed by: UROLOGY

## 2024-07-30 PROCEDURE — 2500000003 HC RX 250 WO HCPCS: Performed by: NURSE ANESTHETIST, CERTIFIED REGISTERED

## 2024-07-30 PROCEDURE — 3600000014 HC SURGERY LEVEL 4 ADDTL 15MIN: Performed by: UROLOGY

## 2024-07-30 PROCEDURE — 80053 COMPREHEN METABOLIC PANEL: CPT

## 2024-07-30 PROCEDURE — 7100000011 HC PHASE II RECOVERY - ADDTL 15 MIN: Performed by: UROLOGY

## 2024-07-30 PROCEDURE — 3600000004 HC SURGERY LEVEL 4 BASE: Performed by: UROLOGY

## 2024-07-30 PROCEDURE — 6370000000 HC RX 637 (ALT 250 FOR IP): Performed by: UROLOGY

## 2024-07-30 PROCEDURE — 7100000010 HC PHASE II RECOVERY - FIRST 15 MIN: Performed by: UROLOGY

## 2024-07-30 PROCEDURE — 6370000000 HC RX 637 (ALT 250 FOR IP): Performed by: NURSE ANESTHETIST, CERTIFIED REGISTERED

## 2024-07-30 PROCEDURE — 2580000003 HC RX 258: Performed by: NURSE ANESTHETIST, CERTIFIED REGISTERED

## 2024-07-30 PROCEDURE — 71045 X-RAY EXAM CHEST 1 VIEW: CPT

## 2024-07-30 PROCEDURE — 2709999900 HC NON-CHARGEABLE SUPPLY: Performed by: UROLOGY

## 2024-07-30 PROCEDURE — 3700000001 HC ADD 15 MINUTES (ANESTHESIA): Performed by: UROLOGY

## 2024-07-30 PROCEDURE — 6360000002 HC RX W HCPCS: Performed by: NURSE ANESTHETIST, CERTIFIED REGISTERED

## 2024-07-30 PROCEDURE — 6360000002 HC RX W HCPCS: Performed by: UROLOGY

## 2024-07-30 DEVICE — URETERAL STENT
Type: IMPLANTABLE DEVICE | Site: URETER | Status: FUNCTIONAL
Brand: PERCUFLEX™ PLUS

## 2024-07-30 RX ORDER — NALOXONE HYDROCHLORIDE 0.4 MG/ML
INJECTION, SOLUTION INTRAMUSCULAR; INTRAVENOUS; SUBCUTANEOUS PRN
Status: DISCONTINUED | OUTPATIENT
Start: 2024-07-30 | End: 2024-07-30 | Stop reason: HOSPADM

## 2024-07-30 RX ORDER — SODIUM CHLORIDE 0.9 % (FLUSH) 0.9 %
5-40 SYRINGE (ML) INJECTION EVERY 12 HOURS SCHEDULED
Status: DISCONTINUED | OUTPATIENT
Start: 2024-07-30 | End: 2024-07-30 | Stop reason: HOSPADM

## 2024-07-30 RX ORDER — FENTANYL CITRATE 50 UG/ML
INJECTION, SOLUTION INTRAMUSCULAR; INTRAVENOUS PRN
Status: DISCONTINUED | OUTPATIENT
Start: 2024-07-30 | End: 2024-07-30 | Stop reason: SDUPTHER

## 2024-07-30 RX ORDER — ONDANSETRON 2 MG/ML
INJECTION INTRAMUSCULAR; INTRAVENOUS PRN
Status: DISCONTINUED | OUTPATIENT
Start: 2024-07-30 | End: 2024-07-30 | Stop reason: SDUPTHER

## 2024-07-30 RX ORDER — LEVOFLOXACIN 5 MG/ML
750 INJECTION, SOLUTION INTRAVENOUS EVERY 24 HOURS
Status: DISCONTINUED | OUTPATIENT
Start: 2024-07-30 | End: 2024-07-30 | Stop reason: HOSPADM

## 2024-07-30 RX ORDER — SODIUM CHLORIDE 0.9 % (FLUSH) 0.9 %
5-40 SYRINGE (ML) INJECTION PRN
Status: DISCONTINUED | OUTPATIENT
Start: 2024-07-30 | End: 2024-07-30 | Stop reason: HOSPADM

## 2024-07-30 RX ORDER — FENTANYL CITRATE 50 UG/ML
50 INJECTION, SOLUTION INTRAMUSCULAR; INTRAVENOUS EVERY 5 MIN PRN
Status: DISCONTINUED | OUTPATIENT
Start: 2024-07-30 | End: 2024-07-30 | Stop reason: HOSPADM

## 2024-07-30 RX ORDER — FENTANYL CITRATE 50 UG/ML
25 INJECTION, SOLUTION INTRAMUSCULAR; INTRAVENOUS EVERY 5 MIN PRN
Status: DISCONTINUED | OUTPATIENT
Start: 2024-07-30 | End: 2024-07-30 | Stop reason: HOSPADM

## 2024-07-30 RX ORDER — LIDOCAINE HYDROCHLORIDE 20 MG/ML
INJECTION, SOLUTION EPIDURAL; INFILTRATION; INTRACAUDAL; PERINEURAL PRN
Status: DISCONTINUED | OUTPATIENT
Start: 2024-07-30 | End: 2024-07-30 | Stop reason: SDUPTHER

## 2024-07-30 RX ORDER — METOCLOPRAMIDE HYDROCHLORIDE 5 MG/ML
10 INJECTION INTRAMUSCULAR; INTRAVENOUS
Status: DISCONTINUED | OUTPATIENT
Start: 2024-07-30 | End: 2024-07-30 | Stop reason: HOSPADM

## 2024-07-30 RX ORDER — OXYCODONE HYDROCHLORIDE AND ACETAMINOPHEN 5; 325 MG/1; MG/1
1 TABLET ORAL EVERY 4 HOURS PRN
COMMUNITY

## 2024-07-30 RX ORDER — SODIUM CHLORIDE 9 MG/ML
INJECTION, SOLUTION INTRAVENOUS PRN
Status: DISCONTINUED | OUTPATIENT
Start: 2024-07-30 | End: 2024-07-30 | Stop reason: HOSPADM

## 2024-07-30 RX ORDER — SODIUM CHLORIDE, SODIUM LACTATE, POTASSIUM CHLORIDE, CALCIUM CHLORIDE 600; 310; 30; 20 MG/100ML; MG/100ML; MG/100ML; MG/100ML
INJECTION, SOLUTION INTRAVENOUS CONTINUOUS
Status: DISCONTINUED | OUTPATIENT
Start: 2024-07-30 | End: 2024-07-30 | Stop reason: HOSPADM

## 2024-07-30 RX ORDER — PROPOFOL 10 MG/ML
INJECTION, EMULSION INTRAVENOUS PRN
Status: DISCONTINUED | OUTPATIENT
Start: 2024-07-30 | End: 2024-07-30 | Stop reason: SDUPTHER

## 2024-07-30 RX ORDER — DEXAMETHASONE SODIUM PHOSPHATE 4 MG/ML
INJECTION, SOLUTION INTRA-ARTICULAR; INTRALESIONAL; INTRAMUSCULAR; INTRAVENOUS; SOFT TISSUE PRN
Status: DISCONTINUED | OUTPATIENT
Start: 2024-07-30 | End: 2024-07-30 | Stop reason: SDUPTHER

## 2024-07-30 RX ORDER — ACETAMINOPHEN 325 MG/1
650 TABLET ORAL ONCE
Status: COMPLETED | OUTPATIENT
Start: 2024-07-30 | End: 2024-07-30

## 2024-07-30 RX ORDER — LIDOCAINE HYDROCHLORIDE 20 MG/ML
JELLY TOPICAL PRN
Status: DISCONTINUED | OUTPATIENT
Start: 2024-07-30 | End: 2024-07-30 | Stop reason: ALTCHOICE

## 2024-07-30 RX ADMIN — ACETAMINOPHEN 650 MG: 325 TABLET ORAL at 10:25

## 2024-07-30 RX ADMIN — DEXAMETHASONE SODIUM PHOSPHATE 4 MG: 4 INJECTION INTRA-ARTICULAR; INTRALESIONAL; INTRAMUSCULAR; INTRAVENOUS; SOFT TISSUE at 13:30

## 2024-07-30 RX ADMIN — LEVOFLOXACIN 750 MG: 5 INJECTION, SOLUTION INTRAVENOUS at 13:11

## 2024-07-30 RX ADMIN — LIDOCAINE HYDROCHLORIDE 5 ML: 20 INJECTION, SOLUTION EPIDURAL; INFILTRATION; INTRACAUDAL; PERINEURAL at 13:24

## 2024-07-30 RX ADMIN — FENTANYL CITRATE 50 MCG: 50 INJECTION INTRAMUSCULAR; INTRAVENOUS at 13:23

## 2024-07-30 RX ADMIN — SODIUM CHLORIDE, POTASSIUM CHLORIDE, SODIUM LACTATE AND CALCIUM CHLORIDE: 600; 310; 30; 20 INJECTION, SOLUTION INTRAVENOUS at 10:38

## 2024-07-30 RX ADMIN — ONDANSETRON 4 MG: 2 INJECTION INTRAMUSCULAR; INTRAVENOUS at 13:30

## 2024-07-30 RX ADMIN — FENTANYL CITRATE 50 MCG: 50 INJECTION INTRAMUSCULAR; INTRAVENOUS at 13:37

## 2024-07-30 RX ADMIN — PROPOFOL 130 MG: 10 INJECTION, EMULSION INTRAVENOUS at 13:24

## 2024-07-30 RX ADMIN — PROPOFOL 70 MG: 10 INJECTION, EMULSION INTRAVENOUS at 13:30

## 2024-07-30 ASSESSMENT — PAIN DESCRIPTION - PAIN TYPE: TYPE: ACUTE PAIN

## 2024-07-30 ASSESSMENT — PAIN DESCRIPTION - LOCATION: LOCATION: BACK

## 2024-07-30 ASSESSMENT — PAIN DESCRIPTION - ORIENTATION: ORIENTATION: LEFT;LOWER

## 2024-07-30 ASSESSMENT — PAIN DESCRIPTION - DESCRIPTORS: DESCRIPTORS: STABBING

## 2024-07-30 ASSESSMENT — PAIN - FUNCTIONAL ASSESSMENT: PAIN_FUNCTIONAL_ASSESSMENT: 0-10

## 2024-07-30 ASSESSMENT — PAIN SCALES - GENERAL
PAINLEVEL_OUTOF10: 0
PAINLEVEL_OUTOF10: 5

## 2024-07-30 ASSESSMENT — LIFESTYLE VARIABLES
HOW OFTEN DO YOU HAVE A DRINK CONTAINING ALCOHOL: MONTHLY OR LESS
HOW MANY STANDARD DRINKS CONTAINING ALCOHOL DO YOU HAVE ON A TYPICAL DAY: 1 OR 2

## 2024-07-30 NOTE — ANESTHESIA PRE PROCEDURE
Department of Anesthesiology  Preprocedure Note       Name:  River Buckley   Age:  69 y.o.  :  1955                                          MRN:  [id number]         Date:  2024      Surgeon: Surgeon(s):  Marito Adams MD    Procedure: Procedure(s):  CYSTOSCOPY URETEROSCOPY LASER-THULIUM LITHORIPSY LASER  CYSTOSCOPY URETERAL STENT INSERTION - PLACEMENT / EXCHANGE    Medications prior to admission:   Prior to Admission medications    Medication Sig Start Date End Date Taking? Authorizing Provider   levoFLOXacin (LEVAQUIN) 500 MG tablet Take 1 tablet by mouth daily for 6 doses 24  Rose Marie Arzate APRN - CNP   tamsulosin (FLOMAX) 0.4 MG capsule Take 1 capsule by mouth daily 24   Rose Marie Arzate APRN - CNP   oxyBUTYnin (DITROPAN-XL) 10 MG extended release tablet Take 1 tablet by mouth every evening 24   Marito Adams MD   glimepiride (AMARYL) 4 MG tablet Take 1 tablet by mouth 2 times daily (with meals) 24   Mary Mata DO   dilTIAZem (CARDIZEM CD) 300 MG extended release capsule TAKE 1 CAPSULE BY MOUTH DAILY 3/11/24   Arnulfo Lennon MD   losartan (COZAAR) 25 MG tablet Take 1 tablet by mouth Daily with lunch 2/12/24 8/10/24  Mary Mata DO   metoprolol succinate (TOPROL XL) 25 MG extended release tablet TAKE 1 TABLET BY MOUTH EVERY DAY 24   Mary Mata DO   metFORMIN (GLUCOPHAGE) 1000 MG tablet TAKE 1 TABLET BY MOUTH TWICE DAILY WITH MEALS 24   Mary Mata DO   rosuvastatin (CRESTOR) 40 MG tablet TAKE 1 TABLET BY MOUTH EVERY EVENING 24   Mary Mata DO   warfarin (COUMADIN) 5 MG tablet TAKE 1 & 1/2 (ONE AND ONE-HALF) TABLETS BY MOUTH EVERY WEDNESDAY AND 1 TABLET ALL OTHER DAYS or AS DIRECTED by medication management clinic 23   Mary Mata DO   CONTOUR NEXT TEST strip 100 each by Other route daily 10/10/23   Mary Mata DO   vitamin D (CHOLECALCIFEROL) 50 MCG (2000) TABS tablet

## 2024-07-30 NOTE — ANESTHESIA POSTPROCEDURE EVALUATION
Department of Anesthesiology  Postprocedure Note    Patient: River Buckley  MRN: [id number]  YOB: 1955  Date of evaluation: 7/30/2024    Procedure Summary       Date: 07/30/24 Room / Location: [facility]    Anesthesia Start: 1320 Anesthesia Stop: 1404    Procedures:       CYSTOSCOPY URETEROSCOPY LASER-THULIUM LITHORIPSY LASER (Left)      CYSTOSCOPY URETERAL STENT INSERTION - PLACEMENT / EXCHANGE (Left) Diagnosis:       Renal stone      (Renal stone [N20.0])    Surgeons: Marito Adams MD Responsible Provider: Anamaria Monreal APRN - CRNA    Anesthesia Type: general ASA Status: 4            Anesthesia Type: No value filed.    German Phase I: German Score: 8    German Phase II: German Score: 10    Anesthesia Post Evaluation    Patient location during evaluation: PACU  Patient participation: complete - patient participated  Level of consciousness: awake and alert  Airway patency: patent  Nausea & Vomiting: no nausea and no vomiting  Cardiovascular status: blood pressure returned to baseline  Respiratory status: acceptable  Hydration status: euvolemic  Pain management: satisfactory to patient and adequate    No notable events documented.

## 2024-07-30 NOTE — DISCHARGE INSTRUCTIONS
SAME DAY SURGERY DISCHARGE INSTRUCTIONS    1.  Do not drive or operate hazardous machinery for 24 hours.    2.  Do not make important personal or business decisions for 24 hours.    3.  Do not drink alcoholic beverages for 24 hours.    4.  Do not smoke tobacco products for 24 hours.    5.  Eat light foods (Jell-O, soups, etc....) and drink plenty of fluids (water, Sprite, etc...) up to 8 glasses per day, as you can tolerate.    6.  Limit your activities for 24 hours.  Do not engage in heavy work until your surgeon gives you permission.      7.  Patient should not be left alone for 12-24 hours following surgical procedure.    8.  Wash hands before and after incision care.  It is important to practice good personal hygiene during the post op period.    9.  Call your surgeon for any questions regarding your surgery.    CYSTOSCOPY DISCHARGE INSTRUCTIONS    Possible burning during urination and/or blood tinged urine.    Drink 6-8 glasses of water for the next day or so.  (This helps to flush the urinary tract.)    Call Dr. Adams ([phone]) if you develop:    Fever over 100 degrees  Prolonged soreness/pain  Unusual bleeding/bruising  Unable to urinate or if urine is bloody  You cannot pass urine 8 hours after the test.  You have pain in your belly or your back just below your rib cage.  (This is called flank pain.)  You have frequent urge to urinate but can pass only small amounts of urine.    Call Dr. Adams office for follow-up appointment ([phone]).

## 2024-07-30 NOTE — H&P
History and Physical    Patient:  River Buckley  MRN: [id number]    CHIEF COMPLAINT:  left flank pain    HISTORY OF PRESENT ILLNESS:   The patient is a 69 y.o. male who presents with left flank pain. Patient is being seen here today as a new patient. Patient is being seen here today for renal calculus and BPH. We have seen the patient approximately 3 years ago. At that point in time we will get a plan on performing ESWL. During the workup for the ESWL patient was found to have several cardiac issues. We never saw the patient again until now. He is having issues with urinary frequency and urgency and nocturia. Patient does continue to take Flomax twice daily and Ditropan XL 5 mg. This is what we had him on we last saw him. Primary care has kept this medication gone. Patient has had no spontaneous stone passage. He reports no flank pain nausea or vomiting     Past Medical History:    Past Medical History:   Diagnosis Date    Diabetes mellitus (HCC)     Hernia cerebri (HCC)     Hyperlipidemia     Hypertension     Obesity (BMI 30-39.9)     Obstructive sleep apnea     on bipap at home    Prostate disease        Past Surgical History:    Past Surgical History:   Procedure Laterality Date    CARDIAC CATHETERIZATION Left 07/23/2021    Dr. Lennon @ [facility]--Refer to Dr. Roger HAND    COLONOSCOPY  08/2012    Kendra SAUL    COLONOSCOPY  02/12/2015    Nikolas SAUL; Colon polyps x2, sigmoid diverticulosis, internal and external hemorrhoids    COLONOSCOPY  03/01/2019    Dr. Cabrera -- screening; no bx/polyps    COLONOSCOPY N/A 03/01/2019    COLORECTAL CANCER SCREENING performed by Anabell Cabrera DO at [facility] OR    CYSTOSCOPY Left 08/07/2018    with stent per Dr. Adams    HERNIA REPAIR      umbilical hernia    MI CYSTO W/INSERT URETERAL STENT Left 08/07/2018    CYSTOSCOPY  STENT INSERTION-LEFT performed by Marito Adams MD at [facility] OR    MI OFFICE/OUTPT VISIT,PROCEDURE ONLY Left 08/23/2018    CYSTOSCOPY URETEROSCOPY- LEFT  Vital Sign     Unable to Pay for Housing in the Last Year: Not on file     Number of Places Lived in the Last Year: Not on file     Unstable Housing in the Last Year: No       Family History:    Family History   Problem Relation Age of Onset    Cancer Mother         Uterine    Heart Disease Father         CAD    Diabetes Brother     Heart Disease Brother     Heart Disease Paternal Grandfather     Heart Disease Brother        REVIEW OF SYSTEMS:  All systems reviewed and negative except for that already noted in the HPI.    Physical Exam:      No data found.  Constitutional: Patient in no acute distress;   Neuro: alert and oriented to person place and time.    Psych: Mood and affect normal.  Skin: Normal  Lungs: Respiratory effort normal  Cardiovascular:  Normal peripheral pulses  Abdomen: Soft, non-tender, non-distended with no CVA, flank pain, hepatosplenomegaly or hernia.  Kidneys normal.  Bladder non-tender and not distended.  Lymphatics: no palpable lymphadenopathy  Penis normal and circumcised  Urethral meatus normal  Scrotal exam normal  Testicles normal bilaterally  Epididymis normal bilaterally  No evidence of inguinal hernia  Anus and perineum normal  Normal rectal tone with no masses  Prostate soft, non-tender to palpation.  No palpable nodules.  Estimated 40 grams.  Seminal vesicles not palpable.       LABS:   No results for input(s): \"WBC\", \"HGB\", \"HCT\", \"MCV\", \"PLT\" in the last 72 hours.  No results for input(s): \"NA\", \"K\", \"CL\", \"CO2\", \"PHOS\", \"BUN\", \"CREATININE\" in the last 72 hours.    Invalid input(s): \"CA\"  Lab Results   Component Value Date    PSA 0.48 01/27/2023    PSA 0.34 05/27/2021    PSA 0.35 05/11/2020       Additional Lab/culture results:    Urinalysis: No results for input(s): \"COLORU\", \"PHUR\", \"LABCAST\", \"WBCUA\", \"RBCUA\", \"MUCUS\", \"TRICHOMONAS\", \"YEAST\", \"BACTERIA\", \"CLARITYU\", \"SPECGRAV\", \"LEUKOCYTESUR\", \"UROBILINOGEN\", \"BILIRUBINUR\", \"BLOODU\" in the last 72 hours.    Invalid input(s):

## 2024-07-31 ENCOUNTER — TELEPHONE (OUTPATIENT)
Dept: UROLOGY | Age: 69
End: 2024-07-31

## 2024-07-31 NOTE — TELEPHONE ENCOUNTER
Mr Buckley has been notified of the providers response and has verbalized understanding.     Appointment note has been updated as well.

## 2024-07-31 NOTE — OP NOTE
[address] [id number]                            OPERATIVE REPORT      PATIENT NAME: JACE SUN             : 1955  MED REC NO: [id number]                          ROOM: [facility]  ACCOUNT NO: [id number]                       ADMIT DATE: 2024  PROVIDER: Marito Adams MD      DATE OF PROCEDURE:  2024    SURGEON:  Marito Adams MD    ASSISTANT:  None.    PREOPERATIVE DIAGNOSIS:  Left renal calculus.    POSTOPERATIVE DIAGNOSIS:  Left renal calculus.    PROCEDURE:  Cystoscopy, left ureteroscopy, left holmium laser lithotripsy, left ureteral stent placement.    ANESTHESIA:  General.    COMPLICATIONS:  None.    ESTIMATED BLOOD LOSS:  Minimal.    SPECIMENS:  None.    PROSTHESIS:  6-Citizen of [place] x 28 cm double-J ureteral stent.    DISPOSITION:  Stable.    FINDINGS:  10 mm left renal stone.    INDICATIONS:  The patient is a 69-year-old male with known left-sided renal calculus here now for definitive therapy.    DESCRIPTION OF PROCEDURE:  The patient was taken back to the operating room.  Informed consent including risks, benefits, and alternatives obtained.  The patient was transferred from [facility] onto the operative table, where he was induced under general anesthesia, given IV Ancef for preoperative antibiotic prophylaxis.  To begin the case, prepped and draped in normal sterile fashion, placed in dorsal lithotomy.  A 22-Citizen of [place] sheath, 30-degree lens passed through the urethra into the bladder.  Once in the bladder, we identified the left ureteral orifice.  0.035-inch wire was passed up.  We then placed a dual-lumen catheter and placed additional Glidewire up.  We then placed a flexible ureteroscope over the Glidewire up in the kidney, identified the stone in the lower pole.  We used 270-micron laser fiber and ablated the stone adequately.  Once this was done, we were able to remove the scope leaving the Glidewire

## 2024-07-31 NOTE — ED PROVIDER NOTES
SpO2 91%   BMI 36.62 kg/m²    Constitutional:  Well developed, Well nourished, No acute distress, Non-toxic appearance.   Eyes:  PERRL, EOMI, Conjunctiva normal, No discharge.   Respiratory:  Normal breath sounds, No respiratory distress, No wheezing, No chest tenderness.   Cardiovascular:  Normal heart rate, Normal rhythm, No murmurs, No rubs, No gallops.   GI:  Bowel sounds normal, Soft, No tenderness, No masses, No pulsatile masses.    Integument:  Warm, Dry, No erythema, No rash.    Neurologic:  Alert & oriented x 3, Normal motor function, Normal sensory function, No focal deficits noted.     Medical Decision Making    Patient  presents to the emergency room for evaluation of hematuria that started after procedure this morning.  He continues to complain of left-sided abdominal pain which has had since being discharged from the hospital for UTI and sepsis last Thursday.  He has not yet started his chronic Coumadin that he has been off for for a week prior to surgery.  He denies any nausea or vomiting or fevers or chills.  Vital signs demonstrate 90% pulse oximetry on 5 L nasal cannula.  Physical exam is benign.  There is no abdominal tenderness.  There is no CVA tenderness.  He is in no distress.  Laboratory studies were performed and results independently reviewed by me.  CBC and CMP normal.  CT scan of the abdomen pelvis without contrast was performed and results independently reviewed by me.  There is a left ureteral stent in place.  There is an 8 mm distal left ureteral stone at the ureteropelvic junction.  There are 3 nonobstructing left renal stones.  There is no left hydronephrosis noted.  Chest x-ray 1 view was performed and results independently reviewed by me.  There is no acute process noted.  Patient is unable to urinate in the emergency room.  There is no evidence of sepsis at this time.  Patient discharged home in stable condition.  He will follow-up with urology on Thursday as scheduled.    1)

## 2024-07-31 NOTE — TELEPHONE ENCOUNTER
Please let him know that after procedure we would anticipate blood in his urine.  Please have him follow-up as scheduled tomorrow in the office for stent,     can we change this appointment to a stent pull instead of a 6-week KUB

## 2024-08-01 ENCOUNTER — OFFICE VISIT (OUTPATIENT)
Dept: UROLOGY | Age: 69
End: 2024-08-01

## 2024-08-01 VITALS
WEIGHT: 270 LBS | BODY MASS INDEX: 36.57 KG/M2 | SYSTOLIC BLOOD PRESSURE: 106 MMHG | DIASTOLIC BLOOD PRESSURE: 68 MMHG | HEIGHT: 72 IN

## 2024-08-01 DIAGNOSIS — E66.01 SEVERE OBESITY (BMI 35.0-39.9) WITH COMORBIDITY (HCC): ICD-10-CM

## 2024-08-01 DIAGNOSIS — N20.0 RENAL CALCULUS: Primary | ICD-10-CM

## 2024-08-01 ASSESSMENT — ENCOUNTER SYMPTOMS
VOMITING: 0
COLOR CHANGE: 0
BACK PAIN: 0
EYE REDNESS: 0
CONSTIPATION: 0
ABDOMINAL PAIN: 0
NAUSEA: 0
WHEEZING: 0
SHORTNESS OF BREATH: 0
COUGH: 0

## 2024-08-01 NOTE — PROGRESS NOTES
HPI:      Patient is a 69 y.o. male in no acute distress.  He is alert and oriented to person, place, and time.       History   4/2018 Referral from Dr. Fall for LUTS. He complains of frequency, urgency, weak stream, intermittency.  He has nocturia 1-2 times per night.  He has been on Flomax for the last 5-6 years.  He denies history of kidney stones.  He denies history of frequent urinary tract infections.  He was circumcised.  He has never seen urology in the past.  He does have type 2 diabetes that is currently uncontrolled.  Most recent hemoglobin A1c was 9.1.  He does admit to drinking 3 cups of coffee per day.  He denies constipation.      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 Started on proscar.     8/2018 left HLL for 5mm distal left ureteral stone     2020 - Stopped proscar about a year ago or so as he as he was told he was unable to give blood     7/30/24 - Left HLL     PSA:  5/2021 - 0.34    Today:  Patient is here today status post left HL.  Patient did go to the emergency room with hematuria and lightheadedness.  Patient did have a CT scan in the emergency room.  We did independently review this today which did show stent in appropriate position on the left.  There is also a decreased stone burden on the left compared to prior to surgery.  He states that he continues to have flank pain and burning on urination.  He is having nocturia x 5-6.  He does feel constipated. He is here today for stent removal.  After stent was removed he does feel better.  He was started on antibiotics by the emergency room.  Regretfully no urine culture was obtained.    Past Medical History:   Diagnosis Date    Diabetes mellitus (HCC)     Hernia cerebri (HCC)     Hyperlipidemia     Hypertension     Obesity (BMI 30-39.9)     Obstructive sleep apnea     on bipap at home    Prostate disease      Past Surgical History:   Procedure Laterality Date    CARDIAC CATHETERIZATION Left 07/23/2021

## 2024-08-01 NOTE — PROGRESS NOTES
Pt had ureteral stent placed on 07/30/2024 following left HLL.   Stent removed via string in office today without difficulty. Pt tolerated removal well.

## 2024-08-01 NOTE — PATIENT INSTRUCTIONS
You may experience waves of pain and/or nausea for the next 24-72 hrs.  You may also experience burning with urination, frequency, urgency, bladder spasms, and blood in the urine. All of this should continue to improve over the next several days. The blood in the urine can last up to two weeks.      1) take ibuprofen (motrin) 600 mg (3 of the 200mg tabs) every 6 hours WITH FOOD for the next 72 hours.  2) take Flomax for the next 72 hours.  3) drink at least 80 oz fluid (water, juice, Gatorade - NOT tea, coffee, soda pop) daily    Call our office [phone] or go to ER (if after normal office hours) if you develop fever, intractable vomiting, severe/intolerable pain.           Survey:    You may be receiving a survey from [employer] regarding your visit today.    Please complete the survey to enable us to provide the highest quality of care to you and your family.    If you cannot score us as very good on any question, please call the office to discuss how we could have major experience exceptional.    Thank you    Your Urology Care Team.

## 2024-08-07 ENCOUNTER — OFFICE VISIT (OUTPATIENT)
Dept: FAMILY MEDICINE CLINIC | Age: 69
End: 2024-08-07

## 2024-08-07 VITALS
BODY MASS INDEX: 36.03 KG/M2 | HEART RATE: 74 BPM | DIASTOLIC BLOOD PRESSURE: 70 MMHG | HEIGHT: 72 IN | WEIGHT: 266 LBS | SYSTOLIC BLOOD PRESSURE: 110 MMHG | OXYGEN SATURATION: 96 %

## 2024-08-07 DIAGNOSIS — A41.9 SEPSIS DUE TO URINARY TRACT INFECTION (HCC): Primary | ICD-10-CM

## 2024-08-07 DIAGNOSIS — N20.0 KIDNEY STONE ON LEFT SIDE: ICD-10-CM

## 2024-08-07 DIAGNOSIS — N39.0 SEPSIS DUE TO URINARY TRACT INFECTION (HCC): Primary | ICD-10-CM

## 2024-08-07 DIAGNOSIS — G47.33 OSA TREATED WITH BIPAP: ICD-10-CM

## 2024-08-07 DIAGNOSIS — J96.01 ACUTE RESPIRATORY FAILURE WITH HYPOXIA (HCC): ICD-10-CM

## 2024-08-07 DIAGNOSIS — Z09 HOSPITAL DISCHARGE FOLLOW-UP: ICD-10-CM

## 2024-08-07 NOTE — PROGRESS NOTES
Name: River Buckley  : 1955         Chief Complaint:     Chief Complaint   Patient presents with    Follow-Up from Hospital     Sepsis from UTI, hypoxia. On O2 3L continuous. Will see pulmonology tomorrow.   Admit -       History of Present Illness:      River Buckley is a 69 y.o.  male who presents with Follow-Up from Hospital (Sepsis from UTI, hypoxia. On O2 3L continuous. Will see pulmonology tomorrow.   Admit -)      HPI    Patient was admitted to [facility] from  to  for sepsis.    Initial post-discharge communication occurred between medical assistant and patient on - see documentation in chart: telephone encounter.    Diagnostic test results reviewed: inpatient labs    Patient risk of morbidity and mortality: moderate    Medical Decision Making: moderate complexity       had routine appt with Dr ARRIAGA for BPH symptoms, had KUB and was found to have stone, planned on ESWL. Few wks later, just prior to hospital admission, pt started having L flank pain, fever and chills, admitted, treated for UTI. Came back  for stone tx, stent placement. Had had gross hematuria the night of procedure, was also dizzy and felt like he could pass out, EMS came and took to ambulance. Thinks may have passed stones though didn't see them, couple nights after ER visit.    Feeling a lot better now, urinating ok, still a little more frequently than he'd like but has improved.  No flank pain now.     During hospitalization pt was also hypoxic, started on oxygen with activity. Wife noticed earlier this summer he was having to take more breaks with mowing grass, sometimes with shopping went to sit down rather than walking around with her. On o2 now  and it does seem to help but he hasn't done much activity yet, only been home a few days. Doesn't recall any previous breathing trouble aside from AN. Smoked 1 ppd for 25 yrs, quit in . Recalls after CABG was on o2 with

## 2024-08-08 ENCOUNTER — OFFICE VISIT (OUTPATIENT)
Dept: PULMONOLOGY | Age: 69
End: 2024-08-08
Payer: MEDICARE

## 2024-08-08 VITALS
OXYGEN SATURATION: 94 % | WEIGHT: 268.7 LBS | RESPIRATION RATE: 16 BRPM | TEMPERATURE: 95.3 F | BODY MASS INDEX: 36.39 KG/M2 | HEIGHT: 72 IN | SYSTOLIC BLOOD PRESSURE: 114 MMHG | DIASTOLIC BLOOD PRESSURE: 71 MMHG | HEART RATE: 86 BPM

## 2024-08-08 DIAGNOSIS — I48.20 CHRONIC ATRIAL FIBRILLATION (HCC): ICD-10-CM

## 2024-08-08 DIAGNOSIS — I25.10 CORONARY ARTERY DISEASE INVOLVING NATIVE CORONARY ARTERY OF NATIVE HEART WITHOUT ANGINA PECTORIS: ICD-10-CM

## 2024-08-08 DIAGNOSIS — I50.32 CHRONIC DIASTOLIC HEART FAILURE (HCC): ICD-10-CM

## 2024-08-08 DIAGNOSIS — G47.33 OSA TREATED WITH BIPAP: ICD-10-CM

## 2024-08-08 DIAGNOSIS — R06.09 DYSPNEA ON EXERTION: ICD-10-CM

## 2024-08-08 DIAGNOSIS — I10 HYPERTENSION, UNSPECIFIED TYPE: ICD-10-CM

## 2024-08-08 DIAGNOSIS — E66.01 CLASS 2 SEVERE OBESITY DUE TO EXCESS CALORIES WITH SERIOUS COMORBIDITY AND BODY MASS INDEX (BMI) OF 35.0 TO 35.9 IN ADULT (HCC): ICD-10-CM

## 2024-08-08 DIAGNOSIS — J96.11 CHRONIC HYPOXEMIC RESPIRATORY FAILURE (HCC): Primary | ICD-10-CM

## 2024-08-08 PROCEDURE — 99214 OFFICE O/P EST MOD 30 MIN: CPT | Performed by: STUDENT IN AN ORGANIZED HEALTH CARE EDUCATION/TRAINING PROGRAM

## 2024-08-08 PROCEDURE — 1123F ACP DISCUSS/DSCN MKR DOCD: CPT | Performed by: STUDENT IN AN ORGANIZED HEALTH CARE EDUCATION/TRAINING PROGRAM

## 2024-08-08 PROCEDURE — G8427 DOCREV CUR MEDS BY ELIG CLIN: HCPCS | Performed by: STUDENT IN AN ORGANIZED HEALTH CARE EDUCATION/TRAINING PROGRAM

## 2024-08-08 PROCEDURE — 3078F DIAST BP <80 MM HG: CPT | Performed by: STUDENT IN AN ORGANIZED HEALTH CARE EDUCATION/TRAINING PROGRAM

## 2024-08-08 PROCEDURE — 3074F SYST BP LT 130 MM HG: CPT | Performed by: STUDENT IN AN ORGANIZED HEALTH CARE EDUCATION/TRAINING PROGRAM

## 2024-08-08 PROCEDURE — 1111F DSCHRG MED/CURRENT MED MERGE: CPT | Performed by: STUDENT IN AN ORGANIZED HEALTH CARE EDUCATION/TRAINING PROGRAM

## 2024-08-08 PROCEDURE — G8417 CALC BMI ABV UP PARAM F/U: HCPCS | Performed by: STUDENT IN AN ORGANIZED HEALTH CARE EDUCATION/TRAINING PROGRAM

## 2024-08-08 PROCEDURE — 99204 OFFICE O/P NEW MOD 45 MIN: CPT | Performed by: STUDENT IN AN ORGANIZED HEALTH CARE EDUCATION/TRAINING PROGRAM

## 2024-08-08 PROCEDURE — 1036F TOBACCO NON-USER: CPT | Performed by: STUDENT IN AN ORGANIZED HEALTH CARE EDUCATION/TRAINING PROGRAM

## 2024-08-08 PROCEDURE — 3017F COLORECTAL CA SCREEN DOC REV: CPT | Performed by: STUDENT IN AN ORGANIZED HEALTH CARE EDUCATION/TRAINING PROGRAM

## 2024-08-08 RX ORDER — ALBUTEROL SULFATE 90 UG/1
2 AEROSOL, METERED RESPIRATORY (INHALATION) 4 TIMES DAILY PRN
Qty: 54 G | Refills: 1 | Status: SHIPPED | OUTPATIENT
Start: 2024-08-08

## 2024-08-08 NOTE — PROGRESS NOTES
oxyBUTYnin (DITROPAN-XL) 10 MG extended release tablet Take 1 tablet by mouth every evening 30 tablet 11    glimepiride (AMARYL) 4 MG tablet Take 1 tablet by mouth 2 times daily (with meals) 180 tablet 1    dilTIAZem (CARDIZEM CD) 300 MG extended release capsule TAKE 1 CAPSULE BY MOUTH DAILY 30 capsule 11    losartan (COZAAR) 25 MG tablet Take 1 tablet by mouth Daily with lunch 90 tablet 1    metoprolol succinate (TOPROL XL) 25 MG extended release tablet TAKE 1 TABLET BY MOUTH EVERY DAY 90 tablet 1    metFORMIN (GLUCOPHAGE) 1000 MG tablet TAKE 1 TABLET BY MOUTH TWICE DAILY WITH MEALS 180 tablet 1    rosuvastatin (CRESTOR) 40 MG tablet TAKE 1 TABLET BY MOUTH EVERY EVENING 90 tablet 1    warfarin (COUMADIN) 5 MG tablet TAKE 1 & 1/2 (ONE AND ONE-HALF) TABLETS BY MOUTH EVERY WEDNESDAY AND 1 TABLET ALL OTHER DAYS or AS DIRECTED by medication management clinic 96 tablet 3    CONTOUR NEXT TEST strip 100 each by Other route daily 100 each 3    vitamin D (CHOLECALCIFEROL) 50 MCG (2000 UT) TABS tablet Take 1 tablet by mouth daily      acetaminophen (TYLENOL) 325 MG tablet Take 2 tablets by mouth every 4 hours as needed      aspirin 81 MG EC tablet Take 1 tablet by mouth daily      Blood Glucose Monitoring Suppl (CONTOUR NEXT MONITOR) w/Device KIT TEST TWICE A DAY BEFORE BREAKFAST AND DINNER      Lancets MISC        No current facility-administered medications for this visit.          Diagnostic Labs:    LABORATORY DATA:  [] Ordered [] Unavailable [x] Reviewed [] Independently interpreted  ABG:   No results found for: \"PH\", \"PHART\", \"POCPH\", \"PCO2\", \"EPI2IBP\", \"POCPCO2\", \"PO2\", \"PO2ART\", \"POCPO2\", \"HCO3\", \"VHY3CTR\", \"POCHCO3\", \"BE\", \"BEART\", \"O2SAT\", \"M4AXCQTL\", \"FHSC6GVU\"  VBG:  No results found for: \"PHVEN\", \"WCA0KQE\", \"BEVEN\", \"V8ZVDPNL\"  CBC:   Lab Results   Component Value Date    WBC 11.0 07/30/2024    RBC 5.14 07/30/2024    HGB 16.2 07/30/2024    HCT 47.7 07/30/2024     07/30/2024    MCV 92.8 07/30/2024    [facility]

## 2024-08-12 ENCOUNTER — TELEPHONE (OUTPATIENT)
Dept: PULMONOLOGY | Age: 69
End: 2024-08-12

## 2024-08-12 NOTE — TELEPHONE ENCOUNTER
Dr. Maguire download under Media for the patient you saw in clinic last week. Advise if any change in Plan of Care.

## 2024-08-12 NOTE — TELEPHONE ENCOUNTER
Called and informed the patients wife the results as indicated and f/u reminder given and verbalized understanding.

## 2024-08-14 ENCOUNTER — ANTI-COAG VISIT (OUTPATIENT)
Age: 69
End: 2024-08-14
Payer: MEDICARE

## 2024-08-14 ENCOUNTER — TELEPHONE (OUTPATIENT)
Dept: FAMILY MEDICINE CLINIC | Age: 69
End: 2024-08-14

## 2024-08-14 VITALS
DIASTOLIC BLOOD PRESSURE: 60 MMHG | SYSTOLIC BLOOD PRESSURE: 96 MMHG | WEIGHT: 272.4 LBS | HEART RATE: 78 BPM | BODY MASS INDEX: 36.94 KG/M2

## 2024-08-14 LAB
INTERNATIONAL NORMALIZATION RATIO, POC: 1.6
PROTHROMBIN TIME, POC: 0

## 2024-08-14 PROCEDURE — 99211 OFF/OP EST MAY X REQ PHY/QHP: CPT | Performed by: PHARMACIST

## 2024-08-14 PROCEDURE — 85610 PROTHROMBIN TIME: CPT | Performed by: PHARMACIST

## 2024-08-14 RX ORDER — METOPROLOL SUCCINATE 25 MG/1
TABLET, EXTENDED RELEASE ORAL
Qty: 90 TABLET | Refills: 1 | Status: SHIPPED | OUTPATIENT
Start: 2024-08-14

## 2024-08-14 RX ORDER — LOSARTAN POTASSIUM 25 MG/1
25 TABLET ORAL
Qty: 90 TABLET | Refills: 1 | Status: SHIPPED | OUTPATIENT
Start: 2024-08-14 | End: 2025-02-10

## 2024-08-14 RX ORDER — ROSUVASTATIN CALCIUM 40 MG/1
40 TABLET, COATED ORAL EVERY EVENING
Qty: 90 TABLET | Refills: 1 | Status: SHIPPED | OUTPATIENT
Start: 2024-08-14

## 2024-08-14 NOTE — TELEPHONE ENCOUNTER
Patient stopped in stating he needs 5 meds refilled.  He is unable to give me the names of meds.  [facility] states [facility] sent something but I did not see anything in [facility].      From what I see in the chart     Losartan 300 mg  Metoprolol succinate 25 mg  Metformin 1000 mg  Rosuvastatin 40 mg   Warfarin 5 mg    [facility]    Health Maintenance   Topic Date Due    DTaP/Tdap/Td vaccine (1 - Tdap) Never done    Shingles vaccine (1 of 2) Never done    Respiratory Syncytial Virus (RSV) Pregnant or age 60 yrs+ (1 - 1-dose 60+ series) Never done    Diabetic retinal exam  12/07/2021    COVID-19 Vaccine (5 - 2023-24 season) 09/01/2023    Flu vaccine (1) 08/01/2024    Annual Wellness Visit (Medicare)  08/11/2024    Diabetic foot exam  02/12/2025    Diabetic Alb to Cr ratio (uACR) test  02/12/2025    Depression Screen  02/12/2025    A1C test (Diabetic or Prediabetic)  05/13/2025    Lipids  06/10/2025    GFR test (Diabetes, CKD 3-4, OR last GFR 15-59)  07/30/2025    Colorectal Cancer Screen  03/01/2029    Pneumococcal 65+ years Vaccine  Completed    AAA screen  Completed    Hepatitis C screen  Completed    Hepatitis A vaccine  Aged Out    Hepatitis B vaccine  Aged Out    Hib vaccine  Aged Out    Polio vaccine  Aged Out    Meningococcal (ACWY) vaccine  Aged Out    Prostate Specific Antigen (PSA) Screening or Monitoring  Discontinued             (applicable per patient's age: Cancer Screenings, Depression Screening, Fall Risk Screening, Immunizations)    Hemoglobin A1C (%)   Date Value   05/13/2024 8.7   02/12/2024 8.6   08/11/2023 7.8     AST (U/L)   Date Value   07/30/2024 62 (H)     ALT (U/L)   Date Value   07/30/2024 64 (H)     BUN (mg/dL)   Date Value   07/30/2024 19      (goal A1C is < 7)   (goal LDL is <100) need 30-50% reduction from baseline     BP Readings from Last 3 Encounters:   08/14/24 96/60   08/08/24 114/71   08/07/24 110/70    (goal /80)      All Future Testing planned

## 2024-08-14 NOTE — PROGRESS NOTES
[facility]  [facility]  ANTICO[facility]ULATION    Referring Provider: Dr Clovis Lennon     GOAL INR: 2.0-3.0     TODAY'S INR: 1.6     WARFARIN Dosage: 10 mg x 1, 7.5 mg x 1, then resume 7.5 mg W, 5 mg all other days    INR (no units)   Date Value   2024 1.4   2024 2.0   2024 2.2   2024 2.5   2024 2.1   2024 2.5   2023 3.1     INR,(POC) (no units)   Date Value   2024 1.6       Hemoglobin   Date Value Ref Range Status   2024 16.2 13.5 - 17.5 g/dL Final     Hematocrit   Date Value Ref Range Status   2024 47.7 41.0 - 53.0 % Final     ALT   Date Value Ref Range Status   2024 64 (H) 5 - 41 U/L Final     AST   Date Value Ref Range Status   2024 62 (H) <40 U/L Final       Medication changes:  Held warfarin for 5 days prior to surgery     Notes:    Fingerstick INR drawn per clinic protocol. Patient states no visible black tarry stool or falls but advises he was admitted to [facility] - after an ER visit for kidney stones. They held his warfarin for 5 days prior to surgery on . States he had some hematuria for a couple of days after the lithotripsy but has had no issues since. Denies any missed or extra doses of warfarin. No change in other maintenance medications or in diet. INR is subtherapeutic on restart so will order 10 mg for this evening, 7.5 mg for tomorrow and will then resume 7.5 mg W, 5 mg all other days and will recheck INR in 2 weeks. Patient acknowledges working in consult agreement with pharmacist as referred by his/her physician.    For Pharmacy Admin Tracking Only    Intervention Detail: Adherence Monitorin and Dose Adjustment: 1, reason: Therapy Optimization  Total # of Interventions Recommended: 2  Total # of Interventions Accepted: 2  Time Spent (min): 20    Anthony Francois, PharmD 2024 10:54 AM

## 2024-08-14 NOTE — TELEPHONE ENCOUNTER
Last OV 8/7/24 hospital f/u     Next OV 11/11/24    Requesting refills on rosuvastatin, losartan, metformin, and metoprolol thru [facility].  Rx's pending

## 2024-08-16 ENCOUNTER — TELEPHONE (OUTPATIENT)
Dept: FAMILY MEDICINE CLINIC | Age: 69
End: 2024-08-16

## 2024-08-26 ENCOUNTER — HOSPITAL ENCOUNTER (OUTPATIENT)
Dept: PULMONOLOGY | Age: 69
Discharge: HOME | End: 2024-08-26
Attending: STUDENT IN AN ORGANIZED HEALTH CARE EDUCATION/TRAINING PROGRAM
Payer: MEDICARE

## 2024-08-26 VITALS — RESPIRATION RATE: 16 BRPM | OXYGEN SATURATION: 95 %

## 2024-08-26 VITALS — BODY MASS INDEX: 36.57 KG/M2 | HEIGHT: 72 IN | WEIGHT: 270 LBS

## 2024-08-26 DIAGNOSIS — J96.11 CHRONIC HYPOXEMIC RESPIRATORY FAILURE (HCC): ICD-10-CM

## 2024-08-26 PROCEDURE — 6360000002 HC RX W HCPCS: Performed by: STUDENT IN AN ORGANIZED HEALTH CARE EDUCATION/TRAINING PROGRAM

## 2024-08-26 PROCEDURE — 94729 DIFFUSING CAPACITY: CPT

## 2024-08-26 PROCEDURE — 94060 EVALUATION OF WHEEZING: CPT

## 2024-08-26 PROCEDURE — 94618 PULMONARY STRESS TESTING: CPT

## 2024-08-26 PROCEDURE — 94726 PLETHYSMOGRAPHY LUNG VOLUMES: CPT

## 2024-08-26 RX ORDER — ALBUTEROL SULFATE 0.83 MG/ML
2.5 SOLUTION RESPIRATORY (INHALATION) ONCE
Status: COMPLETED | OUTPATIENT
Start: 2024-08-26 | End: 2024-08-26

## 2024-08-26 RX ADMIN — ALBUTEROL SULFATE 2.5 MG: 2.5 SOLUTION RESPIRATORY (INHALATION) at 13:29

## 2024-08-26 ASSESSMENT — 6 MINUTE WALK TEST (6MWT)
BORG DYSPNEA SCALE SCORE: SLIGHT (LIGHT)
HEART RATE: 85
BORG FATIGUE SCALE SCORE: SLIGHT (LIGHT)
O2 SATURATION: 94
BLOOD PRESSURE 1: 119/64
TOTAL DISTANCE WALKED (M): 152.4
HEART RATE: 101
DID PATIENT STOP OR PAUSE BEFORE 6 MINUTES?: NO
O2 FLOW RATE (L/MIN): 0.21
BLOOD PRESSURE: 118/65
HEART RATE: 85
BORG DYSPNEA SCALE SCORE: MODERATE
BORG DYSPNEA SCALE SCORE: SEVERE (HEAVY)
BORG FATIGUE SCALE SCORE: SEVERE (HEAVY)
% PREDICTED: 29.82
O2 SATURATION: 94
O2 SATURATION: 90
BLOOD PRESSURE: 127/67
ADDTIONAL O2 FLOW RATE (L/MIN): NO
BORG FATIGUE SCALE SCORE: MODERATE
OXYGEN DEVICE: ROOM AIR
O2 SATURATION: 92
SYMPTOMS: FATIGUE;SOB
HEART RATE: 100

## 2024-08-26 NOTE — PROGRESS NOTES
08/26/24 1405   Data Measured Before Walk   Height 1.829 m (6')   Weight - Scale 122.5 kg (270 lb)   HR 85   Blood Pressure 119/64   O2 Saturation 94   O2 Device Room air   Elier Dyspnea Scale 2   Elier Fatigue Scale 2   Data Measured During the Walk   Heart Rate 100   O2 Flow Rate (l/min) 0.21 l/min   O2 Saturation 90   Need Additional O2 Flow Rate Rows No   Symptoms Fatigue;SOB   Any Problems While Exercising Patient walked approx 152.4 meters at a moderate pace with gradually increasing fatigue and SOB throughout walk.   Elier Dyspnea Scale 5   Elier Fatigue Scale 5   Data Measured Immediately After Walk   Did Patient Stop or Pause Before 6 Minutes No   Predicted Distance (m) 511 Meters   Total Distance Walked (m) 152.4 Meters   % Predicted 29.82   Heart Rate 101   Blood Pressure 127/67   O2 Saturation 92   Elier Dyspnea Scale 3   Elier Fatigue Scale 3   Data Measured 5 Minutes After Walk   Heart Rate 85   Blood Pressure 118/65   O2 Saturation 94

## 2024-08-28 ENCOUNTER — ANTI-COAG VISIT (OUTPATIENT)
Age: 69
End: 2024-08-28
Payer: MEDICARE

## 2024-08-28 VITALS
HEART RATE: 80 BPM | WEIGHT: 275.4 LBS | DIASTOLIC BLOOD PRESSURE: 72 MMHG | BODY MASS INDEX: 37.35 KG/M2 | SYSTOLIC BLOOD PRESSURE: 118 MMHG

## 2024-08-28 LAB
INTERNATIONAL NORMALIZATION RATIO, POC: 1.6
PROTHROMBIN TIME, POC: 0

## 2024-08-28 PROCEDURE — 99211 OFF/OP EST MAY X REQ PHY/QHP: CPT | Performed by: PHARMACIST

## 2024-08-28 PROCEDURE — 94729 DIFFUSING CAPACITY: CPT | Performed by: INTERNAL MEDICINE

## 2024-08-28 PROCEDURE — 94726 PLETHYSMOGRAPHY LUNG VOLUMES: CPT | Performed by: INTERNAL MEDICINE

## 2024-08-28 PROCEDURE — 85610 PROTHROMBIN TIME: CPT | Performed by: PHARMACIST

## 2024-08-28 PROCEDURE — 94060 EVALUATION OF WHEEZING: CPT | Performed by: INTERNAL MEDICINE

## 2024-08-28 NOTE — PROGRESS NOTES
[facility]  [facility]ication Management  ANTICOAGULATION    Referring Provider: Dr Clovis Lennon     GOAL INR: 2.0-3.0     TODAY'S INR: 1.6     WARFARIN Dosage: 10 mg x 1, then increase dose to 7.5 mg Last/W, 5 mg all other days    INR (no units)   Date Value   2024 1.4   2024 2.0   2024 2.2   2024 2.5   2024 2.1   2024 2.5   2023 3.1     INR,(POC) (no units)   Date Value   2024 1.6   2024 1.6       Hemoglobin   Date Value Ref Range Status   2024 16.2 13.5 - 17.5 g/dL Final     Hematocrit   Date Value Ref Range Status   2024 47.7 41.0 - 53.0 % Final     ALT   Date Value Ref Range Status   2024 64 (H) 5 - 41 U/L Final     AST   Date Value Ref Range Status   2024 62 (H) <40 U/L Final       Medication changes:  None     Notes:    Fingerstick INR drawn per clinic protocol. Patient states no visible blood in urine, black tarry stool, falls or ER visits and denies any missed or extra doses of warfarin. No change in other maintenance medications or in diet. INR is still subtherapeutic so will order 10 mg this evening before increase his maintenance dose to 7.5 mg Last/W, 5 mg all other days and will recheck INR in 2 weeks. Patient acknowledges working in consult agreement with pharmacist as referred by his/her physician.    For Pharmacy Admin Tracking Only    Intervention Detail: Adherence Monitorin and Dose Adjustment: 1, reason: Therapy Optimization  Total # of Interventions Recommended: 2  Total # of Interventions Accepted: 2  Time Spent (min): 20    Anthony Francois, PharmD 2024 10:18 AM

## 2024-08-28 NOTE — PROCEDURES
[address] [address]                           PULMONARY FUNCTION      PATIENT NAME: JACE SUN             : 1955  MED REC NO: [id number]                          ROOM:   ACCOUNT NO: [id number]                       ADMIT DATE: 2024  PROVIDER: Luis Trujillo MD      DATE OF PROCEDURE: 2024    STUDY:  Pulmonary function test with bronchodilator and DLCO.    REASON FOR TEST:  Chronic hypoxic respiratory failure.    SUMMARY:    1. The patient's effort was reported as good.  2. The forced vital capacity is at 80% of predicted.  3. The forced expiratory volume in 1 second is normal at 90% of predicted.  4. The forced expiratory volume in 1 second/forced vital capacity is 111% of predicted.  5. The forced expiratory flow 25%-75% is normal at 155% of predicted.  6. A significant improvement was not seen in the forced vital capacity, forced expiratory volume in 1 second, or forced expiratory flow 25%-75% after one-time dose of bronchodilator.  7. Total lung capacity is decreased at 63% predicted.  8. The DLCO is decreased at 53% of predicted, the DLCO/VA is decreased at 71% predicted.    IMPRESSION:    1. Pulmonary function test results suggest a moderate restrictive lung disease.  With a decrease in the DLCO, this suggests an intrapulmonary restriction.  2. No significant improvement was seen after one-time dose of bronchodilator.  3. The DLCO was moderately decreased.          LUIS TRUJILLO MD      D:  2024 17:17:48     T:  2024 02:39:05     RODRIGO/STEVEN  Job #:  [id number]     Doc#:  [id number]

## 2024-09-11 ENCOUNTER — ANTI-COAG VISIT (OUTPATIENT)
Age: 69
End: 2024-09-11
Payer: MEDICARE

## 2024-09-11 ENCOUNTER — HOSPITAL ENCOUNTER (OUTPATIENT)
Age: 69
Discharge: HOME | End: 2024-09-13
Payer: MEDICARE

## 2024-09-11 ENCOUNTER — HOSPITAL ENCOUNTER (OUTPATIENT)
Dept: GENERAL RADIOLOGY | Age: 69
Discharge: HOME | End: 2024-09-13
Payer: MEDICARE

## 2024-09-11 ENCOUNTER — TELEPHONE (OUTPATIENT)
Dept: UROLOGY | Age: 69
End: 2024-09-11

## 2024-09-11 VITALS
BODY MASS INDEX: 37.76 KG/M2 | SYSTOLIC BLOOD PRESSURE: 118 MMHG | DIASTOLIC BLOOD PRESSURE: 74 MMHG | WEIGHT: 278.4 LBS | HEART RATE: 69 BPM

## 2024-09-11 DIAGNOSIS — N20.0 RENAL CALCULUS: ICD-10-CM

## 2024-09-11 LAB
INTERNATIONAL NORMALIZATION RATIO, POC: 1.9
PROTHROMBIN TIME, POC: 0

## 2024-09-11 PROCEDURE — 85610 PROTHROMBIN TIME: CPT | Performed by: PHARMACIST

## 2024-09-11 PROCEDURE — 99211 OFF/OP EST MAY X REQ PHY/QHP: CPT | Performed by: PHARMACIST

## 2024-09-11 PROCEDURE — 74018 RADEX ABDOMEN 1 VIEW: CPT

## 2024-09-19 ENCOUNTER — OFFICE VISIT (OUTPATIENT)
Dept: UROLOGY | Age: 69
End: 2024-09-19

## 2024-09-19 VITALS
HEIGHT: 71 IN | DIASTOLIC BLOOD PRESSURE: 78 MMHG | BODY MASS INDEX: 38.64 KG/M2 | SYSTOLIC BLOOD PRESSURE: 124 MMHG | WEIGHT: 276 LBS

## 2024-09-19 DIAGNOSIS — N40.1 BPH WITH OBSTRUCTION/LOWER URINARY TRACT SYMPTOMS: ICD-10-CM

## 2024-09-19 DIAGNOSIS — N13.8 BPH WITH OBSTRUCTION/LOWER URINARY TRACT SYMPTOMS: ICD-10-CM

## 2024-09-19 DIAGNOSIS — N20.0 RENAL CALCULUS: Primary | ICD-10-CM

## 2024-10-09 ENCOUNTER — ANTI-COAG VISIT (OUTPATIENT)
Age: 69
End: 2024-10-09
Payer: MEDICARE

## 2024-10-09 VITALS
HEART RATE: 75 BPM | DIASTOLIC BLOOD PRESSURE: 76 MMHG | SYSTOLIC BLOOD PRESSURE: 117 MMHG | WEIGHT: 282 LBS | BODY MASS INDEX: 39.35 KG/M2

## 2024-10-09 LAB
INTERNATIONAL NORMALIZATION RATIO, POC: 2.3
PROTHROMBIN TIME, POC: 0

## 2024-10-09 PROCEDURE — 99211 OFF/OP EST MAY X REQ PHY/QHP: CPT | Performed by: PHARMACIST

## 2024-10-09 PROCEDURE — 85610 PROTHROMBIN TIME: CPT | Performed by: PHARMACIST

## 2024-10-09 NOTE — PROGRESS NOTES
[facility]  Medication Management  ANTICOAGULATION    Referring Provider: Dr Clovis Lennon     GOAL INR: 2.0-3.0     TODAY'S INR: 2.3     WARFARIN Dosage: Continue 7.5 mg Last/W, 5 mg all other days    INR (no units)   Date Value   2024 1.4   2024 2.0   2024 2.2   2024 2.5   2024 2.1   2024 2.5   2023 3.1     INR,(POC) (no units)   Date Value   10/09/2024 2.3   2024 1.9   2024 1.6   2024 1.6       Hemoglobin   Date Value Ref Range Status   2024 16.2 13.5 - 17.5 g/dL Final     Hematocrit   Date Value Ref Range Status   2024 47.7 41.0 - 53.0 % Final     ALT   Date Value Ref Range Status   2024 64 (H) 5 - 41 U/L Final     AST   Date Value Ref Range Status   2024 62 (H) <40 U/L Final       Medication changes:  None     Notes:    Fingerstick INR drawn per clinic protocol. Patient states no visible blood in urine, black tarry stool, falls or ER visits and denies any missed or extra doses of warfarin. No change in other maintenance medications or in diet. INR is therapeutic so will continue 7.5 mg Last/W, 5 mg all other days and will recheck INR in 6 weeks. Patient acknowledges working in consult agreement with pharmacist as referred by his/her physician.    For Pharmacy Admin Tracking Only    Intervention Detail: Adherence Monitorin  Total # of Interventions Recommended: 1  Total # of Interventions Accepted: 1  Time Spent (min): 20    Anthony Francois, PharmD 10/9/2024 12:19 PM

## 2024-11-11 ENCOUNTER — OFFICE VISIT (OUTPATIENT)
Dept: FAMILY MEDICINE CLINIC | Age: 69
End: 2024-11-11

## 2024-11-11 ENCOUNTER — HOSPITAL ENCOUNTER (OUTPATIENT)
Age: 69
Discharge: HOME | End: 2024-11-11
Payer: MEDICARE

## 2024-11-11 VITALS
HEIGHT: 71 IN | OXYGEN SATURATION: 94 % | WEIGHT: 284 LBS | HEART RATE: 68 BPM | DIASTOLIC BLOOD PRESSURE: 70 MMHG | SYSTOLIC BLOOD PRESSURE: 124 MMHG | BODY MASS INDEX: 39.76 KG/M2

## 2024-11-11 DIAGNOSIS — G47.33 OSA TREATED WITH BIPAP: ICD-10-CM

## 2024-11-11 DIAGNOSIS — J96.11 CHRONIC RESPIRATORY FAILURE WITH HYPOXIA: ICD-10-CM

## 2024-11-11 DIAGNOSIS — E11.9 TYPE 2 DIABETES MELLITUS WITHOUT COMPLICATION, WITHOUT LONG-TERM CURRENT USE OF INSULIN (HCC): ICD-10-CM

## 2024-11-11 DIAGNOSIS — J98.4 RESTRICTIVE LUNG DISEASE: ICD-10-CM

## 2024-11-11 DIAGNOSIS — E11.65 TYPE 2 DIABETES MELLITUS WITH HYPERGLYCEMIA, WITHOUT LONG-TERM CURRENT USE OF INSULIN (HCC): ICD-10-CM

## 2024-11-11 DIAGNOSIS — Z00.00 MEDICARE ANNUAL WELLNESS VISIT, SUBSEQUENT: Primary | ICD-10-CM

## 2024-11-11 LAB
ANION GAP SERPL CALCULATED.3IONS-SCNC: 10 MMOL/L (ref 9–17)
BUN SERPL-MCNC: 11 MG/DL (ref 8–23)
BUN/CREAT SERPL: 14 (ref 9–20)
CALCIUM SERPL-MCNC: 9.4 MG/DL (ref 8.6–10.4)
CHLORIDE SERPL-SCNC: 103 MMOL/L (ref 98–107)
CO2 SERPL-SCNC: 25 MMOL/L (ref 20–31)
CREAT SERPL-MCNC: 0.8 MG/DL (ref 0.7–1.2)
GFR, ESTIMATED: >90 ML/MIN/1.73M2
GLUCOSE SERPL-MCNC: 147 MG/DL (ref 70–99)
HBA1C MFR BLD: 6.5 %
POTASSIUM SERPL-SCNC: 4.6 MMOL/L (ref 3.7–5.3)
SODIUM SERPL-SCNC: 138 MMOL/L (ref 135–144)
TSH SERPL DL<=0.05 MIU/L-ACNC: 1.56 UIU/ML (ref 0.3–5)

## 2024-11-11 PROCEDURE — 80048 BASIC METABOLIC PNL TOTAL CA: CPT

## 2024-11-11 PROCEDURE — 36415 COLL VENOUS BLD VENIPUNCTURE: CPT

## 2024-11-11 PROCEDURE — 84443 ASSAY THYROID STIM HORMONE: CPT

## 2024-11-11 RX ORDER — WARFARIN SODIUM 5 MG/1
TABLET ORAL
Qty: 103 TABLET | Refills: 3 | OUTPATIENT
Start: 2024-11-11

## 2024-11-11 RX ORDER — METOPROLOL SUCCINATE 25 MG/1
TABLET, EXTENDED RELEASE ORAL
Qty: 90 TABLET | Refills: 1 | Status: SHIPPED | OUTPATIENT
Start: 2024-11-11

## 2024-11-11 RX ORDER — LOSARTAN POTASSIUM 25 MG/1
25 TABLET ORAL
Qty: 90 TABLET | Refills: 1 | Status: SHIPPED | OUTPATIENT
Start: 2024-11-11 | End: 2025-05-10

## 2024-11-11 RX ORDER — GLIMEPIRIDE 4 MG/1
4 TABLET ORAL 2 TIMES DAILY WITH MEALS
Qty: 180 TABLET | Refills: 1 | Status: SHIPPED | OUTPATIENT
Start: 2024-11-11

## 2024-11-11 RX ORDER — FUROSEMIDE 20 MG/1
20 TABLET ORAL DAILY
Qty: 30 TABLET | Refills: 0 | Status: SHIPPED | OUTPATIENT
Start: 2024-11-11

## 2024-11-11 SDOH — HEALTH STABILITY: PHYSICAL HEALTH: ON AVERAGE, HOW MANY DAYS PER WEEK DO YOU ENGAGE IN MODERATE TO STRENUOUS EXERCISE (LIKE A BRISK WALK)?: 0 DAYS

## 2024-11-11 ASSESSMENT — PATIENT HEALTH QUESTIONNAIRE - PHQ9
SUM OF ALL RESPONSES TO PHQ QUESTIONS 1-9: 0
1. LITTLE INTEREST OR PLEASURE IN DOING THINGS: NOT AT ALL
2. FEELING DOWN, DEPRESSED OR HOPELESS: NOT AT ALL
SUM OF ALL RESPONSES TO PHQ QUESTIONS 1-9: 0
SUM OF ALL RESPONSES TO PHQ QUESTIONS 1-9: 0
SUM OF ALL RESPONSES TO PHQ9 QUESTIONS 1 & 2: 0
SUM OF ALL RESPONSES TO PHQ QUESTIONS 1-9: 0

## 2024-11-11 ASSESSMENT — LIFESTYLE VARIABLES
HOW OFTEN DO YOU HAVE A DRINK CONTAINING ALCOHOL: 2
HOW MANY STANDARD DRINKS CONTAINING ALCOHOL DO YOU HAVE ON A TYPICAL DAY: 1
HOW OFTEN DO YOU HAVE SIX OR MORE DRINKS ON ONE OCCASION: 1
HOW MANY STANDARD DRINKS CONTAINING ALCOHOL DO YOU HAVE ON A TYPICAL DAY: 1 OR 2
HOW OFTEN DO YOU HAVE A DRINK CONTAINING ALCOHOL: MONTHLY OR LESS

## 2024-11-11 NOTE — PROGRESS NOTES
Medicare Annual Wellness Visit    River Buckley is here for Medicare AWV, Diabetes, Hypertension, and Sleep Apnea    Assessment & Plan   Medicare annual wellness visit, subsequent  Type 2 diabetes mellitus with hyperglycemia, without long-term current use of insulin (HCC)  -     POCT glycosylated hemoglobin (Hb A1C)  -     Basic Metabolic Panel; Future  -     TSH with Reflex; Future    Recommendations for Preventive Services Due: see orders and patient instructions/AVS.  Recommended screening schedule for the next 5-10 years is provided to the patient in written form: see Patient Instructions/AVS.     Return in about 6 months (around 5/11/2025) for DM.     Subjective       Patient's complete Health Risk Assessment and screening values have been reviewed and are found in Flowsheets. The following problems were reviewed today and where indicated follow up appointments were made and/or referrals ordered.    Positive Risk Factor Screenings with Interventions:              Inactivity:  On average, how many days per week do you engage in moderate to strenuous exercise (like a brisk walk)?: 0 days (!) Abnormal     Interventions:  Patient declined any further interventions or treatment     Abnormal BMI (obese):  Body mass index is 39.63 kg/m². (!) Abnormal  Interventions:  Patient declines any further evaluation or treatment                           Objective   Vitals:    11/11/24 0918   BP: 124/70   Pulse: 68   SpO2: 94%   Weight: 128.8 kg (284 lb)   Height: 1.803 m (5' 10.98\")      Body mass index is 39.63 kg/m².                No Known Allergies  Prior to Visit Medications    Medication Sig Taking? Authorizing Provider   losartan (COZAAR) 25 MG tablet Take 1 tablet by mouth Daily with lunch Yes Mary Mata DO   metFORMIN (GLUCOPHAGE) 1000 MG tablet TAKE 1 TABLET BY MOUTH TWICE DAILY WITH MEALS Yes Mary Mata,    metoprolol succinate (TOPROL XL) 25 MG extended release tablet TAKE 1 TABLET BY MOUTH 
08:22 PM    MCH 31.5 07/30/2024 08:22 PM    MCHC 34.0 07/30/2024 08:22 PM    RDW 13.7 07/30/2024 08:22 PM     07/30/2024 08:22 PM    MPV 10.0 07/30/2024 08:22 PM     Lab Results   Component Value Date/Time    TSH 1.56 11/11/2024 10:27 AM     Lab Results   Component Value Date/Time    CHOL 105 06/10/2024 09:50 AM    LDL 42 06/10/2024 09:50 AM    HDL 44 06/10/2024 09:50 AM    PSA 0.48 01/27/2023 01:54 PM    LABA1C 6.5 11/11/2024 09:34 AM         Assessment & Plan:        Diagnosis Orders   1. Medicare annual wellness visit, subsequent        2. Type 2 diabetes mellitus without complication, without long-term current use of insulin (HCC)  POCT glycosylated hemoglobin (Hb A1C)    Basic Metabolic Panel    TSH with Reflex      3. AN treated with BiPAP        4. Chronic respiratory failure with hypoxia        5. Restrictive lung disease          2. Well controlled, cont metformin and glimepiride same doses  3. Compliant and benefiting, cont same settings incl supplemental o2  4-5. Controlled, cont supp o2 at home, albuterol prn, following with pulmonology, trying to increase exercise      Requested Prescriptions     Signed Prescriptions Disp Refills    losartan (COZAAR) 25 MG tablet 90 tablet 1     Sig: Take 1 tablet by mouth Daily with lunch    metFORMIN (GLUCOPHAGE) 1000 MG tablet 180 tablet 1     Sig: TAKE 1 TABLET BY MOUTH TWICE DAILY WITH MEALS    metoprolol succinate (TOPROL XL) 25 MG extended release tablet 90 tablet 1     Sig: TAKE 1 TABLET BY MOUTH EVERY DAY    glimepiride (AMARYL) 4 MG tablet 180 tablet 1     Sig: Take 1 tablet by mouth 2 times daily (with meals)    furosemide (LASIX) 20 MG tablet 30 tablet 0     Sig: Take 1 tablet by mouth daily       signed by Mary Mata DO on 11/14/2024 at 8:24 PM  [facility] PHYSICIANS  [facility] PRIMARY [facility]ARE [address] [address]  Dept: [phone]

## 2024-11-14 PROBLEM — J96.11 CHRONIC RESPIRATORY FAILURE WITH HYPOXIA: Status: ACTIVE | Noted: 2024-11-14

## 2024-11-20 ENCOUNTER — ANTI-COAG VISIT (OUTPATIENT)
Age: 69
End: 2024-11-20
Payer: MEDICARE

## 2024-11-20 VITALS
SYSTOLIC BLOOD PRESSURE: 117 MMHG | BODY MASS INDEX: 39.07 KG/M2 | WEIGHT: 280 LBS | DIASTOLIC BLOOD PRESSURE: 66 MMHG | HEART RATE: 80 BPM

## 2024-11-20 LAB
INTERNATIONAL NORMALIZATION RATIO, POC: 2.5
PROTHROMBIN TIME, POC: 0

## 2024-11-20 PROCEDURE — 85610 PROTHROMBIN TIME: CPT | Performed by: PHARMACIST

## 2024-11-20 PROCEDURE — 99211 OFF/OP EST MAY X REQ PHY/QHP: CPT | Performed by: PHARMACIST

## 2024-11-20 NOTE — PROGRESS NOTES
[facility]  Medication Management  ANTICOAGULATION    Referring Provider: Dr Clovis Lennon     GOAL INR: 2.0-3.0     TODAY'S INR: 2.5     WARFARIN Dosage: Continue 7.5 mg Last/W, 5 mg all other days    INR (no units)   Date Value   2024 1.4   2024 2.0   2024 2.2   2024 2.5   2024 2.1   2024 2.5   2023 3.1     INR,(POC) (no units)   Date Value   2024 2.5   10/09/2024 2.3   2024 1.9   2024 1.6   2024 1.6       Hemoglobin   Date Value Ref Range Status   2024 16.2 13.5 - 17.5 g/dL Final     Hematocrit   Date Value Ref Range Status   2024 47.7 41.0 - 53.0 % Final     ALT   Date Value Ref Range Status   2024 64 (H) 5 - 41 U/L Final     AST   Date Value Ref Range Status   2024 62 (H) <40 U/L Final       Medication changes:  Was started on furosemide for leg swelling    Notes:    Fingerstick INR drawn per clinic protocol. Patient states no visible blood in urine, black tarry stool, falls or ER visits and denies any missed or extra doses of warfarin. Was started on furosemide for leg swelling but has had no other changes in medication or diet. INR is therapeutic so will continue 7.5 mg Last/W, 5 mg all other days and will recheck INR in 7 weeks (due to the holidays). Patient acknowledges working in consult agreement with pharmacist as referred by his/her physician.    For Pharmacy Admin Tracking Only    Intervention Detail: Adherence Monitorin  Total # of Interventions Recommended: 1  Total # of Interventions Accepted: 1  Time Spent (min): 20    Anthony Francois, PharmD 2024 10:33 AM

## 2024-12-10 ENCOUNTER — OFFICE VISIT (OUTPATIENT)
Dept: FAMILY MEDICINE CLINIC | Age: 69
End: 2024-12-10
Payer: MEDICARE

## 2024-12-10 VITALS
DIASTOLIC BLOOD PRESSURE: 64 MMHG | HEART RATE: 72 BPM | WEIGHT: 281 LBS | OXYGEN SATURATION: 94 % | BODY MASS INDEX: 39.34 KG/M2 | HEIGHT: 71 IN | SYSTOLIC BLOOD PRESSURE: 110 MMHG

## 2024-12-10 DIAGNOSIS — J96.11 CHRONIC RESPIRATORY FAILURE WITH HYPOXIA: ICD-10-CM

## 2024-12-10 DIAGNOSIS — J98.4 RESTRICTIVE LUNG DISEASE: Primary | ICD-10-CM

## 2024-12-10 DIAGNOSIS — J84.9 INTERSTITIAL PULMONARY DISEASE (HCC): ICD-10-CM

## 2024-12-10 PROCEDURE — 3017F COLORECTAL CA SCREEN DOC REV: CPT | Performed by: FAMILY MEDICINE

## 2024-12-10 PROCEDURE — 3078F DIAST BP <80 MM HG: CPT | Performed by: FAMILY MEDICINE

## 2024-12-10 PROCEDURE — G8482 FLU IMMUNIZE ORDER/ADMIN: HCPCS | Performed by: FAMILY MEDICINE

## 2024-12-10 PROCEDURE — G8417 CALC BMI ABV UP PARAM F/U: HCPCS | Performed by: FAMILY MEDICINE

## 2024-12-10 PROCEDURE — 1159F MED LIST DOCD IN RCRD: CPT | Performed by: FAMILY MEDICINE

## 2024-12-10 PROCEDURE — G8427 DOCREV CUR MEDS BY ELIG CLIN: HCPCS | Performed by: FAMILY MEDICINE

## 2024-12-10 PROCEDURE — 99213 OFFICE O/P EST LOW 20 MIN: CPT | Performed by: FAMILY MEDICINE

## 2024-12-10 PROCEDURE — 1123F ACP DISCUSS/DSCN MKR DOCD: CPT | Performed by: FAMILY MEDICINE

## 2024-12-10 PROCEDURE — 3074F SYST BP LT 130 MM HG: CPT | Performed by: FAMILY MEDICINE

## 2024-12-10 PROCEDURE — 1036F TOBACCO NON-USER: CPT | Performed by: FAMILY MEDICINE

## 2024-12-10 RX ORDER — FUROSEMIDE 20 MG/1
20 TABLET ORAL DAILY
Qty: 90 TABLET | Refills: 1 | Status: SHIPPED | OUTPATIENT
Start: 2024-12-10

## 2024-12-10 NOTE — PROGRESS NOTES
Name: River Buckley  : 1955         Chief Complaint:     Chief Complaint   Patient presents with    Shortness of Breath     Follow up 30 days of lasix       History of Present Illness:      River Buckley is a 69 y.o.  male who presents with Shortness of Breath (Follow up 30 days of lasix)      HPI    F/u SOB. Pt has been taking lasix 20 mg daily since last visit a month ago and feels about the same, may have had slight improvement in leg swelling and SOB. No adverse effects, no muscle cramps. Pt is a longtime former smoker, quit in late  with approx 24 pk yr history. AN on BiPAP for many yrs, compliant and benefiting greatly.    Medical History:     Patient Active Problem List   Diagnosis    Essential hypertension, benign    Type 2 diabetes mellitus without complication, without long-term current use of insulin (HCC)    Varicose veins of legs    Tubular adenoma of colon    BPH with obstruction/lower urinary tract symptoms    Diverticulosis of large intestine without hemorrhage    Coronary artery disease involving native coronary artery of native heart without angina pectoris    S/P CABG (coronary artery bypass graft)    AN treated with BiPAP    Paroxysmal atrial fibrillation (HCC)    Complicated UTI (urinary tract infection)    Renal calculus    Secondary hypercoagulable state (HCC)    Sepsis due to urinary tract infection (HCC)    CHF (congestive heart failure) (HCC)    Chronic respiratory failure with hypoxia       Medications:       Prior to Admission medications    Medication Sig Start Date End Date Taking? Authorizing Provider   furosemide (LASIX) 20 MG tablet Take 1 tablet by mouth daily 12/10/24  Yes Mary Mata DO   losartan (COZAAR) 25 MG tablet Take 1 tablet by mouth Daily with lunch 11/11/24 5/10/25 Yes Mary Mata DO   metFORMIN (GLUCOPHAGE) 1000 MG tablet TAKE 1 TABLET BY MOUTH TWICE DAILY WITH MEALS 24  Yes Mary Mata DO   metoprolol succinate (TOPROL XL)

## 2024-12-10 NOTE — PATIENT INSTRUCTIONS
[employer] SURVEY:    You may be receiving a survey from [employer] regarding your visit today.    You may get this in the mail, through your MyChart or in your email.     Please complete the survey to enable us to provide the highest quality of care to you and your family.    If you cannot score us as very good ( 5 Stars) on any question, please feel free to call the office to discuss how we could have made your experience exceptional.     Thank you.    Clinical Care Team:   DO Glenn Nguyễn CMA                                     Triage: Jennifer Gillis CMA              Clerical Team:    Jennifer Parks     [place] [phone]           Billing questions: [phone]           Medical Records Request: [phone]

## 2024-12-17 ENCOUNTER — TELEPHONE (OUTPATIENT)
Dept: CARDIAC REHAB | Age: 69
End: 2024-12-17

## 2024-12-17 NOTE — TELEPHONE ENCOUNTER
Call attempted to this prospective pulmonary rehab patient.  No answer but VM left to call dept back to phone number rendered in message.

## 2024-12-18 ENCOUNTER — TELEPHONE (OUTPATIENT)
Dept: CARDIAC REHAB | Age: 69
End: 2024-12-18

## 2024-12-18 NOTE — TELEPHONE ENCOUNTER
Iraj; made to this prospective pulmonary rehab patient.  Pt answers and agrees to begin rehab on 12/23/24 @ 1:00 pm.

## 2024-12-23 ENCOUNTER — HOSPITAL ENCOUNTER (OUTPATIENT)
Dept: CARDIAC REHAB | Age: 69
Setting detail: THERAPIES SERIES
Discharge: HOME | End: 2024-12-23
Payer: MEDICARE

## 2024-12-23 VITALS — OXYGEN SATURATION: 91 %

## 2024-12-23 PROCEDURE — G0239 OTH RESP PROC, GROUP: HCPCS

## 2024-12-23 ASSESSMENT — PATIENT HEALTH QUESTIONNAIRE - PHQ9
4. FEELING TIRED OR HAVING LITTLE ENERGY: NOT AT ALL
SUM OF ALL RESPONSES TO PHQ QUESTIONS 1-9: 3
SUM OF ALL RESPONSES TO PHQ QUESTIONS 1-9: 3
2. FEELING DOWN, DEPRESSED OR HOPELESS: NOT AT ALL
3. TROUBLE FALLING OR STAYING ASLEEP: NOT AT ALL
6. FEELING BAD ABOUT YOURSELF - OR THAT YOU ARE A FAILURE OR HAVE LET YOURSELF OR YOUR FAMILY DOWN: NOT AT ALL
9. THOUGHTS THAT YOU WOULD BE BETTER OFF DEAD, OR OF HURTING YOURSELF: SEVERAL DAYS
SUM OF ALL RESPONSES TO PHQ9 QUESTIONS 1 & 2: 1
10. IF YOU CHECKED OFF ANY PROBLEMS, HOW DIFFICULT HAVE THESE PROBLEMS MADE IT FOR YOU TO DO YOUR WORK, TAKE CARE OF THINGS AT HOME, OR GET ALONG WITH OTHER PEOPLE: NOT DIFFICULT AT ALL
8. MOVING OR SPEAKING SO SLOWLY THAT OTHER PEOPLE COULD HAVE NOTICED. OR THE OPPOSITE, BEING SO FIGETY OR RESTLESS THAT YOU HAVE BEEN MOVING AROUND A LOT MORE THAN USUAL: SEVERAL DAYS
1. LITTLE INTEREST OR PLEASURE IN DOING THINGS: SEVERAL DAYS
7. TROUBLE CONCENTRATING ON THINGS, SUCH AS READING THE NEWSPAPER OR WATCHING TELEVISION: NOT AT ALL
SUM OF ALL RESPONSES TO PHQ QUESTIONS 1-9: 2
SUM OF ALL RESPONSES TO PHQ QUESTIONS 1-9: 3
5. POOR APPETITE OR OVEREATING: NOT AT ALL

## 2024-12-23 ASSESSMENT — LIFESTYLE VARIABLES
SMOKELESS_TOBACCO: NO
ALCOHOL_USE: NO

## 2024-12-23 ASSESSMENT — PULMONARY FUNCTION TESTS
FEV1 (%PREDICTED): 90
FEV1/FVC: 111
FVC (LITERS): 3.65

## 2024-12-23 ASSESSMENT — EXERCISE STRESS TEST
PEAK_HR: 99
PEAK_BP: 136/78
PEAK_METS: 4.5
PEAK_RPE: 13

## 2024-12-23 NOTE — PROGRESS NOTES
Pulmonary Rehab Initial History and Assessment    River Buckley   1955 12/23/2024    Pre-cert Verification: MCR  [x] Done    Living Will:  [] Yes [x] No   On File:  [] Yes [x] No   Durable Power of :  [] Yes [x] No     Medical History  Past Medical History:   Diagnosis Date    Diabetes mellitus (HCC)     Hernia cerebri (HCC)     Hyperlipidemia     Hypertension     Obesity (BMI 30-39.9)     Obstructive sleep apnea     on bipap at home    Prostate disease        History of present illness:    Diagnosis: restrictive lung disease  Onset: 8/26/24       Spirometry:  [x] Yes  [] No  [] NA (COVID)  FEV1: 90%  FVC: 80%  MXY8ICV: 111%  DLCO: 53%    mMRC Dyspnea ndGndrndanddndend:nd nd2nd COPD Assessment Test Score (CAT): 11    COPD severity rating (GOLD CLASSIFICATION):      [] NA (if post-Covid Dx only)  [x] Moderate (STAGE II)   FEV1 >/=50% but < 80% predicted  [] Severe   FEV1 >/=30% but <50% predicted  [] Very Severe   FEV1 < 30% predicted (or FEV1 < 50% predicted plus respiratory failure or clinical signs of right HF)    COPD Classification of Symptoms / Risk of Exacerbation:      [] Category A   mMRC 0-1 or CAT<10 (low symptom burden)   Hx of 0 or 1 moderate or severe exacerbations (not leading to hospital admission)  [x] Category B   mMRC >/= 2 or CAT>/=10 (higher symptom burden)   Hx of 0 or 1 moderate or severe exacerbations (not leading to hospital admission)  [] Category C   mMRC 0-1 or CAT <10 (low symptom burden)   Hx of >/= 2 moderate/severe exacerbations or >/= 1 exacerbation (leading to hospital admission)    Symptoms:    [x] Cough (frequency): occ  [] Wheezing (Onset/Cause):  [x] Fluid Retention (Where/When): feet and ankles, takes lasix daily  [] Sleeping Problems (# Hours):  [x] Sputum (Volume/Color/Viscosity/Frequency): seldom  [x] Dyspnea (onset/cause): w/ exertion  [] Extra Pillows (Number):    Respiratory Infections/Hospitalizations:    # infections/year:   Antibiotic use:  # 
  Family Support Yes   Psychosocial Intervention   Interventions No intervention indicated   Stress Management Techniques Uses guided meditation;Practices deep breathing;Maintains physical exercise and healthy nutrition;Connects with others   Currently Taking Psychotropic Meds No   Medication Changes No   Psychosocial Education   Education Cardiac meds;Coping techniques;Environmental triggers;Impact self care behaviors on health;Relaxation techniques;Signs/symptoms of depression;Stress management class   Psychosocial Goals   Patient Target Goals Engages in self-care behaviors;Demonstrates appropriate interaction with others;Assess presence or absence of depression using a valid screening tool   Goal Status Initial   Tobacco Cessation Assessment   Stages of Change Maintenance   Smokeless Tobacco Use No   Nutrition Assessment   Stages of Change Preparation   Waist Circumference (In) 53.25 IN   Alcohol No   Weight  126.9 kg (279 lb 12.8 oz)   Height  1.803 m (5' 11\")   BMI 39.11   Nutrition Intervention   Dietitian Consult No   Staff/Patient Discussion Yes   Diabetes Education Referral No   Lipid Clinic Referral No   Nutrition Education   Education Signs/symptoms/treatment of hypo/hyperglycemia;DM & CAD relationship;Limit added sugars;Low sodium diet;Low fat saturated diet;Overall healthy eating pattern that emphasizes vegetables, fruits, whole grains, healthy proteins and non-tropical oils;Reduced calorie/portion controlled diet   Nutrition Goals   Patient Target Goals Weight loss of 5-10%   Goal Status Initial   Progress Towards Goal INITIAL GOAL   Patient Stated Nutrition Goals TO EAT HEALTHIER BY CONSUMING LESS M   Education   Learning Barrier Ready to learn   Education Schedule Given No   Patient Education   Education Activities of daily living;Breaking the dyspnea cycle;DM & lung disease;Heart/lung association;Med compliance;Nebulizer use;Preventing infection;Pulmonary medications;Risk factors;Signs/symptoms of

## 2024-12-24 ENCOUNTER — HOSPITAL ENCOUNTER (OUTPATIENT)
Dept: CARDIAC REHAB | Age: 69
Setting detail: THERAPIES SERIES
Discharge: HOME | End: 2024-12-24
Payer: MEDICARE

## 2024-12-24 PROCEDURE — G0239 OTH RESP PROC, GROUP: HCPCS

## 2024-12-26 ENCOUNTER — HOSPITAL ENCOUNTER (OUTPATIENT)
Dept: CARDIAC REHAB | Age: 69
Setting detail: THERAPIES SERIES
Discharge: HOME | End: 2024-12-26
Payer: MEDICARE

## 2024-12-26 PROCEDURE — G0239 OTH RESP PROC, GROUP: HCPCS

## 2024-12-30 ENCOUNTER — HOSPITAL ENCOUNTER (OUTPATIENT)
Dept: CARDIAC REHAB | Age: 69
Setting detail: THERAPIES SERIES
Discharge: HOME | End: 2024-12-30
Payer: MEDICARE

## 2024-12-30 PROCEDURE — G0239 OTH RESP PROC, GROUP: HCPCS

## 2024-12-31 ENCOUNTER — HOSPITAL ENCOUNTER (OUTPATIENT)
Dept: CARDIAC REHAB | Age: 69
Setting detail: THERAPIES SERIES
Discharge: HOME | End: 2024-12-31
Payer: MEDICARE

## 2024-12-31 PROCEDURE — G0239 OTH RESP PROC, GROUP: HCPCS

## 2025-01-01 ENCOUNTER — APPOINTMENT (OUTPATIENT)
Dept: GENERAL RADIOLOGY | Age: 70
End: 2025-01-01
Payer: MEDICARE

## 2025-01-01 ENCOUNTER — RESULTS FOLLOW-UP (OUTPATIENT)
Dept: FAMILY MEDICINE CLINIC | Age: 70
End: 2025-01-01

## 2025-01-01 ENCOUNTER — RESULTS FOLLOW-UP (OUTPATIENT)
Dept: GENERAL RADIOLOGY | Age: 70
End: 2025-01-01

## 2025-01-01 ENCOUNTER — TELEPHONE (OUTPATIENT)
Dept: UROLOGY | Age: 70
End: 2025-01-01

## 2025-01-01 ENCOUNTER — HOSPITAL ENCOUNTER (EMERGENCY)
Age: 70
Discharge: ANOTHER HOSPITAL | End: 2025-04-15
Attending: EMERGENCY MEDICINE
Payer: MEDICARE

## 2025-01-01 VITALS
HEIGHT: 70 IN | BODY MASS INDEX: 37.84 KG/M2 | DIASTOLIC BLOOD PRESSURE: 69 MMHG | RESPIRATION RATE: 46 BRPM | WEIGHT: 264.3 LBS | HEART RATE: 132 BPM | SYSTOLIC BLOOD PRESSURE: 93 MMHG | OXYGEN SATURATION: 94 % | TEMPERATURE: 99.1 F

## 2025-01-01 DIAGNOSIS — I50.23 ACUTE ON CHRONIC SYSTOLIC CONGESTIVE HEART FAILURE (HCC): ICD-10-CM

## 2025-01-01 DIAGNOSIS — G47.33 OSA (OBSTRUCTIVE SLEEP APNEA): Primary | ICD-10-CM

## 2025-01-01 DIAGNOSIS — N17.9 ACUTE RENAL FAILURE, UNSPECIFIED ACUTE RENAL FAILURE TYPE: ICD-10-CM

## 2025-01-01 DIAGNOSIS — R06.02 SHORTNESS OF BREATH: Primary | ICD-10-CM

## 2025-01-01 DIAGNOSIS — F51.01 PRIMARY INSOMNIA: ICD-10-CM

## 2025-01-01 DIAGNOSIS — I48.91 ATRIAL FIBRILLATION WITH RAPID VENTRICULAR RESPONSE (HCC): ICD-10-CM

## 2025-01-01 LAB
-: NORMAL
ALBUMIN SERPL-MCNC: 3.3 G/DL (ref 3.5–5.2)
ALBUMIN/GLOB SERPL: 0.8 {RATIO} (ref 1–2.5)
ALLEN TEST: POSITIVE
ALP SERPL-CCNC: 183 U/L (ref 40–129)
ALT SERPL-CCNC: 50 U/L (ref 5–41)
ANION GAP SERPL CALCULATED.3IONS-SCNC: 31 MMOL/L (ref 9–17)
AST SERPL-CCNC: 77 U/L
BASOPHILS # BLD: 0.01 K/UL (ref 0–0.2)
BASOPHILS NFR BLD: 0 % (ref 0–2)
BILIRUB SERPL-MCNC: 1.1 MG/DL (ref 0.3–1.2)
BILIRUB UR QL STRIP: NEGATIVE
BNP SERPL-MCNC: ABNORMAL PG/ML
BUN SERPL-MCNC: 79 MG/DL (ref 8–23)
CALCIUM SERPL-MCNC: 9.4 MG/DL (ref 8.6–10.4)
CHLORIDE SERPL-SCNC: 89 MMOL/L (ref 98–107)
CLARITY UR: CLEAR
CO2 SERPL-SCNC: 14 MMOL/L (ref 20–31)
COLOR UR: YELLOW
COMMENT: ABNORMAL
CREAT SERPL-MCNC: 1.9 MG/DL (ref 0.7–1.2)
EKG Q-T INTERVAL: 282 MS
EKG QRS DURATION: 98 MS
EKG QTC CALCULATION (BAZETT): 464 MS
EKG R AXIS: 109 DEGREES
EKG T AXIS: -59 DEGREES
EKG VENTRICULAR RATE: 163 BPM
EOSINOPHIL # BLD: 0.01 K/UL (ref 0–0.4)
EOSINOPHILS RELATIVE PERCENT: 0 % (ref 0–5)
EPI CELLS #/AREA URNS HPF: NORMAL /HPF
ERYTHROCYTE [DISTWIDTH] IN BLOOD BY AUTOMATED COUNT: 14.2 % (ref 12.1–15.2)
FIO2: 60
GFR, ESTIMATED: 37 ML/MIN/1.73M2
GLUCOSE BLD-MCNC: 267 MG/DL (ref 65–99)
GLUCOSE SERPL-MCNC: 249 MG/DL (ref 70–99)
GLUCOSE UR STRIP-MCNC: NEGATIVE MG/DL
HCT VFR BLD AUTO: 45.1 % (ref 41–53)
HGB BLD-MCNC: 15.7 G/DL (ref 13.5–17.5)
HGB UR QL STRIP.AUTO: ABNORMAL
IMM GRANULOCYTES # BLD AUTO: 0.27 K/UL (ref 0–0.3)
IMM GRANULOCYTES NFR BLD: 2 % (ref 0–5)
KETONES UR STRIP-MCNC: NEGATIVE MG/DL
LACTATE BLDV-SCNC: 10.3 MMOL/L (ref 0.5–2.2)
LEUKOCYTE ESTERASE UR QL STRIP: NEGATIVE
LYMPHOCYTES NFR BLD: 0.87 K/UL (ref 1–4.8)
LYMPHOCYTES RELATIVE PERCENT: 6 % (ref 13–44)
MCH RBC QN AUTO: 31.7 PG (ref 26–34)
MCHC RBC AUTO-ENTMCNC: 34.8 G/DL (ref 31–37)
MCV RBC AUTO: 91.1 FL (ref 80–100)
MODE: ABNORMAL
MONOCYTES NFR BLD: 0.35 K/UL (ref 0–1)
MONOCYTES NFR BLD: 2 % (ref 5–9)
NEGATIVE BASE EXCESS, ART: 1.5 MMOL/L (ref 0–2)
NEUTROPHILS NFR BLD: 90 % (ref 39–75)
NEUTS SEG NFR BLD: 12.99 K/UL (ref 2.1–6.5)
NITRITE UR QL STRIP: NEGATIVE
O2 DELIVERY DEVICE: ABNORMAL
PARTIAL THROMBOPLASTIN TIME: 47.3 SEC (ref 23.9–33.8)
PH UR STRIP: 5 [PH] (ref 5–8)
PLATELET # BLD AUTO: 221 K/UL (ref 140–450)
PMV BLD AUTO: 11.5 FL (ref 6–12)
POC HCO3: 19.2 MMOL/L (ref 21–28)
POC O2 SATURATION: 99.5 % (ref 94–98)
POC PCO2: 23.5 MM HG (ref 35–48)
POC PH: 7.52 (ref 7.35–7.45)
POC PO2: 142.6 MM HG (ref 83–108)
POTASSIUM SERPL-SCNC: 3.7 MMOL/L (ref 3.7–5.3)
PROT SERPL-MCNC: 7.2 G/DL (ref 6.4–8.3)
PROT UR STRIP-MCNC: ABNORMAL MG/DL
RBC # BLD AUTO: 4.95 M/UL (ref 4.5–5.9)
RBC #/AREA URNS HPF: NORMAL /HPF (ref 0–2)
SAMPLE SITE: ABNORMAL
SODIUM SERPL-SCNC: 134 MMOL/L (ref 135–144)
SP GR UR STRIP: 1.02 (ref 1–1.03)
TROPONIN I SERPL HS-MCNC: 117 NG/L (ref 0–22)
UROBILINOGEN UR STRIP-ACNC: NORMAL EU/DL (ref 0–1)
WBC #/AREA URNS HPF: NORMAL /HPF
WBC OTHER # BLD: 14.5 K/UL (ref 3.5–11)

## 2025-01-01 PROCEDURE — 83880 ASSAY OF NATRIURETIC PEPTIDE: CPT

## 2025-01-01 PROCEDURE — 6360000002 HC RX W HCPCS: Performed by: EMERGENCY MEDICINE

## 2025-01-01 PROCEDURE — 99285 EMERGENCY DEPT VISIT HI MDM: CPT

## 2025-01-01 PROCEDURE — 36600 WITHDRAWAL OF ARTERIAL BLOOD: CPT

## 2025-01-01 PROCEDURE — 82947 ASSAY GLUCOSE BLOOD QUANT: CPT

## 2025-01-01 PROCEDURE — 96375 TX/PRO/DX INJ NEW DRUG ADDON: CPT

## 2025-01-01 PROCEDURE — 94660 CPAP INITIATION&MGMT: CPT

## 2025-01-01 PROCEDURE — 85730 THROMBOPLASTIN TIME PARTIAL: CPT

## 2025-01-01 PROCEDURE — 2500000003 HC RX 250 WO HCPCS: Performed by: EMERGENCY MEDICINE

## 2025-01-01 PROCEDURE — 96365 THER/PROPH/DIAG IV INF INIT: CPT

## 2025-01-01 PROCEDURE — 71045 X-RAY EXAM CHEST 1 VIEW: CPT

## 2025-01-01 PROCEDURE — 93005 ELECTROCARDIOGRAM TRACING: CPT | Performed by: EMERGENCY MEDICINE

## 2025-01-01 PROCEDURE — 84484 ASSAY OF TROPONIN QUANT: CPT

## 2025-01-01 PROCEDURE — 83605 ASSAY OF LACTIC ACID: CPT

## 2025-01-01 PROCEDURE — 81001 URINALYSIS AUTO W/SCOPE: CPT

## 2025-01-01 PROCEDURE — 93010 ELECTROCARDIOGRAM REPORT: CPT | Performed by: INTERNAL MEDICINE

## 2025-01-01 PROCEDURE — 82803 BLOOD GASES ANY COMBINATION: CPT

## 2025-01-01 PROCEDURE — 96366 THER/PROPH/DIAG IV INF ADDON: CPT

## 2025-01-01 RX ORDER — FUROSEMIDE 10 MG/ML
20 INJECTION INTRAMUSCULAR; INTRAVENOUS ONCE
Status: COMPLETED | OUTPATIENT
Start: 2025-01-01 | End: 2025-01-01

## 2025-01-01 RX ORDER — DILTIAZEM HYDROCHLORIDE 5 MG/ML
10 INJECTION INTRAVENOUS ONCE
Status: COMPLETED | OUTPATIENT
Start: 2025-01-01 | End: 2025-01-01

## 2025-01-01 RX ORDER — TEMAZEPAM 15 MG/1
15 CAPSULE ORAL NIGHTLY PRN
Qty: 7 CAPSULE | Refills: 0 | Status: SHIPPED | OUTPATIENT
Start: 2025-01-01 | End: 2025-01-01

## 2025-01-01 RX ORDER — DILTIAZEM HCL IN NACL,ISO-OSM 125 MG/125
2.5-15 PLASTIC BAG, INJECTION (ML) INTRAVENOUS CONTINUOUS
Status: DISCONTINUED | OUTPATIENT
Start: 2025-01-01 | End: 2025-01-01 | Stop reason: HOSPADM

## 2025-01-01 RX ADMIN — FUROSEMIDE 20 MG: 10 INJECTION, SOLUTION INTRAMUSCULAR; INTRAVENOUS at 00:04

## 2025-01-01 RX ADMIN — WATER 1000 MG: 1 INJECTION INTRAMUSCULAR; INTRAVENOUS; SUBCUTANEOUS at 00:38

## 2025-01-01 RX ADMIN — DILTIAZEM HYDROCHLORIDE 10 MG: 5 INJECTION, SOLUTION INTRAVENOUS at 23:16

## 2025-01-01 RX ADMIN — WATER 40 MG: 1 INJECTION INTRAMUSCULAR; INTRAVENOUS; SUBCUTANEOUS at 00:35

## 2025-01-01 ASSESSMENT — PAIN - FUNCTIONAL ASSESSMENT: PAIN_FUNCTIONAL_ASSESSMENT: NONE - DENIES PAIN

## 2025-01-02 ENCOUNTER — HOSPITAL ENCOUNTER (OUTPATIENT)
Dept: CARDIAC REHAB | Age: 70
Setting detail: THERAPIES SERIES
Discharge: HOME | End: 2025-01-02
Payer: MEDICARE

## 2025-01-02 PROCEDURE — G0239 OTH RESP PROC, GROUP: HCPCS

## 2025-01-06 ENCOUNTER — HOSPITAL ENCOUNTER (OUTPATIENT)
Dept: CARDIAC REHAB | Age: 70
Setting detail: THERAPIES SERIES
Discharge: HOME | End: 2025-01-06
Payer: MEDICARE

## 2025-01-06 PROCEDURE — G0239 OTH RESP PROC, GROUP: HCPCS

## 2025-01-07 ENCOUNTER — HOSPITAL ENCOUNTER (OUTPATIENT)
Dept: CARDIAC REHAB | Age: 70
Setting detail: THERAPIES SERIES
Discharge: HOME | End: 2025-01-07
Payer: MEDICARE

## 2025-01-07 PROCEDURE — G0239 OTH RESP PROC, GROUP: HCPCS

## 2025-01-08 ENCOUNTER — ANTI-COAG VISIT (OUTPATIENT)
Age: 70
End: 2025-01-08
Payer: MEDICARE

## 2025-01-08 VITALS
DIASTOLIC BLOOD PRESSURE: 70 MMHG | WEIGHT: 283.2 LBS | SYSTOLIC BLOOD PRESSURE: 114 MMHG | BODY MASS INDEX: 39.52 KG/M2 | HEART RATE: 83 BPM

## 2025-01-08 LAB
INTERNATIONAL NORMALIZATION RATIO, POC: 2.6
PROTHROMBIN TIME, POC: 0

## 2025-01-08 PROCEDURE — 99211 OFF/OP EST MAY X REQ PHY/QHP: CPT | Performed by: PHARMACIST

## 2025-01-08 PROCEDURE — 85610 PROTHROMBIN TIME: CPT | Performed by: PHARMACIST

## 2025-01-08 NOTE — PROGRESS NOTES
[facility]  [facility]ication [facility]  ANTICOAGULATION    Referring Provider: Dr Clovis Lennon     GOAL INR: 2.0-3.0     TODAY'S INR: 2.6     WARFARIN Dosage: Continue 7.5 mg Last/W, 5 mg all other days    INR (no units)   Date Value   2024 1.4   2024 2.0   2024 2.2   2024 2.5   2024 2.1   2024 2.5   2023 3.1     INR,(POC) (no units)   Date Value   2025 2.6   2024 2.5   10/09/2024 2.3   2024 1.9   2024 1.6   2024 1.6       Hemoglobin   Date Value Ref Range Status   2024 16.2 13.5 - 17.5 g/dL Final     Hematocrit   Date Value Ref Range Status   2024 47.7 41.0 - 53.0 % Final     ALT   Date Value Ref Range Status   2024 64 (H) 5 - 41 U/L Final     AST   Date Value Ref Range Status   2024 62 (H) <40 U/L Final       Medication changes:  None     Notes:    Fingerstick INR drawn per clinic protocol. Patient states no visible blood in urine, black tarry stool, falls or ER visits and denies any missed or extra doses of warfarin. No change in other maintenance medications or in diet. INR is therapeutic so will continue 7.5 mg Last/W, 5 mg all other days and will recheck INR in 6 weeks. Patient acknowledges working in consult agreement with pharmacist as referred by his/her physician.    For Pharmacy Admin Tracking Only    Intervention Detail: Adherence Monitorin  Total # of Interventions Recommended: 1  Total # of Interventions Accepted: 1  Time Spent (min): 20    Anthony Francois, PharmD 2025 11:22 AM

## 2025-01-09 ENCOUNTER — HOSPITAL ENCOUNTER (OUTPATIENT)
Dept: CARDIAC REHAB | Age: 70
Setting detail: THERAPIES SERIES
Discharge: HOME | End: 2025-01-09
Payer: MEDICARE

## 2025-01-09 PROCEDURE — G0239 OTH RESP PROC, GROUP: HCPCS

## 2025-01-13 ENCOUNTER — HOSPITAL ENCOUNTER (OUTPATIENT)
Dept: CARDIAC REHAB | Age: 70
Setting detail: THERAPIES SERIES
Discharge: HOME | End: 2025-01-13
Payer: MEDICARE

## 2025-01-13 PROCEDURE — G0239 OTH RESP PROC, GROUP: HCPCS

## 2025-01-14 ENCOUNTER — HOSPITAL ENCOUNTER (OUTPATIENT)
Dept: CARDIAC REHAB | Age: 70
Setting detail: THERAPIES SERIES
Discharge: HOME | End: 2025-01-14
Payer: MEDICARE

## 2025-01-14 PROCEDURE — G0239 OTH RESP PROC, GROUP: HCPCS

## 2025-01-16 ENCOUNTER — HOSPITAL ENCOUNTER (OUTPATIENT)
Dept: CARDIAC REHAB | Age: 70
Setting detail: THERAPIES SERIES
Discharge: HOME | End: 2025-01-16
Payer: MEDICARE

## 2025-01-16 PROCEDURE — G0239 OTH RESP PROC, GROUP: HCPCS

## 2025-01-20 ENCOUNTER — HOSPITAL ENCOUNTER (OUTPATIENT)
Dept: CARDIAC REHAB | Age: 70
Setting detail: THERAPIES SERIES
Discharge: HOME | End: 2025-01-20
Payer: MEDICARE

## 2025-01-20 VITALS — OXYGEN SATURATION: 91 %

## 2025-01-20 PROCEDURE — G0239 OTH RESP PROC, GROUP: HCPCS

## 2025-01-20 ASSESSMENT — EXERCISE STRESS TEST
PEAK_HR: 99
PEAK_METS: 4.5
PEAK_BP: 136/78
PEAK_RPE: 13

## 2025-01-20 ASSESSMENT — LIFESTYLE VARIABLES
SMOKELESS_TOBACCO: NO
ALCOHOL_USE: NO

## 2025-01-20 ASSESSMENT — PULMONARY FUNCTION TESTS
FVC (LITERS): 3.65
FEV1/FVC: 111
FEV1 (%PREDICTED): 90

## 2025-01-20 NOTE — PROGRESS NOTES
01/20/25 1400   Treatment Diagnosis   Treatment Diagnosis 1 Interstitial lung disease   Referral Date 12/12/24   Significant Cardiovascular History Previous CABG   Co-morbidities Diabetes;Previous MI   Oxygen Saturation / Titration    Stages of Change  Action   Oxygen Assessment   O2 Sat Greater Than 90% Yes   Individual Treatment Plan   ITP Visit Type Initial assessment   1st Date of Exercise 12/23/24   ITP Next Review Date 01/20/25   Visit #/Total Visits 13/36   FVC (Liters) 3.65 liters   FEV1 (%PRED) 90   FEV1/   DLCO (mL/mmHg sec) 18.84 ml/mmHg sec   Risk Stratification Moderate   Pulmonary ITP Exercise;Psychosocial;Nutrition;Education   Exercise   Stages of Change Preparation   Exercise Test Six minute walk test   Distance Calculated in  ft   Peak HR 99   Peak /78   Peak RPE 13   Peak Mets 4.5   Stops 0   SPO2 Range 90-93   Exercise Prescription   Mode Track;Treadmill;Bike;Stepper;Ergometer;Rower;Resistance training   Frequency per week 3X   Duration Per Session 31-60 MINUTES   Intensity METS       3.0-4.0   Progression 0.5 - 1.0 AVG METS EVERY 30 DAYS   SpO2 91 %   O2 Device Room air   Comments TITRATE OXYGEN USE IF NEEDED TO MAINATIAN > 90%   Symptoms with Exercise Shortness of breath   Target Heart Rate 84-96   Resistance Training Yes   Weight 5   Reps 10-15   Assisted Devices None   Exercise Intervention   Type WALKING   Frequency 5X PER WEEK   Duration 30 MINUTES   Resistance Training No   Exercise Education   Education Energy conservation;Blood pressure medications;Equipment orientation;Exercise safety;Home exercise plan;O2 therapy;Low sodium diet;Purse lip/abdominal breathing;Physically active;RPE scale;RPE/RPD scale;Self pulse;Warm up/cool down;Signs/symptoms to report   Exercise Target Group   Target Goal(s) Home activity;Individual exercise RX;Aerobic activity 30 + minutes/day  5 days/week   Psychosocial   Stages of Change Maintenance   Family Support Yes   Comments MAINTAIN PSYCHO

## 2025-01-21 ENCOUNTER — HOSPITAL ENCOUNTER (OUTPATIENT)
Dept: CARDIAC REHAB | Age: 70
Setting detail: THERAPIES SERIES
Discharge: HOME | End: 2025-01-21
Payer: MEDICARE

## 2025-01-21 PROCEDURE — G0239 OTH RESP PROC, GROUP: HCPCS

## 2025-01-23 ENCOUNTER — HOSPITAL ENCOUNTER (OUTPATIENT)
Dept: CARDIAC REHAB | Age: 70
Setting detail: THERAPIES SERIES
Discharge: HOME | End: 2025-01-23
Payer: MEDICARE

## 2025-01-23 PROCEDURE — G0239 OTH RESP PROC, GROUP: HCPCS

## 2025-01-23 RX ORDER — ROSUVASTATIN CALCIUM 40 MG/1
40 TABLET, COATED ORAL EVERY EVENING
Qty: 90 TABLET | Refills: 1 | Status: SHIPPED | OUTPATIENT
Start: 2025-01-23

## 2025-01-23 RX ORDER — DILTIAZEM HYDROCHLORIDE 300 MG/1
300 CAPSULE, COATED, EXTENDED RELEASE ORAL DAILY
Qty: 30 CAPSULE | Refills: 11 | Status: SHIPPED | OUTPATIENT
Start: 2025-01-23

## 2025-01-23 NOTE — TELEPHONE ENCOUNTER
Last OV: 12/10/2024   lung disease 11/11/24 AWV ,DM,LUNG DISEASE, heart failure   Last RX:    Next scheduled apt: 3/11/2025             Surescript requesting a refill

## 2025-01-27 ENCOUNTER — HOSPITAL ENCOUNTER (OUTPATIENT)
Dept: CARDIAC REHAB | Age: 70
Setting detail: THERAPIES SERIES
Discharge: HOME | End: 2025-01-27
Payer: MEDICARE

## 2025-01-27 ENCOUNTER — TELEPHONE (OUTPATIENT)
Dept: UROLOGY | Age: 70
End: 2025-01-27

## 2025-01-27 PROCEDURE — G0239 OTH RESP PROC, GROUP: HCPCS

## 2025-01-27 RX ORDER — TAMSULOSIN HYDROCHLORIDE 0.4 MG/1
0.4 CAPSULE ORAL DAILY
Qty: 90 CAPSULE | Refills: 0 | Status: SHIPPED | OUTPATIENT
Start: 2025-01-27

## 2025-01-27 NOTE — TELEPHONE ENCOUNTER
Patient wants a refill of Tamsulosin sent to [facility]. He was last seen 9/19/24 and next appt is 3/27/25.

## 2025-01-28 ENCOUNTER — HOSPITAL ENCOUNTER (OUTPATIENT)
Dept: CARDIAC REHAB | Age: 70
Setting detail: THERAPIES SERIES
Discharge: HOME | End: 2025-01-28
Payer: MEDICARE

## 2025-01-28 PROCEDURE — G0239 OTH RESP PROC, GROUP: HCPCS

## 2025-01-30 ENCOUNTER — HOSPITAL ENCOUNTER (OUTPATIENT)
Dept: CARDIAC REHAB | Age: 70
Setting detail: THERAPIES SERIES
Discharge: HOME | End: 2025-01-30
Payer: MEDICARE

## 2025-01-30 PROCEDURE — G0239 OTH RESP PROC, GROUP: HCPCS

## 2025-02-03 ENCOUNTER — HOSPITAL ENCOUNTER (OUTPATIENT)
Dept: CARDIAC REHAB | Age: 70
Setting detail: THERAPIES SERIES
Discharge: HOME | End: 2025-02-03
Payer: MEDICARE

## 2025-02-03 ENCOUNTER — HOSPITAL ENCOUNTER (OUTPATIENT)
Dept: CT IMAGING | Age: 70
Discharge: HOME | End: 2025-02-05
Attending: FAMILY MEDICINE
Payer: MEDICARE

## 2025-02-03 DIAGNOSIS — J98.4 RESTRICTIVE LUNG DISEASE: ICD-10-CM

## 2025-02-03 PROCEDURE — 71250 CT THORAX DX C-: CPT

## 2025-02-03 PROCEDURE — G0239 OTH RESP PROC, GROUP: HCPCS

## 2025-02-04 ENCOUNTER — HOSPITAL ENCOUNTER (OUTPATIENT)
Dept: CARDIAC REHAB | Age: 70
Setting detail: THERAPIES SERIES
Discharge: HOME | End: 2025-02-04
Payer: MEDICARE

## 2025-02-04 PROCEDURE — G0239 OTH RESP PROC, GROUP: HCPCS

## 2025-02-06 ENCOUNTER — HOSPITAL ENCOUNTER (OUTPATIENT)
Dept: CARDIAC REHAB | Age: 70
Setting detail: THERAPIES SERIES
Discharge: HOME | End: 2025-02-06
Payer: MEDICARE

## 2025-02-06 PROCEDURE — G0239 OTH RESP PROC, GROUP: HCPCS

## 2025-02-10 ENCOUNTER — HOSPITAL ENCOUNTER (OUTPATIENT)
Dept: CARDIAC REHAB | Age: 70
Setting detail: THERAPIES SERIES
Discharge: HOME | End: 2025-02-10
Payer: MEDICARE

## 2025-02-10 PROCEDURE — G0239 OTH RESP PROC, GROUP: HCPCS

## 2025-02-11 ENCOUNTER — HOSPITAL ENCOUNTER (OUTPATIENT)
Dept: CARDIAC REHAB | Age: 70
Setting detail: THERAPIES SERIES
Discharge: HOME | End: 2025-02-11
Payer: MEDICARE

## 2025-02-11 PROCEDURE — G0239 OTH RESP PROC, GROUP: HCPCS

## 2025-02-13 ENCOUNTER — HOSPITAL ENCOUNTER (OUTPATIENT)
Dept: CARDIAC REHAB | Age: 70
Setting detail: THERAPIES SERIES
Discharge: HOME | End: 2025-02-13
Payer: MEDICARE

## 2025-02-13 PROCEDURE — G0239 OTH RESP PROC, GROUP: HCPCS

## 2025-02-17 ENCOUNTER — HOSPITAL ENCOUNTER (OUTPATIENT)
Dept: CARDIAC REHAB | Age: 70
Setting detail: THERAPIES SERIES
Discharge: HOME | End: 2025-02-17
Payer: MEDICARE

## 2025-02-17 VITALS — OXYGEN SATURATION: 92 %

## 2025-02-17 PROCEDURE — G0239 OTH RESP PROC, GROUP: HCPCS

## 2025-02-17 ASSESSMENT — PULMONARY FUNCTION TESTS
FVC (LITERS): 3.65
FEV1 (%PREDICTED): 90
FEV1/FVC: 111

## 2025-02-17 ASSESSMENT — EXERCISE STRESS TEST
PEAK_BP: 142/78
PEAK_RPE: 13
PEAK_HR: 126
PEAK_METS: 3.8

## 2025-02-17 ASSESSMENT — LIFESTYLE VARIABLES
SMOKELESS_TOBACCO: NO
ALCOHOL_USE: NO

## 2025-02-17 NOTE — PROGRESS NOTES
02/17/25 1300   Treatment Diagnosis   Treatment Diagnosis 1 Interstitial lung disease   Referral Date 12/12/24   Significant Cardiovascular History Previous CABG   Co-morbidities Diabetes;Previous MI   Oxygen Saturation / Titration    Stages of Change  Action   Oxygen Assessment   O2 Sat Greater Than 90% Yes   Individual Treatment Plan   ITP Visit Type Re-assessment   1st Date of Exercise 12/23/24   ITP Next Review Date 01/20/25   Visit #/Total Visits 25/36   FVC (Liters) 3.65 liters   FEV1 (%PRED) 90   FEV1/   DLCO (mL/mmHg sec) 18.84 ml/mmHg sec   Risk Stratification Moderate   Pulmonary ITP Exercise;Psychosocial;Nutrition;Education   Exercise   Stages of Change Preparation   Exercise Test Six minute walk test   Distance Calculated in  ft   Peak    Peak /78   Peak RPE 13   Peak Mets 3.8   SPO2 Range 90-93   Exercise Prescription   Mode Track;Treadmill;Bike;Stepper;Ergometer;Rower;Resistance training   Frequency per week 3X   Duration Per Session 31-60 MINUTES   Intensity METS       3.0-4.0   Progression 0.5 - 1.0 AVG METS EVERY 30 DAYS   SpO2 92 %   O2 Device Room air   Comments TITRATE OXYGEN USE IF NEEDED TO MAINATIAN > 90%   Symptoms with Exercise Shortness of breath   Target Heart Rate 84-96   Resistance Training Yes   Weight 10   Reps 10-15   Assisted Devices None   Exercise Intervention   Type WALKING   Frequency 5X PER WEEK   Duration 30 MINUTES   Resistance Training No   Exercise Education   Education Energy conservation;Equipment orientation;Exercise safety;Home exercise plan;Physically active;Purse lip/abdominal breathing;RPE/RPD scale;Self pulse;Signs/symptoms to report;Understand blood pressure;Warm up/cool down   Exercise Target Group   Target Goal(s) Home activity;Individual exercise RX;Aerobic activity 30 + minutes/day  5 days/week   Psychosocial   Stages of Change Maintenance   Family Support Yes   Comments MAINTAIN PSYCHO SOCIAL SCORES   Psychosocial Intervention

## 2025-02-18 ENCOUNTER — HOSPITAL ENCOUNTER (OUTPATIENT)
Dept: CARDIAC REHAB | Age: 70
Setting detail: THERAPIES SERIES
Discharge: HOME | End: 2025-02-18
Payer: MEDICARE

## 2025-02-18 PROCEDURE — G0239 OTH RESP PROC, GROUP: HCPCS

## 2025-02-19 ENCOUNTER — ANTI-COAG VISIT (OUTPATIENT)
Age: 70
End: 2025-02-19
Payer: MEDICARE

## 2025-02-19 VITALS
WEIGHT: 281.6 LBS | SYSTOLIC BLOOD PRESSURE: 116 MMHG | HEART RATE: 85 BPM | DIASTOLIC BLOOD PRESSURE: 75 MMHG | BODY MASS INDEX: 39.29 KG/M2

## 2025-02-19 LAB
INTERNATIONAL NORMALIZATION RATIO, POC: 3
PROTHROMBIN TIME, POC: 0

## 2025-02-19 PROCEDURE — 99211 OFF/OP EST MAY X REQ PHY/QHP: CPT | Performed by: PHARMACIST

## 2025-02-19 PROCEDURE — 85610 PROTHROMBIN TIME: CPT | Performed by: PHARMACIST

## 2025-02-19 NOTE — PROGRESS NOTES
[facility]  Medication Management  ANTICOAGULATION    Referring Provider: Dr Clovis Lennon     GOAL INR: 2.0-3.0     TODAY'S INR: 3.0     WARFARIN Dosage: Continue 7.5 mg Last/W, 5 mg all other days    INR (no units)   Date Value   2024 1.4   2024 2.0   2024 2.2   2024 2.5   2024 2.1   2024 2.5   2023 3.1     INR,(POC) (no units)   Date Value   2025 3.0   2025 2.6   2024 2.5   10/09/2024 2.3   2024 1.9   2024 1.6   2024 1.6       Hemoglobin   Date Value Ref Range Status   2024 16.2 13.5 - 17.5 g/dL Final     Hematocrit   Date Value Ref Range Status   2024 47.7 41.0 - 53.0 % Final     ALT   Date Value Ref Range Status   2024 64 (H) 5 - 41 U/L Final     AST   Date Value Ref Range Status   2024 62 (H) <40 U/L Final       Medication changes:  None     Notes:    Fingerstick INR drawn per clinic protocol. Patient states no visible blood in urine, black tarry stool, falls or ER visits and denies any missed or extra doses of warfarin. No change in other maintenance medications or in diet. INR is therapeutic so will continue 7.5 mg Last/W, 5 mg all other days and will recheck INR in 6 weeks. Patient acknowledges working in consult agreement with pharmacist as referred by his/her physician.    For Pharmacy Admin Tracking Only    Intervention Detail: Adherence Monitorin  Total # of Interventions Recommended: 1  Total # of Interventions Accepted: 1  Time Spent (min): 20    Anthony Francois, PharmD 2025 10:50 AM

## 2025-02-20 ENCOUNTER — HOSPITAL ENCOUNTER (OUTPATIENT)
Dept: CARDIAC REHAB | Age: 70
Setting detail: THERAPIES SERIES
Discharge: HOME | End: 2025-02-20
Payer: MEDICARE

## 2025-02-20 PROCEDURE — G0239 OTH RESP PROC, GROUP: HCPCS

## 2025-02-24 ENCOUNTER — HOSPITAL ENCOUNTER (OUTPATIENT)
Dept: CARDIAC REHAB | Age: 70
Setting detail: THERAPIES SERIES
Discharge: HOME | End: 2025-02-24
Payer: MEDICARE

## 2025-02-24 PROCEDURE — G0239 OTH RESP PROC, GROUP: HCPCS

## 2025-02-25 ENCOUNTER — HOSPITAL ENCOUNTER (OUTPATIENT)
Dept: CARDIAC REHAB | Age: 70
Setting detail: THERAPIES SERIES
Discharge: HOME | End: 2025-02-25
Payer: MEDICARE

## 2025-02-25 PROCEDURE — G0239 OTH RESP PROC, GROUP: HCPCS

## 2025-02-27 ENCOUNTER — HOSPITAL ENCOUNTER (OUTPATIENT)
Dept: CARDIAC REHAB | Age: 70
Setting detail: THERAPIES SERIES
Discharge: HOME | End: 2025-02-27
Payer: MEDICARE

## 2025-02-27 PROCEDURE — G0239 OTH RESP PROC, GROUP: HCPCS

## 2025-03-03 ENCOUNTER — HOSPITAL ENCOUNTER (OUTPATIENT)
Dept: CARDIAC REHAB | Age: 70
Setting detail: THERAPIES SERIES
Discharge: HOME | End: 2025-03-03
Payer: MEDICARE

## 2025-03-03 PROCEDURE — G0239 OTH RESP PROC, GROUP: HCPCS

## 2025-03-04 ENCOUNTER — HOSPITAL ENCOUNTER (OUTPATIENT)
Dept: CARDIAC REHAB | Age: 70
Setting detail: THERAPIES SERIES
Discharge: HOME | End: 2025-03-04
Payer: MEDICARE

## 2025-03-04 PROCEDURE — G0239 OTH RESP PROC, GROUP: HCPCS

## 2025-03-06 ENCOUNTER — HOSPITAL ENCOUNTER (OUTPATIENT)
Dept: CARDIAC REHAB | Age: 70
Setting detail: THERAPIES SERIES
Discharge: HOME | End: 2025-03-06
Payer: MEDICARE

## 2025-03-06 ENCOUNTER — TELEPHONE (OUTPATIENT)
Dept: PULMONOLOGY | Age: 70
End: 2025-03-06

## 2025-03-06 DIAGNOSIS — J96.11 CHRONIC RESPIRATORY FAILURE WITH HYPOXIA (HCC): Primary | ICD-10-CM

## 2025-03-06 PROCEDURE — G0239 OTH RESP PROC, GROUP: HCPCS

## 2025-03-06 NOTE — TELEPHONE ENCOUNTER
Patients wife called stating they received a letter stating a new Oxygen Rx is needed to be sent to [facility] due to it being new year.

## 2025-03-10 ENCOUNTER — HOSPITAL ENCOUNTER (OUTPATIENT)
Dept: CARDIAC REHAB | Age: 70
Setting detail: THERAPIES SERIES
Discharge: HOME | End: 2025-03-10
Payer: MEDICARE

## 2025-03-10 PROCEDURE — G0239 OTH RESP PROC, GROUP: HCPCS

## 2025-03-11 ENCOUNTER — OFFICE VISIT (OUTPATIENT)
Dept: FAMILY MEDICINE CLINIC | Age: 70
End: 2025-03-11
Payer: MEDICARE

## 2025-03-11 ENCOUNTER — HOSPITAL ENCOUNTER (OUTPATIENT)
Dept: CARDIAC REHAB | Age: 70
Setting detail: THERAPIES SERIES
Discharge: HOME | End: 2025-03-11
Payer: MEDICARE

## 2025-03-11 VITALS
DIASTOLIC BLOOD PRESSURE: 70 MMHG | HEIGHT: 71 IN | OXYGEN SATURATION: 93 % | SYSTOLIC BLOOD PRESSURE: 114 MMHG | WEIGHT: 278 LBS | HEART RATE: 88 BPM | BODY MASS INDEX: 38.92 KG/M2

## 2025-03-11 DIAGNOSIS — I50.32 CHRONIC DIASTOLIC HEART FAILURE (HCC): ICD-10-CM

## 2025-03-11 DIAGNOSIS — I48.20 CHRONIC ATRIAL FIBRILLATION (HCC): ICD-10-CM

## 2025-03-11 DIAGNOSIS — J96.11 CHRONIC RESPIRATORY FAILURE WITH HYPOXIA (HCC): ICD-10-CM

## 2025-03-11 DIAGNOSIS — J84.9 INTERSTITIAL PULMONARY DISEASE (HCC): ICD-10-CM

## 2025-03-11 DIAGNOSIS — E11.9 TYPE 2 DIABETES MELLITUS WITHOUT COMPLICATION, WITHOUT LONG-TERM CURRENT USE OF INSULIN (HCC): Primary | ICD-10-CM

## 2025-03-11 DIAGNOSIS — M25.561 RIGHT MEDIAL KNEE PAIN: ICD-10-CM

## 2025-03-11 DIAGNOSIS — G47.33 OSA (OBSTRUCTIVE SLEEP APNEA): ICD-10-CM

## 2025-03-11 LAB — HBA1C MFR BLD: 7.4 %

## 2025-03-11 PROCEDURE — 1123F ACP DISCUSS/DSCN MKR DOCD: CPT | Performed by: FAMILY MEDICINE

## 2025-03-11 PROCEDURE — 3074F SYST BP LT 130 MM HG: CPT | Performed by: FAMILY MEDICINE

## 2025-03-11 PROCEDURE — 83036 HEMOGLOBIN GLYCOSYLATED A1C: CPT | Performed by: FAMILY MEDICINE

## 2025-03-11 PROCEDURE — 2022F DILAT RTA XM EVC RTNOPTHY: CPT | Performed by: FAMILY MEDICINE

## 2025-03-11 PROCEDURE — 1036F TOBACCO NON-USER: CPT | Performed by: FAMILY MEDICINE

## 2025-03-11 PROCEDURE — G8427 DOCREV CUR MEDS BY ELIG CLIN: HCPCS | Performed by: FAMILY MEDICINE

## 2025-03-11 PROCEDURE — G8417 CALC BMI ABV UP PARAM F/U: HCPCS | Performed by: FAMILY MEDICINE

## 2025-03-11 PROCEDURE — 3017F COLORECTAL CA SCREEN DOC REV: CPT | Performed by: FAMILY MEDICINE

## 2025-03-11 PROCEDURE — 1159F MED LIST DOCD IN RCRD: CPT | Performed by: FAMILY MEDICINE

## 2025-03-11 PROCEDURE — 3051F HG A1C>EQUAL 7.0%<8.0%: CPT | Performed by: FAMILY MEDICINE

## 2025-03-11 PROCEDURE — 99214 OFFICE O/P EST MOD 30 MIN: CPT | Performed by: FAMILY MEDICINE

## 2025-03-11 PROCEDURE — 3078F DIAST BP <80 MM HG: CPT | Performed by: FAMILY MEDICINE

## 2025-03-11 PROCEDURE — G0239 OTH RESP PROC, GROUP: HCPCS

## 2025-03-11 SDOH — ECONOMIC STABILITY: FOOD INSECURITY: WITHIN THE PAST 12 MONTHS, THE FOOD YOU BOUGHT JUST DIDN'T LAST AND YOU DIDN'T HAVE MONEY TO GET MORE.: NEVER TRUE

## 2025-03-11 SDOH — ECONOMIC STABILITY: FOOD INSECURITY: WITHIN THE PAST 12 MONTHS, YOU WORRIED THAT YOUR FOOD WOULD RUN OUT BEFORE YOU GOT MONEY TO BUY MORE.: NEVER TRUE

## 2025-03-11 ASSESSMENT — PATIENT HEALTH QUESTIONNAIRE - PHQ9
SUM OF ALL RESPONSES TO PHQ QUESTIONS 1-9: 0
2. FEELING DOWN, DEPRESSED OR HOPELESS: NOT AT ALL
1. LITTLE INTEREST OR PLEASURE IN DOING THINGS: NOT AT ALL
SUM OF ALL RESPONSES TO PHQ QUESTIONS 1-9: 0

## 2025-03-11 NOTE — PROGRESS NOTES
pulmonary disease (HCC)       Medications:       Prior to Admission medications    Medication Sig Start Date End Date Taking? Authorizing Provider   empagliflozin (JARDIANCE) 25 MG tablet Take 1 tablet by mouth daily 3/11/25  Yes Mary Mata DO   diclofenac sodium (VOLTAREN) 1 % GEL Apply 4 g topically 4 times daily as needed for Pain (R knee pain) 3/11/25  Yes Mary Mata DO   tamsulosin (FLOMAX) 0.4 MG capsule Take 1 capsule by mouth daily 1/27/25  Yes Emily Spain APRN - CNP   dilTIAZem (CARDIZEM CD) 300 MG extended release capsule TAKE 1 CAPSULE BY MOUTH DAILY 1/23/25  Yes Arnulfo Lennon MD   rosuvastatin (CRESTOR) 40 MG tablet TAKE 1 TABLET BY MOUTH EVERY EVENING 1/23/25  Yes Mary Mata DO   furosemide (LASIX) 20 MG tablet Take 1 tablet by mouth daily 12/10/24  Yes Mary Mata DO   losartan (COZAAR) 25 MG tablet Take 1 tablet by mouth Daily with lunch 11/11/24 5/10/25 Yes Mary Mata DO   metFORMIN (GLUCOPHAGE) 1000 MG tablet TAKE 1 TABLET BY MOUTH TWICE DAILY WITH MEALS 11/11/24  Yes Mary Mata DO   metoprolol succinate (TOPROL XL) 25 MG extended release tablet TAKE 1 TABLET BY MOUTH EVERY DAY 11/11/24  Yes Mary Mata DO   glimepiride (AMARYL) 4 MG tablet Take 1 tablet by mouth 2 times daily (with meals) 11/11/24  Yes Mary Mata DO   warfarin (COUMADIN) 5 MG tablet TAKE 1 & 1/2 TABLETS BY MOUTH EVERY SUNDAY AND WEDNESDAY AND TAKE 1 TABLET ALL OTHER DAYS or AS DIRECTED by Sharifa Ocampo Medication Management 11/11/24  Yes Arnulfo Lennon MD   albuterol sulfate HFA (VENTOLIN HFA) 108 (90 Base) MCG/ACT inhaler Inhale 2 puffs into the lungs 4 times daily as needed for Wheezing 8/8/24  Yes Kenyetta Shin MD   oxyBUTYnin (DITROPAN-XL) 10 MG extended release tablet Take 1 tablet by mouth every evening 6/14/24  Yes Marito Adams MD   CONTOUR NEXT TEST strip 100 each by Other route daily 10/10/23  Yes Mary Mata, DO   vitamin D

## 2025-03-13 ENCOUNTER — HOSPITAL ENCOUNTER (OUTPATIENT)
Dept: CARDIAC REHAB | Age: 70
Setting detail: THERAPIES SERIES
Discharge: HOME | End: 2025-03-13
Payer: MEDICARE

## 2025-03-13 VITALS — OXYGEN SATURATION: 92 %

## 2025-03-13 PROCEDURE — G0239 OTH RESP PROC, GROUP: HCPCS

## 2025-03-13 ASSESSMENT — EXERCISE STRESS TEST
PEAK_RPE: 13
PEAK_BP: 154/72
PEAK_METS: 2.9
PEAK_HR: 99

## 2025-03-13 ASSESSMENT — PULMONARY FUNCTION TESTS
FEV1/FVC: 111
FVC (LITERS): 3.65
FEV1 (%PREDICTED): 90

## 2025-03-13 ASSESSMENT — LIFESTYLE VARIABLES
ALCOHOL_USE: NO
SMOKELESS_TOBACCO: NO

## 2025-03-13 NOTE — PROGRESS NOTES
03/13/25 1400   Treatment Diagnosis   Treatment Diagnosis 1 Interstitial lung disease   Referral Date 12/12/24   Significant Cardiovascular History Previous CABG   Co-morbidities Diabetes;Previous MI   Oxygen Assessment   O2 Sat Greater Than 90% Yes   Individual Treatment Plan   ITP Visit Type Discharge, completed program   ITP Next Review Date 01/20/25   Visit #/Total Visits 36/36   FVC (Liters) 3.65 liters   FEV1 (%PRED) 90   FEV1/   DLCO (mL/mmHg sec) 18.84 ml/mmHg sec   Risk Stratification Moderate   Pulmonary ITP Exercise;Psychosocial;Nutrition;Education   Exercise   Stages of Change Preparation   Exercise Test Six minute walk test   Distance (Feet-Actual) 1248 ft   Peak HR 99   Peak /72   Peak RPE 13   Peak Mets 2.9   Stops 0   SPO2 Range 90-93   Exercise Prescription   Mode Track;Treadmill;Bike;Stepper;Ergometer;Rower;Resistance training   Frequency per week 3X   Duration Per Session 31-60 MINUTES   Intensity METS       3.0-4.0   Progression 0.5 - 1.0 AVG METS EVERY 30 DAYS   SpO2 92 %   O2 Device Room air   Comments TITRATE OXYGEN USE IF [facility] > 90%   Symptoms with Exercise Shortness of breath   Target Heart Rate 84-96   Resistance Training Yes   Weight 10   Reps 10-15   Assisted Devices None   Exercise Intervention   Type WALKING   Frequency 5X PER WEEK   Duration 30 MINUTES   Resistance Training No   Exercise Education   Education Energy conservation;Equipment orientation;Exercise safety;Home exercise plan;Low sodium diet;Purse lip/abdominal breathing;Physically active;RPE/RPD scale;Signs/symptoms to report;Understand blood pressure;Warm up/cool down   Exercise Target Group   Target Goal(s) Home activity;Individual exercise RX;Aerobic activity 30 + minutes/day  5 days/week   Psychosocial   Stages of Change Maintenance   Family Support Yes   Comments MAINTAIN PSYCHO SOCIAL SCORES   Psychosocial Intervention   Interventions No intervention indicated   Stress Management

## 2025-03-17 ENCOUNTER — APPOINTMENT (OUTPATIENT)
Dept: CARDIAC REHAB | Age: 70
End: 2025-03-17
Payer: MEDICARE

## 2025-03-18 ENCOUNTER — APPOINTMENT (OUTPATIENT)
Dept: CARDIAC REHAB | Age: 70
End: 2025-03-18
Payer: MEDICARE

## 2025-03-20 ENCOUNTER — APPOINTMENT (OUTPATIENT)
Dept: CARDIAC REHAB | Age: 70
End: 2025-03-20
Payer: MEDICARE

## 2025-03-24 ENCOUNTER — APPOINTMENT (OUTPATIENT)
Dept: CARDIAC REHAB | Age: 70
End: 2025-03-24
Payer: MEDICARE

## 2025-03-24 ENCOUNTER — HOSPITAL ENCOUNTER (OUTPATIENT)
Dept: GENERAL RADIOLOGY | Age: 70
Discharge: HOME | End: 2025-03-26
Payer: MEDICARE

## 2025-03-24 ENCOUNTER — HOSPITAL ENCOUNTER (OUTPATIENT)
Age: 70
Discharge: HOME | End: 2025-03-26
Payer: MEDICARE

## 2025-03-24 DIAGNOSIS — N20.0 RENAL CALCULUS: ICD-10-CM

## 2025-03-24 PROCEDURE — 74018 RADEX ABDOMEN 1 VIEW: CPT

## 2025-03-25 ENCOUNTER — HOSPITAL ENCOUNTER (OUTPATIENT)
Dept: SLEEP CENTER | Age: 70
Discharge: HOME | End: 2025-03-25
Payer: MEDICARE

## 2025-03-25 ENCOUNTER — APPOINTMENT (OUTPATIENT)
Dept: CARDIAC REHAB | Age: 70
End: 2025-03-25
Payer: MEDICARE

## 2025-03-25 DIAGNOSIS — G47.33 OSA (OBSTRUCTIVE SLEEP APNEA): ICD-10-CM

## 2025-03-25 PROCEDURE — 95810 POLYSOM 6/> YRS 4/> PARAM: CPT

## 2025-03-25 ASSESSMENT — SLEEP AND FATIGUE QUESTIONNAIRES
ARE YOU TIRED DURING WAKE TIME: 1-2 TIMES A WEEK
DOES YOUR SNORING BOTHER OTHERS: NO
DO YOU SNORE: YES
HOW LIKELY ARE YOU TO NOD OFF OR FALL ASLEEP WHEN YOU ARE A PASSENGER IN A CAR FOR AN HOUR WITHOUT A BREAK: WOULD NEVER DOZE
ARE YOU TIRED AFTER SLEEPING: 1-2 TIME A WEEK
NUMBER OF TIMES YOU WAKE PER NIGHT: 2
WHAT TIME DO YOU USUALLY GO TO BED: 82800
HOW OFTEN DO YOU SNORE: 1-2 TIMES A WEEK
NORMAL AMOUNT OF SLEEP PER NIGHT: 7.5
HOW MANY NAPS DO YOU TAKE PER WEEK: 1
HAS ANYONE NOTICED THAT YOU QUIT BREATHING DURING SLEEP: NEVER/ALMOST NEVER
SNORING VOLUME: AS LOUD AS TALKING
HOW LIKELY ARE YOU TO NOD OFF OR FALL ASLEEP WHILE SITTING QUIETLY AFTER LUNCH WITHOUT ALCOHOL: SLIGHT CHANCE OF DOZING
HOW LIKELY ARE YOU TO NOD OFF OR FALL ASLEEP WHILE SITTING AND READING: SLIGHT CHANCE OF DOZING
ESS TOTAL SCORE: 6
HOW LIKELY ARE YOU TO NOD OFF OR FALL ASLEEP WHILE WATCHING TV: SLIGHT CHANCE OF DOZING
DO YOU HAVE HIGH BLOOD PRESSURE: YES
USUAL AMOUNT OF TIME TO FALL ASLEEP (MIN): 15
HAVE YOU EVER NODDED OFF OR FALLEN ASLEEP WHILE DRIVING: NO
HOW LIKELY ARE YOU TO NOD OFF OR FALL ASLEEP WHILE LYING DOWN TO REST IN THE AFTERNOON WHEN CIRCUMSTANCES PERMIT: SLIGHT CHANCE OF DOZING
HOW LIKELY ARE YOU TO NOD OFF OR FALL ASLEEP IN A CAR, WHILE STOPPED FOR A FEW MINUTES IN TRAFFIC: SLIGHT CHANCE OF DOZING
HOW LIKELY ARE YOU TO NOD OFF OR FALL ASLEEP WHILE SITTING AND TALKING TO SOMEONE: SLIGHT CHANCE OF DOZING
HOW LIKELY ARE YOU TO NOD OFF OR FALL ASLEEP WHILE SITTING INACTIVE IN A PUBLIC PLACE: WOULD NEVER DOZE
WHAT TIME DO YOU USUALLY WAKE UP: 28800

## 2025-03-26 VITALS — WEIGHT: 278 LBS | BODY MASS INDEX: 39.8 KG/M2 | HEIGHT: 70 IN

## 2025-03-27 ENCOUNTER — HOSPITAL ENCOUNTER (OUTPATIENT)
Age: 70
Discharge: HOME | End: 2025-03-27
Payer: MEDICARE

## 2025-03-27 ENCOUNTER — APPOINTMENT (OUTPATIENT)
Dept: CARDIAC REHAB | Age: 70
End: 2025-03-27
Payer: MEDICARE

## 2025-03-27 ENCOUNTER — OFFICE VISIT (OUTPATIENT)
Dept: UROLOGY | Age: 70
End: 2025-03-27
Payer: MEDICARE

## 2025-03-27 VITALS
HEART RATE: 81 BPM | BODY MASS INDEX: 40.46 KG/M2 | DIASTOLIC BLOOD PRESSURE: 75 MMHG | SYSTOLIC BLOOD PRESSURE: 107 MMHG | WEIGHT: 282 LBS

## 2025-03-27 DIAGNOSIS — Z01.818 PRE-OP TESTING: ICD-10-CM

## 2025-03-27 DIAGNOSIS — N40.1 BPH WITH OBSTRUCTION/LOWER URINARY TRACT SYMPTOMS: ICD-10-CM

## 2025-03-27 DIAGNOSIS — N20.0 RENAL CALCULUS: Primary | ICD-10-CM

## 2025-03-27 DIAGNOSIS — N13.8 BPH WITH OBSTRUCTION/LOWER URINARY TRACT SYMPTOMS: ICD-10-CM

## 2025-03-27 LAB
ANION GAP SERPL CALCULATED.3IONS-SCNC: 7 MMOL/L (ref 9–17)
BASOPHILS # BLD: 0.03 K/UL (ref 0–0.2)
BASOPHILS NFR BLD: 0 % (ref 0–2)
BUN SERPL-MCNC: 16 MG/DL (ref 8–23)
CALCIUM SERPL-MCNC: 10 MG/DL (ref 8.6–10.4)
CHLORIDE SERPL-SCNC: 102 MMOL/L (ref 98–107)
CO2 SERPL-SCNC: 32 MMOL/L (ref 20–31)
CREAT SERPL-MCNC: 1.1 MG/DL (ref 0.7–1.2)
EOSINOPHIL # BLD: 0.33 K/UL (ref 0–0.4)
EOSINOPHILS RELATIVE PERCENT: 5 % (ref 0–5)
ERYTHROCYTE [DISTWIDTH] IN BLOOD BY AUTOMATED COUNT: 13.7 % (ref 12.1–15.2)
GFR, ESTIMATED: 72 ML/MIN/1.73M2
GLUCOSE SERPL-MCNC: 81 MG/DL (ref 70–99)
HCT VFR BLD AUTO: 48.2 % (ref 41–53)
HGB BLD-MCNC: 16.2 G/DL (ref 13.5–17.5)
IMM GRANULOCYTES # BLD AUTO: 0 K/UL (ref 0–0.3)
IMM GRANULOCYTES NFR BLD: 0 % (ref 0–5)
LYMPHOCYTES NFR BLD: 1.99 K/UL (ref 1–4.8)
LYMPHOCYTES RELATIVE PERCENT: 29 % (ref 13–44)
MCH RBC QN AUTO: 31.7 PG (ref 26–34)
MCHC RBC AUTO-ENTMCNC: 33.6 G/DL (ref 31–37)
MCV RBC AUTO: 94.3 FL (ref 80–100)
MONOCYTES NFR BLD: 0.94 K/UL (ref 0–1)
MONOCYTES NFR BLD: 14 % (ref 5–9)
NEUTROPHILS NFR BLD: 52 % (ref 39–75)
NEUTS SEG NFR BLD: 3.56 K/UL (ref 2.1–6.5)
PLATELET # BLD AUTO: 253 K/UL (ref 140–450)
PMV BLD AUTO: 9.3 FL (ref 6–12)
POTASSIUM SERPL-SCNC: 4.8 MMOL/L (ref 3.7–5.3)
RBC # BLD AUTO: 5.11 M/UL (ref 4.5–5.9)
SODIUM SERPL-SCNC: 141 MMOL/L (ref 135–144)
WBC OTHER # BLD: 6.9 K/UL (ref 3.5–11)

## 2025-03-27 PROCEDURE — 1036F TOBACCO NON-USER: CPT | Performed by: PHYSICIAN ASSISTANT

## 2025-03-27 PROCEDURE — 99214 OFFICE O/P EST MOD 30 MIN: CPT | Performed by: PHYSICIAN ASSISTANT

## 2025-03-27 PROCEDURE — 3074F SYST BP LT 130 MM HG: CPT | Performed by: PHYSICIAN ASSISTANT

## 2025-03-27 PROCEDURE — 36415 COLL VENOUS BLD VENIPUNCTURE: CPT

## 2025-03-27 PROCEDURE — 3017F COLORECTAL CA SCREEN DOC REV: CPT | Performed by: PHYSICIAN ASSISTANT

## 2025-03-27 PROCEDURE — 1159F MED LIST DOCD IN RCRD: CPT | Performed by: PHYSICIAN ASSISTANT

## 2025-03-27 PROCEDURE — 3078F DIAST BP <80 MM HG: CPT | Performed by: PHYSICIAN ASSISTANT

## 2025-03-27 PROCEDURE — 1123F ACP DISCUSS/DSCN MKR DOCD: CPT | Performed by: PHYSICIAN ASSISTANT

## 2025-03-27 PROCEDURE — 93005 ELECTROCARDIOGRAM TRACING: CPT

## 2025-03-27 PROCEDURE — G8417 CALC BMI ABV UP PARAM F/U: HCPCS | Performed by: PHYSICIAN ASSISTANT

## 2025-03-27 PROCEDURE — 51798 US URINE CAPACITY MEASURE: CPT | Performed by: PHYSICIAN ASSISTANT

## 2025-03-27 PROCEDURE — 80048 BASIC METABOLIC PNL TOTAL CA: CPT

## 2025-03-27 PROCEDURE — G8427 DOCREV CUR MEDS BY ELIG CLIN: HCPCS | Performed by: PHYSICIAN ASSISTANT

## 2025-03-27 PROCEDURE — 85025 COMPLETE CBC W/AUTO DIFF WBC: CPT

## 2025-03-27 NOTE — PROGRESS NOTES
HPI:      Patient is a 69 y.o. male in no acute distress.  He is alert and oriented to person, place, and time.       History   4/2018 Referral from Dr. Fall for LUTS. He complains of frequency, urgency, weak stream, intermittency.  He has nocturia 1-2 times per night.  He has been on Flomax for the last 5-6 years.  He denies history of kidney stones.  He denies history of frequent urinary tract infections.  He was circumcised.  He has never seen urology in the past.  He does have type 2 diabetes that is currently uncontrolled.  Most recent hemoglobin A1c was 9.1.  He does admit to drinking 3 cups of coffee per day.  He denies constipation.      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 Started on proscar.     8/2018 left HLL for 5mm distal left ureteral stone     2020 - Stopped proscar about a year ago or so as he as he was told he was unable to give blood     7/30/24 - Left HLL     PSA:  5/2021 - 0.34    Today:  Patient is here today for stone follow-up.  Patient did have a KUB prior visit today which does show a reduced stone burden compared to prior to surgery.  Patient does continue to have a cluster of fragments of stone in the left kidney largest of these measures at least 7 mm..  He denies any stone passage since his procedure.  He is accompanied by spouse.  He does take Flomax and Ditropan.  He is having daily bowel movements.  Nocturia x 1. Bladderscan performed in office today:  Pt voided 15 minutes ago,  random PVR - 190 mL    Past Medical History:   Diagnosis Date    Diabetes mellitus (HCC)     Hernia cerebri (HCC)     Hyperlipidemia     Hypertension     Obesity (BMI 30-39.9)     Obstructive sleep apnea     on bipap at home    Prostate disease      Past Surgical History:   Procedure Laterality Date    CARDIAC CATHETERIZATION Left 07/23/2021    Dr. Lennon @ [facility]--Refer to Dr. Roger HAND    COLONOSCOPY  08/2012    Kendra SAUL    COLONOSCOPY  02/12/2015    Nikolas

## 2025-03-27 NOTE — TELEPHONE ENCOUNTER
Patient scheduled for LTLL on 4/15/25.      Clearance sent to Dr Mata and Uche.  Patient had labs and EKG done today.  Patient is on ASA and coumdadin.  Writer calls Matthew at coumadin clinic and he voices he will be in touch with Vigromiea and patient.  He has an appt at clinic on 4/2/25.

## 2025-03-28 LAB
EKG Q-T INTERVAL: 382 MS
EKG QRS DURATION: 102 MS
EKG QTC CALCULATION (BAZETT): 390 MS
EKG R AXIS: 45 DEGREES
EKG T AXIS: 81 DEGREES
EKG VENTRICULAR RATE: 63 BPM

## 2025-03-31 ENCOUNTER — APPOINTMENT (OUTPATIENT)
Dept: CARDIAC REHAB | Age: 70
End: 2025-03-31
Payer: MEDICARE

## 2025-04-01 ENCOUNTER — TELEPHONE (OUTPATIENT)
Dept: CARDIOLOGY CLINIC | Age: 70
End: 2025-04-01

## 2025-04-01 ENCOUNTER — TELEPHONE (OUTPATIENT)
Dept: PREADMISSION TESTING | Age: 70
End: 2025-04-01

## 2025-04-01 NOTE — TELEPHONE ENCOUNTER
Clearance received from Uche's office.  Writer calls office for asa instruction. Per Tenisha, hold aspirin 5 days as well.  She voices she will call coumadin clinic and let them know he will need coumadin bridging. Patient does have an appt tomorrow with clinic.

## 2025-04-01 NOTE — TELEPHONE ENCOUNTER
3/28/25 EKG showed A-Fib. Dr. Christopher was asked to review EKG. Please review and advise, thank you.

## 2025-04-01 NOTE — TELEPHONE ENCOUNTER
It appears that Dr. ARRIAGA's office requested cardiac and medical clearance. That was sent to both offices on 3/27. We will review those clearances once they are completed. Thank you

## 2025-04-01 NOTE — TELEPHONE ENCOUNTER
Nurse DR ARRIAGA office called regarding holding coumadin and asa   Form found in media to hold both for 5 days and needing bridged   Matthew called at coumadin clinic to see if they were informed about   Bridging he was informed no bridging but was not noted in chart   Surgical form stated per DR Lennon pt needs bridging and pt is   In a fib. Matthew informed that bridging is needed. Pt has appt tomorrow  With him.

## 2025-04-02 ENCOUNTER — ANTI-COAG VISIT (OUTPATIENT)
Age: 70
End: 2025-04-02
Payer: MEDICARE

## 2025-04-02 VITALS
SYSTOLIC BLOOD PRESSURE: 118 MMHG | DIASTOLIC BLOOD PRESSURE: 69 MMHG | HEART RATE: 84 BPM | BODY MASS INDEX: 40.32 KG/M2 | WEIGHT: 281 LBS

## 2025-04-02 LAB
INTERNATIONAL NORMALIZATION RATIO, POC: 2.3
PROTHROMBIN TIME, POC: 0

## 2025-04-02 PROCEDURE — 99211 OFF/OP EST MAY X REQ PHY/QHP: CPT | Performed by: PHARMACIST

## 2025-04-02 PROCEDURE — 85610 PROTHROMBIN TIME: CPT | Performed by: PHARMACIST

## 2025-04-02 RX ORDER — ENOXAPARIN SODIUM 150 MG/ML
120 INJECTION SUBCUTANEOUS EVERY 12 HOURS
Qty: 20 EACH | Refills: 1 | OUTPATIENT
Start: 2025-04-02

## 2025-04-02 NOTE — TELEPHONE ENCOUNTER
Logan- the EKG was done prior to me faxing to his cardiologist to review. I will call office to ensure, but this did go over to office for review. He is on coumadin and will be bridged with lovenox d/t a fib.     Writer leaves message on Dr Ivey's office machine to ensure he saw EKG

## 2025-04-02 NOTE — TELEPHONE ENCOUNTER
So I'm seeing cardiac clearance on 3/27 but A-fib being found via EKG on 3/28. We will need additional documentation indicating that Dr. Christopher (cardiologist) is aware of the A-fib and still finds the patient appropriate to proceed. Currently, I don't see any diagnostics or data to indicate heart health since A-fib has been found (ECHO, stress test, etc).

## 2025-04-02 NOTE — PROGRESS NOTES
[facility]  Medication Management  ANTICOAGULATION    Referring Provider: Dr Clovis Lennon     GOAL INR: 2.0-3.0     TODAY'S INR: 2.3     WARFARIN Dosage: Continue 7.5 mg Lats/W, 5 mg all other days for 1 week before following the Lovenox bridging calendar    INR (no units)   Date Value   2024 1.4   2024 2.0   2024 2.2   2024 2.5   2024 2.1   2024 2.5   2023 3.1     INR,(POC) (no units)   Date Value   2025 2.3   2025 3.0   2025 2.6   2024 2.5   10/09/2024 2.3   2024 1.9   2024 1.6       Hemoglobin   Date Value Ref Range Status   2025 16.2 13.5 - 17.5 g/dL Final     Hematocrit   Date Value Ref Range Status   2025 48.2 41.0 - 53.0 % Final     ALT   Date Value Ref Range Status   2024 64 (H) 5 - 41 U/L Final     AST   Date Value Ref Range Status   2024 62 (H) <40 U/L Final       Medication changes:  Lithotripsy 4/15 and will hold warfarin for 5 days and bridge with Lovenox    Notes:    Fingerstick INR drawn per clinic protocol. Patient states no visible blood in urine, black tarry stool, falls or ER visits and denies any missed or extra doses of warfarin. He is scheduled for a Lithotripsy on 4/15 and is instructed to hold warfarin for 5 days and, because he is currently in A fib, bridge with Lovenox. No change in other maintenance medications or in diet. INR is therapeutic so will continue his normal 7.5 mg Last/W, 5 mg all other days for one week until he is to switch to the Lovenox dosing calendar provided. Will recheck INR in 3 weeks which will be one week after his restart. Patient acknowledges working in consult agreement with pharmacist as referred by his/her physician.    For Pharmacy Admin Tracking Only    Intervention Detail: Adherence Monitorin and Dose Adjustment: 1, reason: Therapy Optimization  Total # of Interventions Recommended: 2  Total # of Interventions Accepted: 2  Time

## 2025-04-03 NOTE — TELEPHONE ENCOUNTER
Updated fax received from Dr Ivey. Dr has to hold aspirin for 7 days. Patient called and made aware.

## 2025-04-03 NOTE — TELEPHONE ENCOUNTER
Agree, EKG was done before the cardiology appointment.  Furthermore, this is why he is being bridged with Lovenox due to the A-fib

## 2025-04-03 NOTE — TELEPHONE ENCOUNTER
Suki from Dr. Ivey's office called to confirm that Dr. Ivey is aware that the patient was in afib per his EKG and that he is ok with procedure proceeding.

## 2025-04-07 ENCOUNTER — HOSPITAL ENCOUNTER (OUTPATIENT)
Age: 70
Discharge: HOME | End: 2025-04-09
Payer: MEDICARE

## 2025-04-07 ENCOUNTER — RESULTS FOLLOW-UP (OUTPATIENT)
Dept: FAMILY MEDICINE CLINIC | Age: 70
End: 2025-04-07

## 2025-04-07 ENCOUNTER — OFFICE VISIT (OUTPATIENT)
Dept: FAMILY MEDICINE CLINIC | Age: 70
End: 2025-04-07
Payer: MEDICARE

## 2025-04-07 ENCOUNTER — HOSPITAL ENCOUNTER (OUTPATIENT)
Dept: GENERAL RADIOLOGY | Age: 70
Discharge: HOME | End: 2025-04-09
Payer: MEDICARE

## 2025-04-07 ENCOUNTER — HOSPITAL ENCOUNTER (OUTPATIENT)
Age: 70
Discharge: HOME | End: 2025-04-07
Payer: MEDICARE

## 2025-04-07 VITALS
TEMPERATURE: 98 F | HEART RATE: 104 BPM | SYSTOLIC BLOOD PRESSURE: 112 MMHG | WEIGHT: 277 LBS | DIASTOLIC BLOOD PRESSURE: 62 MMHG | OXYGEN SATURATION: 92 % | BODY MASS INDEX: 39.65 KG/M2 | HEIGHT: 70 IN

## 2025-04-07 DIAGNOSIS — R06.02 INCREASING SHORTNESS OF BREATH: ICD-10-CM

## 2025-04-07 DIAGNOSIS — N20.0 RENAL CALCULUS: ICD-10-CM

## 2025-04-07 DIAGNOSIS — R06.03 RESPIRATORY DISTRESS: Primary | ICD-10-CM

## 2025-04-07 DIAGNOSIS — R06.03 RESPIRATORY DISTRESS: ICD-10-CM

## 2025-04-07 LAB
ANION GAP SERPL CALCULATED.3IONS-SCNC: 11 MMOL/L (ref 9–17)
BASOPHILS # BLD: 0.02 K/UL (ref 0–0.2)
BASOPHILS NFR BLD: 0 % (ref 0–2)
BILIRUBIN, POC: NORMAL
BLOOD URINE, POC: NORMAL
BNP SERPL-MCNC: 2318 PG/ML
BUN SERPL-MCNC: 16 MG/DL (ref 8–23)
CALCIUM SERPL-MCNC: 9.3 MG/DL (ref 8.6–10.4)
CHLORIDE SERPL-SCNC: 99 MMOL/L (ref 98–107)
CLARITY, POC: CLEAR
CO2 SERPL-SCNC: 25 MMOL/L (ref 20–31)
COLOR, POC: YELLOW
CREAT SERPL-MCNC: 1.2 MG/DL (ref 0.7–1.2)
D DIMER PPP FEU-MCNC: 0.32 UG/ML FEU (ref 0–0.59)
EOSINOPHIL # BLD: 0 K/UL (ref 0–0.4)
EOSINOPHILS RELATIVE PERCENT: 0 % (ref 0–5)
ERYTHROCYTE [DISTWIDTH] IN BLOOD BY AUTOMATED COUNT: 13.8 % (ref 12.1–15.2)
GFR, ESTIMATED: 65 ML/MIN/1.73M2
GLUCOSE SERPL-MCNC: 153 MG/DL (ref 70–99)
GLUCOSE URINE, POC: NORMAL MG/DL
HCT VFR BLD AUTO: 46.2 % (ref 41–53)
HGB BLD-MCNC: 15.5 G/DL (ref 13.5–17.5)
IMM GRANULOCYTES # BLD AUTO: 0.03 K/UL (ref 0–0.3)
IMM GRANULOCYTES NFR BLD: 0 % (ref 0–5)
INR PPP: 2
KETONES, POC: NORMAL MG/DL
LEUKOCYTE EST, POC: NORMAL
LYMPHOCYTES NFR BLD: 0.81 K/UL (ref 1–4.8)
LYMPHOCYTES RELATIVE PERCENT: 7 % (ref 13–44)
MCH RBC QN AUTO: 31 PG (ref 26–34)
MCHC RBC AUTO-ENTMCNC: 33.5 G/DL (ref 31–37)
MCV RBC AUTO: 92.4 FL (ref 80–100)
MONOCYTES NFR BLD: 1.18 K/UL (ref 0–1)
MONOCYTES NFR BLD: 11 % (ref 5–9)
NEUTROPHILS NFR BLD: 82 % (ref 39–75)
NEUTS SEG NFR BLD: 8.95 K/UL (ref 2.1–6.5)
NITRITE, POC: NORMAL
PH, POC: 5
PLATELET # BLD AUTO: 194 K/UL (ref 140–450)
PMV BLD AUTO: 9.7 FL (ref 6–12)
POTASSIUM SERPL-SCNC: 3.9 MMOL/L (ref 3.7–5.3)
PROTEIN, POC: NORMAL MG/DL
PROTHROMBIN TIME: 23.4 SEC (ref 11.5–14.2)
RBC # BLD AUTO: 5 M/UL (ref 4.5–5.9)
SODIUM SERPL-SCNC: 135 MMOL/L (ref 135–144)
SPECIFIC GRAVITY, POC: 1.02
UROBILINOGEN, POC: 0.2 MG/DL
WBC OTHER # BLD: 11 K/UL (ref 3.5–11)

## 2025-04-07 PROCEDURE — 81002 URINALYSIS NONAUTO W/O SCOPE: CPT | Performed by: NURSE PRACTITIONER

## 2025-04-07 PROCEDURE — 3078F DIAST BP <80 MM HG: CPT | Performed by: NURSE PRACTITIONER

## 2025-04-07 PROCEDURE — 3017F COLORECTAL CA SCREEN DOC REV: CPT | Performed by: NURSE PRACTITIONER

## 2025-04-07 PROCEDURE — 1159F MED LIST DOCD IN RCRD: CPT | Performed by: NURSE PRACTITIONER

## 2025-04-07 PROCEDURE — 71046 X-RAY EXAM CHEST 2 VIEWS: CPT

## 2025-04-07 PROCEDURE — 85379 FIBRIN DEGRADATION QUANT: CPT

## 2025-04-07 PROCEDURE — 99213 OFFICE O/P EST LOW 20 MIN: CPT | Performed by: NURSE PRACTITIONER

## 2025-04-07 PROCEDURE — 85025 COMPLETE CBC W/AUTO DIFF WBC: CPT

## 2025-04-07 PROCEDURE — 36415 COLL VENOUS BLD VENIPUNCTURE: CPT

## 2025-04-07 PROCEDURE — 1036F TOBACCO NON-USER: CPT | Performed by: NURSE PRACTITIONER

## 2025-04-07 PROCEDURE — 1123F ACP DISCUSS/DSCN MKR DOCD: CPT | Performed by: NURSE PRACTITIONER

## 2025-04-07 PROCEDURE — 83880 ASSAY OF NATRIURETIC PEPTIDE: CPT

## 2025-04-07 PROCEDURE — G8427 DOCREV CUR MEDS BY ELIG CLIN: HCPCS | Performed by: NURSE PRACTITIONER

## 2025-04-07 PROCEDURE — 3074F SYST BP LT 130 MM HG: CPT | Performed by: NURSE PRACTITIONER

## 2025-04-07 PROCEDURE — 80048 BASIC METABOLIC PNL TOTAL CA: CPT

## 2025-04-07 PROCEDURE — G8417 CALC BMI ABV UP PARAM F/U: HCPCS | Performed by: NURSE PRACTITIONER

## 2025-04-07 PROCEDURE — 85610 PROTHROMBIN TIME: CPT

## 2025-04-07 ASSESSMENT — ENCOUNTER SYMPTOMS
VOMITING: 0
COUGH: 1
SHORTNESS OF BREATH: 1
DIARRHEA: 0
NAUSEA: 0

## 2025-04-07 NOTE — TELEPHONE ENCOUNTER
Postpone surgery, we will need clearance from PCP after this acute episode   dependent (less than 25% patients effort)

## 2025-04-07 NOTE — TELEPHONE ENCOUNTER
Call from AIDE inquiring if patient wears home O2 all of the time, or just at night.  Writer calls patient. He voices he wears 3L at night, but has it on right now because he is not feeling well and has an appt with his PCP today at 1100.  PAT made aware.

## 2025-04-07 NOTE — TELEPHONE ENCOUNTER
Patient's wife calls in after he was seen by primary care. She reports he is in fluid overload and numerous labs and xray was ordered. Please advise re: surgery. Thank you.

## 2025-04-07 NOTE — PROGRESS NOTES
1     Expected Date:   4/7/2025     Expiration Date:   4/7/2026    Basic Metabolic Panel     Standing Status:   Future     Number of Occurrences:   1     Expected Date:   4/7/2025     Expiration Date:   4/7/2026    D-Dimer, Quantitative     Standing Status:   Future     Number of Occurrences:   1     Expected Date:   4/7/2025     Expiration Date:   4/7/2026    Brain Natriuretic Peptide     Standing Status:   Future     Number of Occurrences:   1     Expected Date:   4/7/2025     Expiration Date:   4/7/2026    Protime-INR     Standing Status:   Future     Number of Occurrences:   1     Expected Date:   4/7/2025     Expiration Date:   4/7/2026     Daily Coumadin Dose?:   7.5mg 4/6/25    POCT Urinalysis Dipstick no Micro         Patient Instructions     SURVEY:    You may be receiving a survey from [facility] regarding your visit today.    Please complete the survey to enable us to provide the highest quality of care to you and your family.    If you cannot score us a very good on any question, please call the office to discuss how we could have made your experience a very good one.    Thank you.     Electronically signed by Raf Sanchez DNP,APRN,CNP  on 4/7/2025 at 1:33 PM           Completed Refills   Requested Prescriptions      No prescriptions requested or ordered in this encounter

## 2025-04-07 NOTE — TELEPHONE ENCOUNTER
----- Message from Raf Sanchez DNP sent at 4/7/2025  2:57 PM EDT -----  Please let patient know that his BNP, a test for fluid overload, is elevated at 2318.  Normal is less than 300.  I am not sure why it has occurred, however he is holding little more fluid than usual.  I am going to increase his Lasix to 40 mg daily.  His chest x-ray showed no signs of pneumonia or other respiratory problems.  I would like to see him again on Thursday to make sure he is moving in the right direction.

## 2025-04-07 NOTE — TELEPHONE ENCOUNTER
Patients wife Bettina was notified of results and aware to take lasix 40mg. Patient is scheduled for check up on Friday- Matt out of office Thursday

## 2025-04-09 NOTE — TELEPHONE ENCOUNTER
Pt will need a BNP drawn day of surgery. Has their cardiologist been notified of their fluid overload status?

## 2025-04-09 NOTE — TELEPHONE ENCOUNTER
Patient calls in. He requests new surgery date 5/13/25.  Patient voices he is calling the coumadin clinic to let them know he will not have procedure completed on 4/15- but will tentativley plan on 5/13/25.

## 2025-04-10 ENCOUNTER — TELEPHONE (OUTPATIENT)
Dept: PHARMACY | Age: 70
End: 2025-04-10

## 2025-04-10 ENCOUNTER — HOSPITAL ENCOUNTER (OUTPATIENT)
Age: 70
Discharge: HOME | End: 2025-04-10
Attending: INTERNAL MEDICINE
Payer: MEDICARE

## 2025-04-10 ENCOUNTER — HOSPITAL ENCOUNTER (OUTPATIENT)
Dept: CT IMAGING | Age: 70
Discharge: HOME | End: 2025-04-12
Attending: INTERNAL MEDICINE
Payer: MEDICARE

## 2025-04-10 ENCOUNTER — OFFICE VISIT (OUTPATIENT)
Dept: CARDIOLOGY CLINIC | Age: 70
End: 2025-04-10

## 2025-04-10 VITALS
BODY MASS INDEX: 38.45 KG/M2 | OXYGEN SATURATION: 94 % | WEIGHT: 268 LBS | DIASTOLIC BLOOD PRESSURE: 60 MMHG | TEMPERATURE: 97.4 F | HEART RATE: 100 BPM | SYSTOLIC BLOOD PRESSURE: 104 MMHG

## 2025-04-10 DIAGNOSIS — R10.9 ABDOMINAL PAIN, UNSPECIFIED ABDOMINAL LOCATION: ICD-10-CM

## 2025-04-10 DIAGNOSIS — E78.5 HYPERLIPIDEMIA, UNSPECIFIED HYPERLIPIDEMIA TYPE: ICD-10-CM

## 2025-04-10 DIAGNOSIS — R06.02 SOB (SHORTNESS OF BREATH): ICD-10-CM

## 2025-04-10 DIAGNOSIS — R19.00 ABDOMINAL SWELLING: ICD-10-CM

## 2025-04-10 DIAGNOSIS — I10 ESSENTIAL HYPERTENSION: ICD-10-CM

## 2025-04-10 DIAGNOSIS — I48.91 ATRIAL FIBRILLATION, UNSPECIFIED TYPE (HCC): Primary | ICD-10-CM

## 2025-04-10 DIAGNOSIS — R41.0 CONFUSED: ICD-10-CM

## 2025-04-10 DIAGNOSIS — I48.91 ATRIAL FIBRILLATION, UNSPECIFIED TYPE (HCC): ICD-10-CM

## 2025-04-10 DIAGNOSIS — R07.9 CHEST PAIN, UNSPECIFIED TYPE: ICD-10-CM

## 2025-04-10 LAB
ALBUMIN SERPL-MCNC: 3.7 G/DL (ref 3.5–5.2)
ALBUMIN/GLOB SERPL: 1 {RATIO} (ref 1–2.5)
ALP SERPL-CCNC: 58 U/L (ref 40–129)
ALT SERPL-CCNC: 60 U/L (ref 5–41)
ANION GAP SERPL CALCULATED.3IONS-SCNC: 15 MMOL/L (ref 9–17)
AST SERPL-CCNC: 69 U/L
BASOPHILS # BLD: 0.02 K/UL (ref 0–0.2)
BASOPHILS NFR BLD: 0 % (ref 0–2)
BILIRUB SERPL-MCNC: 1.1 MG/DL (ref 0.3–1.2)
BUN SERPL-MCNC: 21 MG/DL (ref 8–23)
CALCIUM SERPL-MCNC: 8.8 MG/DL (ref 8.6–10.4)
CHLORIDE SERPL-SCNC: 96 MMOL/L (ref 98–107)
CO2 SERPL-SCNC: 25 MMOL/L (ref 20–31)
CREAT SERPL-MCNC: 1.2 MG/DL (ref 0.7–1.2)
EOSINOPHIL # BLD: 0.01 K/UL (ref 0–0.4)
EOSINOPHILS RELATIVE PERCENT: 0 % (ref 0–5)
ERYTHROCYTE [DISTWIDTH] IN BLOOD BY AUTOMATED COUNT: 13.5 % (ref 12.1–15.2)
GFR, ESTIMATED: 65 ML/MIN/1.73M2
GLUCOSE SERPL-MCNC: 131 MG/DL (ref 70–99)
HCT VFR BLD AUTO: 46.6 % (ref 41–53)
HGB BLD-MCNC: 16.2 G/DL (ref 13.5–17.5)
IMM GRANULOCYTES # BLD AUTO: 0.03 K/UL (ref 0–0.3)
IMM GRANULOCYTES NFR BLD: 0 % (ref 0–5)
LYMPHOCYTES NFR BLD: 0.85 K/UL (ref 1–4.8)
LYMPHOCYTES RELATIVE PERCENT: 11 % (ref 13–44)
MCH RBC QN AUTO: 32.1 PG (ref 26–34)
MCHC RBC AUTO-ENTMCNC: 34.8 G/DL (ref 31–37)
MCV RBC AUTO: 92.3 FL (ref 80–100)
MONOCYTES NFR BLD: 0.46 K/UL (ref 0–1)
MONOCYTES NFR BLD: 6 % (ref 5–9)
NEUTROPHILS NFR BLD: 83 % (ref 39–75)
NEUTS SEG NFR BLD: 6.07 K/UL (ref 2.1–6.5)
PLATELET # BLD AUTO: 149 K/UL (ref 140–450)
PMV BLD AUTO: 9.9 FL (ref 6–12)
POTASSIUM SERPL-SCNC: 3.6 MMOL/L (ref 3.7–5.3)
PROT SERPL-MCNC: 7.4 G/DL (ref 6.4–8.3)
RBC # BLD AUTO: 5.05 M/UL (ref 4.5–5.9)
SODIUM SERPL-SCNC: 136 MMOL/L (ref 135–144)
TROPONIN I SERPL HS-MCNC: 12 NG/L (ref 0–22)
TSH SERPL DL<=0.05 MIU/L-ACNC: 1.24 UIU/ML (ref 0.27–4.2)
WBC OTHER # BLD: 7.4 K/UL (ref 3.5–11)

## 2025-04-10 PROCEDURE — 74177 CT ABD & PELVIS W/CONTRAST: CPT

## 2025-04-10 PROCEDURE — 84484 ASSAY OF TROPONIN QUANT: CPT

## 2025-04-10 PROCEDURE — 6360000004 HC RX CONTRAST MEDICATION: Performed by: INTERNAL MEDICINE

## 2025-04-10 PROCEDURE — 80053 COMPREHEN METABOLIC PANEL: CPT

## 2025-04-10 PROCEDURE — 70470 CT HEAD/BRAIN W/O & W/DYE: CPT

## 2025-04-10 PROCEDURE — 84443 ASSAY THYROID STIM HORMONE: CPT

## 2025-04-10 PROCEDURE — 36415 COLL VENOUS BLD VENIPUNCTURE: CPT

## 2025-04-10 PROCEDURE — 85025 COMPLETE CBC W/AUTO DIFF WBC: CPT

## 2025-04-10 RX ORDER — IOPAMIDOL 755 MG/ML
100 INJECTION, SOLUTION INTRAVASCULAR
Status: COMPLETED | OUTPATIENT
Start: 2025-04-10 | End: 2025-04-10

## 2025-04-10 RX ORDER — AMIODARONE HYDROCHLORIDE 200 MG/1
200 TABLET ORAL DAILY
Qty: 30 TABLET | Refills: 11 | Status: SHIPPED | OUTPATIENT
Start: 2025-04-10

## 2025-04-10 RX ADMIN — IOPAMIDOL 100 ML: 755 INJECTION, SOLUTION INTRAVENOUS at 12:12

## 2025-04-10 NOTE — PROGRESS NOTES
Ov DR Lennon   Episode few days ago (Sunday)  Dizziness felt like passing out  Confused   Weak and sob  Started 4 days ago   Corbin ankle edema   Increase lasix lost  10lbs in last couple   Days.  Having chills.    Suppose to have   Surgery for kidney stone  Cancelled   Rescheduled for May 16    Bedside echo done  Scheduled for sleep study  On 4/22    Will do labs   And CT chest abd pelvis  And ct brain w     Returned to office   Reviewed testing with pt     Will stop cozaar   Start amiodarone 200 mg daily  Take bp daily different times   Of day call Monday with bp log    Will see pt on [facility]. 4/16  With cbc,cmp

## 2025-04-10 NOTE — TELEPHONE ENCOUNTER
Received a call from Radiology stating that they have a patient who was requesting to speak with the clinic. Once arriving, River was sitting in the lobby with his wife. States he started retaining water and feeling very poorly on Sunday. States because of this his Lithotripsy scheduled for 4/15 was rescheduled for 5/13. River confirms that he has not held any Coumadin or started his Lovenox bridge. Today would have been his first day of holding warfarin. INR on 4/2 was 2.3. Patient has gone 6 weeks between visits for his last 4 appointments so will move his next appt from 4/22 to 5/7 to provide a new bridging calendar. He is to continue 7.5 mg Last/W, 5 mg all other days of the week.   Anthony Francois, PharmD 4/10/2025 11:48 AM

## 2025-04-14 ENCOUNTER — TELEPHONE (OUTPATIENT)
Dept: CARDIOLOGY CLINIC | Age: 70
End: 2025-04-14

## 2025-04-14 ENCOUNTER — HOSPITAL ENCOUNTER (OUTPATIENT)
Age: 70
Discharge: HOME | End: 2025-04-14
Payer: MEDICARE

## 2025-04-14 DIAGNOSIS — R07.9 CHEST PAIN, UNSPECIFIED TYPE: ICD-10-CM

## 2025-04-14 DIAGNOSIS — R10.9 ABDOMINAL PAIN, UNSPECIFIED ABDOMINAL LOCATION: ICD-10-CM

## 2025-04-14 DIAGNOSIS — R06.02 SOB (SHORTNESS OF BREATH): ICD-10-CM

## 2025-04-14 DIAGNOSIS — E78.5 HYPERLIPIDEMIA, UNSPECIFIED HYPERLIPIDEMIA TYPE: ICD-10-CM

## 2025-04-14 DIAGNOSIS — R19.00 ABDOMINAL SWELLING: ICD-10-CM

## 2025-04-14 DIAGNOSIS — I95.9 HYPOTENSION, UNSPECIFIED HYPOTENSION TYPE: ICD-10-CM

## 2025-04-14 DIAGNOSIS — R68.83 CHILLS: ICD-10-CM

## 2025-04-14 DIAGNOSIS — I10 ESSENTIAL HYPERTENSION: ICD-10-CM

## 2025-04-14 DIAGNOSIS — R68.83 CHILLS: Primary | ICD-10-CM

## 2025-04-14 DIAGNOSIS — I48.91 ATRIAL FIBRILLATION, UNSPECIFIED TYPE (HCC): ICD-10-CM

## 2025-04-14 DIAGNOSIS — R53.1 WEAKNESS: ICD-10-CM

## 2025-04-14 DIAGNOSIS — R41.0 CONFUSED: ICD-10-CM

## 2025-04-14 LAB
-: NORMAL
ALBUMIN SERPL-MCNC: 3.1 G/DL (ref 3.5–5.2)
ALBUMIN/GLOB SERPL: 0.9 {RATIO} (ref 1–2.5)
ALP SERPL-CCNC: 87 U/L (ref 40–129)
ALT SERPL-CCNC: 47 U/L (ref 5–41)
ANION GAP SERPL CALCULATED.3IONS-SCNC: 18 MMOL/L (ref 9–17)
AST SERPL-CCNC: 67 U/L
BASOPHILS # BLD: 0.01 K/UL (ref 0–0.2)
BASOPHILS NFR BLD: 0 % (ref 0–2)
BILIRUB SERPL-MCNC: 1 MG/DL (ref 0.3–1.2)
BILIRUB UR QL STRIP: NEGATIVE
BUN SERPL-MCNC: 72 MG/DL (ref 8–23)
CALCIUM SERPL-MCNC: 9 MG/DL (ref 8.6–10.4)
CHLORIDE SERPL-SCNC: 94 MMOL/L (ref 98–107)
CLARITY UR: CLEAR
CO2 SERPL-SCNC: 21 MMOL/L (ref 20–31)
COLOR UR: YELLOW
COMMENT: ABNORMAL
CREAT SERPL-MCNC: 1.7 MG/DL (ref 0.7–1.2)
EOSINOPHIL # BLD: 0.02 K/UL (ref 0–0.4)
EOSINOPHILS RELATIVE PERCENT: 0 % (ref 0–5)
EPI CELLS #/AREA URNS HPF: NORMAL /HPF
ERYTHROCYTE [DISTWIDTH] IN BLOOD BY AUTOMATED COUNT: 13.9 % (ref 12.1–15.2)
GFR, ESTIMATED: 43 ML/MIN/1.73M2
GLUCOSE SERPL-MCNC: 151 MG/DL (ref 70–99)
GLUCOSE UR STRIP-MCNC: NEGATIVE MG/DL
HCT VFR BLD AUTO: 41.5 % (ref 41–53)
HGB BLD-MCNC: 14.4 G/DL (ref 13.5–17.5)
HGB UR QL STRIP.AUTO: ABNORMAL
IMM GRANULOCYTES # BLD AUTO: 0.08 K/UL (ref 0–0.3)
IMM GRANULOCYTES NFR BLD: 0 % (ref 0–5)
KETONES UR STRIP-MCNC: NEGATIVE MG/DL
LEUKOCYTE ESTERASE UR QL STRIP: NEGATIVE
LYMPHOCYTES NFR BLD: 1.59 K/UL (ref 1–4.8)
LYMPHOCYTES RELATIVE PERCENT: 8 % (ref 13–44)
MCH RBC QN AUTO: 31.4 PG (ref 26–34)
MCHC RBC AUTO-ENTMCNC: 34.7 G/DL (ref 31–37)
MCV RBC AUTO: 90.6 FL (ref 80–100)
MONOCYTES NFR BLD: 1.72 K/UL (ref 0–1)
MONOCYTES NFR BLD: 9 % (ref 5–9)
NEUTROPHILS NFR BLD: 82 % (ref 39–75)
NEUTS SEG NFR BLD: 16.04 K/UL (ref 2.1–6.5)
NITRITE UR QL STRIP: NEGATIVE
PH UR STRIP: 5 [PH] (ref 5–8)
PLATELET # BLD AUTO: 199 K/UL (ref 140–450)
PMV BLD AUTO: 11.3 FL (ref 6–12)
POTASSIUM SERPL-SCNC: 3.6 MMOL/L (ref 3.7–5.3)
PROT SERPL-MCNC: 6.7 G/DL (ref 6.4–8.3)
PROT UR STRIP-MCNC: ABNORMAL MG/DL
RBC # BLD AUTO: 4.58 M/UL (ref 4.5–5.9)
RBC #/AREA URNS HPF: NORMAL /HPF (ref 0–2)
SODIUM SERPL-SCNC: 133 MMOL/L (ref 135–144)
SP GR UR STRIP: 1.01 (ref 1–1.03)
UROBILINOGEN UR STRIP-ACNC: NORMAL EU/DL (ref 0–1)
WBC #/AREA URNS HPF: NORMAL /HPF
WBC OTHER # BLD: 19.5 K/UL (ref 3.5–11)

## 2025-04-14 PROCEDURE — 87205 SMEAR GRAM STAIN: CPT

## 2025-04-14 PROCEDURE — 80053 COMPREHEN METABOLIC PANEL: CPT

## 2025-04-14 PROCEDURE — 87154 CUL TYP ID BLD PTHGN 6+ TRGT: CPT

## 2025-04-14 PROCEDURE — 36415 COLL VENOUS BLD VENIPUNCTURE: CPT

## 2025-04-14 PROCEDURE — 85025 COMPLETE CBC W/AUTO DIFF WBC: CPT

## 2025-04-14 PROCEDURE — 87040 BLOOD CULTURE FOR BACTERIA: CPT

## 2025-04-14 PROCEDURE — 81001 URINALYSIS AUTO W/SCOPE: CPT

## 2025-04-14 PROCEDURE — 87077 CULTURE AEROBIC IDENTIFY: CPT

## 2025-04-14 PROCEDURE — 87186 SC STD MICRODIL/AGAR DIL: CPT

## 2025-04-14 RX ORDER — FUROSEMIDE 20 MG/1
20 TABLET ORAL DAILY
Qty: 90 TABLET | Refills: 1 | Status: SHIPPED
Start: 2025-04-14

## 2025-04-14 NOTE — TELEPHONE ENCOUNTER
Pt called with bp log   Reviewed on phone with  DR Lennon   Will decrease lasix to 20 mg daily  Do u/a and culture  With 2 blood cultures   Today   Pt called informed   Still very weak still  Having some chills.   Will do testing today

## 2025-04-15 NOTE — ED PROVIDER NOTES
EMERGENCY DEPARTMENT ENCOUNTER      CHIEF COMPLAINT    Chief Complaint   Patient presents with    Shortness of Breath     Patient arrives to the Er via [facility] for SOB x2wks. Christy is on 3L chronic. Patient has Hx of COPD and A-FIB       HPI    River Buckley is a 69 y.o. male who presentsto ED via EMS with shortness of breath.  Patient has history of CHF.  Patient has history of atrial fibrillation.  Patient was started on amiodarone 3 days ago..  Patient has been progressively getting short of breath for the past couple weeks.  Patient is on 3 L of oxygen by nasal cannula at home.  Patient also has a history of diabetes and obesity.  Patient presents to ED acutely dyspneic with moderate labored respirations  PAST MEDICAL HISTORY    Past Medical History:   Diagnosis Date    Diabetes mellitus (HCC)     Hernia cerebri (HCC)     Hyperlipidemia     Hypertension     Obesity (BMI 30-39.9)     Obstructive sleep apnea     on bipap at home    Prostate disease        SURGICAL HISTORY    Past Surgical History:   Procedure Laterality Date    CARDIAC CATHETERIZATION Left 07/23/2021    Dr. Lennon @ [facility]--Refer to Dr. Roger HAND    COLONOSCOPY  08/2012    Kendra SAUL    COLONOSCOPY  02/12/2015    Nikolas SAUL; Colon polyps x2, sigmoid diverticulosis, internal and external hemorrhoids    COLONOSCOPY  03/01/2019    Dr. Cabrera -- screening; no bx/polyps    COLONOSCOPY N/A 03/01/2019    COLORECTAL CANCER SCREENING performed by Anabell Cabrera DO at [facility] OR    CYSTOSCOPY Left 08/07/2018    with stent per Dr. Adams    CYSTOSCOPY Left 7/30/2024    CYSTOSCOPY URETEROSCOPY LASER-THULIUM LITHORIPSY LASER performed by Marito Adams MD at [facility] OR    CYSTOSCOPY Left 7/30/2024    CYSTOSCOPY URETERAL STENT INSERTION - PLACEMENT / EXCHANGE performed by Mraito Adams MD at [facility] OR    HERNIA REPAIR      umbilical hernia    LITHOTRIPSY  07/30/2024    left side    [place] CYSTO W/INSERT URETERAL STENT Left 08/07/2018    CYSTOSCOPY   STENT INSERTION-LEFT performed by Marito Adams MD at [facility] OR    ND OFFICE/OUTPT VISIT,PROCEDURE ONLY Left 08/23/2018    CYSTOSCOPY URETEROSCOPY- LEFT HLL, STENT EXCHANGE performed by Marito Adams MD at [facility] OR       CURRENT MEDICATIONS    Current Outpatient Rx   Medication Sig Dispense Refill    furosemide (LASIX) 20 MG tablet Take 1 tablet by mouth daily 90 tablet 1    amiodarone (CORDARONE) 200 MG tablet Take 1 tablet by mouth daily 30 tablet 11    diclofenac sodium (VOLTAREN) 1 % GEL Apply 4 g topically 4 times daily as needed for Pain (R knee pain) 100 g 2    tamsulosin (FLOMAX) 0.4 MG capsule Take 1 capsule by mouth daily 90 capsule 0    dilTIAZem (CARDIZEM CD) 300 MG extended release capsule TAKE 1 CAPSULE BY MOUTH DAILY 30 capsule 11    rosuvastatin (CRESTOR) 40 MG tablet TAKE 1 TABLET BY MOUTH EVERY EVENING 90 tablet 1    metFORMIN (GLUCOPHAGE) 1000 MG tablet TAKE 1 TABLET BY MOUTH TWICE DAILY WITH MEALS 180 tablet 1    metoprolol succinate (TOPROL XL) 25 MG extended release tablet TAKE 1 TABLET BY MOUTH EVERY DAY 90 tablet 1    glimepiride (AMARYL) 4 MG tablet Take 1 tablet by mouth 2 times daily (with meals) 180 tablet 1    warfarin (COUMADIN) 5 MG tablet TAKE 1 & 1/2 TABLETS BY MOUTH EVERY SUNDAY AND WEDNESDAY AND TAKE 1 TABLET ALL OTHER DAYS or AS DIRECTED by Sharifa Ocampo Medication Management 103 tablet 3    albuterol sulfate HFA (VENTOLIN HFA) 108 (90 Base) MCG/ACT inhaler Inhale 2 puffs into the lungs 4 times daily as needed for Wheezing 54 g 1    oxyBUTYnin (DITROPAN-XL) 10 MG extended release tablet Take 1 tablet by mouth every evening 30 tablet 11    CONTOUR NEXT TEST strip 100 each by Other route daily 100 each 3    vitamin D (CHOLECALCIFEROL) 50 MCG (2000 UT) TABS tablet Take 1 tablet by mouth daily      Blood Glucose Monitoring Suppl (CONTOUR NEXT MONITOR) w/Device KIT TEST TWICE A DAY BEFORE BREAKFAST AND DINNER      Lancets MISC       aspirin 81 MG EC tablet Take 1 tablet by mouth

## 2025-04-15 NOTE — ED NOTES
Adult Non-Invasive Positive Pressure Ventilation for Acute Respiratory Distress  Patient & Family Education Note     Patient: River Buckley  Age: 69 y.o.     The patient and/or family has been educated on the following items and have verbalized understanding and agreement:    [x]Patient and/or family have been educated on the purpose and advantages of NIV and have verbalized understanding and agreement.  [x]Patient and/or family is in agreement that the patient will be NPO (Nothing by Mouth) for the duration of NIV use.  [x]Patient and/or  family is in agreement that NIV will not be routinely disrupted except to complete oral care.  [x]Patient and/or family have been educated on the level of care, vital signs frequency and monitoring requirements for NIV and are in agreement.  [x]Patient and/or family have been educated on reporting any nausea, chest discomfort, sudden increase in shortness of breath, or a severe headache to the staff immediately.

## 2025-04-15 NOTE — ED NOTES
Patient stated that he did not want intubation if it came to it and he does not want compressions to be done if it comes to it. At this time ANNEMARIE Mckeon with witness of Annemarie Traore explained what a DNR-CCA is and at that time patient stated he wanted to change his code status to a DNR-CCA. Physician aware and paperwork signed and DNR-CCA order placed.

## 2025-04-17 RX ORDER — TAMSULOSIN HYDROCHLORIDE 0.4 MG/1
0.4 CAPSULE ORAL DAILY
Qty: 90 CAPSULE | Refills: 0 | Status: SHIPPED | OUTPATIENT
Start: 2025-04-17

## 2025-04-17 NOTE — TELEPHONE ENCOUNTER
Last appt   6/14/2024   Next appt   09/25/2025    Medication is active on current medication list.

## 2025-04-18 NOTE — TELEPHONE ENCOUNTER
Patient is currently at the [facility] on ECMO.    Please cancel his upcoming surgery.  Make an appointment in 2 months to reevaluate him.

## (undated) DEVICE — SOLUTION IRRIG 1000ML 0.9% SOD CHL USP POUR PLAS BTL

## (undated) DEVICE — SOLUTION SURG PREP CHLORHEXIDINE GLUC 4% 8 OZ EXIDINE

## (undated) DEVICE — SOLUTION IRRIG 1000ML STRL H2O USP PLAS POUR BTL

## (undated) DEVICE — Z DISCONTINUED BY MEDLINE USE 2711682 TRAY SKIN PREP DRY W/ PREM GLV

## (undated) DEVICE — GOWN,AURORA,NON-REINFORCED,2XL: Brand: MEDLINE

## (undated) DEVICE — DUAL LUMEN URETERAL CATHETER

## (undated) DEVICE — ADAPTER URO SCP UROLOK LL

## (undated) DEVICE — Z DISCONTINUED USE 2624853 GLOVE SURG SZ 75 L12IN THK91MIL BRN LTX FREE

## (undated) DEVICE — SOLUTION IV IRRIG WATER 1000ML POUR BRL 2F7114

## (undated) DEVICE — SOLUTION SURG PREP ANTIMICROBIAL 4 OZ SKIN WND EXIDINE

## (undated) DEVICE — Z INACTIVE USE 2660664 SOLUTION IRRIG 3000ML 0.9% SOD CHL USP UROMATIC PLAS CONT

## (undated) DEVICE — SPONGE GZ W4XL4IN CELOS POSTOP AVANT

## (undated) DEVICE — 3M™ STERI-STRIP™ REINFORCED ADHESIVE SKIN CLOSURES, R1547, 1/2 IN X 4 IN (12 MM X 100 MM), 6 STRIPS/ENVELOPE: Brand: 3M™ STERI-STRIP™

## (undated) DEVICE — GLOVE ORANGE PI 7 1/2   MSG9075

## (undated) DEVICE — Device

## (undated) DEVICE — SOLUTION IRRIG 3000ML 0.9% SOD CHL USP UROMATIC PLAS CONT

## (undated) DEVICE — Z DUP USE 2139333 GUIDEWIRE UROLOGICAL STR STD 0.035 IN X150 CM REG ZIPWIRE LF

## (undated) DEVICE — SYRINGE MED 20ML STD CLR PLAS LUERLOCK TIP N CTRL DISP

## (undated) DEVICE — MEDI-VAC NON-CONDUCTIVE TUBING7MM X 30.5 (100FT): Brand: CARDINAL HEALTH

## (undated) DEVICE — SOLUTION IV IRRIG POUR BRL 0.9% SODIUM CHL 2F7124

## (undated) DEVICE — 4-PORT MANIFOLD: Brand: NEPTUNE 2

## (undated) DEVICE — GUIDEWIRE VASC NITINOL HYDROPHIL STR 3 CM 0.035 INX150 CM STD NIT ZIPWIRE

## (undated) DEVICE — SWAB MEDICATED TINC BENZ

## (undated) DEVICE — CANNULA ORAL NSL AD CO2 N INTUB O2 DEL DISP TRU LNK

## (undated) DEVICE — SINGLE ACTION PUMPING SYSTEM

## (undated) DEVICE — MEDI-VAC NON-CONDUCTIVE SUCTION TUBING 6MM X 6.1M (20 FT.) L: Brand: CARDINAL HEALTH

## (undated) DEVICE — FIBER THULIO PERFORMANCE 270 MICRON SLIM

## (undated) DEVICE — CYSTOSCOPY PACK: Brand: CONVERTORS

## (undated) DEVICE — MERCY TIFFIN CYSTO-LF: Brand: MEDLINE INDUSTRIES, INC.